# Patient Record
Sex: MALE | Race: WHITE | Employment: OTHER | URBAN - METROPOLITAN AREA
[De-identification: names, ages, dates, MRNs, and addresses within clinical notes are randomized per-mention and may not be internally consistent; named-entity substitution may affect disease eponyms.]

---

## 2021-01-06 ENCOUNTER — HOSPITAL ENCOUNTER (EMERGENCY)
Age: 76
Discharge: HOME | End: 2021-01-06
Attending: EMERGENCY MEDICINE
Payer: MEDICARE

## 2021-01-06 VITALS
WEIGHT: 214 LBS | HEART RATE: 73 BPM | DIASTOLIC BLOOD PRESSURE: 85 MMHG | SYSTOLIC BLOOD PRESSURE: 133 MMHG | RESPIRATION RATE: 18 BRPM | TEMPERATURE: 98.6 F | OXYGEN SATURATION: 97 %

## 2021-01-06 DIAGNOSIS — I48.91 ATRIAL FIBRILLATION, UNSPECIFIED TYPE (HCC): Primary | ICD-10-CM

## 2021-01-06 LAB
ANION GAP SERPL CALCULATED.3IONS-SCNC: 13 MMOL/L (ref 3–16)
BASOPHILS ABSOLUTE: 0.1 K/UL (ref 0–0.2)
BASOPHILS RELATIVE PERCENT: 0.8 %
BUN BLDV-MCNC: 26 MG/DL (ref 7–20)
CALCIUM SERPL-MCNC: 9.8 MG/DL (ref 8.3–10.6)
CHLORIDE BLD-SCNC: 97 MMOL/L (ref 99–110)
CO2: 26 MMOL/L (ref 21–32)
CREAT SERPL-MCNC: 1.1 MG/DL (ref 0.8–1.3)
EOSINOPHILS ABSOLUTE: 0.1 K/UL (ref 0–0.6)
EOSINOPHILS RELATIVE PERCENT: 1.8 %
GFR AFRICAN AMERICAN: >60
GFR NON-AFRICAN AMERICAN: >60
GLUCOSE BLD-MCNC: 237 MG/DL (ref 70–99)
HCT VFR BLD CALC: 45.1 % (ref 40.5–52.5)
HEMOGLOBIN: 15.6 G/DL (ref 13.5–17.5)
INR BLD: 1.08 (ref 0.86–1.14)
LYMPHOCYTES ABSOLUTE: 1.6 K/UL (ref 1–5.1)
LYMPHOCYTES RELATIVE PERCENT: 22.5 %
MCH RBC QN AUTO: 30.6 PG (ref 26–34)
MCHC RBC AUTO-ENTMCNC: 34.6 G/DL (ref 31–36)
MCV RBC AUTO: 88.6 FL (ref 80–100)
MONOCYTES ABSOLUTE: 0.6 K/UL (ref 0–1.3)
MONOCYTES RELATIVE PERCENT: 8.7 %
NEUTROPHILS ABSOLUTE: 4.6 K/UL (ref 1.7–7.7)
NEUTROPHILS RELATIVE PERCENT: 66.2 %
PDW BLD-RTO: 14 % (ref 12.4–15.4)
PLATELET # BLD: 223 K/UL (ref 135–450)
PMV BLD AUTO: 8.5 FL (ref 5–10.5)
POTASSIUM REFLEX MAGNESIUM: 3.7 MMOL/L (ref 3.5–5.1)
PROTHROMBIN TIME: 12.5 SEC (ref 10–13.2)
RBC # BLD: 5.1 M/UL (ref 4.2–5.9)
SODIUM BLD-SCNC: 136 MMOL/L (ref 136–145)
TROPONIN: <0.01 NG/ML
WBC # BLD: 6.9 K/UL (ref 4–11)

## 2021-01-06 PROCEDURE — 85025 COMPLETE CBC W/AUTO DIFF WBC: CPT

## 2021-01-06 PROCEDURE — 85610 PROTHROMBIN TIME: CPT

## 2021-01-06 PROCEDURE — 99284 EMERGENCY DEPT VISIT MOD MDM: CPT

## 2021-01-06 PROCEDURE — 84484 ASSAY OF TROPONIN QUANT: CPT

## 2021-01-06 PROCEDURE — 80048 BASIC METABOLIC PNL TOTAL CA: CPT

## 2021-01-06 PROCEDURE — 93005 ELECTROCARDIOGRAM TRACING: CPT | Performed by: STUDENT IN AN ORGANIZED HEALTH CARE EDUCATION/TRAINING PROGRAM

## 2021-01-06 PROCEDURE — 6370000000 HC RX 637 (ALT 250 FOR IP): Performed by: STUDENT IN AN ORGANIZED HEALTH CARE EDUCATION/TRAINING PROGRAM

## 2021-01-06 PROCEDURE — 84443 ASSAY THYROID STIM HORMONE: CPT

## 2021-01-06 RX ADMIN — APIXABAN 5 MG: 5 TABLET, FILM COATED ORAL at 21:42

## 2021-01-07 ENCOUNTER — TELEPHONE (OUTPATIENT)
Dept: CARDIOLOGY CLINIC | Age: 76
End: 2021-01-07

## 2021-01-07 DIAGNOSIS — I48.0 PAROXYSMAL ATRIAL FIBRILLATION (HCC): Primary | ICD-10-CM

## 2021-01-07 LAB
EKG ATRIAL RATE: 60 BPM
EKG DIAGNOSIS: NORMAL
EKG Q-T INTERVAL: 466 MS
EKG QRS DURATION: 88 MS
EKG QTC CALCULATION (BAZETT): 622 MS
EKG R AXIS: -31 DEGREES
EKG T AXIS: -61 DEGREES
EKG VENTRICULAR RATE: 107 BPM
TSH REFLEX: 1.85 UIU/ML (ref 0.27–4.2)

## 2021-01-07 NOTE — TELEPHONE ENCOUNTER
Patient called back wondering when his appt can be. I told him Lizbet Rose has been in clinic since 8 am and is still seeing pts. He hasn't worn a monitor or got an echo so I was going to schedule that and he doesn't want that. Please advise. He said he will call back at 10 am tomorrow if its not addressed.

## 2021-01-07 NOTE — TELEPHONE ENCOUNTER
LVM offering new pt OV with UL on 1/21 at 1600. Asked pt to call back to confirm. Also, advised pt that UL would like an echo prior to OV as this will help guide his treatment plan. He does not need a monitor as he had documented AF per ECG. When he calls back to confirm OV, please offer to schedule echo at [place] (close to his home), if he is agreeable.

## 2021-01-07 NOTE — ED TRIAGE NOTES
Patient presents with complaint of new onset atrial fibrillation. States he went for a routine colonoscopy and was found to be in atrial fibrillation. States his PCP advised him to been seen in the ED. He denies any respiratory difficulty or pain at this time.

## 2021-01-07 NOTE — TELEPHONE ENCOUNTER
The patient was referred to Dr. Gabrielle Heart by [facility] for new onset atrial fibrillation. The patient has not had any testing done and has not wore a monitor. The patient did not want to schedule an appointment for April, he states that is to far out. Please call the patient at [phone] to advise.

## 2021-01-07 NOTE — ED PROVIDER NOTES
[facility] RESIDENT [facility]       Date of evaluation: 1/6/2021    Chief Complaint     Heart Problem      of Present Illness     [place] is a 70 y.o. male with PMH significant for HTN, diabetes who presents for evaluation of new onset afib. Patient notes that he was at a regularly scheduled screening colonoscopy today when they noted that he was in atrial fibrillation. The colonoscopy was aborted, patient instructed to come to the emergency department for evaluation. He notes a history of PVCs approximately 20 years ago, followed up with stress test which was normal.  At the time he had multiple stressors in his life that he says was a cause of this. Here he is asymptomatic without chest pain, shortness of breath, palpitations, lightheadedness. Denies fevers, chills, congestion, sore throat, double vision, abdominal pain, constipation, diarrhea, dysuria, frequency, urgency, falls, rash, sensory deficits, weakness, headache. Other than stated above, no additional aggravating or alleviating factors are noted. Review of Systems     All other systems are negative except as mentioned in HPI. Past Medical, Surgical, Family, and Social History     He has no past medical history on file. He has no past surgical history on file. His family history is not on file. He reports that he has never smoked. He has never used smokeless tobacco.    Medications     There are no discharge medications for this patient. Allergies     He is allergic to sulfa antibiotics. Physical Exam     INITIAL VITALS: BP: 137/82, Temp: 98.6 °F (37 °C), Pulse: 67, Resp: 16, SpO2: 98 %   Physical Exam    General:  Well appearing. No acute distress  Eyes:  Pupils reactive. No discharge from eyes   ENT:  No discharge from nose. OP clear  Neck:  Supple, trachea midline  Pulmonary:   Non-labored breathing.  Breath sounds clear bilaterally  Cardiac:  Regular rate and rhythm. No murmurs  Abdomen:  Soft. Non-tender. Non-distended  Musculoskeletal:  No long bone deformity. No CVA tenderness  Vascular:  Extremities warm and perfused. Skin:  Dry, no rashes  Extremities:  No peripheral edema  Neuro:  Alert. Moves all four extremities to command. No focal deficit  Neuro:  Alert and oriented x 4. CN II-XII intact. 5/5 strength in all 4 extremities. Sensation grossly intact to light touch.  Speech and mentation normal.  Gait narrow and stable    DiagnosticResults     EKG   Interpreted in conjunction with emergencydepartment physician No att. providers found  Rhythm: atrial fibrillation  Rate: 110-120  Axis: normal  Ectopy: premature ventricular contractions (unifocal)  Conduction: normal  ST Segments: no acute change  T Waves:no acute change  Q Waves: none  Clinical Impression: no acute changes  Comparison:  No previous    RADIOLOGY:  No orders to display       LABS:   Results for orders placed or performed during the hospital encounter of 01/06/21   CBC Auto Differential   Result Value Ref Range    WBC 6.9 4.0 - 11.0 K/uL    RBC 5.10 4.20 - 5.90 M/uL    Hemoglobin 15.6 13.5 - 17.5 g/dL    Hematocrit 45.1 40.5 - 52.5 %    MCV 88.6 80.0 - 100.0 fL    MCH 30.6 26.0 - 34.0 pg    MCHC 34.6 31.0 - 36.0 g/dL    RDW 14.0 12.4 - 15.4 %    Platelets 747 770 - 913 K/uL    MPV 8.5 5.0 - 10.5 fL    Neutrophils % 66.2 %    Lymphocytes % 22.5 %    Monocytes % 8.7 %    Eosinophils % 1.8 %    Basophils % 0.8 %    Neutrophils Absolute 4.6 1.7 - 7.7 K/uL    Lymphocytes Absolute 1.6 1.0 - 5.1 K/uL    Monocytes Absolute 0.6 0.0 - 1.3 K/uL    Eosinophils Absolute 0.1 0.0 - 0.6 K/uL    Basophils Absolute 0.1 0.0 - 0.2 K/uL   Basic Metabolic Panel w/ Reflex to MG   Result Value Ref Range    Sodium 136 136 - 145 mmol/L    Potassium reflex Magnesium 3.7 3.5 - 5.1 mmol/L    Chloride 97 (L) 99 - 110 mmol/L    CO2 26 21 - 32 mmol/L    Anion Gap 13 3 - 16    Glucose 237 (H) 70 - 99 mg/dL    BUN 26 (H) 7 - 20 mg/dL CREATININE 1.1 0.8 - 1.3 mg/dL    GFR Non-African American >60 >60    GFR African American >60 >60    Calcium 9.8 8.3 - 10.6 mg/dL   Protime-INR   Result Value Ref Range    Protime 12.5 10.0 - 13.2 sec    INR 1.08 0.86 - 1.14   Troponin   Result Value Ref Range    Troponin <0.01 <0.01 ng/mL       ED BEDSIDE ULTRASOUND:      RECENT VITALS:  BP: 133/85, Temp: 98.6 °F (37 °C), Pulse: 73,Resp: 18, SpO2: 97 %     Procedures         ED Course     Nursing Notes, Past Medical Hx, Past Surgical Hx, Social Hx, Allergies, and Family Hx were reviewed. The patient was given the following medications:  Orders Placed This Encounter   Medications    apixaban (ELIQUIS) tablet 5 mg       CONSULTS:  [address] [place] [address],[address] / ASSESSMENT / Gino James is a 70 y.o. male with a history and presentation as described above in HPI. The patient was evaluated by myself and the ED Attending Physician, Dr. Ej Ly. All management and disposition plans were discussed and agreed upon. Upon presentation, the patient was well-appearing, afebrile and hemodynamically stable. Patient is asymptomatic currently in rate controlled atrial fibrillation, noted to be with rates in the 90s to 100s. CBC without leukocytosis or anemia present. BMP without sodium/potassium abnormalities or EDER present. Troponin negative. Coags wnl. TSH with reflex sent, pending at dispo. Patient without any history or physical exam findings consistent with hyperthyroidism. Cardiology was consulted, case discussed, recommended anticoagulation here, follow-up with Dr. Augustine Edmonds. Patient with [address] vasc score of 3, HAS BLED score of 1. Eliquis 5 mg administered. I discussed the diagnosis of atrial fibrillation with the patient noted that this may not be new onset, could be chronic and just discovered. I instructed the patient to follow-up with Dr. Augustine Edmonds also with PCP.     Medications received during this ED visit:  Medications   apixaban (ELIQUIS) tablet 5 mg (5 mg Oral Given 1/6/21 3872)       At this time, the patient was deemed appropriate for discharge. My customary discharge instructions, including strict return precautions for new or worsening symptoms concerning to the patient, were provided. All of patient's questions were answered satisfactorily, and was subsequently sent home in stable condition. This patient was also evaluated by the attending physician. All care plans were discussed and agreed upon. Clinical Impression     1. Atrial fibrillation, unspecified type [facility])        Disposition     PATIENT REFERRED TO:  Eyal Mcdowell MD  [address] [address]  [phone]    Schedule an appointment as soon as possible for a visit       Reinaldo Salamanca MD  [address] [place] [address]  [phone]    Schedule an appointment as soon as possible for a visit         DISCHARGE MEDICATIONS:  There are no discharge medications for this patient. DISPOSITION Decision To Discharge 01/06/2021 09:11:16 PM  1. The patient is to be discharged home in stable/improved condition. 2. Workup, treatment and diagnosis were discussed with the patient and/or family members; the patient agrees to the plan and all questions were addressed and answered. 3. The patient is instructed to return to the emergency department should their symptoms worsen or any concern the patient believes warrants acute physician evaluation.         Mian Thompson MD  Resident  01/06/21 9407

## 2021-01-12 ENCOUNTER — HOSPITAL ENCOUNTER (OUTPATIENT)
Dept: NON INVASIVE DIAGNOSTICS | Age: 76
Discharge: HOME | End: 2021-01-12
Payer: MEDICARE

## 2021-01-12 DIAGNOSIS — I48.0 PAROXYSMAL ATRIAL FIBRILLATION (HCC): ICD-10-CM

## 2021-01-12 LAB
LV EF: 58 %
LVEF MODALITY: NORMAL

## 2021-01-12 PROCEDURE — 93306 TTE W/DOPPLER COMPLETE: CPT

## 2021-01-19 NOTE — PROGRESS NOTES
[facility]   Cardiac Electrophysiology Consultation   Date: 1/21/2021  Reason for Consultation:  AF  Consult Requesting Physician: No att. providers found     Chief Complaint:   Chief Complaint   Patient presents with    Atrial Fibrillation    New Patient      HPI: Mario Dumont is a 70 y.o. male referred by ED physician for AF. PMH significant for HTN, DM type II, and PVC's about 20 years with normal stress test and thought to be related to stress. On 1/6/21, pt was having a routine colonoscopy and found to be in AF. Colonoscopy was aborted and he was sent to the ED. ECG in the ED confirmed AF, rate 107. The pt was asymptomatic without palpitations, SOB, or CP. He was sent home and started on Eliquis. Echo (1/12/21) showed preserved LVEF of 55-60%, intermediate diastolic dysfunction, and LA enlargement. Interval History: Today, he is here for management of new onset AF. He is in AF today with rate 101 bpm.  He complains of fatigue, MAIER, palpitations, chest discomfort, and decline in functional capacity. He also confirms occasional dizziness, especially with postural changes. He has noticed these symptoms since 7/2020 when he was having trouble walk up a hill while fishing with his grandson. He just thought he was out of shape. He is the  at a [employer] and struggles walking from one end of the building to the other due to fatigue. He has never been tested for sleep apnea, but he's been told he snores. In the past, he worked for [employer]. Atrial Fibrillation:    BMI    :   Body mass index is 31.96 kg/m².     Duration   :   1/2021    Symptoms   :   Shortness of breath, palpitations, easy fatigability, dyspnea on exertion, decline in his functional capacity    Previous DCCV :  none    Previous AAD           :   none    Beta blocker  :   Lopressor    ACE / ARB  :   losartan    OAC   :   Eliquis    LVEF    :   55-60%    Left atrial size :   5.0 cm SURAJ   :   Not tested    Past Medical History:   Diagnosis Date    Allergic reaction to sulfonamide 1960    Bee sting reaction 2000    Cephalexin adverse reaction 1993    Fracture of left elbow 1956    History of right inguinal hernia repair 1987    Hidalgo's neuroma of right foot 1989    Open fracture of left elbow 1991    Open fracture of left humerus 1991    Rupture of appendix 1970        Past Surgical History:   Procedure Laterality Date    TONSILLECTOMY  1953       Allergies: Allergies   Allergen Reactions    Bee Venom Swelling    Cephalexin Swelling    Cephalosporins     Propoxyphene     Sulfa Antibiotics        Medication:   Prior to Admission medications    Medication Sig Start Date End Date Taking? Authorizing Provider   amLODIPine (NORVASC) 10 MG tablet Take 1 tablet by mouth every morning 11/4/20  Yes Historical Provider, MD   atorvastatin (LIPITOR) 40 MG tablet Take 40 mg by mouth nightly 11/4/20  Yes Historical Provider, MD   ciclopirox (LOPROX) 0.77 % cream Apply topically every morning 11/4/20  Yes Historical Provider, MD   cloNIDine (CATAPRES) 0.2 MG tablet Take 0.2 mg by mouth 2 times daily 11/4/20  Yes Historical Provider, MD   enalapril (VASOTEC) 10 MG tablet Take 10 mg by mouth 2 times daily 11/4/20  Yes Historical Provider, MD   fenofibrate (TRIGLIDE) 160 MG tablet Take 160 mg by mouth every evening 11/4/20  Yes Historical Provider, MD   glimepiride (AMARYL) 2 MG tablet Take 2 mg by mouth 2 times daily 11/4/20  Yes Historical Provider, MD   losartan-hydroCHLOROthiazide (HYZAAR) 100-25 MG per tablet Take 1 tablet by mouth every evening 11/4/20  Yes Historical Provider, MD   LORazepam (ATIVAN) 1 MG tablet Take 1 mg by mouth 2 times daily.  11/4/20  Yes Historical Provider, MD   metFORMIN (GLUCOPHAGE) 1000 MG tablet Take 1,000 mg by mouth 2 times daily 11/4/20  Yes Historical Provider, MD   metoprolol tartrate (LOPRESSOR) 50 MG tablet Take 50 mg by mouth 2 times daily 11/4/20  Yes Historical Provider, MD   sAXagliptin (ONGLYZA) 5 MG TABS tablet Take 5 mg by mouth every evening 11/4/20  Yes Historical Provider, MD   PARoxetine (PAXIL) 20 MG tablet Take 20 mg by mouth every evening 11/4/20  Yes Historical Provider, MD   aspirin 81 MG EC tablet Take 81 mg by mouth daily   Yes Historical Provider, MD   apixaban (ELIQUIS) 2.5 MG TABS tablet Take 2.5 mg by mouth 2 times daily 1/11/21  Yes Historical Provider, MD   blood glucose test strips (TRUETEST TEST) strip 1 strip 2 times daily 4/2/20  Yes Historical Provider, MD   Lancets MISC Use to check Glucose BID. Dx: 250.00. Dispense Contour Lancets 4/2/20  Yes Historical Provider, MD   aspirin-acetaminophen-caffeine (MIGRAINE FORMULA) 768-056-44 MG per tablet Take 1 tablet by mouth 2 times daily as needed for Headaches   Yes Historical Provider, MD       Social History:   reports that he has never smoked. He has never used smokeless tobacco.        Family History:  family history is not on file. Reviewed. Denies family history of sudden cardiac death, arrhythmia, premature CAD    Review of System:    · General ROS: negative for - chills, fever   · Psychological ROS: negative for - anxiety or depression  · Ophthalmic ROS: negative for - eye pain or loss of vision  · ENT ROS: negative for - epistaxis, headaches, nasal discharge, sore throat   · Allergy and Immunology ROS: negative for - hives, nasal congestion   · Hematological and Lymphatic ROS: negative for - bleeding problems, blood clots, bruising or jaundice  · Endocrine ROS: negative for - skin changes, temperature intolerance or unexpected weight changes  · Respiratory ROS: negative for - cough, hemoptysis, pleuritic pain, SOB, sputum changes or wheezing  · Cardiovascular ROS: Per HPI.    · Gastrointestinal ROS: negative for - abdominal pain, blood in stools, diarrhea, hematemesis, melena, nausea/vomiting or swallowing difficulty/pain  · Genito-Urinary ROS: negative for - dysuria or incontinence · Musculoskeletal ROS: negative for - joint swelling or muscle pain  · Neurological ROS: negative for - confusion, dizziness, gait disturbance, headaches, numbness/tingling, seizures, speech problems, tremors, visual changes or weakness  · Dermatological ROS: negative for - rash    Physical Examination:  Vitals:    01/21/21 1552   BP: 124/86   Pulse: 101   Temp: 97.5 °F (36.4 °C)       · Constitutional: Oriented. No distress. · Head: Normocephalic and atraumatic. · Mouth/Throat: Oropharynx is clear and moist.   · Eyes: Conjunctivae normal. EOM are normal.   · Neck: Normal range of motion. Neck supple. No rigidity. No JVD present. · Cardiovascular: Normal rate, regular rhythm, S1&S2 and intact distal pulses. · Pulmonary/Chest: Bilateral respiratory sounds. No wheezes. No rhonchi. · Abdominal: Soft. Bowel sounds present. No distension, No tenderness. · Musculoskeletal: No tenderness. No edema    · Lymphadenopathy: Has no cervical adenopathy. · Neurological: Alert and oriented. Cranial nerve appears intact, No Gross deficit   · Skin: Skin is warm and dry. No rash noted. · Psychiatric: Has a normal mood, affect and behavior     Labs:  Reviewed. ECG: reviewed, AF, rate 101, with QRS duration 92 ms. No pathologic Q waves, ventricular pre-excitation, or QT prolongation. Studies:   1. Event monitor:     2. Echo 1/12/21:   Summary   Normal left ventricular size. Mild concentric left ventricular hypertrophy. Normal left ventricular systolic function with an estimated ejection   fraction of 55-60%. Indeterminate diastolic function due to afib. The left atrium is moderately dilated. 3. Stress Test 2/14/02: IMPRESSION:   NO OBVIOUS SCARRING NOR ISCHEMIA.  50 PERCENT LEFT VENTRICULAR   EJECTION FRACTION. 4. Cath: The MCOT, echocardiogram, stress test, and coronary angiography/PCI were reviewed by myself and used for my plan of care. Procedures:  1.   RFA/PVI of AF Assessment/Plan:  1. Newly diagnosed AF, on Eliquis   2. HTN, stable on losartan, metoprolol, and amlodipine  3. HLD, stable on statin  4.  DM, type II     New onset AF, found incidentally during routine colonoscopy  Preserved LV EF, but with intermediate diastolic dysfunction and LAE  In AF today, rate 101, symptomatic  Currently on Eliquis 2.5 mg BID. His creat was 1.1 (1/6/21). He is not on adequate dose of Eliquis for stroke prevention. Will increase Eliquis to 5 mg BID    Discussed in detail about the risk factors, pathophysiology, and treatment options including life stye modifications for atrial fibrillation. 1. Keep your blood pressure under control. 2. Keep your blood sugar under control. 3. Eat heart healthy diet  4. Minimize alcohol intake  5. Stop smoking  6. Be active, keep your body weight optimal  7. Exercise on a regular basis  8. Manage your stress   9. If you snore, get evaluated for sleep apnea  10. Be aware of the symptoms of stroke    Due to symptomatic AF and change in atrial structure, would recommend rhythm management. We educated the patient that atrial fibrillation and atrial flutter are both progressive diseases, with more frequent episodes that will ensue. Subsequent episodes usually become more sustained to the extent that many individuals would then develop persistent atrial fibrillation/atrial flutter. Once persistence is reached, permanent atrial dysrhythmia is inevitable. Treatment options including cardioversion, anti-arrhythmic medication therapy, rate control strategy, oral anticoagulation, and atrial fibrillation ablation were discussed with patient. Risks, benefits and alternative of each treatment options were explained. We discussed different management options for both atrial tachydysrhythmia including their risks and benefits.  These options include use of cardioversion (mainly for persisting atrial fibrillation or atrial flutter) which provides an effective after atrial fibrillation ablation. Lifelong oral anticoagulation secondary to high chads vasc score. Follow up in 1 week with EP NP after the ablation  Follow up in 3 months with me    Thank you for allowing me to participate in the care of Braulio Lofton. All questions and concerns were addressed to the patient/family. Alternatives to my treatment were discussed. Dyllan Golden RN, am scribing for and in the presence of Dr. Terral Nissen. 01/21/21 4:35 PM  Karen Vaughn RN    I, Gordo Estrada MD, personally performed the services prescribed in this documentation as scribed by Ms. Karen Vaughn RN in my presence and it is both accurate and complete.        Gordo Estrada MD  Cardiac Electrophysiology  [address]

## 2021-01-21 ENCOUNTER — OFFICE VISIT (OUTPATIENT)
Dept: CARDIOLOGY CLINIC | Age: 76
End: 2021-01-21
Payer: MEDICARE

## 2021-01-21 VITALS
SYSTOLIC BLOOD PRESSURE: 124 MMHG | TEMPERATURE: 97.5 F | HEART RATE: 101 BPM | DIASTOLIC BLOOD PRESSURE: 86 MMHG | BODY MASS INDEX: 32.05 KG/M2 | HEIGHT: 69 IN | WEIGHT: 216.4 LBS

## 2021-01-21 DIAGNOSIS — I48.0 PAROXYSMAL ATRIAL FIBRILLATION (HCC): Primary | ICD-10-CM

## 2021-01-21 DIAGNOSIS — I10 ESSENTIAL HYPERTENSION: ICD-10-CM

## 2021-01-21 DIAGNOSIS — E78.2 MIXED HYPERLIPIDEMIA: ICD-10-CM

## 2021-01-21 PROCEDURE — 93000 ELECTROCARDIOGRAM COMPLETE: CPT | Performed by: INTERNAL MEDICINE

## 2021-01-21 PROCEDURE — 1123F ACP DISCUSS/DSCN MKR DOCD: CPT | Performed by: INTERNAL MEDICINE

## 2021-01-21 PROCEDURE — G8484 FLU IMMUNIZE NO ADMIN: HCPCS | Performed by: INTERNAL MEDICINE

## 2021-01-21 PROCEDURE — 1036F TOBACCO NON-USER: CPT | Performed by: INTERNAL MEDICINE

## 2021-01-21 PROCEDURE — G8417 CALC BMI ABV UP PARAM F/U: HCPCS | Performed by: INTERNAL MEDICINE

## 2021-01-21 PROCEDURE — 3017F COLORECTAL CA SCREEN DOC REV: CPT | Performed by: INTERNAL MEDICINE

## 2021-01-21 PROCEDURE — G8427 DOCREV CUR MEDS BY ELIG CLIN: HCPCS | Performed by: INTERNAL MEDICINE

## 2021-01-21 PROCEDURE — 99205 OFFICE O/P NEW HI 60 MIN: CPT | Performed by: INTERNAL MEDICINE

## 2021-01-21 PROCEDURE — 4040F PNEUMOC VAC/ADMIN/RCVD: CPT | Performed by: INTERNAL MEDICINE

## 2021-01-21 RX ORDER — LOSARTAN POTASSIUM AND HYDROCHLOROTHIAZIDE 25; 100 MG/1; MG/1
1 TABLET ORAL EVERY EVENING
COMMUNITY
Start: 2020-11-04

## 2021-01-21 RX ORDER — BIOTIN 1 MG
1 TABLET ORAL 2 TIMES DAILY
COMMUNITY
Start: 2020-04-02

## 2021-01-21 RX ORDER — FENOFIBRATE 160 MG/1
160 TABLET ORAL EVERY EVENING
COMMUNITY
Start: 2020-11-04

## 2021-01-21 RX ORDER — GLIMEPIRIDE 2 MG/1
2 TABLET ORAL 2 TIMES DAILY
COMMUNITY
Start: 2020-11-04

## 2021-01-21 RX ORDER — LANCETS 30 GAUGE
EACH MISCELLANEOUS
COMMUNITY
Start: 2020-04-02

## 2021-01-21 RX ORDER — CLONIDINE HYDROCHLORIDE 0.2 MG/1
0.2 TABLET ORAL 2 TIMES DAILY
COMMUNITY
Start: 2020-11-04

## 2021-01-21 RX ORDER — METOPROLOL TARTRATE 50 MG/1
50 TABLET, FILM COATED ORAL 2 TIMES DAILY
COMMUNITY
Start: 2020-11-04

## 2021-01-21 RX ORDER — ATORVASTATIN CALCIUM 40 MG/1
40 TABLET, FILM COATED ORAL NIGHTLY
COMMUNITY
Start: 2020-11-04

## 2021-01-21 RX ORDER — PAROXETINE HYDROCHLORIDE 20 MG/1
20 TABLET, FILM COATED ORAL EVERY EVENING
COMMUNITY
Start: 2020-11-04

## 2021-01-21 RX ORDER — AMLODIPINE BESYLATE 10 MG/1
1 TABLET ORAL EVERY MORNING
COMMUNITY
Start: 2020-11-04

## 2021-01-21 RX ORDER — ENALAPRIL MALEATE 10 MG/1
10 TABLET ORAL 2 TIMES DAILY
COMMUNITY
Start: 2020-11-04

## 2021-01-21 RX ORDER — ASPIRIN 81 MG/1
81 TABLET ORAL DAILY
Status: ON HOLD | COMMUNITY
End: 2021-02-22 | Stop reason: HOSPADM

## 2021-01-21 RX ORDER — ACETAMINOPHEN, ASPIRIN AND CAFFEINE 250; 250; 65 MG/1; MG/1; MG/1
1 TABLET, FILM COATED ORAL 2 TIMES DAILY PRN
Status: ON HOLD | COMMUNITY
End: 2021-05-18 | Stop reason: HOSPADM

## 2021-01-21 RX ORDER — LORAZEPAM 1 MG/1
1 TABLET ORAL 2 TIMES DAILY
COMMUNITY
Start: 2020-11-04

## 2021-01-27 ENCOUNTER — PREP FOR PROCEDURE (OUTPATIENT)
Dept: CARDIOLOGY CLINIC | Age: 76
End: 2021-01-27

## 2021-01-27 RX ORDER — SODIUM CHLORIDE 0.9 % (FLUSH) 0.9 %
10 SYRINGE (ML) INJECTION PRN
Status: CANCELLED | OUTPATIENT
Start: 2021-01-27

## 2021-01-27 RX ORDER — SODIUM CHLORIDE 9 MG/ML
INJECTION, SOLUTION INTRAVENOUS CONTINUOUS
Status: CANCELLED | OUTPATIENT
Start: 2021-01-27

## 2021-01-27 RX ORDER — SODIUM CHLORIDE 0.9 % (FLUSH) 0.9 %
10 SYRINGE (ML) INJECTION EVERY 12 HOURS SCHEDULED
Status: CANCELLED | OUTPATIENT
Start: 2021-01-27

## 2021-02-01 ENCOUNTER — TELEPHONE (OUTPATIENT)
Dept: CARDIOLOGY CLINIC | Age: 76
End: 2021-02-01

## 2021-02-01 DIAGNOSIS — I48.19 PERSISTENT ATRIAL FIBRILLATION (HCC): Primary | ICD-10-CM

## 2021-02-01 NOTE — TELEPHONE ENCOUNTER
Spoke with pt. At [address] with UL on 1/21/21, his Eliquis was increased to 5 mg BID. On 1/22/21, he was on a ladder (works [employer]) and missed the last step, falling and hitting his back against a brick wall. He didn't notice any injuries, beside being sore. On the evening of 1/27, he noticed pink urine that progressed to red before going to bed. In the morning, his urine was clear. Then that evening (1/28), the hematuria came back and lasted through 1/29. At 1600 on 1/29, he passed a blood clot and the bleeding subsided. He has had clear urine since the afternoon of 1/29. He did not stop Eliquis during this time. He is scheduled for an AF ablation on 2/22. Does he need to see urology or can we continue to monitor and refer to urology if hematuria comes back?

## 2021-02-01 NOTE — TELEPHONE ENCOUNTER
Pt called back in requesting to speak to nurse [facility] regarding missed call. Pt can be reached at [phone] to address this matter.  Thanks

## 2021-02-01 NOTE — TELEPHONE ENCOUNTER
Patient called stating he was seen in office on 1.21.21 and was prescribed Eliquis. Patient had some blood in urine on Friday for two days stating it could be from a fall or possibly the medicine.  Patient does not have any problems today but would like a call

## 2021-02-04 ENCOUNTER — VIRTUAL VISIT (OUTPATIENT)
Dept: PULMONOLOGY | Age: 76
End: 2021-02-04
Payer: MEDICARE

## 2021-02-04 DIAGNOSIS — E11.43 TYPE 2 DIABETES MELLITUS WITH PERIPHERAL AUTONOMIC NEUROPATHY (HCC): Chronic | ICD-10-CM

## 2021-02-04 DIAGNOSIS — R06.83 SNORING: ICD-10-CM

## 2021-02-04 DIAGNOSIS — R31.9 HEMATURIA, UNSPECIFIED TYPE: Primary | ICD-10-CM

## 2021-02-04 DIAGNOSIS — I48.91 NEW ONSET A-FIB (HCC): ICD-10-CM

## 2021-02-04 DIAGNOSIS — F41.1 ANXIETY STATE: Chronic | ICD-10-CM

## 2021-02-04 DIAGNOSIS — E66.9 NON MORBID OBESITY, UNSPECIFIED OBESITY TYPE: Chronic | ICD-10-CM

## 2021-02-04 DIAGNOSIS — I10 ESSENTIAL HYPERTENSION: Chronic | ICD-10-CM

## 2021-02-04 DIAGNOSIS — G47.10 HYPERSOMNIA: Primary | ICD-10-CM

## 2021-02-04 PROBLEM — F32.9 MAJOR DEPRESSION, CHRONIC: Chronic | Status: ACTIVE | Noted: 2018-03-29

## 2021-02-04 PROBLEM — F32.9 MAJOR DEPRESSION, CHRONIC: Status: ACTIVE | Noted: 2018-03-29

## 2021-02-04 PROCEDURE — 4040F PNEUMOC VAC/ADMIN/RCVD: CPT | Performed by: INTERNAL MEDICINE

## 2021-02-04 PROCEDURE — 3017F COLORECTAL CA SCREEN DOC REV: CPT | Performed by: INTERNAL MEDICINE

## 2021-02-04 PROCEDURE — 2022F DILAT RTA XM EVC RTNOPTHY: CPT | Performed by: INTERNAL MEDICINE

## 2021-02-04 PROCEDURE — 1123F ACP DISCUSS/DSCN MKR DOCD: CPT | Performed by: INTERNAL MEDICINE

## 2021-02-04 PROCEDURE — 3046F HEMOGLOBIN A1C LEVEL >9.0%: CPT | Performed by: INTERNAL MEDICINE

## 2021-02-04 PROCEDURE — 99204 OFFICE O/P NEW MOD 45 MIN: CPT | Performed by: INTERNAL MEDICINE

## 2021-02-04 PROCEDURE — G8427 DOCREV CUR MEDS BY ELIG CLIN: HCPCS | Performed by: INTERNAL MEDICINE

## 2021-02-04 ASSESSMENT — SLEEP AND FATIGUE QUESTIONNAIRES
HOW LIKELY ARE YOU TO NOD OFF OR FALL ASLEEP WHILE SITTING AND TALKING TO SOMEONE: 1
HOW LIKELY ARE YOU TO NOD OFF OR FALL ASLEEP WHILE SITTING INACTIVE IN A PUBLIC PLACE: 1

## 2021-02-04 NOTE — TELEPHONE ENCOUNTER
Pt referred to Dr. Blayne Saul and given contact information. Cali Bustamante, pt missed one dose of AC yesterday and has an ablation scheduled for 2/22/21.  Will this missed dose be a problem?- Ania

## 2021-02-04 NOTE — PROGRESS NOTES
Differential diagnosis includes but not limited to: SURAJ, PLMD's, narcolepsy, parasomnias. Reviewed SURAJ (which is the highest likelihood diagnosis): pathophysiology, diagnosis, complications and treatment. Instructed him not to drive if drowsy. Continue medications per his PCP and other physicians. Limit caffeine use after 3pm. Will do PSG to rule-out SURAJ and other sleep disorders. 1 wk follow up after study to review his results. The chronic medical conditions listed are directly related to the primary diagnosis listed above. The management of the primary diagnosis affects the secondary diagnosis and vice versa. Continue meds for: a fib, HTN, DM, and TARIQ. Pt would medically benefit from wt loss for SURAJ (diet, exercise, surgical). Orders Placed This Encounter   Procedures    POLYSOMNOGRAPHY (PSG) - Diagnostic Testing          Subjective:     Patient ID: Lisset Ceron is a 70 y.o. male. Chief Complaint   Patient presents with    Atrial Fibrillation     ablation sched. 2/22    Daytime Sleepiness       HPI:      Lisset Ceron is a 70 y.o. male referred by Mic Sheridan for a sleep evaluation. He complains of: snoring, excessive daytime sleepiness , non-restorative sleep, nocturia, napping, drowsiness while driving (on long drives) and tossing and turning at night. He denies: cataplexy and hypnagogic hallucinations. Previous evaluation and treatment has included- none    DOT/CDL - No  FAA/'s license -No    Previous Report(s) Reviewed: historical medical records, office notes, andreferral letter(s). Pertinent data has been documented.     Rye - Total score: 13    Caffeine Intake - Pop/Soda: 1 can(s) per day    Social History     Socioeconomic History    Marital status: Single     Spouse name: Not on file    Number of children: Not on file    Years of education: Not on file    Highest education level: Not on file   Occupational History    Not on file   Social Needs  Financial resource strain: Not on file    Food insecurity     Worry: Not on file     Inability: Not on file   [place] [employer] needs     Medical: Not on file     Non-medical: Not on file   Tobacco Use    Smoking status: Never Smoker    Smokeless tobacco: Never Used   Substance and Sexual Activity    Alcohol use: Not on file    Drug use: Not on file    Sexual activity: Not on file   Lifestyle    Physical activity     Days per week: Not on file     Minutes per session: Not on file    Stress: Not on file   Relationships    Social connections     Talks on phone: Not on file     Gets together: Not on file     Attends Worship service: Not on file     Active member of club or organization: Not on file     Attends meetings of clubs or organizations: Not on file     Relationship status: Not on file    Intimate partner violence     Fear of current or ex partner: Not on file     Emotionally abused: Not on file     Physically abused: Not on file     Forced sexual activity: Not on file   Other Topics Concern    Not on file   Social History Narrative    Not on file        Current Outpatient Medications   Medication Sig Dispense Refill    amLODIPine (NORVASC) 10 MG tablet Take 1 tablet by mouth every morning      atorvastatin (LIPITOR) 40 MG tablet Take 40 mg by mouth nightly      ciclopirox (LOPROX) 0.77 % cream Apply topically every morning      cloNIDine (CATAPRES) 0.2 MG tablet Take 0.2 mg by mouth 2 times daily      enalapril (VASOTEC) 10 MG tablet Take 10 mg by mouth 2 times daily      fenofibrate (TRIGLIDE) 160 MG tablet Take 160 mg by mouth every evening      glimepiride (AMARYL) 2 MG tablet Take 2 mg by mouth 2 times daily      losartan-hydroCHLOROthiazide (HYZAAR) 100-25 MG per tablet Take 1 tablet by mouth every evening      LORazepam (ATIVAN) 1 MG tablet Take 1 mg by mouth 2 times daily.       metFORMIN (GLUCOPHAGE) 1000 MG tablet Take 1,000 mg by mouth 2 times daily  metoprolol tartrate (LOPRESSOR) 50 MG tablet Take 50 mg by mouth 2 times daily      sAXagliptin (ONGLYZA) 5 MG TABS tablet Take 5 mg by mouth every evening      PARoxetine (PAXIL) 20 MG tablet Take 20 mg by mouth every evening      aspirin 81 MG EC tablet Take 81 mg by mouth daily      blood glucose test strips (TRUETEST TEST) strip 1 strip 2 times daily      Lancets MISC Use to check Glucose BID. Dx: 250.00. Dispense Contour Lancets      aspirin-acetaminophen-caffeine (MIGRAINE FORMULA) 250-250-65 MG per tablet Take 1 tablet by mouth 2 times daily as needed for Headaches      apixaban (ELIQUIS) 5 MG TABS tablet Take 1 tablet by mouth 2 times daily 180 tablet 3     No current facility-administered medications for this visit.         Allergies as of 02/04/2021 - Review Complete 02/04/2021   Allergen Reaction Noted    Bee venom Swelling 08/26/2010    Cephalexin Swelling 02/19/2007    Cephalosporins  02/09/2016    Propoxyphene  02/09/2016    Sulfa antibiotics  01/06/2021       Patient Active Problem List   Diagnosis    Anxiety state    Type 2 diabetes mellitus with peripheral autonomic neuropathy (HCC)    Essential hypertension    Major depression, chronic    Migraine    Mixed hyperlipidemia    New onset a-fib ([address]    Reflux esophagitis    Obesity       Past Medical History:   Diagnosis Date    Allergic reaction to sulfonamide 1960    Bee sting reaction 2000    Cephalexin adverse reaction 1993    Fracture of left elbow 1956    History of right inguinal hernia repair 1987    Hidalgo's neuroma of right foot 1989    Open fracture of left elbow 1991    Open fracture of left humerus 1991    Rupture of appendix 1970       Past Surgical History:   Procedure Laterality Date    TONSILLECTOMY  1953       Family History   Problem Relation Age of Onset    Sleep Apnea Father        Objective:     Vitals:  Patient reported Height and Weight Calculated BMI   Patient-Reported Vitals 2/4/2021 Patient-Reported Weight 215#   Patient-Reported Height 5'9\"    31.7     Due to COVID-19 this was a virtual visit and physical exam was deferred.     Electronically signed by Morgan Rose MD on2/4/2021 at 3:02 PM

## 2021-02-04 NOTE — LETTER
[facility]  [place] [address] [place] [address]  Phone: [phone]  Fax: [phone]      February 4, 2021       Patient: Fidelia Chaparro   MR Number: <[id number]>   YOB: 1945   Date of Visit: 2/4/2021     Thank you for allowing me to participate in the care of Anthony Eagle. Here is my assessment and plan. Also attached is a copy of his consult note:    ASSESSMENT:  Visit Diagnoses and Associated Orders     Hypersomnia   (New Problem)  -  Primary    needs work-up    POLYSOMNOGRAPHY (PSG) - Diagnostic Testing [89709 Custom]   - Future Order         Snoring   (New Problem)      needs work-up    POLYSOMNOGRAPHY (PSG) - Diagnostic Testing [46932 Custom]   - Future Order         New onset a-fib (HCC)   (Stable)           Essential hypertension   (Stable)           Type 2 diabetes mellitus with peripheral autonomic neuropathy (HCC)   (Stable)           Anxiety state   (Stable)           Non morbid obesity, unspecified obesity type   (Stable)                 Plan:  Differential diagnosis includes but not limited to: SURAJ, PLMD's, narcolepsy, parasomnias. Reviewed SURAJ (which is the highest likelihood diagnosis): pathophysiology, diagnosis, complications and treatment. Instructed him not to drive if drowsy. Continue medications per his PCP and other physicians. Limit caffeine use after 3pm. Will do PSG to rule-out SURAJ and other sleep disorders. 1 wk follow up after study to review his results. The chronic medical conditions listed are directly related to the primary diagnosis listed above. The management of the primary diagnosis affects the secondary diagnosis and vice versa. Continue meds for: a fib, HTN, DM, and TARIQ. Pt would medically benefit from wt loss for SURAJ (diet, exercise, surgical).     Orders Placed This Encounter   Procedures    POLYSOMNOGRAPHY (PSG) - Diagnostic Testing If you have questions or concerns, please do not hesitate to call me. I look forward to following Ana Maria Ranjan along with you.     Sincerely,      Claudean Peon, MD    CC providers:  Kristen Hawley MD  [facility]  [address] [address]  [address] [facility] [address] [place] [address] MED  [address]  Via Outside Provider Messaging

## 2021-02-04 NOTE — TELEPHONE ENCOUNTER
Per UL, he will need a CHRISTOFER immediately prior to the AF ablation. Kirstie Low and Rafael from the cath lab aware.

## 2021-02-04 NOTE — TELEPHONE ENCOUNTER
Yes - it will be a problem. Please advise Carmen and Dr. Eliz Jeffrey to see if they want to move out appmt or schedule for a CHRISTOFER.

## 2021-02-11 ENCOUNTER — HOSPITAL ENCOUNTER (OUTPATIENT)
Dept: CT IMAGING | Age: 76
Discharge: HOME | End: 2021-02-11
Payer: MEDICARE

## 2021-02-11 DIAGNOSIS — R31.0 GROSS HEMATURIA: ICD-10-CM

## 2021-02-11 LAB
GFR AFRICAN AMERICAN: >60
GFR NON-AFRICAN AMERICAN: >60
PERFORMED ON: NORMAL
POC CREATININE: 1.1 MG/DL (ref 0.8–1.3)
POC SAMPLE TYPE: NORMAL

## 2021-02-11 PROCEDURE — 74178 CT ABD&PLV WO CNTR FLWD CNTR: CPT

## 2021-02-11 PROCEDURE — 6360000004 HC RX CONTRAST MEDICATION: Performed by: UROLOGY

## 2021-02-11 PROCEDURE — 82565 ASSAY OF CREATININE: CPT

## 2021-02-11 RX ADMIN — IOPAMIDOL 80 ML: 755 INJECTION, SOLUTION INTRAVENOUS at 08:53

## 2021-02-16 ENCOUNTER — TELEPHONE (OUTPATIENT)
Dept: CARDIOLOGY CLINIC | Age: 76
End: 2021-02-16

## 2021-02-16 ENCOUNTER — HOSPITAL ENCOUNTER (OUTPATIENT)
Dept: CT IMAGING | Age: 76
Discharge: HOME | End: 2021-02-16
Payer: MEDICARE

## 2021-02-16 DIAGNOSIS — I48.0 PAROXYSMAL ATRIAL FIBRILLATION (HCC): ICD-10-CM

## 2021-02-16 LAB
GFR AFRICAN AMERICAN: >60
GFR NON-AFRICAN AMERICAN: >60
PERFORMED ON: NORMAL
POC CREATININE: 1 MG/DL (ref 0.8–1.3)
POC SAMPLE TYPE: NORMAL

## 2021-02-16 PROCEDURE — 6360000004 HC RX CONTRAST MEDICATION: Performed by: INTERNAL MEDICINE

## 2021-02-16 PROCEDURE — 71275 CT ANGIOGRAPHY CHEST: CPT

## 2021-02-16 PROCEDURE — 82565 ASSAY OF CREATININE: CPT

## 2021-02-16 RX ADMIN — IOPAMIDOL 80 ML: 755 INJECTION, SOLUTION INTRAVENOUS at 12:48

## 2021-02-16 NOTE — TELEPHONE ENCOUNTER
Pt called to give update on hematuria. He saw urology and had a CT done. Pt reported that the urologist told him that one wall of the bladder is thicker than normal and, at some point, he'd like to do a scope and biopsy. The pt states that the urologist (Dr. Elba Dwyer) didn't request the pt to come off Eliqu, he just wanted the pt to call our office to make UL aware. Pt is scheduled for an AF ablation this Monday (2/22) and the pt would like to proceed. He states that his hematuria comes in \"waves\" (bleeding for 2-3 days and then clear for a while). He can correlate the bleeding with increase in activity. For the past 30 hrs, he's been less active and his urine is clear. He is aware that after the ablation, he needs to be on uninterrupted [address] [place] [address] Road for 3 months, but he wants to get the ablation and take the risk. Explained that  is out of town this week. Pt wishes to continue as planned, arriving at [facility] on Monday, and discuss this with UL immediately prior to the procedure.

## 2021-02-17 ENCOUNTER — OFFICE VISIT (OUTPATIENT)
Dept: PRIMARY CARE CLINIC | Age: 76
End: 2021-02-17
Payer: MEDICARE

## 2021-02-17 DIAGNOSIS — Z01.818 PREOP EXAMINATION: Primary | ICD-10-CM

## 2021-02-17 PROCEDURE — G8428 CUR MEDS NOT DOCUMENT: HCPCS | Performed by: NURSE PRACTITIONER

## 2021-02-17 PROCEDURE — G8417 CALC BMI ABV UP PARAM F/U: HCPCS | Performed by: NURSE PRACTITIONER

## 2021-02-17 PROCEDURE — 99211 OFF/OP EST MAY X REQ PHY/QHP: CPT | Performed by: NURSE PRACTITIONER

## 2021-02-18 ENCOUNTER — TELEPHONE (OUTPATIENT)
Dept: CARDIOLOGY CLINIC | Age: 76
End: 2021-02-18

## 2021-02-18 DIAGNOSIS — I48.0 PAROXYSMAL ATRIAL FIBRILLATION (HCC): ICD-10-CM

## 2021-02-18 LAB
ANION GAP SERPL CALCULATED.3IONS-SCNC: 16 MMOL/L (ref 3–16)
BASOPHILS ABSOLUTE: 0.1 K/UL (ref 0–0.2)
BASOPHILS RELATIVE PERCENT: 0.9 %
BUN BLDV-MCNC: 21 MG/DL (ref 7–20)
CALCIUM SERPL-MCNC: 9.5 MG/DL (ref 8.3–10.6)
CHLORIDE BLD-SCNC: 98 MMOL/L (ref 99–110)
CO2: 25 MMOL/L (ref 21–32)
CREAT SERPL-MCNC: 1.2 MG/DL (ref 0.8–1.3)
EOSINOPHILS ABSOLUTE: 0.1 K/UL (ref 0–0.6)
EOSINOPHILS RELATIVE PERCENT: 2.1 %
GFR AFRICAN AMERICAN: >60
GFR NON-AFRICAN AMERICAN: 59
GLUCOSE BLD-MCNC: 250 MG/DL (ref 70–99)
HCT VFR BLD CALC: 41.4 % (ref 40.5–52.5)
HEMOGLOBIN: 14.6 G/DL (ref 13.5–17.5)
INR BLD: 1.56 (ref 0.86–1.14)
LYMPHOCYTES ABSOLUTE: 1.6 K/UL (ref 1–5.1)
LYMPHOCYTES RELATIVE PERCENT: 22.2 %
MCH RBC QN AUTO: 31.2 PG (ref 26–34)
MCHC RBC AUTO-ENTMCNC: 35.3 G/DL (ref 31–36)
MCV RBC AUTO: 88.5 FL (ref 80–100)
MONOCYTES ABSOLUTE: 0.5 K/UL (ref 0–1.3)
MONOCYTES RELATIVE PERCENT: 6.4 %
NEUTROPHILS ABSOLUTE: 4.8 K/UL (ref 1.7–7.7)
NEUTROPHILS RELATIVE PERCENT: 68.4 %
PDW BLD-RTO: 13.6 % (ref 12.4–15.4)
PLATELET # BLD: 238 K/UL (ref 135–450)
PMV BLD AUTO: 9 FL (ref 5–10.5)
POTASSIUM SERPL-SCNC: 4.1 MMOL/L (ref 3.5–5.1)
PROTHROMBIN TIME: 18.2 SEC (ref 10–13.2)
RBC # BLD: 4.68 M/UL (ref 4.2–5.9)
SARS-COV-2, NAA: NOT DETECTED
SODIUM BLD-SCNC: 139 MMOL/L (ref 136–145)
WBC # BLD: 7.1 K/UL (ref 4–11)

## 2021-02-18 NOTE — TELEPHONE ENCOUNTER
Pt states that he has not had any bleeding since Monday longest he has went without Blood. Pt states that he should had his Blood Drawn 45 Minutes ago. Spoke with pt and confirmed upcoming Ablation  on 02/22/2021, pt arriving at [facility] at 09:30AM.  Reviewed all preop instructions previously given to pt. Covid Testing and CTA was coompleted on 02/17/2021 and labs were Drawn 02/18/2021. Pt denies missing any doses of  Eliquis and advised to hold [address] the morning of the procedure. Pt verbalized understanding.

## 2021-02-19 ENCOUNTER — HOSPITAL ENCOUNTER (OUTPATIENT)
Dept: SLEEP CENTER | Age: 76
Discharge: HOME | End: 2021-02-19
Payer: MEDICARE

## 2021-02-19 DIAGNOSIS — G47.10 HYPERSOMNIA: ICD-10-CM

## 2021-02-19 DIAGNOSIS — R06.83 SNORING: ICD-10-CM

## 2021-02-19 PROCEDURE — 95810 POLYSOM 6/> YRS 4/> PARAM: CPT | Performed by: INTERNAL MEDICINE

## 2021-02-19 PROCEDURE — 95810 POLYSOM 6/> YRS 4/> PARAM: CPT

## 2021-02-22 ENCOUNTER — ANESTHESIA EVENT (OUTPATIENT)
Dept: CARDIAC CATH/INVASIVE PROCEDURES | Age: 76
End: 2021-02-22
Payer: MEDICARE

## 2021-02-22 ENCOUNTER — ANESTHESIA (OUTPATIENT)
Dept: CARDIAC CATH/INVASIVE PROCEDURES | Age: 76
End: 2021-02-22
Payer: MEDICARE

## 2021-02-22 ENCOUNTER — HOSPITAL ENCOUNTER (OUTPATIENT)
Dept: CARDIAC CATH/INVASIVE PROCEDURES | Age: 76
Discharge: HOME | End: 2021-02-22
Attending: INTERNAL MEDICINE | Admitting: INTERNAL MEDICINE
Payer: MEDICARE

## 2021-02-22 VITALS — DIASTOLIC BLOOD PRESSURE: 78 MMHG | SYSTOLIC BLOOD PRESSURE: 126 MMHG | TEMPERATURE: 97.5 F | OXYGEN SATURATION: 97 %

## 2021-02-22 VITALS — HEIGHT: 69 IN | TEMPERATURE: 98 F | WEIGHT: 214 LBS | BODY MASS INDEX: 31.7 KG/M2

## 2021-02-22 DIAGNOSIS — I48.19 PERSISTENT ATRIAL FIBRILLATION (HCC): ICD-10-CM

## 2021-02-22 LAB
EKG ATRIAL RATE: 108 BPM
EKG DIAGNOSIS: NORMAL
EKG Q-T INTERVAL: 366 MS
EKG QRS DURATION: 92 MS
EKG QTC CALCULATION (BAZETT): 455 MS
EKG R AXIS: -21 DEGREES
EKG T AXIS: -28 DEGREES
EKG VENTRICULAR RATE: 93 BPM
INR BLD: 1.39 (ref 0.86–1.14)
POC ACT LR: 296 SEC
POC ACT LR: 344 SEC
POC ACT LR: 351 SEC
POC ACT LR: 365 SEC
PROTHROMBIN TIME: 16.2 SEC (ref 10–13.2)

## 2021-02-22 PROCEDURE — 6360000002 HC RX W HCPCS: Performed by: NURSE ANESTHETIST, CERTIFIED REGISTERED

## 2021-02-22 PROCEDURE — 93657 TX L/R ATRIAL FIB ADDL: CPT | Performed by: INTERNAL MEDICINE

## 2021-02-22 PROCEDURE — C1730 CATH, EP, 19 OR FEW ELECT: HCPCS

## 2021-02-22 PROCEDURE — 2500000003 HC RX 250 WO HCPCS: Performed by: NURSE ANESTHETIST, CERTIFIED REGISTERED

## 2021-02-22 PROCEDURE — 2500000003 HC RX 250 WO HCPCS

## 2021-02-22 PROCEDURE — 2720000010 HC SURG SUPPLY STERILE

## 2021-02-22 PROCEDURE — C1759 CATH, INTRA ECHOCARDIOGRAPHY: HCPCS

## 2021-02-22 PROCEDURE — 93623 PRGRMD STIMJ&PACG IV RX NFS: CPT | Performed by: INTERNAL MEDICINE

## 2021-02-22 PROCEDURE — 93622 COMP EP EVAL L VENTR PAC&REC: CPT

## 2021-02-22 PROCEDURE — 93623 PRGRMD STIMJ&PACG IV RX NFS: CPT

## 2021-02-22 PROCEDURE — 93320 DOPPLER ECHO COMPLETE: CPT

## 2021-02-22 PROCEDURE — 85347 COAGULATION TIME ACTIVATED: CPT

## 2021-02-22 PROCEDURE — C1732 CATH, EP, DIAG/ABL, 3D/VECT: HCPCS

## 2021-02-22 PROCEDURE — 2709999900 HC NON-CHARGEABLE SUPPLY

## 2021-02-22 PROCEDURE — 3700000001 HC ADD 15 MINUTES (ANESTHESIA)

## 2021-02-22 PROCEDURE — 2580000003 HC RX 258: Performed by: NURSE ANESTHETIST, CERTIFIED REGISTERED

## 2021-02-22 PROCEDURE — C1760 CLOSURE DEV, VASC: HCPCS

## 2021-02-22 PROCEDURE — 93655 ICAR CATH ABLTJ DSCRT ARRHYT: CPT

## 2021-02-22 PROCEDURE — 93005 ELECTROCARDIOGRAM TRACING: CPT | Performed by: INTERNAL MEDICINE

## 2021-02-22 PROCEDURE — 93613 INTRACARDIAC EPHYS 3D MAPG: CPT

## 2021-02-22 PROCEDURE — 93656 COMPRE EP EVAL ABLTJ ATR FIB: CPT

## 2021-02-22 PROCEDURE — 93662 INTRACARDIAC ECG (ICE): CPT | Performed by: INTERNAL MEDICINE

## 2021-02-22 PROCEDURE — 93655 ICAR CATH ABLTJ DSCRT ARRHYT: CPT | Performed by: INTERNAL MEDICINE

## 2021-02-22 PROCEDURE — 93613 INTRACARDIAC EPHYS 3D MAPG: CPT | Performed by: INTERNAL MEDICINE

## 2021-02-22 PROCEDURE — 3700000000 HC ANESTHESIA ATTENDED CARE

## 2021-02-22 PROCEDURE — G0269 OCCLUSIVE DEVICE IN VEIN ART: HCPCS

## 2021-02-22 PROCEDURE — 93662 INTRACARDIAC ECG (ICE): CPT

## 2021-02-22 PROCEDURE — 93312 ECHO TRANSESOPHAGEAL: CPT

## 2021-02-22 PROCEDURE — C1894 INTRO/SHEATH, NON-LASER: HCPCS

## 2021-02-22 PROCEDURE — 93010 ELECTROCARDIOGRAM REPORT: CPT | Performed by: INTERNAL MEDICINE

## 2021-02-22 PROCEDURE — 93325 DOPPLER ECHO COLOR FLOW MAPG: CPT

## 2021-02-22 PROCEDURE — 6360000002 HC RX W HCPCS

## 2021-02-22 PROCEDURE — 93657 TX L/R ATRIAL FIB ADDL: CPT

## 2021-02-22 PROCEDURE — 85610 PROTHROMBIN TIME: CPT

## 2021-02-22 PROCEDURE — 93656 COMPRE EP EVAL ABLTJ ATR FIB: CPT | Performed by: INTERNAL MEDICINE

## 2021-02-22 RX ORDER — SODIUM CHLORIDE 0.9 % (FLUSH) 0.9 %
10 SYRINGE (ML) INJECTION PRN
Status: DISCONTINUED | OUTPATIENT
Start: 2021-02-22 | End: 2021-02-22 | Stop reason: HOSPADM

## 2021-02-22 RX ORDER — FUROSEMIDE 20 MG/1
20 TABLET ORAL DAILY
Qty: 5 TABLET | Refills: 0 | Status: SHIPPED | OUTPATIENT
Start: 2021-02-22 | End: 2021-03-17 | Stop reason: ALTCHOICE

## 2021-02-22 RX ORDER — SODIUM CHLORIDE 0.9 % (FLUSH) 0.9 %
10 SYRINGE (ML) INJECTION EVERY 12 HOURS SCHEDULED
Status: DISCONTINUED | OUTPATIENT
Start: 2021-02-22 | End: 2021-02-22 | Stop reason: HOSPADM

## 2021-02-22 RX ORDER — SODIUM CHLORIDE 9 MG/ML
INJECTION, SOLUTION INTRAVENOUS CONTINUOUS
Status: DISCONTINUED | OUTPATIENT
Start: 2021-02-22 | End: 2021-02-22 | Stop reason: HOSPADM

## 2021-02-22 RX ORDER — LIDOCAINE HYDROCHLORIDE 20 MG/ML
INJECTION, SOLUTION INTRAVENOUS PRN
Status: DISCONTINUED | OUTPATIENT
Start: 2021-02-22 | End: 2021-02-22 | Stop reason: SDUPTHER

## 2021-02-22 RX ORDER — ENALAPRILAT 2.5 MG/2ML
1.25 INJECTION INTRAVENOUS
Status: DISCONTINUED | OUTPATIENT
Start: 2021-02-22 | End: 2021-02-22 | Stop reason: HOSPADM

## 2021-02-22 RX ORDER — GABAPENTIN 300 MG/1
300 CAPSULE ORAL 2 TIMES DAILY
COMMUNITY

## 2021-02-22 RX ORDER — LABETALOL HYDROCHLORIDE 5 MG/ML
5 INJECTION, SOLUTION INTRAVENOUS EVERY 10 MIN PRN
Status: DISCONTINUED | OUTPATIENT
Start: 2021-02-22 | End: 2021-02-22 | Stop reason: HOSPADM

## 2021-02-22 RX ORDER — ONDANSETRON 2 MG/ML
4 INJECTION INTRAMUSCULAR; INTRAVENOUS
Status: DISCONTINUED | OUTPATIENT
Start: 2021-02-22 | End: 2021-02-22 | Stop reason: HOSPADM

## 2021-02-22 RX ORDER — FLECAINIDE ACETATE 100 MG/1
100 TABLET ORAL 2 TIMES DAILY
Qty: 60 TABLET | Refills: 3 | Status: SHIPPED | OUTPATIENT
Start: 2021-02-22 | End: 2021-03-04 | Stop reason: ALTCHOICE

## 2021-02-22 RX ORDER — HEPARIN SODIUM 10000 [USP'U]/100ML
INJECTION, SOLUTION INTRAVENOUS CONTINUOUS PRN
Status: DISCONTINUED | OUTPATIENT
Start: 2021-02-22 | End: 2021-02-22 | Stop reason: SDUPTHER

## 2021-02-22 RX ORDER — SODIUM CHLORIDE, SODIUM LACTATE, POTASSIUM CHLORIDE, CALCIUM CHLORIDE 600; 310; 30; 20 MG/100ML; MG/100ML; MG/100ML; MG/100ML
INJECTION, SOLUTION INTRAVENOUS CONTINUOUS PRN
Status: DISCONTINUED | OUTPATIENT
Start: 2021-02-22 | End: 2021-02-22 | Stop reason: SDUPTHER

## 2021-02-22 RX ORDER — HEPARIN SODIUM 10000 [USP'U]/ML
INJECTION, SOLUTION INTRAVENOUS; SUBCUTANEOUS PRN
Status: DISCONTINUED | OUTPATIENT
Start: 2021-02-22 | End: 2021-02-22 | Stop reason: SDUPTHER

## 2021-02-22 RX ORDER — HEPARIN SODIUM 1000 [USP'U]/ML
INJECTION, SOLUTION INTRAVENOUS; SUBCUTANEOUS PRN
Status: DISCONTINUED | OUTPATIENT
Start: 2021-02-22 | End: 2021-02-22 | Stop reason: SDUPTHER

## 2021-02-22 RX ORDER — PROTAMINE SULFATE 10 MG/ML
INJECTION, SOLUTION INTRAVENOUS PRN
Status: DISCONTINUED | OUTPATIENT
Start: 2021-02-22 | End: 2021-02-22 | Stop reason: SDUPTHER

## 2021-02-22 RX ORDER — HYDRALAZINE HYDROCHLORIDE 20 MG/ML
5 INJECTION INTRAMUSCULAR; INTRAVENOUS EVERY 5 MIN PRN
Status: DISCONTINUED | OUTPATIENT
Start: 2021-02-22 | End: 2021-02-22 | Stop reason: HOSPADM

## 2021-02-22 RX ORDER — ONDANSETRON 2 MG/ML
INJECTION INTRAMUSCULAR; INTRAVENOUS PRN
Status: DISCONTINUED | OUTPATIENT
Start: 2021-02-22 | End: 2021-02-22 | Stop reason: SDUPTHER

## 2021-02-22 RX ORDER — FENTANYL CITRATE 50 UG/ML
50 INJECTION, SOLUTION INTRAMUSCULAR; INTRAVENOUS EVERY 5 MIN PRN
Status: DISCONTINUED | OUTPATIENT
Start: 2021-02-22 | End: 2021-02-22 | Stop reason: HOSPADM

## 2021-02-22 RX ORDER — FUROSEMIDE 10 MG/ML
INJECTION INTRAMUSCULAR; INTRAVENOUS PRN
Status: DISCONTINUED | OUTPATIENT
Start: 2021-02-22 | End: 2021-02-22 | Stop reason: SDUPTHER

## 2021-02-22 RX ORDER — ACETAMINOPHEN 325 MG/1
650 TABLET ORAL EVERY 4 HOURS PRN
Status: DISCONTINUED | OUTPATIENT
Start: 2021-02-22 | End: 2021-02-22 | Stop reason: HOSPADM

## 2021-02-22 RX ORDER — PANTOPRAZOLE SODIUM 40 MG/1
40 TABLET, DELAYED RELEASE ORAL DAILY
Qty: 30 TABLET | Refills: 0 | Status: SHIPPED | OUTPATIENT
Start: 2021-02-22 | End: 2021-07-16

## 2021-02-22 RX ORDER — FENTANYL CITRATE 50 UG/ML
INJECTION, SOLUTION INTRAMUSCULAR; INTRAVENOUS PRN
Status: DISCONTINUED | OUTPATIENT
Start: 2021-02-22 | End: 2021-02-22 | Stop reason: SDUPTHER

## 2021-02-22 RX ORDER — FENTANYL CITRATE 50 UG/ML
25 INJECTION, SOLUTION INTRAMUSCULAR; INTRAVENOUS EVERY 5 MIN PRN
Status: DISCONTINUED | OUTPATIENT
Start: 2021-02-22 | End: 2021-02-22 | Stop reason: HOSPADM

## 2021-02-22 RX ORDER — ROCURONIUM BROMIDE 10 MG/ML
INJECTION, SOLUTION INTRAVENOUS PRN
Status: DISCONTINUED | OUTPATIENT
Start: 2021-02-22 | End: 2021-02-22 | Stop reason: SDUPTHER

## 2021-02-22 RX ORDER — PROPOFOL 10 MG/ML
INJECTION, EMULSION INTRAVENOUS PRN
Status: DISCONTINUED | OUTPATIENT
Start: 2021-02-22 | End: 2021-02-22 | Stop reason: SDUPTHER

## 2021-02-22 RX ADMIN — HEPARIN SODIUM 3000 UNITS: 1000 INJECTION, SOLUTION INTRAVENOUS; SUBCUTANEOUS at 13:44

## 2021-02-22 RX ADMIN — ONDANSETRON 4 MG: 2 INJECTION INTRAMUSCULAR; INTRAVENOUS at 15:05

## 2021-02-22 RX ADMIN — ROCURONIUM BROMIDE 50 MG: 10 INJECTION INTRAVENOUS at 12:48

## 2021-02-22 RX ADMIN — PHENYLEPHRINE HYDROCHLORIDE 20 MCG/MIN: 10 INJECTION INTRAVENOUS at 12:16

## 2021-02-22 RX ADMIN — ISOPROTERENOL HYDROCHLORIDE 10 MCG/MIN: 0.2 INJECTION, SOLUTION INTRAMUSCULAR; INTRAVENOUS at 14:54

## 2021-02-22 RX ADMIN — SODIUM CHLORIDE, SODIUM LACTATE, POTASSIUM CHLORIDE, AND CALCIUM CHLORIDE: .6; .31; .03; .02 INJECTION, SOLUTION INTRAVENOUS at 12:16

## 2021-02-22 RX ADMIN — HEPARIN SODIUM 10000 UNITS: 1000 INJECTION, SOLUTION INTRAVENOUS; SUBCUTANEOUS at 13:27

## 2021-02-22 RX ADMIN — HEPARIN SODIUM 2000 UNITS/HR: 10000 INJECTION, SOLUTION INTRAVENOUS at 13:44

## 2021-02-22 RX ADMIN — PROTAMINE SULFATE 40 MG: 10 INJECTION, SOLUTION INTRAVENOUS at 15:05

## 2021-02-22 RX ADMIN — SUGAMMADEX 200 MG: 100 INJECTION, SOLUTION INTRAVENOUS at 15:10

## 2021-02-22 RX ADMIN — FUROSEMIDE 40 MG: 10 INJECTION, SOLUTION INTRAMUSCULAR; INTRAVENOUS at 15:05

## 2021-02-22 RX ADMIN — PROPOFOL 125 MG: 10 INJECTION, EMULSION INTRAVENOUS at 12:48

## 2021-02-22 RX ADMIN — HEPARIN SODIUM 21 UNITS: 10000 INJECTION, SOLUTION INTRAVENOUS; SUBCUTANEOUS at 12:16

## 2021-02-22 RX ADMIN — LIDOCAINE HYDROCHLORIDE 100 MG: 20 INJECTION, SOLUTION INTRAVENOUS at 12:44

## 2021-02-22 RX ADMIN — FENTANYL CITRATE 100 MCG: 50 INJECTION, SOLUTION INTRAMUSCULAR; INTRAVENOUS at 12:42

## 2021-02-22 ASSESSMENT — PULMONARY FUNCTION TESTS
PIF_VALUE: 23
PIF_VALUE: 20
PIF_VALUE: 19
PIF_VALUE: 1
PIF_VALUE: 27
PIF_VALUE: 20
PIF_VALUE: 18
PIF_VALUE: 20
PIF_VALUE: 21
PIF_VALUE: 22
PIF_VALUE: 19
PIF_VALUE: 0
PIF_VALUE: 1
PIF_VALUE: 20
PIF_VALUE: 20
PIF_VALUE: 19
PIF_VALUE: 19
PIF_VALUE: 22
PIF_VALUE: 19
PIF_VALUE: 18
PIF_VALUE: 20
PIF_VALUE: 20
PIF_VALUE: 19
PIF_VALUE: 0
PIF_VALUE: 19
PIF_VALUE: 2
PIF_VALUE: 21
PIF_VALUE: 21
PIF_VALUE: 19
PIF_VALUE: 1
PIF_VALUE: 21
PIF_VALUE: 18
PIF_VALUE: 20
PIF_VALUE: 0
PIF_VALUE: 1
PIF_VALUE: 18
PIF_VALUE: 20
PIF_VALUE: 21
PIF_VALUE: 19
PIF_VALUE: 26
PIF_VALUE: 19
PIF_VALUE: 20
PIF_VALUE: 18
PIF_VALUE: 6
PIF_VALUE: 18
PIF_VALUE: 20
PIF_VALUE: 18
PIF_VALUE: 1
PIF_VALUE: 20
PIF_VALUE: 19
PIF_VALUE: 24
PIF_VALUE: 21
PIF_VALUE: 0
PIF_VALUE: 1
PIF_VALUE: 20
PIF_VALUE: 21
PIF_VALUE: 21
PIF_VALUE: 19
PIF_VALUE: 21
PIF_VALUE: 19
PIF_VALUE: 20
PIF_VALUE: 19
PIF_VALUE: 19
PIF_VALUE: 20
PIF_VALUE: 21
PIF_VALUE: 21
PIF_VALUE: 18
PIF_VALUE: 21
PIF_VALUE: 19
PIF_VALUE: 21
PIF_VALUE: 0
PIF_VALUE: 19
PIF_VALUE: 0
PIF_VALUE: 19
PIF_VALUE: 21
PIF_VALUE: 20
PIF_VALUE: 21
PIF_VALUE: 18
PIF_VALUE: 19
PIF_VALUE: 20
PIF_VALUE: 1
PIF_VALUE: 19
PIF_VALUE: 0
PIF_VALUE: 19

## 2021-02-22 NOTE — PRE SEDATION
Sedation Pre-Procedure Note    Patient Name: Katelyn Espinosa   YOB: 1945  Room/Bed: Cath Pool/NONE  Medical Record Number: [id number]  Date: 2/22/2021   Time: 12:47 PM       Indication: Atrial fibrillation    Consent: I have discussed with the patient and/or the patient representative the indication, alternatives, and the possible risks and/or complications of the planned procedure and the anesthesia methods. The patient and/or patient representative appear to understand and agree to proceed. Vital Signs:   Vitals:    02/22/21 0945   Temp: 98 °F (36.7 °C)       Past Medical History:   has a past medical history of Allergic reaction to sulfonamide, Bee sting reaction, Cephalexin adverse reaction, Fracture of left elbow, History of right inguinal hernia repair, Hidalgo's neuroma of right foot, Open fracture of left elbow, Open fracture of left humerus, and Rupture of appendix. Past Surgical History:   has a past surgical history that includes Tonsillectomy (1953). Medications:   Scheduled Meds:    sodium chloride flush  10 mL Intravenous 2 times per day     Continuous Infusions:    sodium chloride       PRN Meds: sodium chloride flush  Home Meds:   Prior to Admission medications    Medication Sig Start Date End Date Taking? Authorizing Provider   gabapentin (NEURONTIN) 300 MG capsule Take 300 mg by mouth 2 times daily.    Yes Historical Provider, MD   amLODIPine (NORVASC) 10 MG tablet Take 1 tablet by mouth every morning 11/4/20  Yes Historical Provider, MD   atorvastatin (LIPITOR) 40 MG tablet Take 40 mg by mouth nightly 11/4/20  Yes Historical Provider, MD   ciclopirox (LOPROX) 0.77 % cream Apply topically every morning 11/4/20  Yes Historical Provider, MD   cloNIDine (CATAPRES) 0.2 MG tablet Take 0.2 mg by mouth 2 times daily 11/4/20  Yes Historical Provider, MD   enalapril (VASOTEC) 10 MG tablet Take 10 mg by mouth 2 times daily 11/4/20  Yes Historical Provider, MD fenofibrate (TRIGLIDE) 160 MG tablet Take 160 mg by mouth every evening 11/4/20  Yes Historical Provider, MD   glimepiride (AMARYL) 2 MG tablet Take 2 mg by mouth 2 times daily 11/4/20  Yes Historical Provider, MD   losartan-hydroCHLOROthiazide (HYZAAR) 100-25 MG per tablet Take 1 tablet by mouth every evening 11/4/20  Yes Historical Provider, MD   LORazepam (ATIVAN) 1 MG tablet Take 1 mg by mouth 2 times daily. 11/4/20  Yes Historical Provider, MD   metFORMIN (GLUCOPHAGE) 1000 MG tablet Take 1,000 mg by mouth 2 times daily 11/4/20  Yes Historical Provider, MD   metoprolol tartrate (LOPRESSOR) 50 MG tablet Take 50 mg by mouth 2 times daily 11/4/20  Yes Historical Provider, MD   sAXagliptin (ONGLYZA) 5 MG TABS tablet Take 5 mg by mouth every evening 11/4/20  Yes Historical Provider, MD   PARoxetine (PAXIL) 20 MG tablet Take 20 mg by mouth every evening 11/4/20  Yes Historical Provider, MD   aspirin-acetaminophen-caffeine (MIGRAINE FORMULA) 250-250-65 MG per tablet Take 1 tablet by mouth 2 times daily as needed for Headaches   Yes Historical Provider, MD   apixaban (ELIQUIS) 5 MG TABS tablet Take 1 tablet by mouth 2 times daily 1/21/21  Yes Betty Bean MD   aspirin 81 MG EC tablet Take 81 mg by mouth daily    Historical Provider, MD   blood glucose test strips (TRUETEST TEST) strip 1 strip 2 times daily 4/2/20   Historical Provider, MD   Lancets MISC Use to check Glucose BID. Dx: 250.00. Dispense Contour Lancets 4/2/20   Historical Provider, MD     Coumadin Use Last 7 Days:  no  Antiplatelet drug therapy use last 7 days: yes -aspirin  Other anticoagulant use last 7 days: yes -Eliquis  Additional Medication Information: None      Pre-Sedation Documentation and Exam:   I have personally completed a history, physical exam & review of systems for this patient (see notes).     Mallampati Airway Assessment:  Mallampati Class I - (soft palate, fauces, uvula & anterior/posterior tonsillar pillars are visible) Prior History of Anesthesia Complications:   none    ASA Classification:  Class 2 - A normal healthy patient with mild systemic disease    Sedation/ Anesthesia Plan:   general    Medications Planned:   Per anesthesia team    Patient is an appropriate candidate for plan of sedation: yes    Electronically signed by Romayne Rude, MD on 2/22/2021 at 12:47 PM

## 2021-02-22 NOTE — H&P
[address]   Cardiac Electrophysiology H&P  Date: 2/22/2021  Admit Date:  2/22/2021  Reason for admission: Atrial fibrillation  Consult Requesting Physician: Jose Juan Hernandez*     No chief complaint on file. HPI: Da Baker is a 70 y.o. PMH significant for HTN, DM type II, and PVC's about 20 years with normal stress test and thought to be related to stress. On 1/6/21, pt was having a routine colonoscopy and found to be in AF. Colonoscopy was aborted and he was sent to the ED. ECG in the ED confirmed AF, rate 107. The pt was asymptomatic without palpitations, SOB, or CP. He was sent home and started on Eliquis. Echo (1/12/21) showed preserved LVEF of 55-60%, intermediate diastolic dysfunction, and LA enlargement. Here for previously scheduled atrial fibrillation ablation    Past Medical History:   Diagnosis Date    Allergic reaction to sulfonamide 1960    Bee sting reaction 2000    Cephalexin adverse reaction 1993    Fracture of left elbow 1956    History of right inguinal hernia repair 1987    Hidalgo's neuroma of right foot 1989    Open fracture of left elbow 1991    Open fracture of left humerus 1991    Rupture of appendix 1970        Past Surgical History:   Procedure Laterality Date    TONSILLECTOMY  1953       Allergies   Allergen Reactions    Bee Venom Swelling    Cephalexin Swelling    Cephalosporins     Propoxyphene     Sulfa Antibiotics        Social History:  Reviewed. reports that he has never smoked. He has never used smokeless tobacco.     Family History:  Reviewed. family history includes Sleep Apnea in his father. No premature CAD. Review of System:  All other systems reviewed except for that noted above.  Pertinent negatives and positives are:     Objective      · General: negative for fever, chills   · Ophthalmic ROS: negative for - eye pain or loss of vision  · ENT ROS: negative for - headaches, sore throat · Respiratory: negative for - cough, sputum  · Cardiovascular: Reviewed in HPI  · Gastrointestinal: negative for - abdominal pain, diarrhea, N/V  · Hematology: negative for - bleeding, blood clots, bruising or jaundice  · Genito-Urinary:  negative for - Dysuria or incontinence  · Musculoskeletal: negative for - Joint swelling, muscle pain  · Neurological: negative for - confusion, dizziness, headaches   · Psychiatric: No anxiety, no depression. · Dermatological: negative for - rash    Physical Examination:  Vitals:    21 0945   Temp: 98 °F (36.7 °C)      No intake or output data in the 24 hours ending 21 1246  No intake/output data recorded. Wt Readings from Last 3 Encounters:   21 214 lb (97.1 kg)   21 216 lb 6.4 oz (98.2 kg)   21 214 lb (97.1 kg)     Temp  Av °F (36.7 °C)  Min: 98 °F (36.7 °C)  Max: 98 °F (36.7 °C)  BP  Min: 107/84  Max: 107/84  SpO2  Av %  Min: 99 %  Max: 99 %  FiO2   Av %  Min: 98 %  Max: 98 %    · Telemetry: A. fib    · Constitutional: Alert. Oriented to person, place, and time. No distress. · Head: Normocephalic and atraumatic. · Mouth/Throat: Lips appear moist. Oropharynx is clear and moist.  · Eyes: Conjunctivae normal. EOM are normal.   · Neck: Neck supple. No lymphadenopathy. No rigidity. No JVD present. · Cardiovascular: Normal rate, irregular rhythm. Normal S1&S2. Carotid pulse 2+ bilaterally. · Pulmonary/Chest: Bilateral respiratory sounds present. No respiratory accessory muscle use. No wheezes, No rhonchi. · Abdominal: Soft. Normal bowel sounds present. No distension, No tenderness. No splenomegaly. No hernia. · Musculoskeletal: No tenderness. No edema    · Lymphadenopathy: Has no cervical adenopathy. · Neurological: Alert and oriented. Cranial nerve II-XII grossly intact, No gross deficit to touch. · Skin: Skin is warm and dry. No rash, lesions, ulcerations noted. · Psychiatric: No anxiety nor agitation.     Labs: Reviewed. No results for input(s): NA, K, CL, CO2, PHOS, BUN, CREATININE in the last 72 hours. Invalid input(s): CA  No results for input(s): WBC, HGB, HCT, MCV, PLT in the last 72 hours. Lab Results   Component Value Date    TROPONINI <0.01 01/06/2021     No results found for: BNP  Lab Results   Component Value Date    PROTIME 16.2 02/22/2021    PROTIME 18.2 02/18/2021    PROTIME 12.5 01/06/2021    INR 1.39 02/22/2021    INR 1.56 02/18/2021    INR 1.08 01/06/2021     No results found for: CHOL, HDL, TRIG    Diagnostic and imaging results reviewed. I independently reviewed the ECG and telemetry. Scheduled Meds:   sodium chloride flush  10 mL Intravenous 2 times per day     Continuous Infusions:   sodium chloride       PRN Meds:.sodium chloride flush     Assessment:   Patient Active Problem List    Diagnosis Date Noted    New onset a-fib ([facility] 01/11/2021    Major depression, chronic 03/29/2018    Type 2 diabetes mellitus with peripheral autonomic neuropathy ([facility] 10/13/2016    Mixed hyperlipidemia 10/13/2016    Migraine 02/16/2010    Obesity 02/16/2010    Anxiety state 07/30/1997    Essential hypertension 07/30/1997    Reflux esophagitis 07/30/1997      There are no active hospital problems to display for this patient.         Recommendation (s): We educated the patient that atrial fibrillation and atrial flutter are both progressive diseases, with more frequent episodes that will ensue. Subsequent episodes usually become more sustained to the extent that many individuals would then develop persistent atrial fibrillation/atrial flutter. Once persistence is reached, permanent atrial dysrhythmia is inevitable. Treatment options including cardioversion, anti-arrhythmic medication therapy, rate control strategy, oral anticoagulation, and atrial fibrillation ablation were discussed with patient. Risks, benefits and alternative of each treatment options were explained. We discussed different management options for both atrial tachydysrhythmia including their risks and benefits. These options include use of cardioversion (mainly for persisting atrial fibrillation or atrial flutter) which provides an effective immediate therapy with success rates of 75% or higher, but it provides no short nor long term efficacy. Anti-arrhythmic medications provide a very effective short term therapy, but even with our most potent anti-arrhythmic medication there is limited long term efficacy (clinical studies have shown that 40% of patients remain atrial fibrillation-free after 4 years of follow-up after starting one of the more powerful anti-arrhythmic medication (amiodarone), and, if extrapolated, may have further diminishing success as time goes on). Against atrial flutter, any anti-arrhythmic medication would be nearly ineffective. Atrial fibrillation and atrial flutter ablation is a potentially curative therapy with very reasonable success rate after a first time procedure and with improving success rates with subsequent procedures. The risks, benefits and alternatives of the ablation procedure were discussed with the patient. The risks including, but not limited to, the risks of bleeding, infection, radiation exposure, injury to vascular, cardiac and surrounding structures (including pneumothorax), stroke, cardiac perforation, tamponade, need for emergent open heart surgery, need for pacemaker implantation, injury to the phrenic nerve, injury to the esophagus, myocardial infarction and death were discussed in detail. The patient wishes to proceed. Thank you for allowing me to participate in the care of Daphnie Grady . If you have any questions/comments, please do not hesitate to contact us. Dante Coelho MD   Cardiac Electrophysiology  [address] [facility]    For any EP related issues after 5 PM, contact [facility] on call cardiology through .

## 2021-02-22 NOTE — ANESTHESIA PRE PROCEDURE
Department of Anesthesiology  Preprocedure Note       Name:  Nixon Anthony   Age:  70 y.o.  :  1945                                          MRN:  [id number]         Date:  2021      Surgeon: * Surgery not found *    Procedure:     Medications prior to admission:   Prior to Admission medications    Medication Sig Start Date End Date Taking? Authorizing Provider   gabapentin (NEURONTIN) 300 MG capsule Take 300 mg by mouth 2 times daily. Yes Historical Provider, MD   amLODIPine (NORVASC) 10 MG tablet Take 1 tablet by mouth every morning 20  Yes Historical Provider, MD   atorvastatin (LIPITOR) 40 MG tablet Take 40 mg by mouth nightly 20  Yes Historical Provider, MD   ciclopirox (LOPROX) 0.77 % cream Apply topically every morning 20  Yes Historical Provider, MD   cloNIDine (CATAPRES) 0.2 MG tablet Take 0.2 mg by mouth 2 times daily 20  Yes Historical Provider, MD   enalapril (VASOTEC) 10 MG tablet Take 10 mg by mouth 2 times daily 20  Yes Historical Provider, MD   fenofibrate (TRIGLIDE) 160 MG tablet Take 160 mg by mouth every evening 20  Yes Historical Provider, MD   glimepiride (AMARYL) 2 MG tablet Take 2 mg by mouth 2 times daily 20  Yes Historical Provider, MD   losartan-hydroCHLOROthiazide (HYZAAR) 100-25 MG per tablet Take 1 tablet by mouth every evening 20  Yes Historical Provider, MD   LORazepam (ATIVAN) 1 MG tablet Take 1 mg by mouth 2 times daily.  20  Yes Historical Provider, MD   metFORMIN (GLUCOPHAGE) 1000 MG tablet Take 1,000 mg by mouth 2 times daily 20  Yes Historical Provider, MD   metoprolol tartrate (LOPRESSOR) 50 MG tablet Take 50 mg by mouth 2 times daily 20  Yes Historical Provider, MD   sAXagliptin (ONGLYZA) 5 MG TABS tablet Take 5 mg by mouth every evening 20  Yes Historical Provider, MD   PARoxetine (PAXIL) 20 MG tablet Take 20 mg by mouth every evening 20  Yes Historical Provider, MD aspirin-acetaminophen-caffeine (MIGRAINE FORMULA) 250-250-65 MG per tablet Take 1 tablet by mouth 2 times daily as needed for Headaches   Yes Historical Provider, MD   apixaban (ELIQUIS) 5 MG TABS tablet Take 1 tablet by mouth 2 times daily 1/21/21  Yes Jose Juan Hernandez MD   aspirin 81 MG EC tablet Take 81 mg by mouth daily    Historical Provider, MD   blood glucose test strips (TRUETEST TEST) strip 1 strip 2 times daily 4/2/20   Historical Provider, MD   Lancets MISC Use to check Glucose BID. Dx: 250.00. Dispense Contour Lancets 4/2/20   Historical Provider, MD       Current medications:    Current Facility-Administered Medications   Medication Dose Route Frequency Provider Last Rate Last Admin    0.9 % sodium chloride infusion   Intravenous Continuous Jose Juan Hernandez MD        sodium chloride flush 0.9 % injection 10 mL  10 mL Intravenous 2 times per day Jose Juan Hernandez MD        sodium chloride flush 0.9 % injection 10 mL  10 mL Intravenous PRN Jose Juan Hernandez MD           Allergies:     Allergies   Allergen Reactions    Bee Venom Swelling    Cephalexin Swelling    Cephalosporins     Propoxyphene     Sulfa Antibiotics        Problem List:    Patient Active Problem List   Diagnosis Code    Anxiety state F41.1    Type 2 diabetes mellitus with peripheral autonomic neuropathy (HCC) E11.43    Essential hypertension I10    Major depression, chronic F32.9    Migraine G43.909    Mixed hyperlipidemia E78.2    New onset a-fib (HCC) I48.91    Reflux esophagitis K21.00    Obesity E66.9       Past Medical History:        Diagnosis Date    Allergic reaction to sulfonamide 1960    Bee sting reaction 2000    Cephalexin adverse reaction 1993    Fracture of left elbow 1956    History of right inguinal hernia repair 1987    Hidalgo's neuroma of right foot 1989    Open fracture of left elbow 1991    Open fracture of left humerus 1991    Rupture of appendix 1970 Past Surgical History:        Procedure Laterality Date    TONSILLECTOMY  5       Social History:    Social History     Tobacco Use    Smoking status: Never Smoker    Smokeless tobacco: Never Used   Substance Use Topics    Alcohol use: Not on file                                Counseling given: Not Answered      Vital Signs (Current):   Vitals:    02/22/21 0945   Temp: 98 °F (36.7 °C)   Weight: 214 lb (97.1 kg)   Height: 5' 9\" (1.753 m)                                              BP Readings from Last 3 Encounters:   01/21/21 124/86   01/06/21 133/85       NPO Status:                                                                                 BMI:   Wt Readings from Last 3 Encounters:   02/22/21 214 lb (97.1 kg)   01/21/21 216 lb 6.4 oz (98.2 kg)   01/06/21 214 lb (97.1 kg)     Body mass index is 31.6 kg/m². CBC:   Lab Results   Component Value Date    WBC 7.1 02/18/2021    RBC 4.68 02/18/2021    HGB 14.6 02/18/2021    HCT 41.4 02/18/2021    MCV 88.5 02/18/2021    RDW 13.6 02/18/2021     02/18/2021       CMP:   Lab Results   Component Value Date     02/18/2021    K 4.1 02/18/2021    K 3.7 01/06/2021    CL 98 02/18/2021    CO2 25 02/18/2021    BUN 21 02/18/2021    CREATININE 1.2 02/18/2021    GFRAA >60 02/18/2021    LABGLOM 59 02/18/2021    GLUCOSE 250 02/18/2021    CALCIUM 9.5 02/18/2021       POC Tests: No results for input(s): POCGLU, POCNA, POCK, POCCL, POCBUN, POCHEMO, POCHCT in the last 72 hours.     Coags:   Lab Results   Component Value Date    PROTIME 16.2 02/22/2021    INR 1.39 02/22/2021       HCG (If Applicable): No results found for: PREGTESTUR, PREGSERUM, HCG, HCGQUANT     ABGs: No results found for: PHART, PO2ART, HRV8MIX, CCF6GZR, BEART, C3GKZVUW     Type & Screen (If Applicable):  No results found for: LABABO, LABRH    Drug/Infectious Status (If Applicable):  No results found for: HIV, HEPCAB    COVID-19 Screening (If Applicable):   Lab Results   Component Value Date

## 2021-02-22 NOTE — PROCEDURES
The risks, benefits and alternatives of the ablation procedure were discussed with the patient. The risks including, but not limited to, the risks of bleeding, infection, radiation exposure, injury to vascular, cardiac and surrounding structures (including pneumothorax), stroke, cardiac perforation, tamponade, need for emergent open heart surgery, need for pacemaker implantation, esophageal injury and fistula, myocardial infarction and death were discussed in detail. The patient opted to proceed with the ablation. Written informed consent was signed and placed in the chart. Patient was brought to the EP lab in a fasting non-sedate state. Patient underwent general anesthesia by anesthesia team. The patient was monitored continuously with ECG, pulse oximetry, blood pressure monitoring, and direct observation. The patient was in AF  upon arriving in the EP laboratory. In the past 3 weeks, patient missed a dose of Eliquis. Hence, we decided to perform a CHRISTOFER to rule out any left atrial appendage clot. CHRISTOFER did not show any evidence of left atrial or left atrial appendage thrombus or smoke. This report will be dictated separately. As there is no clot noted, we decided to proceed with atrial fibrillation ablation.     After subcutaneous infiltration with Lidocaine, sheaths were placed in RFV without difficulty as follows:    8.5Fr Buna medium curve sheath to the RFV for 3.5 mm tip Thermocool ST/SF D/F curve catheter and Pentaray catheter  7Fr sheath for the decapolar CS catheter  9Fr sheath for the ICE catheter An intracardiac echocardiography catheter was advanced to the right atrium. The ICE catheter was placed in the RV and lack of a pericardial effusion was documented. After confirming that, a heparin bolus was given and a heparin drip was started. The ACT was maintained with a goal of 350-400 seconds for the duration of the procedure. Additional boluses of heparin during the procedure to keep the ACT about 350 sec. Intracardiac echocardiography was performed and a Cartosound map of the interatrial septum, coronary sinus, root of the aorta and left atrium was created. Using Pentaray catheter, a three-dimensional electro-anatomic mapping of the right atrium, right atrial appendage, SVC, IVC, coronary sinus and interatrial septum was performed. The decapolar catheter was advanced to the coronary sinus. Also an esophageal temperature probe (Fontself Temperature Probe 12Fr) that was tied with sutures to an accompanying [employer] quadripolar 6 [place] 5-5-5 electrode spacing catheter was advanced into the esophagus for real-time mapping of the esophagus and careful monitoring of the esophageal temperature during ablation. Ablation was stopped if the temperature was rising for more than 0.5 °C. Esosure was used to deviate the esophagus away from the site of ablation. Pacing from left atrium, 1:1 conduction over AV node with (AV wenckebach) was 350 msec  Pacing from LV apex, 1:1 retrograde conduction over AV node (VA wenckebach) was more than 600 msec    All catheters and sheaths were removed to the right atrium. The heparin was stopped. The ICE catheter was placed in the RV. There was no pericardial effusion. Protamine 40 mg was given. When the ACT was appropriate, catheters and sheaths were removed and hemostasis was obtained via Vascade MVP venous closure device. No complications noted. Conclusion:     - Pre- and post-procedure diagnoses were persistent atrial fibrillation   - Pulmonary vein isolations using wide area circumferential radiofrequency ablation   - Additional focal ablation for atrial fibrillation, outside the pulmonary veins in endocardial part of the coronary sinus and left side of the intra-atrial septum  -Atypical flutter -roofline and posterior box to create a posterior wall isolation   No dormant conduction with Adenosine  Isuprel infusion, with an attempt to induce arrhythmia    Plan:  Resume [facility] with Eliquis  PPI for 30 days  Started on flecainide 100 g p.o. twice daily  Lasix 20mg PO today and then for 2 more days. Bedrest for 2 hours  Observation for 4 hours  D/C after 4 hours, if stable. Thank you for allowing us to participate in the care of your patient. If you have any questions or concerns, please do not hesitate to contact me.     Mami Oneill MD   Cardiac Electrophysiology  [address] [place] [address] [phone]  [facility]

## 2021-02-23 NOTE — ANESTHESIA POSTPROCEDURE EVALUATION
Department of Anesthesiology  Postprocedure Note    Patient: Da Baker  MRN: [id number]  YOB: 1945  Date of evaluation: 2/22/2021  Time:  7:46 PM     Procedure Summary     Date: 02/22/21 Room / Location: [facility] Cath Lab    Anesthesia Start: 0636 Anesthesia Stop: 6568    Procedure: ECHOCARDIOGRAM TRANSESOPHAGEAL Diagnosis:       Persistent atrial fibrillation ([address]      Other persistent atrial fibrillation      (missed dose of Eliquis)    Scheduled Providers: Doris Padron MD Responsible Provider: Judy Haney DO    Anesthesia Type: general ASA Status: 3          Anesthesia Type: general    Nataly Phase I:      Nataly Phase II:      Last vitals: Reviewed and per EMR flowsheets.        Anesthesia Post Evaluation    Patient location during evaluation: PACU  Patient participation: complete - patient participated  Level of consciousness: awake and alert  Airway patency: patent  Nausea & Vomiting: no nausea and no vomiting  Cardiovascular status: blood pressure returned to baseline  Respiratory status: acceptable  Hydration status: euvolemic

## 2021-02-25 ENCOUNTER — TELEPHONE (OUTPATIENT)
Dept: PULMONOLOGY | Age: 76
End: 2021-02-25

## 2021-02-26 ENCOUNTER — OFFICE VISIT (OUTPATIENT)
Dept: PRIMARY CARE CLINIC | Age: 76
End: 2021-02-26
Payer: MEDICARE

## 2021-02-26 DIAGNOSIS — Z01.818 PREOP EXAMINATION: Primary | ICD-10-CM

## 2021-02-26 PROCEDURE — 99211 OFF/OP EST MAY X REQ PHY/QHP: CPT | Performed by: NURSE PRACTITIONER

## 2021-02-26 PROCEDURE — G8428 CUR MEDS NOT DOCUMENT: HCPCS | Performed by: NURSE PRACTITIONER

## 2021-02-26 PROCEDURE — G8417 CALC BMI ABV UP PARAM F/U: HCPCS | Performed by: NURSE PRACTITIONER

## 2021-02-26 NOTE — PROGRESS NOTES
[address]   Electrophysiology  Office Visit  Date: 3/4/2021    Chief Complaint   Patient presents with    Atrial Fibrillation    Hypertension       Cardiac HX: Yvette House is a 70 y.o. man with a h/o HTN, DM, PVCs ongoing for about 20 years with normal MPI, incidental finding of AF on 1/6/2021 during a routine colonoscopy, sent to the ED, placed on Eliquis, echo (1/12/2021) showed preserved LVEF of 55 to 60%, intermediate diastolic dysfunction and LAE, + SURAJ on CPAP, s/p RFA/PVI of AF (2/22/2021, Dr. Lionel Aden). Interval History/HPI: Patient is here for follow-up for paroxysmal atrial fibrillation. He is status post catheter ablation and PVI of atrial fibrillation and atypical flutter with a roofline and posterior box to create a posterior wall isolation on 2/22/2021. Patient denies any difficulty or pain with swallowing. He had a couple twinges of CP post procedure however states that he is not having any now. Overall he feels well. He denies shortness of breath, PND, orthopnea or lower extremity edema. He has not felt any breakthrough of his atrial fibrillation. He was placed on flecainide post procedure and his EKG today shows that his QRS is widened to 128 ms. He is taking the Protonix twice a day, have asked him to cut back to once a day and finish out the prescription. His blood pressure is well controlled in the office today review of medication shows that he is on both an ACE and ARB and he states that this was done by his PCP. Home medications:   Current Outpatient Medications on File Prior to Visit   Medication Sig Dispense Refill    gabapentin (NEURONTIN) 300 MG capsule Take 300 mg by mouth 2 times daily.       pantoprazole (PROTONIX) 40 MG tablet Take 1 tablet by mouth daily 30 tablet 0    amLODIPine (NORVASC) 10 MG tablet Take 1 tablet by mouth every morning      atorvastatin (LIPITOR) 40 MG tablet Take 40 mg by mouth nightly      ciclopirox (LOPROX) 0.77 % cream Apply topically every morning      cloNIDine (CATAPRES) 0.2 MG tablet Take 0.2 mg by mouth 2 times daily      enalapril (VASOTEC) 10 MG tablet Take 10 mg by mouth 2 times daily      fenofibrate (TRIGLIDE) 160 MG tablet Take 160 mg by mouth every evening      glimepiride (AMARYL) 2 MG tablet Take 2 mg by mouth 2 times daily      losartan-hydroCHLOROthiazide (HYZAAR) 100-25 MG per tablet Take 1 tablet by mouth every evening      LORazepam (ATIVAN) 1 MG tablet Take 1 mg by mouth 2 times daily.  metFORMIN (GLUCOPHAGE) 1000 MG tablet Take 1,000 mg by mouth 2 times daily      metoprolol tartrate (LOPRESSOR) 50 MG tablet Take 50 mg by mouth 2 times daily      sAXagliptin (ONGLYZA) 5 MG TABS tablet Take 5 mg by mouth every evening      PARoxetine (PAXIL) 20 MG tablet Take 20 mg by mouth every evening      blood glucose test strips (TRUETEST TEST) strip 1 strip 2 times daily      Lancets MISC Use to check Glucose BID. Dx: 250.00. Dispense Contour Lancets      aspirin-acetaminophen-caffeine (MIGRAINE FORMULA) 250-250-65 MG per tablet Take 1 tablet by mouth 2 times daily as needed for Headaches      apixaban (ELIQUIS) 5 MG TABS tablet Take 1 tablet by mouth 2 times daily 180 tablet 3    furosemide (LASIX) 20 MG tablet Take 1 tablet by mouth daily for 5 days (Patient not taking: Reported on 3/4/2021) 5 tablet 0     No current facility-administered medications on file prior to visit.         Past Medical History:   Diagnosis Date    Allergic reaction to sulfonamide 1960    Bee sting reaction 2000    Cephalexin adverse reaction 1993    Fracture of left elbow 1956    History of right inguinal hernia repair 1987    Hidalgo's neuroma of right foot 1989    Open fracture of left elbow 1991    Open fracture of left humerus 1991    Rupture of appendix 1970        Past Surgical History:   Procedure Laterality Date    TONSILLECTOMY  1953       Allergies   Allergen Reactions    Bee Venom Swelling    Cephalexin Swelling    Cephalosporins     Propoxyphene     Sulfa Antibiotics        Social History:  Reviewed. reports that he has never smoked. He has never used smokeless tobacco.     Family History:  Reviewed. family history includes Sleep Apnea in his father. Review of System:    · Constitutional: No fevers, chills. · Eyes: No visual changes or diplopia. No scleral icterus. · ENT: No Headaches. No mouth sores or sore throat. · Cardiovascular: No for chest pain, No for dyspnea on exertion, No for palpitations or No for loss of consciousness. No cough, hemoptysis, No for pleuritic pain, or phlebitis. · Respiratory: No for cough or wheezing. No hematemesis. · Gastrointestinal: No abdominal pain, blood in stools. · Genitourinary: No dysuria, or hematuria. · Musculoskeletal: No gait disturbance,    · Integumentary: No rash or pruritis. · Neurological: No headache, change in muscle strength, numbness or tingling. · Psychiatric: No anxiety, or depression. · Endocrine: No temperature intolerance. No excessive thirst, fluid intake, or urination. · Hem/Lymph: No abnormal bruising or bleeding, blood clots or swollen lymph nodes. · Allergic/Immunologic: No nasal congestion or hives. Physical Examination:  Vitals:    03/04/21 1503   BP: 124/68   Pulse:    Temp:          Wt Readings from Last 3 Encounters:   03/04/21 214 lb 3.2 oz (97.2 kg)   02/22/21 214 lb (97.1 kg)   01/21/21 216 lb 6.4 oz (98.2 kg)       · Constitutional: Oriented. No distress. · Head: Normocephalic and atraumatic. · Mouth/Throat: Oropharynx is clear and moist.   · Eyes: Conjunctivae clear without jaunduice. PERRL. · Neck: Neck supple. No rigidity. No JVD present. · Cardiovascular: Normal rate, regular rhythm, S1&S2. · Pulmonary/Chest: Bilateral respiratory sounds. No wheezes, No rhonchi. · Abdominal: Soft. Bowel sounds present. No distension, No tenderness. · Musculoskeletal: No tenderness.  No edema    · Lymphadenopathy: Has no cervical adenopathy. · Neurological: Alert and oriented. Cranial nerve appears intact, No Gross deficit   · Skin: Skin is warm and dry. No rash noted. · Psychiatric: Has a normal mood, affect and behavior     Labs:  Reviewed. No results for input(s): NA, K, CL, CO2, PHOS, BUN, CREATININE in the last 72 hours. Invalid input(s): CA,  TSH  No results for input(s): WBC, HGB, HCT, MCV, PLT in the last 72 hours. Lab Results   Component Value Date    TROPONINI <0.01 01/06/2021     No results found for: BNP  Lab Results   Component Value Date    PROTIME 16.2 02/22/2021    PROTIME 18.2 02/18/2021    PROTIME 12.5 01/06/2021    INR 1.39 02/22/2021    INR 1.56 02/18/2021    INR 1.08 01/06/2021     No results found for: CHOL, HDL, TRIG    ECG: Personally reviewed: NSR, HR 68, , , QTc 476    ECHO:  2.22.2021(CHRISTOFER)  Summary   The left atrial size is dilated. There is no evidence of mass or thrombus in the left atrium or appendage. Doppler velocities in the JOON are reduced. ECHO: 1.12.2021  Summary   Normal left ventricular size. Mild concentric left ventricular hypertrophy. Normal left ventricular systolic function with an estimated ejection   fraction of 55-60%. Indeterminate diastolic function due to afib. The left atrium is moderately dilated. Stress Test: n/a    Cardiac Angiography: n/a    Problem List:   Patient Active Problem List    Diagnosis Date Noted    Persistent atrial fibrillation ([address]     New onset a-fib ([address] 01/11/2021    Major depression, chronic 03/29/2018    Type 2 diabetes mellitus with peripheral autonomic neuropathy ([address] 10/13/2016    Mixed hyperlipidemia 10/13/2016    Migraine 02/16/2010    Obesity 02/16/2010    Anxiety state 07/30/1997    Essential hypertension 07/30/1997    Reflux esophagitis 07/30/1997        Assessment:   1. Persistent atrial fibrillation ([address]    2.  Benign essential HTN        Cardiac HX: Yefri Vargas is a 70 y.o. man with a h/o HTN, DM, PVCs ongoing for about 20 years with normal MPI, incidental finding of AF on 1/6/2021 during a routine colonoscopy, sent to the ED, placed on Eliquis, echo (1/12/2021) showed preserved LVEF of 55 to 60%, intermediate diastolic dysfunction and LAE, + SURAJ on CPAP, s/p RFA/PVI of AF (2/22/2021, Dr. Lionel Aden). [id number] 3. TSH 1.85 (1/6/2021). AF  - In NSR - no breakthrough per patient  - S/p RFA/PVI of AF and atypical AFL on 2/22/2021  - Right groin healed well per patient  - On Eliquis 5 mg- no s/s bleeding - continue, reinforced not missing any doses in this 90-day post procedure timeframe  - On flecainide 100 mg twice a day -  - had been 88/92 now 128 - will stop  - On Protonix 40 mg daily - continue x 30 days  - F/u with Dr. Lionel Aden in 3 months  - ECG ordered and results personally reviewed     HTN  - BP well controlled  - On amlodipine 10 mg daily, clonidine 0.2 mg twice daily, Vasotec 10 mg twice daily, Hyzaar 100/25 mg nightly, metoprolol 50 mg twice a day- patient placed on ACE and ARB per PCP    EF of 04-56%  ACEi for systolic HF  ASA and Statin for CAD  Anticoagulation for AF   No Tobacco use. All questions and concerns were addressed to the patient/family. Alternatives to my treatment were discussed. The note was completed using EMR. Every effort was made to ensure accuracy; however, inadvertent computerized transcription errors may be present. Patient received education regarding their diagnosis, treatment and medications while in the office today.       Annabel Abraham [address]

## 2021-02-27 LAB — SARS-COV-2: NOT DETECTED

## 2021-03-02 ENCOUNTER — HOSPITAL ENCOUNTER (OUTPATIENT)
Dept: SLEEP CENTER | Age: 76
Discharge: HOME | End: 2021-03-02
Payer: MEDICARE

## 2021-03-02 DIAGNOSIS — G47.33 OBSTRUCTIVE SLEEP APNEA (ADULT) (PEDIATRIC): ICD-10-CM

## 2021-03-02 DIAGNOSIS — G47.33 OBSTRUCTIVE SLEEP APNEA (ADULT) (PEDIATRIC): Primary | ICD-10-CM

## 2021-03-02 PROCEDURE — 95811 POLYSOM 6/>YRS CPAP 4/> PARM: CPT

## 2021-03-02 PROCEDURE — 95811 POLYSOM 6/>YRS CPAP 4/> PARM: CPT | Performed by: INTERNAL MEDICINE

## 2021-03-04 ENCOUNTER — OFFICE VISIT (OUTPATIENT)
Dept: CARDIOLOGY CLINIC | Age: 76
End: 2021-03-04
Payer: MEDICARE

## 2021-03-04 VITALS
HEIGHT: 69 IN | WEIGHT: 214.2 LBS | TEMPERATURE: 97.8 F | SYSTOLIC BLOOD PRESSURE: 124 MMHG | DIASTOLIC BLOOD PRESSURE: 68 MMHG | HEART RATE: 68 BPM | BODY MASS INDEX: 31.73 KG/M2

## 2021-03-04 DIAGNOSIS — I48.19 PERSISTENT ATRIAL FIBRILLATION (HCC): Primary | ICD-10-CM

## 2021-03-04 DIAGNOSIS — I10 BENIGN ESSENTIAL HTN: ICD-10-CM

## 2021-03-04 PROCEDURE — 93000 ELECTROCARDIOGRAM COMPLETE: CPT | Performed by: NURSE PRACTITIONER

## 2021-03-04 PROCEDURE — G8427 DOCREV CUR MEDS BY ELIG CLIN: HCPCS | Performed by: NURSE PRACTITIONER

## 2021-03-04 PROCEDURE — 1036F TOBACCO NON-USER: CPT | Performed by: NURSE PRACTITIONER

## 2021-03-04 PROCEDURE — 3017F COLORECTAL CA SCREEN DOC REV: CPT | Performed by: NURSE PRACTITIONER

## 2021-03-04 PROCEDURE — 99214 OFFICE O/P EST MOD 30 MIN: CPT | Performed by: NURSE PRACTITIONER

## 2021-03-04 PROCEDURE — G8484 FLU IMMUNIZE NO ADMIN: HCPCS | Performed by: NURSE PRACTITIONER

## 2021-03-04 PROCEDURE — G8417 CALC BMI ABV UP PARAM F/U: HCPCS | Performed by: NURSE PRACTITIONER

## 2021-03-04 PROCEDURE — 1123F ACP DISCUSS/DSCN MKR DOCD: CPT | Performed by: NURSE PRACTITIONER

## 2021-03-04 PROCEDURE — 4040F PNEUMOC VAC/ADMIN/RCVD: CPT | Performed by: NURSE PRACTITIONER

## 2021-03-05 ENCOUNTER — TELEPHONE (OUTPATIENT)
Dept: PULMONOLOGY | Age: 76
End: 2021-03-05

## 2021-03-05 NOTE — PROGRESS NOTES
Yefri Vargas         : 1945  [address]    Diagnosis: [x] SURAJ (G47.33) [] CSA (G47.31) [] Apnea (G47.30)   Length of Need: [x] 12 Months [] 99 Months [] Other:    Machine (RENUKA!): [x] Respironics Dream Station      Auto [] ResMed AirSense     Auto [] Other:     []  CPAP () [x] Bilevel ()   Mode: [] Auto [] Spontaneous    Mode: [x] Auto [] Spontaneous               IPAP max 25  EPAP min 11 cmH2O  PS 3 cmH2o  PS max 7 cmH2O             Comfort Settings:   - Ramp Pressure: 5 cmH2O                                        - Ramp time: 15 min                                     -  Flex/EPR - 3 full time                                    - For ResMed Bilevel (TiMax-4 sec   TiMin- 0.2 sec)     Humidifier: [x] Heated ()        [x] Water chamber replacement ()/ 1 per 6 months        Mask:   [] Nasal () /1 per 3 months [] Full Face () /1 per 3 months   [] Patient choice -Size and fit mask [x] Patient Choice - Size and fit mask   [] Dispense:  [x] Dispense: Dreamwear   [] Headgear () / 1 per 3 months [x] Headgear () / 1 per 3 months   [] Replacement Nasal Cushion ()/2 per month [x] Interface Replacement ()/1 per month   [] Replacement Nasal Pillows ()/2 per month         Tubing: [x] Heated ()/1 per 3 months    [] Standard ()/1 per 3 months [] Other:           Filters: [x] Non-disposable ()/1 per 6 months     [x] Ultra-Fine, Disposable ()/2 per month        Miscellaneous: [] Chin Strap ()/ 1 per 6 months [] O2 bleed-in:       LPM   [] Oximetry on CPAP/Bilevel []  Other:    [x] Modem: ()         Start Order Date: 21    MEDICAL JUSTIFICATION:  I, the undersigned, certify that the above prescribed supplies are medically necessary for this patients wellbeing. In my opinion, the supplies are both reasonable and necessary in reference to accepted standards of medicalpractice in treatment of this patients condition.     Bryson Dominguez MD NPI: [id number]       Order Signed Date: 21    Electronically signed by Tamica Carroll MD on 3/5/2021 at 9:32 AM    Katelyn Espinosa  1945  [address]  [phone] (home)   [phone] (mobile)      Insurance Info (confirm with patient if correct):  Payor/Plan Subscr  Sex Relation Sub.  Ins. ID Effective Group Num

## 2021-03-16 ENCOUNTER — TELEPHONE (OUTPATIENT)
Dept: CARDIOLOGY CLINIC | Age: 76
End: 2021-03-16

## 2021-03-17 ENCOUNTER — PREP FOR PROCEDURE (OUTPATIENT)
Dept: CARDIOLOGY CLINIC | Age: 76
End: 2021-03-17

## 2021-03-17 ENCOUNTER — OFFICE VISIT (OUTPATIENT)
Dept: CARDIOLOGY CLINIC | Age: 76
End: 2021-03-17
Payer: MEDICARE

## 2021-03-17 VITALS
WEIGHT: 212.2 LBS | TEMPERATURE: 97.1 F | HEIGHT: 70 IN | DIASTOLIC BLOOD PRESSURE: 72 MMHG | HEART RATE: 116 BPM | SYSTOLIC BLOOD PRESSURE: 108 MMHG | BODY MASS INDEX: 30.38 KG/M2

## 2021-03-17 DIAGNOSIS — I10 BENIGN ESSENTIAL HTN: ICD-10-CM

## 2021-03-17 DIAGNOSIS — I48.0 PAROXYSMAL ATRIAL FIBRILLATION (HCC): Primary | ICD-10-CM

## 2021-03-17 PROCEDURE — 99214 OFFICE O/P EST MOD 30 MIN: CPT | Performed by: NURSE PRACTITIONER

## 2021-03-17 PROCEDURE — G8484 FLU IMMUNIZE NO ADMIN: HCPCS | Performed by: NURSE PRACTITIONER

## 2021-03-17 PROCEDURE — 1123F ACP DISCUSS/DSCN MKR DOCD: CPT | Performed by: NURSE PRACTITIONER

## 2021-03-17 PROCEDURE — 3017F COLORECTAL CA SCREEN DOC REV: CPT | Performed by: NURSE PRACTITIONER

## 2021-03-17 PROCEDURE — 93000 ELECTROCARDIOGRAM COMPLETE: CPT | Performed by: NURSE PRACTITIONER

## 2021-03-17 PROCEDURE — G8428 CUR MEDS NOT DOCUMENT: HCPCS | Performed by: NURSE PRACTITIONER

## 2021-03-17 PROCEDURE — 4040F PNEUMOC VAC/ADMIN/RCVD: CPT | Performed by: NURSE PRACTITIONER

## 2021-03-17 PROCEDURE — 1036F TOBACCO NON-USER: CPT | Performed by: NURSE PRACTITIONER

## 2021-03-17 PROCEDURE — G8417 CALC BMI ABV UP PARAM F/U: HCPCS | Performed by: NURSE PRACTITIONER

## 2021-03-17 RX ORDER — MIDAZOLAM HYDROCHLORIDE 1 MG/ML
1 INJECTION INTRAMUSCULAR; INTRAVENOUS ONCE
Status: CANCELLED | OUTPATIENT
Start: 2021-03-19

## 2021-03-17 RX ORDER — SODIUM CHLORIDE 0.9 % (FLUSH) 0.9 %
10 SYRINGE (ML) INJECTION PRN
Status: CANCELLED | OUTPATIENT
Start: 2021-03-17

## 2021-03-17 RX ORDER — SODIUM CHLORIDE 9 MG/ML
INJECTION, SOLUTION INTRAVENOUS CONTINUOUS
Status: CANCELLED | OUTPATIENT
Start: 2021-03-17

## 2021-03-17 RX ORDER — SODIUM CHLORIDE 0.9 % (FLUSH) 0.9 %
10 SYRINGE (ML) INJECTION EVERY 12 HOURS SCHEDULED
Status: CANCELLED | OUTPATIENT
Start: 2021-03-17

## 2021-03-17 NOTE — PROGRESS NOTES
[facility]   Electrophysiology  Office Visit  Date: 3/17/2021    Chief Complaint   Patient presents with    Atrial Fibrillation    Hypertension       Cardiac HX: Catalina Winslow is a 70 y.o. man with a h/o HTN, DM, PVCs ongoing for about 20 years with normal MPI, incidental finding of AF on 1/6/2021 during a routine colonoscopy, sent to the ED, placed on Eliquis, echo (1/12/2021) showed preserved LVEF of 55 to 60%, intermediate diastolic dysfunction and LAE, + SURAJ on CPAP, s/p RFA/PVI of AF (2/22/2021, Dr. Citlali Salazar). Interval History/HPI: Patient is here for f/u for pAF, he presents in AF today. He had his second Covid vaccination last Friday and then Monday had been sitting on a couch at a friends house when he stood up and almost passed out, then felt he was in AF. He has been in AF since. His flecainide had been stopped at last OV d/t a widening QRS. He does have an ECG kathi for his phone and checks his HR several times a day. His heart rate in A. fib runs between 100 to 120 bpm. When he is in NSR he is in the 60s. He is still waiting on his CPAP and was hoping he would have that on prior to doing another cardioversion. Denies chest pain, shortness of breath, PND, orthopnea or lower extremity edema. He is on the Eliquis and has not missed any doses. His blood pressure is a little lower today than what it normally is. Home medications:   Current Outpatient Medications on File Prior to Visit   Medication Sig Dispense Refill    gabapentin (NEURONTIN) 300 MG capsule Take 300 mg by mouth 2 times daily.       pantoprazole (PROTONIX) 40 MG tablet Take 1 tablet by mouth daily 30 tablet 0    amLODIPine (NORVASC) 10 MG tablet Take 1 tablet by mouth every morning      atorvastatin (LIPITOR) 40 MG tablet Take 40 mg by mouth nightly      ciclopirox (LOPROX) 0.77 % cream Apply topically every morning      cloNIDine (CATAPRES) 0.2 MG tablet Take 0.2 mg by mouth 2 times daily      enalapril (VASOTEC) 10 MG tablet Take 10 mg by mouth 2 times daily      fenofibrate (TRIGLIDE) 160 MG tablet Take 160 mg by mouth every evening      glimepiride (AMARYL) 2 MG tablet Take 2 mg by mouth 2 times daily      losartan-hydroCHLOROthiazide (HYZAAR) 100-25 MG per tablet Take 1 tablet by mouth every evening      LORazepam (ATIVAN) 1 MG tablet Take 1 mg by mouth 2 times daily.  metFORMIN (GLUCOPHAGE) 1000 MG tablet Take 1,000 mg by mouth 2 times daily      metoprolol tartrate (LOPRESSOR) 50 MG tablet Take 50 mg by mouth 2 times daily      sAXagliptin (ONGLYZA) 5 MG TABS tablet Take 5 mg by mouth every evening      PARoxetine (PAXIL) 20 MG tablet Take 20 mg by mouth every evening      blood glucose test strips (TRUETEST TEST) strip 1 strip 2 times daily      Lancets MISC Use to check Glucose BID. Dx: 250.00. Dispense Contour Lancets      aspirin-acetaminophen-caffeine (MIGRAINE FORMULA) 250-250-65 MG per tablet Take 1 tablet by mouth 2 times daily as needed for Headaches      apixaban (ELIQUIS) 5 MG TABS tablet Take 1 tablet by mouth 2 times daily 180 tablet 3     No current facility-administered medications on file prior to visit. Past Medical History:   Diagnosis Date    Allergic reaction to sulfonamide 1960    Bee sting reaction 2000    Cephalexin adverse reaction 1993    Fracture of left elbow 1956    History of right inguinal hernia repair 1987    Hidalgo's neuroma of right foot 1989    Open fracture of left elbow 1991    Open fracture of left humerus 1991    Rupture of appendix 1970        Past Surgical History:   Procedure Laterality Date    TONSILLECTOMY  1953       Allergies   Allergen Reactions    Bee Venom Swelling    Cephalexin Swelling    Cephalosporins     Propoxyphene Other (See Comments)     Hallucinations  (Darvon/Darvocet)    Sulfa Antibiotics        Social History:  Reviewed. reports that he has never smoked. He has never used smokeless tobacco.     Family History:  Reviewed. normal mood, affect and behavior     Labs:  Reviewed. No results for input(s): NA, K, CL, CO2, PHOS, BUN, CREATININE in the last 72 hours. Invalid input(s): CA,  TSH  No results for input(s): WBC, HGB, HCT, MCV, PLT in the last 72 hours. Lab Results   Component Value Date    TROPONINI <0.01 01/06/2021     No results found for: BNP  Lab Results   Component Value Date    PROTIME 16.2 02/22/2021    PROTIME 18.2 02/18/2021    PROTIME 12.5 01/06/2021    INR 1.39 02/22/2021    INR 1.56 02/18/2021    INR 1.08 01/06/2021     No results found for: CHOL, HDL, TRIG    ECG: Personally reviewed: AF, , QRS 90, QTc 450     ECHO:  2.22.2021([facility])  Summary   The left atrial size is dilated. There is no evidence of mass or thrombus in the left atrium or appendage. Doppler velocities in the JOON are reduced. ECHO: 1.12.2021  Summary   Normal left ventricular size. Mild concentric left ventricular hypertrophy. Normal left ventricular systolic function with an estimated ejection   fraction of 55-60%. Indeterminate diastolic function due to afib. The left atrium is moderately dilated. Stress Test: n/a    Cardiac Angiography: n/a    Problem List:   Patient Active Problem List    Diagnosis Date Noted    Persistent [facility] fibrillation ([facility] [address]     New onset a-fib ([facility] [address] 01/11/2021    Major depression, chronic 03/29/2018    Type 2 diabetes mellitus with peripheral autonomic neuropathy ([facility] [address] 10/13/2016    Mixed hyperlipidemia 10/13/2016    Migraine 02/16/2010    Obesity 02/16/2010    Anxiety state 07/30/1997    Essential hypertension 07/30/1997    Reflux esophagitis 07/30/1997        Assessment:   1. Paroxysmal atrial fibrillation (HCC)    2.  Benign essential HTN        Cardiac HX: Yefri Vargas is a 70 y.o. man with a h/o HTN, DM, PVCs ongoing for about 20 years with normal MPI, incidental finding of AF on 1/6/2021 during a routine colonoscopy, sent to the ED, placed on Eliquis, echo (1/12/2021) showed preserved LVEF of 55 to 60%, intermediate diastolic dysfunction and LAE, + SURAJ on CPAP, s/p RFA/PVI of AF (2/22/2021, Dr. Lei Wiggins). [id number] 3. TSH 1.85 (1/6/2021). AF  - In AF -   - S/p RFA/PVI of AF and atypical AFL on 2/22/2021  - On Eliquis 5 mg- no s/s bleeding - continue, has not missed any doses  - Off flecainide d/t QRS prolongation  - On Protonix 40 mg daily - continue x 30 days  - Will arrange DCCV  -Patient to take metoprolol 75 mg twice a day until his cardioversion and then cut back to 50 mg twice a day as his heart rate is in the 60s when he is in NSR  - ECG ordered and results personally reviewed     HTN  - BP well controlled  - On amlodipine 10 mg daily, clonidine 0.2 mg twice daily, Vasotec 10 mg twice daily, Hyzaar 100/25 mg nightly, metoprolol 50 mg twice a day- (patient placed on ACE and ARB per PCP)    EF of 10-44%  ACEi for systolic HF  ASA and Statin for CAD  Anticoagulation for AF   No Tobacco use. All questions and concerns were addressed to the patient/family. Alternatives to my treatment were discussed. The note was completed using EMR. Every effort was made to ensure accuracy; however, inadvertent computerized transcription errors may be present. Patient received education regarding their diagnosis, treatment and medications while in the office today.       Michael Leung [address]

## 2021-03-19 ENCOUNTER — HOSPITAL ENCOUNTER (OUTPATIENT)
Dept: CARDIAC CATH/INVASIVE PROCEDURES | Age: 76
Discharge: HOME | End: 2021-03-21
Payer: MEDICARE

## 2021-03-19 VITALS
RESPIRATION RATE: 16 BRPM | WEIGHT: 212 LBS | TEMPERATURE: 97.9 F | OXYGEN SATURATION: 99 % | BODY MASS INDEX: 30.35 KG/M2 | HEIGHT: 70 IN

## 2021-03-19 LAB
ANION GAP SERPL CALCULATED.3IONS-SCNC: 17 MMOL/L (ref 3–16)
BUN BLDV-MCNC: 30 MG/DL (ref 7–20)
CALCIUM SERPL-MCNC: 9 MG/DL (ref 8.3–10.6)
CHLORIDE BLD-SCNC: 96 MMOL/L (ref 99–110)
CO2: 25 MMOL/L (ref 21–32)
CREAT SERPL-MCNC: 1.3 MG/DL (ref 0.8–1.3)
EKG ATRIAL RATE: 87 BPM
EKG DIAGNOSIS: NORMAL
EKG Q-T INTERVAL: 372 MS
EKG QRS DURATION: 96 MS
EKG QTC CALCULATION (BAZETT): 494 MS
EKG R AXIS: -20 DEGREES
EKG T AXIS: -28 DEGREES
EKG VENTRICULAR RATE: 106 BPM
GFR AFRICAN AMERICAN: >60
GFR NON-AFRICAN AMERICAN: 54
GLUCOSE BLD-MCNC: 221 MG/DL (ref 70–99)
INR BLD: 1.99 (ref 0.86–1.14)
POTASSIUM SERPL-SCNC: 3.3 MMOL/L (ref 3.5–5.1)
PROTHROMBIN TIME: 23.3 SEC (ref 10–13.2)
SODIUM BLD-SCNC: 138 MMOL/L (ref 136–145)

## 2021-03-19 PROCEDURE — 85610 PROTHROMBIN TIME: CPT

## 2021-03-19 PROCEDURE — 93010 ELECTROCARDIOGRAM REPORT: CPT | Performed by: INTERNAL MEDICINE

## 2021-03-19 PROCEDURE — 93005 ELECTROCARDIOGRAM TRACING: CPT | Performed by: INTERNAL MEDICINE

## 2021-03-19 PROCEDURE — 99152 MOD SED SAME PHYS/QHP 5/>YRS: CPT | Performed by: INTERNAL MEDICINE

## 2021-03-19 PROCEDURE — 2700000000 HC OXYGEN THERAPY PER DAY

## 2021-03-19 PROCEDURE — 92960 CARDIOVERSION ELECTRIC EXT: CPT | Performed by: INTERNAL MEDICINE

## 2021-03-19 PROCEDURE — 92960 CARDIOVERSION ELECTRIC EXT: CPT

## 2021-03-19 PROCEDURE — 80048 BASIC METABOLIC PNL TOTAL CA: CPT

## 2021-03-19 PROCEDURE — 94761 N-INVAS EAR/PLS OXIMETRY MLT: CPT

## 2021-03-19 RX ORDER — SODIUM CHLORIDE 9 MG/ML
INJECTION, SOLUTION INTRAVENOUS CONTINUOUS
Status: DISCONTINUED | OUTPATIENT
Start: 2021-03-19 | End: 2021-03-22 | Stop reason: HOSPADM

## 2021-03-19 RX ORDER — SODIUM CHLORIDE 0.9 % (FLUSH) 0.9 %
10 SYRINGE (ML) INJECTION PRN
Status: DISCONTINUED | OUTPATIENT
Start: 2021-03-19 | End: 2021-03-22 | Stop reason: HOSPADM

## 2021-03-19 RX ORDER — SODIUM CHLORIDE 0.9 % (FLUSH) 0.9 %
10 SYRINGE (ML) INJECTION EVERY 12 HOURS SCHEDULED
Status: DISCONTINUED | OUTPATIENT
Start: 2021-03-19 | End: 2021-03-22 | Stop reason: HOSPADM

## 2021-03-19 RX ORDER — MIDAZOLAM HYDROCHLORIDE 1 MG/ML
1 INJECTION INTRAMUSCULAR; INTRAVENOUS ONCE
Status: DISCONTINUED | OUTPATIENT
Start: 2021-03-19 | End: 2021-03-22 | Stop reason: HOSPADM

## 2021-03-19 NOTE — PROCEDURES
[facility]     Electrophysiology Procedure Note       Date of Procedure: 3/19/2021  Patient's Name: Lisa Ramos  YOB: 1945   Medical Record Number: [id number]  Referring Physician: Sanaz att. providers found  Procedure Performed by: Aura Sicard, MD    Procedures performed:  · Anesthesia: Monitored Anesthesia Care  · Level of sedation plan: Moderate sedation (conscious sedation) with intravenous Propofol 70 mg  · Start time: 810  · Stop time: 820  · Mallampati airway assessment class: I  · ASA class: 1  · An independent trained observer pushed medications at my direction. We monitored the patient's level of consciousness and vital signs/physiologic status throughout the procedure duration  · External Electrical cardioversion     Indication of the procedure: Symptomatic, persistent atrial fibrillation      Details of procedure: The patient was brought to the cath lab area in a fasting and non-sedated state. The risks, benefits and alternatives of the procedure were discussed with the patient. The patient opted to proceed with the procedure. Written informed consent was signed and placed in the chart. A timeout protocol was completed to identify the patient and the procedure being performed. Patient is on chronic anticoagulation therapy. The patient was monitored continuously with ECG, pulse oximetry, blood pressure monitoring, and direct observation. We then administered intravenous propofol for sedation, and electrical DC cardioversion was performed using 200J, synchronized shock. Patient was converted to sinus rhythm. The patient tolerated the procedure well and there were no complications. Conclusion:   Successful external DC cardioversion of symptomatic, persistent atrial fibrillation     Plan:   The patient can be discharged if remains stable. Will continue with Eliquis and flecainide.  The patient will follow-up with Dr. Kiet Cole as an outpatient    Thank you for allowing me to participate in the care of this patient. If you have any questions, please feel free to contact me.     Jamari Puente MD   Cardiac Electrophysiology  [address] [place] [address] [phone]  [facility]

## 2021-03-19 NOTE — PROGRESS NOTES
ETCO2  Monitoring done for conscious sedation. Patient is on 2 liters/min via nasal cannula for procedure.     Baseline information:  HR: 77 BP: 137/74  RR: 16 LOC: sleepy  ETCO2: 36 SpO2: 99    2 minutes after sedation administered:  HR: 88 BP: 138/87  RR: 16 LOC: comatose  ETCO2: 36 SpO2: 98    4 min after sedation administered:  HR: 60 BP: 112/78  RR: 16 LOC: comatose  ETCO2: 31 SpO2: 100    Post-Procedure:  HR: 58 BP: 119/79  RR: 16 LOC: lethargic  ETCO2: 30 SpO2: 100  well    Patient/caregiver was educated on the proper method of use:  Yes      Level of patient/caregiver understanding able to:   [x] Verbalize understanding   [] Demonstrate understanding       [] Teach back        [] Needs reinforcement       []  No available caregiver               []  Other:     Response to education:  Excellent

## 2021-03-20 LAB
EKG ATRIAL RATE: 56 BPM
EKG DIAGNOSIS: NORMAL
EKG P AXIS: -19 DEGREES
EKG P-R INTERVAL: 200 MS
EKG Q-T INTERVAL: 458 MS
EKG QRS DURATION: 96 MS
EKG QTC CALCULATION (BAZETT): 441 MS
EKG R AXIS: -34 DEGREES
EKG T AXIS: -35 DEGREES
EKG VENTRICULAR RATE: 56 BPM

## 2021-04-11 ENCOUNTER — HOSPITAL ENCOUNTER (EMERGENCY)
Age: 76
Discharge: HOME | End: 2021-04-11
Attending: EMERGENCY MEDICINE
Payer: MEDICARE

## 2021-04-11 VITALS
RESPIRATION RATE: 15 BRPM | OXYGEN SATURATION: 94 % | SYSTOLIC BLOOD PRESSURE: 130 MMHG | TEMPERATURE: 97.6 F | HEIGHT: 70 IN | BODY MASS INDEX: 30.64 KG/M2 | WEIGHT: 214 LBS | DIASTOLIC BLOOD PRESSURE: 68 MMHG | HEART RATE: 68 BPM

## 2021-04-11 DIAGNOSIS — S90.424A BLISTER OF THIRD TOE OF RIGHT FOOT, INITIAL ENCOUNTER: ICD-10-CM

## 2021-04-11 DIAGNOSIS — S90.424A BLISTER OF SECOND TOE OF RIGHT FOOT, INITIAL ENCOUNTER: Primary | ICD-10-CM

## 2021-04-11 PROCEDURE — 99284 EMERGENCY DEPT VISIT MOD MDM: CPT

## 2021-04-11 PROCEDURE — 6370000000 HC RX 637 (ALT 250 FOR IP): Performed by: EMERGENCY MEDICINE

## 2021-04-11 RX ORDER — BACITRACIN ZINC AND POLYMYXIN B SULFATE 500; 1000 [USP'U]/G; [USP'U]/G
OINTMENT TOPICAL ONCE
Status: COMPLETED | OUTPATIENT
Start: 2021-04-11 | End: 2021-04-11

## 2021-04-11 RX ORDER — DOXYCYCLINE 100 MG/1
100 TABLET ORAL 2 TIMES DAILY
Qty: 10 TABLET | Refills: 0 | Status: SHIPPED | OUTPATIENT
Start: 2021-04-11 | End: 2021-04-16

## 2021-04-11 RX ADMIN — BACITRACIN ZINC AND POLYMYXIN B SULFATE: 500; 10000 OINTMENT TOPICAL at 04:24

## 2021-04-11 SDOH — HEALTH STABILITY: MENTAL HEALTH: HOW OFTEN DO YOU HAVE A DRINK CONTAINING ALCOHOL?: NEVER

## 2021-04-11 NOTE — ED PROVIDER NOTES
[id number]          ATTENDING PHYSICIAN NOTE       Date of evaluation: 4/11/2021    Chief Complaint     Toe Pain      History of Present Illness     Paul Le is a 70 y.o. male who presents who presents with complaint of some blisters on the end of his second third and fourth toe on his right foot. Patient reports he has diabetes, and some decreased sensation in his feet, as well as previous nerve resection at the third toe on his right foot. Reports he thought he had some water in his work boot today, noted to be mildly irritating, but this evening when he checked found a drill bit in his shoe, which apparently has been contacting his foot all day long, and was noted to have some blisters on the distal aspect of his second third and fourth toe. He says the area is mildly uncomfortable, but just a little bit. He is not sure how the drill bit got in there, noted today but thinks it may have been in there yesterday as well. He just noticed the wounds this evening. He says he was unable to really care for them at home and subsequently came in. Denies any fevers or chills. Denies any pain in his foot otherwise. No alleviating or aggravating factors otherwise. Review of Systems     Review of Systems  Pertinent positives and negatives are listed in the HPI; otherwise all systems are reviewed and were negative. Past Medical, Surgical, Family, and Social History     He has a past medical history of Allergic reaction to sulfonamide, Bee sting reaction, Cephalexin adverse reaction, Fracture of left elbow, History of right inguinal hernia repair, Hidalgo's neuroma of right foot, Open fracture of left elbow, Open fracture of left humerus, and Rupture of appendix. He has a past surgical history that includes Tonsillectomy (1953). His family history includes Sleep Apnea in his father. He reports that he has never smoked.  He has never used smokeless tobacco. He reports that he does not drink alcohol or use drugs. Medications     Discharge Medication List as of 4/11/2021  5:03 AM      CONTINUE these medications which have NOT CHANGED    Details   gabapentin (NEURONTIN) 300 MG capsule Take 300 mg by mouth 2 times daily. Historical Med      pantoprazole (PROTONIX) 40 MG tablet Take 1 tablet by mouth daily, Disp-30 tablet, R-0Normal      amLODIPine (NORVASC) 10 MG tablet Take 1 tablet by mouth every morningHistorical Med      atorvastatin (LIPITOR) 40 MG tablet Take 40 mg by mouth nightlyHistorical Med      ciclopirox (LOPROX) 0.77 % cream Apply topically every morning, Topical, EVERY MORNING Starting Wed 11/4/2020, Historical Med      cloNIDine (CATAPRES) 0.2 MG tablet Take 0.2 mg by mouth 2 times dailyHistorical Med      enalapril (VASOTEC) 10 MG tablet Take 10 mg by mouth 2 times dailyHistorical Med      fenofibrate (TRIGLIDE) 160 MG tablet Take 160 mg by mouth every eveningHistorical Med      glimepiride (AMARYL) 2 MG tablet Take 2 mg by mouth 2 times dailyHistorical Med      losartan-hydroCHLOROthiazide (HYZAAR) 100-25 MG per tablet Take 1 tablet by mouth every eveningHistorical Med      LORazepam (ATIVAN) 1 MG tablet Take 1 mg by mouth 2 times daily. Historical Med      metFORMIN (GLUCOPHAGE) 1000 MG tablet Take 1,000 mg by mouth 2 times dailyHistorical Med      metoprolol tartrate (LOPRESSOR) 50 MG tablet Take 50 mg by mouth 2 times dailyHistorical Med      sAXagliptin (ONGLYZA) 5 MG TABS tablet Take 5 mg by mouth every eveningHistorical Med      PARoxetine (PAXIL) 20 MG tablet Take 20 mg by mouth every eveningHistorical Med      blood glucose test strips (TRUETEST TEST) strip 2 TIMES DAILY Starting Thu 4/2/2020, Historical Med      Lancets MISC Historical Med      aspirin-acetaminophen-caffeine (MIGRAINE FORMULA) 250-250-65 MG per tablet Take 1 tablet by mouth 2 times daily as needed for HeadachesHistorical Med      apixaban (ELIQUIS) 5 MG TABS tablet Take 1 tablet by mouth 2 times daily, Disp-180 tablet, R-3Normal             Allergies     He is allergic to bee venom; cephalexin; cephalosporins; propoxyphene; and sulfa antibiotics. Physical Exam     INITIAL VITALS: BP: 137/73, Temp: 97.6 °F (36.4 °C), Pulse: 68, Resp: 15, SpO2: 97 %   Physical Exam  Constitutional:       Appearance: Normal appearance. Cardiovascular:      Rate and Rhythm: Normal rate. Pulmonary:      Effort: Pulmonary effort is normal. No respiratory distress. Musculoskeletal:      Comments: There are ruptured blisters on the tip and pad of the second and third toe on the right foot with some mild erythema of the toes. There is a unruptured blister on the medial aspect of the fourth toe, which appears small. Blister is not tense on this digit, there is no erythema. No tenderness of the foot. No erythema extending outside of the toes. Full range of motion all 5 toes. Skin:     General: Skin is warm and dry. Neurological:      General: No focal deficit present. Mental Status: He is alert and oriented to person, place, and time. Comments: decreased sensation right 2nd, 3, 4 toe         Diagnostic Results     EKG       RADIOLOGY:  No orders to display       LABS:   No results found for this visit on 04/11/21. ED BEDSIDE ULTRASOUND:      RECENT VITALS:  BP: 130/68,Temp: 97.6 °F (36.4 °C), Pulse: 68, Resp: 15, SpO2: 94 %     Procedures         ED Course     Nursing Notes, Past Medical Hx, Past Surgical Hx, Social Hx,Allergies, and Family Hx were reviewed.     patient was given the following medications:  Orders Placed This Encounter   Medications    bacitracin-polymyxin b (POLYSPORIN) ointment    doxycycline monohydrate (ADOXA) 100 MG tablet     Sig: Take 1 tablet by mouth 2 times daily for 5 days     Dispense:  10 tablet     Refill:  0       CONSULTS:  None    MEDICAL DECISIONMAKING / ASSESSMENT / Ever Rivera is a 70 y.o. male who presents with what appears to be contact area of blistering on the third and second toes of the right foot after contact with a drill bit in the patient's shoe all day. The ruptured blisters were debrided, exposing some beefy red dermis tissue underneath it. There is a small area of pale tissue on the distal aspect of the third toe, which may represent a deeper pressure sore, but no obvious ulcer in this area. The wounds were cleaned, irrigated, and the sloughing off dermal tissue incised, and dressed with bacitracin ointment, silicone dressing, and absorbent gauze overlying them. There is a little bit of erythema on the dorsal aspect of the second and third toe, which I am unable to say at this point could not be an early cellulitis, though I suspect this probably changes from the irritation that caused the blistering in the distal aspect to start with, but we will go ahead and treat for the cellulitis. Is reported allergies appear to indicate doxycycline to be the most likely to be helpful and provide adequate coverage for skin neelam. He was given return precautions for any spreading redness or any other worsening symptoms. We will refer him to podiatry, and asked that he call first thing in the morning to get follow-up as soon as he can. He is comfortable with this plan, and otherwise nontoxic in appearance. Clinical Impression     1. Blister of second toe of right foot, initial encounter    2.  Blister of third toe of right foot, initial encounter        Disposition     PATIENT REFERRED TO:  Mat Shay DPM  [address] [facility] [address]  [phone]    Schedule an appointment as soon as possible for a visit         DISCHARGE MEDICATIONS:  Discharge Medication List as of 4/11/2021  5:03 AM      START taking these medications    Details   doxycycline monohydrate (ADOXA) 100 MG tablet Take 1 tablet by mouth 2 times daily for 5 days, Disp-10 tablet, R-0Normal             DISPOSITION Decision To Discharge 04/11/2021

## 2021-05-10 ENCOUNTER — TELEPHONE (OUTPATIENT)
Dept: CARDIOLOGY CLINIC | Age: 76
End: 2021-05-10

## 2021-05-10 NOTE — TELEPHONE ENCOUNTER
Pt requesting Cardiac Clearance. Scheduled for a colonoscopy. Date not specified on the letter sent. Last EKG was conducted on 03/19/2021. Pt is currently on Eliquis 5mg. Colonoscopy scheduled to be conducted at the [facility].

## 2021-05-12 ENCOUNTER — OFFICE VISIT (OUTPATIENT)
Dept: PULMONOLOGY | Age: 76
End: 2021-05-12
Payer: MEDICARE

## 2021-05-12 VITALS
DIASTOLIC BLOOD PRESSURE: 70 MMHG | HEART RATE: 96 BPM | BODY MASS INDEX: 30.64 KG/M2 | OXYGEN SATURATION: 97 % | HEIGHT: 70 IN | SYSTOLIC BLOOD PRESSURE: 126 MMHG | WEIGHT: 214 LBS

## 2021-05-12 DIAGNOSIS — G47.33 OSA (OBSTRUCTIVE SLEEP APNEA): Primary | ICD-10-CM

## 2021-05-12 DIAGNOSIS — I10 ESSENTIAL HYPERTENSION: Chronic | ICD-10-CM

## 2021-05-12 DIAGNOSIS — I48.19 PERSISTENT ATRIAL FIBRILLATION (HCC): ICD-10-CM

## 2021-05-12 DIAGNOSIS — E11.43 TYPE 2 DIABETES MELLITUS WITH PERIPHERAL AUTONOMIC NEUROPATHY (HCC): Chronic | ICD-10-CM

## 2021-05-12 DIAGNOSIS — E66.9 CLASS 1 OBESITY WITH SERIOUS COMORBIDITY AND BODY MASS INDEX (BMI) OF 30.0 TO 30.9 IN ADULT, UNSPECIFIED OBESITY TYPE: ICD-10-CM

## 2021-05-12 PROCEDURE — 4040F PNEUMOC VAC/ADMIN/RCVD: CPT | Performed by: NURSE PRACTITIONER

## 2021-05-12 PROCEDURE — 3017F COLORECTAL CA SCREEN DOC REV: CPT | Performed by: NURSE PRACTITIONER

## 2021-05-12 PROCEDURE — 3046F HEMOGLOBIN A1C LEVEL >9.0%: CPT | Performed by: NURSE PRACTITIONER

## 2021-05-12 PROCEDURE — G8417 CALC BMI ABV UP PARAM F/U: HCPCS | Performed by: NURSE PRACTITIONER

## 2021-05-12 PROCEDURE — 2022F DILAT RTA XM EVC RTNOPTHY: CPT | Performed by: NURSE PRACTITIONER

## 2021-05-12 PROCEDURE — 1036F TOBACCO NON-USER: CPT | Performed by: NURSE PRACTITIONER

## 2021-05-12 PROCEDURE — G8427 DOCREV CUR MEDS BY ELIG CLIN: HCPCS | Performed by: NURSE PRACTITIONER

## 2021-05-12 PROCEDURE — 99214 OFFICE O/P EST MOD 30 MIN: CPT | Performed by: NURSE PRACTITIONER

## 2021-05-12 PROCEDURE — 1123F ACP DISCUSS/DSCN MKR DOCD: CPT | Performed by: NURSE PRACTITIONER

## 2021-05-12 ASSESSMENT — SLEEP AND FATIGUE QUESTIONNAIRES
ESS TOTAL SCORE: 5
HOW LIKELY ARE YOU TO NOD OFF OR FALL ASLEEP WHILE SITTING INACTIVE IN A PUBLIC PLACE: 1
HOW LIKELY ARE YOU TO NOD OFF OR FALL ASLEEP WHILE WATCHING TV: 1
HOW LIKELY ARE YOU TO NOD OFF OR FALL ASLEEP WHILE SITTING AND TALKING TO SOMEONE: 0
HOW LIKELY ARE YOU TO NOD OFF OR FALL ASLEEP WHILE LYING DOWN TO REST IN THE AFTERNOON WHEN CIRCUMSTANCES PERMIT: 1
HOW LIKELY ARE YOU TO NOD OFF OR FALL ASLEEP WHILE SITTING QUIETLY AFTER LUNCH WITHOUT ALCOHOL: 0
HOW LIKELY ARE YOU TO NOD OFF OR FALL ASLEEP WHEN YOU ARE A PASSENGER IN A CAR FOR AN HOUR WITHOUT A BREAK: 1

## 2021-05-12 NOTE — LETTER
[facility]  [place] [address] [place] [address]  Phone: [phone]  Fax: [phone]    May 12, 2021       Patient: Ciara Powell   MR Number: [id number]   YOB: 1945   Date of Visit: 5/12/2021       Emily West was seen for a follow up visit today. Here is my assessment and plan as well as an attached copy of his visit today:    Type 2 diabetes mellitus with peripheral autonomic neuropathy (HCC)  Chronic- Stable. Discussed the importance of treating obstructive sleep apnea as part of the management of this disorder. Cont any meds per PCP and other physicians. Essential hypertension  Chronic- Stable. Discussed the importance of treating obstructive sleep apnea as part of the management of this disorder. Cont any meds per PCP and other physicians. Persistent atrial fibrillation (HCC)  Chronic- Stable. Discussed the importance of treating obstructive sleep apnea as part of the management of this disorder. Cont any meds per PCP and other physicians. Obesity  Chronic-not stable:  Discussed importance of treating obstructive sleep apnea and getting sufficient sleep to assist with weight control. Encouraged him to work on weight loss through diet and exercise. Recommended DASH or Mediterranean diets. SURAJ (obstructive sleep apnea)  Chronic-Stable: Reviewed and analyzed results of physiologic download from patient's machine and reviewed with patient. Supplies and parts as needed for his machine. These are medically necessary. Limit caffeine use after 3pm. Based on the analyzed data will continue with current settings. Verbal and written instruction on PAP equipment cleaning and disinfection schedule provided. Patient to reach out to medical services company to schedule a mask fitting. Follow up in 3 months. If you have questions or concerns, please do not hesitate to call me. I look forward to following Gemma Krabbe along with you.     Sincerely,    More Sanders SUKUMAR Khoury    CC providers:  MD Beto Mathew [address] [place] [id number]  [employer] Mail

## 2021-05-12 NOTE — PROGRESS NOTES
diet and exercise. Recommended DASH or Mediterranean diets. Review of external prior note from Cardiology (different specialty) showed: 3/17/21. Patient underwent afib ablation on 2/22/21 that was unsuccessful. Dicontinued flecainide 100mg BID due to low bp. Continue Eliquis. Review of results of unique test/ procedure showed: Underwent a successful cardioversion for Atrial fibrillation on 3/19/21. Symptoms have since improved. Echo 1/12/21 showed LVEF of 55-60%, intermediate diastolic dysfunction. Reviewed, analyzed, and documented physiologic data from patient's PAP machine. This information was analyzed to assess complexity and medical decision making in regards to further testing and management. The primary encounter diagnosis was SURAJ (obstructive sleep apnea). Diagnoses of Persistent atrial fibrillation ([address] Essential hypertension, Type 2 diabetes mellitus with peripheral autonomic neuropathy ([facility] [address] and Class 1 obesity with serious comorbidity and body mass index (BMI) of 30.0 to 30.9 in adult, unspecified obesity type were also pertinent to this visit. The chronic medical conditions listed are directly related to the primary diagnosis listed above. The management of the primary diagnosis affects the secondary diagnosis and vice versa. Subjective:     Patient ID: Olaf Seay is a 70 y.o. male. Chief Complaint   Patient presents with    Sleep Apnea       HPI:  Machine Modem/Download Info:  Compliance (hours/night): 6.2 hrs/night  % of nights >= 4 hrs: 90 %  Download AHI (/hour): 2.7 /HR   Average IPAP Pressure: 16 cmH2O  Average EPAP Pressure: 11 cmH2O         AUTO BIPAP - Settings (Dax)  IPAP Max: 25 cmH2O  EPAP Min: 11 cmH2O  Pressure Support Min: 3  Pressure Support Max: 7             Comfort Settings  Humidity Level (0-8): 3  Heated Tubing (Yes/No): Yes  Flex/EPR (0-3): 3 PAP Mask  Clinically Relevant Leak: No     Olaf Seay is doing well with his machine.  He is waking more rested and is no longer sleepy on his drive home after work. Pressure feels good and he is waking rested. he denies headaches, congestion, nosebleeds, dryness, aerophagia, or drowsiness while driving. He is using a full face mask and reports that the large is too big but the medium feels ok but is somewhat tight on his nasal bridge. He has not reached out to his equipment company for a mask fitting. Had polysomnography on 2/19/21 which showed CMS AHI-25.1/hr with low sat-80% and time below 90% of 8.3 min.     [address]    [place] - Total score: 5    Social History     Socioeconomic History    Marital status: Single     Spouse name: Not on file    Number of children: Not on file    Years of education: Not on file    Highest education level: Not on file   Occupational History    Not on file   Social Needs    Financial resource strain: Not on file    Food insecurity     Worry: Not on file     Inability: Not on file    Transportation needs     Medical: Not on file     Non-medical: Not on file   Tobacco Use    Smoking status: Never Smoker    Smokeless tobacco: Never Used   Substance and Sexual Activity    Alcohol use: Never     Frequency: Never    Drug use: Never    Sexual activity: Not on file   Lifestyle    Physical activity     Days per week: Not on file     Minutes per session: Not on file    Stress: Not on file   Relationships    Social connections     Talks on phone: Not on file     Gets together: Not on file     Attends Buddhist service: Not on file     Active member of club or organization: Not on file     Attends meetings of clubs or organizations: Not on file     Relationship status: Not on file    Intimate partner violence     Fear of current or ex partner: Not on file     Emotionally abused: Not on file     Physically abused: Not on file     Forced sexual activity: Not on file   Other Topics Concern    Not on file   Social History Narrative    Not on file       Current Outpatient Medications   Medication Sig Dispense Refill    gabapentin (NEURONTIN) 300 MG capsule Take 300 mg by mouth 2 times daily.  pantoprazole (PROTONIX) 40 MG tablet Take 1 tablet by mouth daily 30 tablet 0    amLODIPine (NORVASC) 10 MG tablet Take 1 tablet by mouth every morning      atorvastatin (LIPITOR) 40 MG tablet Take 40 mg by mouth nightly      ciclopirox (LOPROX) 0.77 % cream Apply topically every morning      cloNIDine (CATAPRES) 0.2 MG tablet Take 0.2 mg by mouth 2 times daily      enalapril (VASOTEC) 10 MG tablet Take 10 mg by mouth 2 times daily      fenofibrate (TRIGLIDE) 160 MG tablet Take 160 mg by mouth every evening      glimepiride (AMARYL) 2 MG tablet Take 2 mg by mouth 2 times daily      losartan-hydroCHLOROthiazide (HYZAAR) 100-25 MG per tablet Take 1 tablet by mouth every evening      LORazepam (ATIVAN) 1 MG tablet Take 1 mg by mouth 2 times daily.  metFORMIN (GLUCOPHAGE) 1000 MG tablet Take 1,000 mg by mouth 2 times daily      metoprolol tartrate (LOPRESSOR) 50 MG tablet Take 50 mg by mouth 2 times daily      sAXagliptin (ONGLYZA) 5 MG TABS tablet Take 5 mg by mouth every evening      PARoxetine (PAXIL) 20 MG tablet Take 20 mg by mouth every evening      blood glucose test strips (TRUETEST TEST) strip 1 strip 2 times daily      Lancets MISC Use to check Glucose BID. Dx: 250.00. Dispense Contour Lancets      aspirin-acetaminophen-caffeine (MIGRAINE FORMULA) 250-250-65 MG per tablet Take 1 tablet by mouth 2 times daily as needed for Headaches      apixaban (ELIQUIS) 5 MG TABS tablet Take 1 tablet by mouth 2 times daily 180 tablet 3     No current facility-administered medications for this visit.         Allergies as of 05/12/2021 - Review Complete 05/12/2021   Allergen Reaction Noted    Bee venom Swelling 08/26/2010    Cephalexin Swelling 02/19/2007    Cephalosporins  02/09/2016    Propoxyphene Other (See Comments) 02/09/2016    Sulfa antibiotics  01/06/2021       Patient Active Problem List   Diagnosis    Anxiety state    Type 2 diabetes mellitus with peripheral autonomic neuropathy (HCC)    Essential hypertension    Major depression, chronic    Migraine    Mixed hyperlipidemia    New onset a-fib (HCC)    Reflux esophagitis    Obesity    Persistent atrial fibrillation (HCC)    SURAJ (obstructive sleep apnea)            Vitals:  Weight BMI   Wt Readings from Last 3 Encounters:   05/12/21 214 lb (97.1 kg)   04/11/21 214 lb (97.1 kg)   03/19/21 212 lb (96.2 kg)    Body mass index is 30.71 kg/m².      BP HR SaO2   BP Readings from Last 3 Encounters:   05/12/21 126/70   04/11/21 130/68   03/17/21 108/72    Pulse Readings from Last 3 Encounters:   05/12/21 96   04/11/21 68   03/17/21 116    SpO2 Readings from Last 3 Encounters:   05/12/21 97%   04/11/21 94%   03/19/21 99%            Electronically signed by SUKUMAR Holt on 5/12/2021 at 9:45 AM

## 2021-05-12 NOTE — ASSESSMENT & PLAN NOTE
Chronic-Stable: Reviewed and analyzed results of physiologic download from patient's machine and reviewed with patient. Supplies and parts as needed for his machine. These are medically necessary. Limit caffeine use after 3pm. Based on the analyzed data will continue with current settings. Verbal and written instruction on PAP equipment cleaning and disinfection schedule provided. Patient to reach out to medical services company to schedule a mask fitting. Follow up in 3 months.

## 2021-05-14 ENCOUNTER — APPOINTMENT (OUTPATIENT)
Dept: GENERAL RADIOLOGY | Age: 76
DRG: 617 | End: 2021-05-14
Payer: MEDICARE

## 2021-05-14 ENCOUNTER — HOSPITAL ENCOUNTER (INPATIENT)
Age: 76
LOS: 4 days | Discharge: HOME | DRG: 617 | End: 2021-05-18
Attending: EMERGENCY MEDICINE | Admitting: INTERNAL MEDICINE
Payer: MEDICARE

## 2021-05-14 DIAGNOSIS — M86.9 OSTEOMYELITIS OF RIGHT FOOT, UNSPECIFIED TYPE (HCC): ICD-10-CM

## 2021-05-14 DIAGNOSIS — G89.18 POST-OP PAIN: Primary | ICD-10-CM

## 2021-05-14 PROBLEM — E11.628 DIABETIC FOOT INFECTION (HCC): Status: ACTIVE | Noted: 2021-05-14

## 2021-05-14 PROBLEM — L08.9 DIABETIC FOOT INFECTION (HCC): Status: ACTIVE | Noted: 2021-05-14

## 2021-05-14 LAB
ANION GAP SERPL CALCULATED.3IONS-SCNC: 16 MMOL/L (ref 3–16)
BASE EXCESS VENOUS: 3.4 MMOL/L (ref -2–3)
BASOPHILS ABSOLUTE: 0.1 K/UL (ref 0–0.2)
BASOPHILS RELATIVE PERCENT: 0.6 %
BUN BLDV-MCNC: 30 MG/DL (ref 7–20)
C-REACTIVE PROTEIN: 36 MG/L (ref 0–5.1)
CALCIUM SERPL-MCNC: 9.5 MG/DL (ref 8.3–10.6)
CARBOXYHEMOGLOBIN: 0.3 % (ref 0–1.5)
CHLORIDE BLD-SCNC: 94 MMOL/L (ref 99–110)
CO2: 25 MMOL/L (ref 21–32)
CREAT SERPL-MCNC: 1.3 MG/DL (ref 0.8–1.3)
EOSINOPHILS ABSOLUTE: 0.1 K/UL (ref 0–0.6)
EOSINOPHILS RELATIVE PERCENT: 0.9 %
GFR AFRICAN AMERICAN: >60
GFR NON-AFRICAN AMERICAN: 54
GLUCOSE BLD-MCNC: 168 MG/DL (ref 70–99)
GLUCOSE BLD-MCNC: 85 MG/DL (ref 70–99)
HCO3 VENOUS: 28.1 MMOL/L (ref 24–28)
HCT VFR BLD CALC: 37.1 % (ref 40.5–52.5)
HEMOGLOBIN: 12.8 G/DL (ref 13.5–17.5)
LACTIC ACID: 3 MMOL/L (ref 0.4–2)
LYMPHOCYTES ABSOLUTE: 1.5 K/UL (ref 1–5.1)
LYMPHOCYTES RELATIVE PERCENT: 16.3 %
MAGNESIUM: 1.2 MG/DL (ref 1.8–2.4)
MCH RBC QN AUTO: 30.1 PG (ref 26–34)
MCHC RBC AUTO-ENTMCNC: 34.4 G/DL (ref 31–36)
MCV RBC AUTO: 87.5 FL (ref 80–100)
METHEMOGLOBIN VENOUS: 0.4 % (ref 0–1.5)
MONOCYTES ABSOLUTE: 0.7 K/UL (ref 0–1.3)
MONOCYTES RELATIVE PERCENT: 8 %
NEUTROPHILS ABSOLUTE: 6.8 K/UL (ref 1.7–7.7)
NEUTROPHILS RELATIVE PERCENT: 74.2 %
O2 SAT, VEN: 84 %
PCO2, VEN: 42.3 MMHG (ref 41–51)
PDW BLD-RTO: 13.3 % (ref 12.4–15.4)
PERFORMED ON: NORMAL
PH VENOUS: 7.43 (ref 7.35–7.45)
PLATELET # BLD: 306 K/UL (ref 135–450)
PMV BLD AUTO: 8.2 FL (ref 5–10.5)
PO2, VEN: 50.7 MMHG (ref 25–40)
POTASSIUM REFLEX MAGNESIUM: 3.4 MMOL/L (ref 3.5–5.1)
RBC # BLD: 4.25 M/UL (ref 4.2–5.9)
SEDIMENTATION RATE, ERYTHROCYTE: 68 MM/HR (ref 0–20)
SODIUM BLD-SCNC: 135 MMOL/L (ref 136–145)
TCO2 CALC VENOUS: 29 MMOL/L
WBC # BLD: 9.1 K/UL (ref 4–11)

## 2021-05-14 PROCEDURE — 84134 ASSAY OF PREALBUMIN: CPT

## 2021-05-14 PROCEDURE — 86140 C-REACTIVE PROTEIN: CPT

## 2021-05-14 PROCEDURE — 36415 COLL VENOUS BLD VENIPUNCTURE: CPT

## 2021-05-14 PROCEDURE — 85652 RBC SED RATE AUTOMATED: CPT

## 2021-05-14 PROCEDURE — 2580000003 HC RX 258: Performed by: EMERGENCY MEDICINE

## 2021-05-14 PROCEDURE — 83036 HEMOGLOBIN GLYCOSYLATED A1C: CPT

## 2021-05-14 PROCEDURE — 73630 X-RAY EXAM OF FOOT: CPT

## 2021-05-14 PROCEDURE — 96365 THER/PROPH/DIAG IV INF INIT: CPT

## 2021-05-14 PROCEDURE — 83605 ASSAY OF LACTIC ACID: CPT

## 2021-05-14 PROCEDURE — 0JBQ0ZZ EXCISION OF RIGHT FOOT SUBCUTANEOUS TISSUE AND FASCIA, OPEN APPROACH: ICD-10-PCS | Performed by: PODIATRIST

## 2021-05-14 PROCEDURE — 96375 TX/PRO/DX INJ NEW DRUG ADDON: CPT

## 2021-05-14 PROCEDURE — 1200000000 HC SEMI PRIVATE

## 2021-05-14 PROCEDURE — 87070 CULTURE OTHR SPECIMN AEROBIC: CPT

## 2021-05-14 PROCEDURE — 87077 CULTURE AEROBIC IDENTIFY: CPT

## 2021-05-14 PROCEDURE — 85025 COMPLETE CBC W/AUTO DIFF WBC: CPT

## 2021-05-14 PROCEDURE — 6360000002 HC RX W HCPCS: Performed by: EMERGENCY MEDICINE

## 2021-05-14 PROCEDURE — 87186 SC STD MICRODIL/AGAR DIL: CPT

## 2021-05-14 PROCEDURE — 87205 SMEAR GRAM STAIN: CPT

## 2021-05-14 PROCEDURE — 83735 ASSAY OF MAGNESIUM: CPT

## 2021-05-14 PROCEDURE — 2580000003 HC RX 258: Performed by: INTERNAL MEDICINE

## 2021-05-14 PROCEDURE — 6360000002 HC RX W HCPCS: Performed by: INTERNAL MEDICINE

## 2021-05-14 PROCEDURE — 80048 BASIC METABOLIC PNL TOTAL CA: CPT

## 2021-05-14 PROCEDURE — 87040 BLOOD CULTURE FOR BACTERIA: CPT

## 2021-05-14 PROCEDURE — 82803 BLOOD GASES ANY COMBINATION: CPT

## 2021-05-14 PROCEDURE — 99283 EMERGENCY DEPT VISIT LOW MDM: CPT

## 2021-05-14 RX ORDER — ENALAPRIL MALEATE 10 MG/1
10 TABLET ORAL 2 TIMES DAILY
Status: DISCONTINUED | OUTPATIENT
Start: 2021-05-15 | End: 2021-05-18 | Stop reason: HOSPADM

## 2021-05-14 RX ORDER — LORAZEPAM 1 MG/1
1 TABLET ORAL 2 TIMES DAILY PRN
Status: DISCONTINUED | OUTPATIENT
Start: 2021-05-15 | End: 2021-05-18 | Stop reason: HOSPADM

## 2021-05-14 RX ORDER — SODIUM CHLORIDE 0.9 % (FLUSH) 0.9 %
5-40 SYRINGE (ML) INJECTION PRN
Status: DISCONTINUED | OUTPATIENT
Start: 2021-05-14 | End: 2021-05-18 | Stop reason: HOSPADM

## 2021-05-14 RX ORDER — LOSARTAN POTASSIUM AND HYDROCHLOROTHIAZIDE 25; 100 MG/1; MG/1
1 TABLET ORAL EVERY EVENING
Status: DISCONTINUED | OUTPATIENT
Start: 2021-05-15 | End: 2021-05-18 | Stop reason: HOSPADM

## 2021-05-14 RX ORDER — ONDANSETRON 2 MG/ML
4 INJECTION INTRAMUSCULAR; INTRAVENOUS EVERY 6 HOURS PRN
Status: DISCONTINUED | OUTPATIENT
Start: 2021-05-14 | End: 2021-05-18 | Stop reason: HOSPADM

## 2021-05-14 RX ORDER — INSULIN LISPRO 100 [IU]/ML
0-6 INJECTION, SOLUTION INTRAVENOUS; SUBCUTANEOUS
Status: DISCONTINUED | OUTPATIENT
Start: 2021-05-15 | End: 2021-05-18 | Stop reason: HOSPADM

## 2021-05-14 RX ORDER — PAROXETINE HYDROCHLORIDE 20 MG/1
20 TABLET, FILM COATED ORAL EVERY EVENING
Status: DISCONTINUED | OUTPATIENT
Start: 2021-05-14 | End: 2021-05-18 | Stop reason: HOSPADM

## 2021-05-14 RX ORDER — SODIUM CHLORIDE, SODIUM LACTATE, POTASSIUM CHLORIDE, CALCIUM CHLORIDE 600; 310; 30; 20 MG/100ML; MG/100ML; MG/100ML; MG/100ML
INJECTION, SOLUTION INTRAVENOUS CONTINUOUS
Status: DISCONTINUED | OUTPATIENT
Start: 2021-05-14 | End: 2021-05-18 | Stop reason: HOSPADM

## 2021-05-14 RX ORDER — FENOFIBRATE 160 MG/1
160 TABLET ORAL DAILY
Status: DISCONTINUED | OUTPATIENT
Start: 2021-05-14 | End: 2021-05-18 | Stop reason: HOSPADM

## 2021-05-14 RX ORDER — METOPROLOL TARTRATE 50 MG/1
50 TABLET, FILM COATED ORAL 2 TIMES DAILY
Status: DISCONTINUED | OUTPATIENT
Start: 2021-05-15 | End: 2021-05-18 | Stop reason: HOSPADM

## 2021-05-14 RX ORDER — SODIUM CHLORIDE 9 MG/ML
25 INJECTION, SOLUTION INTRAVENOUS PRN
Status: DISCONTINUED | OUTPATIENT
Start: 2021-05-14 | End: 2021-05-18 | Stop reason: HOSPADM

## 2021-05-14 RX ORDER — AMLODIPINE BESYLATE 10 MG/1
10 TABLET ORAL EVERY MORNING
Status: DISCONTINUED | OUTPATIENT
Start: 2021-05-15 | End: 2021-05-18 | Stop reason: HOSPADM

## 2021-05-14 RX ORDER — DOXYCYCLINE HYCLATE 100 MG
100 TABLET ORAL 2 TIMES DAILY
Status: ON HOLD | COMMUNITY
End: 2021-05-18 | Stop reason: HOSPADM

## 2021-05-14 RX ORDER — SODIUM CHLORIDE 0.9 % (FLUSH) 0.9 %
5-40 SYRINGE (ML) INJECTION EVERY 12 HOURS SCHEDULED
Status: DISCONTINUED | OUTPATIENT
Start: 2021-05-14 | End: 2021-05-18 | Stop reason: HOSPADM

## 2021-05-14 RX ORDER — SODIUM CHLORIDE, SODIUM LACTATE, POTASSIUM CHLORIDE, AND CALCIUM CHLORIDE .6; .31; .03; .02 G/100ML; G/100ML; G/100ML; G/100ML
500 INJECTION, SOLUTION INTRAVENOUS ONCE
Status: COMPLETED | OUTPATIENT
Start: 2021-05-14 | End: 2021-05-15

## 2021-05-14 RX ORDER — ACETAMINOPHEN 650 MG/1
650 SUPPOSITORY RECTAL EVERY 6 HOURS PRN
Status: DISCONTINUED | OUTPATIENT
Start: 2021-05-14 | End: 2021-05-18 | Stop reason: HOSPADM

## 2021-05-14 RX ORDER — POLYETHYLENE GLYCOL 3350 17 G/17G
17 POWDER, FOR SOLUTION ORAL DAILY PRN
Status: DISCONTINUED | OUTPATIENT
Start: 2021-05-14 | End: 2021-05-18 | Stop reason: HOSPADM

## 2021-05-14 RX ORDER — CLONIDINE HYDROCHLORIDE 0.1 MG/1
0.2 TABLET ORAL 2 TIMES DAILY
Status: DISCONTINUED | OUTPATIENT
Start: 2021-05-15 | End: 2021-05-18 | Stop reason: HOSPADM

## 2021-05-14 RX ORDER — DEXTROSE MONOHYDRATE 50 MG/ML
100 INJECTION, SOLUTION INTRAVENOUS PRN
Status: DISCONTINUED | OUTPATIENT
Start: 2021-05-14 | End: 2021-05-18 | Stop reason: HOSPADM

## 2021-05-14 RX ORDER — NICOTINE POLACRILEX 4 MG
15 LOZENGE BUCCAL PRN
Status: DISCONTINUED | OUTPATIENT
Start: 2021-05-14 | End: 2021-05-18 | Stop reason: HOSPADM

## 2021-05-14 RX ORDER — ACETAMINOPHEN 325 MG/1
650 TABLET ORAL EVERY 6 HOURS PRN
Status: DISCONTINUED | OUTPATIENT
Start: 2021-05-14 | End: 2021-05-18 | Stop reason: HOSPADM

## 2021-05-14 RX ORDER — PANTOPRAZOLE SODIUM 40 MG/1
40 TABLET, DELAYED RELEASE ORAL DAILY
Status: DISCONTINUED | OUTPATIENT
Start: 2021-05-15 | End: 2021-05-18 | Stop reason: HOSPADM

## 2021-05-14 RX ORDER — PROMETHAZINE HYDROCHLORIDE 25 MG/1
12.5 TABLET ORAL EVERY 6 HOURS PRN
Status: DISCONTINUED | OUTPATIENT
Start: 2021-05-14 | End: 2021-05-18 | Stop reason: HOSPADM

## 2021-05-14 RX ORDER — ASPIRIN 81 MG/1
81 TABLET ORAL DAILY
COMMUNITY
End: 2021-07-16

## 2021-05-14 RX ORDER — DEXTROSE MONOHYDRATE 25 G/50ML
12.5 INJECTION, SOLUTION INTRAVENOUS PRN
Status: DISCONTINUED | OUTPATIENT
Start: 2021-05-14 | End: 2021-05-18 | Stop reason: HOSPADM

## 2021-05-14 RX ORDER — INSULIN LISPRO 100 [IU]/ML
0-3 INJECTION, SOLUTION INTRAVENOUS; SUBCUTANEOUS NIGHTLY
Status: DISCONTINUED | OUTPATIENT
Start: 2021-05-14 | End: 2021-05-18 | Stop reason: HOSPADM

## 2021-05-14 RX ORDER — MAGNESIUM SULFATE IN WATER 40 MG/ML
4000 INJECTION, SOLUTION INTRAVENOUS ONCE
Status: COMPLETED | OUTPATIENT
Start: 2021-05-14 | End: 2021-05-14

## 2021-05-14 RX ORDER — GABAPENTIN 300 MG/1
300 CAPSULE ORAL 2 TIMES DAILY
Status: DISCONTINUED | OUTPATIENT
Start: 2021-05-15 | End: 2021-05-18 | Stop reason: HOSPADM

## 2021-05-14 RX ADMIN — SODIUM CHLORIDE, POTASSIUM CHLORIDE, SODIUM LACTATE AND CALCIUM CHLORIDE: 600; 310; 30; 20 INJECTION, SOLUTION INTRAVENOUS at 23:55

## 2021-05-14 RX ADMIN — SODIUM CHLORIDE, POTASSIUM CHLORIDE, SODIUM LACTATE AND CALCIUM CHLORIDE 500 ML: 600; 310; 30; 20 INJECTION, SOLUTION INTRAVENOUS at 22:22

## 2021-05-14 RX ADMIN — MAGNESIUM SULFATE HEPTAHYDRATE 4000 MG: 4 INJECTION, SOLUTION INTRAVENOUS at 19:00

## 2021-05-14 RX ADMIN — Medication 10 ML: at 22:22

## 2021-05-14 RX ADMIN — VANCOMYCIN HYDROCHLORIDE 1500 MG: 10 INJECTION, POWDER, LYOPHILIZED, FOR SOLUTION INTRAVENOUS at 18:26

## 2021-05-14 RX ADMIN — MEROPENEM 1000 MG: 1 INJECTION, POWDER, FOR SOLUTION INTRAVENOUS at 17:40

## 2021-05-14 ASSESSMENT — ENCOUNTER SYMPTOMS
VOMITING: 0
RHINORRHEA: 0
FACIAL SWELLING: 0
CHEST TIGHTNESS: 0
EYE DISCHARGE: 0
NAUSEA: 0
DIARRHEA: 0
SHORTNESS OF BREATH: 0
CHOKING: 0
ABDOMINAL PAIN: 0
EYE PAIN: 0

## 2021-05-14 NOTE — H&P
Hospital Medicine History & Physical      PCP: Chey Newton MD    Date of Admission: 5/14/2021    Date of Service: Pt seen/examined on 05/14/21 and Admitted to Inpatient with expected LOS greater than two midnights due to medical therapy. Chief Complaint:  Toe drainage and erythema. History Of Present Illness:      Suzanne Hernandez is a 70 y.o. male with past medical history as below, including DMii, who presents with worsening erythema and drainage from his right toes, increased right foot swelling and fever to 101.7. He called his podiatrist last weekend and took a course of doxycycline when he first had symptoms however he has not had improvement, and believes drainage is increased. He denies chest ain, palpitations, sob, n/v, diarrhea or constipation. Denies much foot/toe pain. Past Medical History:          Diagnosis Date    Allergic reaction to sulfonamide 1960    Bee sting reaction 2000    Cephalexin adverse reaction 1993    Fracture of left elbow 1956    History of right inguinal hernia repair 1987    Hidalgo's neuroma of right foot 1989    Open fracture of left elbow 1991    Open fracture of left humerus 1991    Rupture of appendix 1970       Past Surgical History:          Procedure Laterality Date    TONSILLECTOMY  1953       Medications Prior to Admission:      Prior to Admission medications    Medication Sig Start Date End Date Taking? Authorizing Provider   doxycycline hyclate (VIBRA-TABS) 100 MG tablet Take 100 mg by mouth   Yes Historical Provider, MD   aspirin 81 MG EC tablet Take 81 mg by mouth daily   Yes Historical Provider, MD   gabapentin (NEURONTIN) 300 MG capsule Take 300 mg by mouth 2 times daily.    Yes Historical Provider, MD   amLODIPine (NORVASC) 10 MG tablet Take 1 tablet by mouth every morning 11/4/20  Yes Historical Provider, MD   atorvastatin (LIPITOR) 40 MG tablet Take 40 mg by mouth nightly 11/4/20  Yes Historical Provider, MD   ciclopirox (LOPROX) 0.77 % cream Apply topically every morning 11/4/20  Yes Historical Provider, MD   cloNIDine (CATAPRES) 0.2 MG tablet Take 0.2 mg by mouth 2 times daily 11/4/20  Yes Historical Provider, MD   enalapril (VASOTEC) 10 MG tablet Take 10 mg by mouth 2 times daily 11/4/20  Yes Historical Provider, MD   fenofibrate (TRIGLIDE) 160 MG tablet Take 160 mg by mouth every evening 11/4/20  Yes Historical Provider, MD   glimepiride (AMARYL) 2 MG tablet Take 2 mg by mouth 2 times daily 11/4/20  Yes Historical Provider, MD   losartan-hydroCHLOROthiazide (HYZAAR) 100-25 MG per tablet Take 1 tablet by mouth every evening 11/4/20  Yes Historical Provider, MD   LORazepam (ATIVAN) 1 MG tablet Take 1 mg by mouth 2 times daily. 11/4/20  Yes Historical Provider, MD   metFORMIN (GLUCOPHAGE) 1000 MG tablet Take 1,000 mg by mouth 2 times daily 11/4/20  Yes Historical Provider, MD   metoprolol tartrate (LOPRESSOR) 50 MG tablet Take 50 mg by mouth 2 times daily 11/4/20  Yes Historical Provider, MD   sAXagliptin (ONGLYZA) 5 MG TABS tablet Take 5 mg by mouth every evening 11/4/20  Yes Historical Provider, MD   PARoxetine (PAXIL) 20 MG tablet Take 20 mg by mouth every evening 11/4/20  Yes Historical Provider, MD   aspirin-acetaminophen-caffeine (MIGRAINE FORMULA) 250-250-65 MG per tablet Take 1 tablet by mouth 2 times daily as needed for Headaches   Yes Historical Provider, MD   apixaban (ELIQUIS) 5 MG TABS tablet Take 1 tablet by mouth 2 times daily 1/21/21  Yes Fish Serra MD   pantoprazole (PROTONIX) 40 MG tablet Take 1 tablet by mouth daily 2/22/21   Fish Serra MD   blood glucose test strips (TRUETEST TEST) strip 1 strip 2 times daily 4/2/20   Historical Provider, MD   Lancets MISC Use to check Glucose BID. Dx: 250.00.  Dispense Contour Lancets 4/2/20   Historical Provider, MD       Allergies:  Bee venom, Cephalexin, Cephalosporins, Propoxyphene, and Sulfa antibiotics    Social History:      The patient currently lives in private residence. TOBACCO:   reports that he has never smoked. He has never used smokeless tobacco.  ETOH:   reports no history of alcohol use. Family History:      Reviewed in detail and positive as follows:        Problem Relation Age of Onset    Sleep Apnea Father        REVIEW OF SYSTEMS:   Pertinent positives as noted in the HPI. All other systems reviewed and negative. PHYSICAL EXAM:    /72   Pulse 75   Temp 97.9 °F (36.6 °C) (Oral)   Resp 18   Ht 5' 10\" (1.778 m)   Wt 212 lb (96.2 kg)   SpO2 98%   BMI 30.42 kg/m²     General appearance: No apparent distress, appears stated age and cooperative. HEENT: Normal cephalic, atraumatic without obvious deformity. Pupils equal, round, and reactive to light. Extra ocular muscles intact. Conjunctivae/corneas clear. Neck: Supple, with full range of motion. No jugular venous distention. Trachea midline. Respiratory:  Normal respiratory effort. Clear to auscultation, bilaterally without Rales/Wheezes/Rhonchi. Cardiovascular: Regular rate and rhythm with normal S1/S2 without murmurs, rubs or gallops. Abdomen: Soft, non-tender, non-distended with normal bowel sounds. Musculoskelatal: R LE wrapped in dressings. Skin: Skin color, texture, turgor normal.  No rashes or lesions. Neurologic:  Neurovascularly intact without any focal sensory/motor deficits. Cranial nerves: II-XII intact, grossly non-focal.  Psychiatric: Alert and oriented, thought content appropriate, normal insight    Labs:     Recent Labs     05/14/21  1636   WBC 9.1   HGB 12.8*   HCT 37.1*        Recent Labs     05/14/21  1636   *   K 3.4*   CL 94*   CO2 25   BUN 30*   CREATININE 1.3   CALCIUM 9.5     No results for input(s): AST, ALT, BILIDIR, BILITOT, ALKPHOS in the last 72 hours. No results for input(s): INR in the last 72 hours. No results for input(s): Charley Loth in the last 72 hours.     Urinalysis:    No results found for: NITRU, [address], BACTERIA, RBCUA, BLOODU, SPECGRAV, GLUCOSEU    Studies:  XR FOOT RIGHT (MIN 3 VIEWS)   Final Result      3rd distal phalanx acute osteomyelitis. ASSESSMENT:    Diabetic foot infection  Osteomyelitis  Atrial fibrillation on Eliquis  HTN  DMII with peripheral neuropathy    PLAN:    Started on vanc and merrem due to history of antibiotic allergies  Podiatry consulted, involvement appreciated  Wound culture in process  ESR, CRP  Blood cultures ordered  CXR shows acute osteomyelitis  Agree with ID consult    DMII  Hold home oral meds  Sliding scale  Hgb A1c    Continue home anti-hypertensives    Afib s/p ablation  Patient has taken all his evening medications including his Eliquis already, plan to start heparin drip in am in case of possible surgery. DVT Prophylaxis: Pt on Eliquis (not ordered)  Diet: No diet orders on file  Code Status: Prior    PT/OT Eval Status: pending    Dispo - Inpatient      Nathalia Wise DO    Thank you Benny Snow MD for the opportunity to be involved in this patient's care. If you have any questions or concerns please feel free to contact me at perfectserve.

## 2021-05-14 NOTE — ED NOTES
Floor Rn for room 5308 unavailable to take report at present . Will call back.      Reinaldo Mckinnon RN  05/14/21 1922

## 2021-05-14 NOTE — ED NOTES
Bed: A08-08  Expected date:   Expected time:   Means of arrival:   Comments:  Jimmy Corcoran RN  05/14/21 [id number]

## 2021-05-14 NOTE — PROGRESS NOTES
Clinical Pharmacy Progress Note  Medication History     Admit Date: 5/14/21    List of of current medications patient is taking is complete. Home Medication list in EPIC updated to reflect changes noted below. Source of information: Patient, patient's home medication list, [employer]    Please note:   Doxycycline - patient reports starting 5/9/21 for a 7-day course  Patient reports taking all AM medications today prior to arrival at [facility]      Complete Home Medication List:  Current Outpatient Medications on File Prior to Encounter   Medication Sig    doxycycline hyclate (VIBRA-TABS) 100 MG tablet Take 100 mg by mouth 2 times daily     aspirin 81 MG EC tablet Take 81 mg by mouth daily    gabapentin (NEURONTIN) 300 MG capsule Take 300 mg by mouth 2 times daily. pantoprazole (PROTONIX) 40 MG tablet Take 1 tablet by mouth daily    amLODIPine (NORVASC) 10 MG tablet Take 1 tablet by mouth every morning    atorvastatin (LIPITOR) 40 MG tablet Take 40 mg by mouth nightly    ciclopirox (LOPROX) 0.77 % cream Apply topically every morning    cloNIDine (CATAPRES) 0.2 MG tablet Take 0.2 mg by mouth 2 times daily    enalapril (VASOTEC) 10 MG tablet Take 10 mg by mouth 2 times daily    fenofibrate (TRIGLIDE) 160 MG tablet Take 160 mg by mouth every evening    glimepiride (AMARYL) 2 MG tablet Take 2 mg by mouth 2 times daily    losartan-hydroCHLOROthiazide (HYZAAR) 100-25 MG per tablet Take 1 tablet by mouth every evening    LORazepam (ATIVAN) 1 MG tablet Take 1 mg by mouth 2 times daily.     metFORMIN (GLUCOPHAGE) 1000 MG tablet Take 1,000 mg by mouth 2 times daily    metoprolol tartrate (LOPRESSOR) 50 MG tablet Take 50 mg by mouth 2 times daily    sAXagliptin (ONGLYZA) 5 MG TABS tablet Take 5 mg by mouth every evening    PARoxetine (PAXIL) 20 MG tablet Take 20 mg by mouth every evening    aspirin-acetaminophen-caffeine (MIGRAINE FORMULA) 250-250-65 MG per tablet Take 1 tablet by mouth 2 times daily as needed for Headaches apixaban (ELIQUIS) 5 MG TABS tablet Take 1 tablet by mouth 2 times daily       Please call with questions!     Stas Suggs PharmD, [facility]  Mobile: [phone]  Main Pharmacy: [phone]

## 2021-05-14 NOTE — ED NOTES
Blood culture drawn. Specimen labeled using patient name, date of birth, medical number as identifiers, and staff initials. Patient tolerated well and left on stretcher locked in lowest position, with side rails up and call light within reach.       Molly Bruno RN  05/14/21 1981

## 2021-05-14 NOTE — CONSULTS
Department of Podiatry Consult Note  Resident       Reason for Consult: Foot infection    Requesting Physician: Dr. Fred Foster: Right foot pain    HISTORY OF PRESENT ILLNESS:                The patient is a 70 y.o. male with significant past medical history of please see list below. Podiatry was consulted for foot infection. Patient is established with a podiatrist Dr. Torie Acosta. Patient last saw his podiatrist on 5/10/2021. Patient was given a prescription of doxycycline. Patient states that he has had this ulcer to his right third toe for well over 4 weeks that started as a blister and was seen at the [facility] ED on 4/11/2021. Patient states something fell in his shoes and due to his neuropathy did not feel it until he created an ulcer. Pain rate today is 2/10 on the pain scale. Patient describes the pain as achy in nature. Patient states couple days ago that he did have increased temperature over 101.7 °F, but has not had a increase in temperature since. Patient thinks that his wound is getting worse on his third digit with increased drainage and came to the emergency department. Patient denies nausea, vomiting, fever, chills, shortness of breath, or other constitutional symptoms. Patient has no other complaints on today's evaluation. Past Medical History:        Diagnosis Date    Allergic reaction to sulfonamide 1960    Bee sting reaction 2000    Cephalexin adverse reaction 1993    Fracture of left elbow 1956    History of right inguinal hernia repair 1987    Hidalgo's neuroma of right foot 1989    Open fracture of left elbow 1991    Open fracture of left humerus 1991    Rupture of appendix 1970     Past Surgical History:        Procedure Laterality Date    TONSILLECTOMY  1953     Current Medications:    No current facility-administered medications for this encounter.    Allergies:   Bee venom, Cephalexin, Cephalosporins, Propoxyphene, and Sulfa antibiotics  Social History: TOBACCO:   reports that he has never smoked. He has never used smokeless tobacco.  Family History:       Problem Relation Age of Onset    Sleep Apnea Father      REVIEW OF SYSTEMS:      ROS: A 10 point review of systems was conducted, significant findings as noted in HPI. All other systems negative. PHYSICAL EXAM:      Vitals:    /72   Pulse 75   Temp 97.9 °F (36.6 °C) (Oral)   Resp 18   Ht 5' 10\" (1.778 m)   Wt 212 lb (96.2 kg)   SpO2 98%   BMI 30.42 kg/m²     LABS:   No results for input(s): WBC, HGB, HCT, PLT in the last 72 hours. No results for input(s): NA, K, CL, CO2, PHOS, BUN, CREATININE in the last 72 hours. Invalid input(s): CA  No results for input(s): PROT, INR, APTT in the last 72 hours. VASCULAR: DP and PT pulses are nonpalpable right lower extremity 2/2 edema. DP and PT pulses are palpable left lower extremity. Upon hand-held Doppler triphasic signals noted right lower extremity. CFT is brisk to the digits of the foot b/l. Skin temperature is warm to lukewarm from proximal to distal with focal calor noted to the second and third digits right foot. +1 pitting edema noted right lower extremity. No pain with calf compression b/l. NEUROLOGIC: Gross and epicritic sensation is diminished b/l. Protective sensation is diminished at all pedal sites b/l. DERMATOLOGIC: Dermatological changes noted B/L LE. Right lower extremity full-thickness linear wound noted to the distal tuft of the third digit. Wound measures approximately 1.0 x 0.1 x 0.4 cm. Wound base is a mixture of granular and hyperkeratotic rim and border. Wound does probe to bone. Wound does not tunnel, or tracks. Less than 1 cc of purulent drainage encountered during compression of the third digit. No fluctuance, crepitus, or malodor noted. Periwound erythema acute signs of infection noted at the second and third digits.     Picture taken on 5/14/2021        Patient gave verbal consent of all pictures taken today.        MUSCULOSKELETAL: Muscle strength is 5/5 for all pedal groups tested. No pain with palpation of the foot or ankle b/l. Ankle joint ROM is decreased in dorsiflexion with the knee extended. IMAGING:   X-ray right foot, 5/14/2021  Impression       3rd distal phalanx acute osteomyelitis. IMPRESSION/RECOMMENDATIONS:    Diabetic foot infection, right foot  Full-thickness ulceration, Mott 3, third digit  Cellulitis, right foot  Osteomyelitis, third distal phalanx right foot  DM 2 with peripheral neuropathy            -Patient is seen and examined this evening. -VSS, afebrile, no leukocytosis noted  -X-ray right foot, third distal phalanx, see impression above. -CRP 36/ESR 68  -Hemoglobin A1c and prealbumin, follow-up results  -Wound culture obtained digit right foot. Follow-up results.  Jennifer Crawford excisional debridement with a #10 blade to the third digit down to dermal tissue only. Less than 20 cm² of tissue debrided. Estimated blood loss less than 2 cc. Hemostasis achieved with direct pressure. Patient tolerated procedure well. -Right lower extremity dressed with Betadine, DSD, Ace bandage  -Patient brought Cam boot from home. Instructed patient to heel weight-bear to the right lower extremity while wearing the cam boot.  -Consulted infectious disease, IV antibiotic recommendation.  -Continue broad-spectrum IV antibiotics. -Diabetic foot infection with cellulitis at the second and third digit. DISPO: Diabetic foot infection, full-thickness ulceration third digit with localized cellulitis to the second and third digits on the right foot. Patient failed outpatient antibiotics and would recommend inpatient hospital stay with IV broad-spectrum antibiotics. Will follow-up on all labs and wound culture. Will discuss with attending about further treatment modalities.   We will continue to closely monitor patient while in house.    -Discussed assessment and plan with Dr. Mary Grimaldo [address], DPM   Podiatric Resident, PGY-2  Pager: [phone] or Perfect serve     Dr. Ana Paula Alfonso, [facility]  Office: [phone]  Cell: [phone]

## 2021-05-14 NOTE — ED PROVIDER NOTES
[address] [facility] ER          ATTENDING PHYSICIAN NOTE       Date of evaluation: 5/14/2021    Chief Complaint     Foot Swelling      History of Present Illness     Sia Pierson is a 70 y.o. male who presents with a chief complaint of foot swelling. Patient states that 4 weeks ago (4/11 on chart review) he came here for evaluation of blister on his feet. Per ED note he was started on doxycycline. He felt like his symptoms were improving including the erythema to his toes and he was taking good care of it and doing daily dressing changes. However over the weekend when he finishes antibiotics he noted a fever of 101.7 and felt generally unwell and noticed that his toes were more red and swollen. He called his podiatrist who he had been following with outpatient who prescribed him additional antibiotics. Patient has been taking those all week and subjectively feels better but did note today that his toes again appear more swollen and erythematous with significant purulent drainage. Patient does have diabetes and diabetic neuropathy, takes oral medications but does not take any insulin at this time. Denies any constitutional symptoms at this time including fevers, muscle aches. Denies significant pain in his foot but states he does have neuropathy so might not feel significant pain anyway. Podiatrist is Dr Jose Martin Jaquez [phone]      Review of Systems     Review of Systems   Constitutional: Negative for activity change, appetite change, fatigue and fever. HENT: Negative for ear pain, facial swelling, nosebleeds and rhinorrhea. Eyes: Negative for pain, discharge and visual disturbance. Respiratory: Negative for choking, chest tightness and shortness of breath. Cardiovascular: Negative for chest pain and palpitations. Gastrointestinal: Negative for abdominal pain, diarrhea, nausea and vomiting.    Endocrine: Negative for cold intolerance, heat intolerance and polyuria. Genitourinary: Negative for dysuria, flank pain and hematuria. Musculoskeletal: Negative for arthralgias, joint swelling and myalgias. Toe swelling R foot as per hpi above   Skin: Negative for rash and wound. Allergic/Immunologic: Negative for environmental allergies. Neurological: Negative for dizziness, seizures, syncope, weakness and light-headedness. Hematological: Does not bruise/bleed easily. Psychiatric/Behavioral: Negative for confusion and hallucinations. Past Medical, Surgical, Family, and Social History     He has a past medical history of Allergic reaction to sulfonamide, Bee sting reaction, Cephalexin adverse reaction, Fracture of left elbow, History of right inguinal hernia repair, Hidalgo's neuroma of right foot, Open fracture of left elbow, Open fracture of left humerus, and Rupture of appendix. He has a past surgical history that includes Tonsillectomy (1953). His family history includes Sleep Apnea in his father. He reports that he has never smoked. He has never used smokeless tobacco. He reports that he does not drink alcohol or use drugs.     Medications     Previous Medications    AMLODIPINE (NORVASC) 10 MG TABLET    Take 1 tablet by mouth every morning    APIXABAN (ELIQUIS) 5 MG TABS TABLET    Take 1 tablet by mouth 2 times daily    ASPIRIN 81 MG EC TABLET    Take 81 mg by mouth daily    ASPIRIN-ACETAMINOPHEN-CAFFEINE (MIGRAINE FORMULA) 250-250-65 MG PER TABLET    Take 1 tablet by mouth 2 times daily as needed for Headaches    ATORVASTATIN (LIPITOR) 40 MG TABLET    Take 40 mg by mouth nightly    BLOOD GLUCOSE TEST STRIPS (TRUETEST TEST) STRIP    1 strip 2 times daily    CICLOPIROX (LOPROX) 0.77 % CREAM    Apply topically every morning    CLONIDINE (CATAPRES) 0.2 MG TABLET    Take 0.2 mg by mouth 2 times daily    DOXYCYCLINE HYCLATE (VIBRA-TABS) 100 MG TABLET    Take 100 mg by mouth    ENALAPRIL (VASOTEC) 10 MG TABLET    Take 10 mg by mouth 2 times daily FENOFIBRATE (TRIGLIDE) 160 MG TABLET    Take 160 mg by mouth every evening    GABAPENTIN (NEURONTIN) 300 MG CAPSULE    Take 300 mg by mouth 2 times daily. GLIMEPIRIDE (AMARYL) 2 MG TABLET    Take 2 mg by mouth 2 times daily    LANCETS MISC    Use to check Glucose BID. Dx: 250.00. Dispense Contour Lancets    LORAZEPAM (ATIVAN) 1 MG TABLET    Take 1 mg by mouth 2 times daily. LOSARTAN-HYDROCHLOROTHIAZIDE (HYZAAR) 100-25 MG PER TABLET    Take 1 tablet by mouth every evening    METFORMIN (GLUCOPHAGE) 1000 MG TABLET    Take 1,000 mg by mouth 2 times daily    METOPROLOL TARTRATE (LOPRESSOR) 50 MG TABLET    Take 50 mg by mouth 2 times daily    PANTOPRAZOLE (PROTONIX) 40 MG TABLET    Take 1 tablet by mouth daily    PAROXETINE (PAXIL) 20 MG TABLET    Take 20 mg by mouth every evening    SAXAGLIPTIN (ONGLYZA) 5 MG TABS TABLET    Take 5 mg by mouth every evening       Allergies     He is allergic to bee venom; cephalexin; cephalosporins; propoxyphene; and sulfa antibiotics. Physical Exam     INITIAL VITALS: BP: 123/72, Temp: 97.9 °F (36.6 °C), Pulse: 75, Resp: 18, SpO2: 98 %   Physical Exam  Constitutional:       Appearance: Normal appearance. HENT:      Head: Normocephalic and atraumatic. Nose: Nose normal.      Mouth/Throat:      Mouth: Mucous membranes are moist.   Eyes:      Extraocular Movements: Extraocular movements intact. Pupils: Pupils are equal, round, and reactive to light. Neck:      Musculoskeletal: Normal range of motion and neck supple. Cardiovascular:      Rate and Rhythm: Normal rate and regular rhythm. Pulses: Normal pulses. Pulmonary:      Effort: Pulmonary effort is normal. No respiratory distress. Abdominal:      General: There is no distension. Tenderness: There is no abdominal tenderness. Musculoskeletal: Normal range of motion.       Comments: Erythema to right foot 2nd and 3rd digit, swelling as well, purulent ulcer on bottom of 3rd digit, foot otherwise without significant swelling / erythema   Skin:     General: Skin is warm and dry. Capillary Refill: Capillary refill takes less than 2 seconds. Neurological:      General: No focal deficit present. Mental Status: He is alert and oriented to person, place, and time. Diagnostic Results     EKG   none    RADIOLOGY:  XR FOOT RIGHT (MIN 3 VIEWS)   Final Result      3rd distal phalanx acute osteomyelitis.           LABS:   Results for orders placed or performed during the hospital encounter of 05/14/21   CBC Auto Differential   Result Value Ref Range    WBC 9.1 4.0 - 11.0 K/uL    RBC 4.25 4.20 - 5.90 M/uL    Hemoglobin 12.8 (L) 13.5 - 17.5 g/dL    Hematocrit 37.1 (L) 40.5 - 52.5 %    MCV 87.5 80.0 - 100.0 fL    MCH 30.1 26.0 - 34.0 pg    MCHC 34.4 31.0 - 36.0 g/dL    RDW 13.3 12.4 - 15.4 %    Platelets 300 311 - 535 K/uL    MPV 8.2 5.0 - 10.5 fL    Neutrophils % 74.2 %    Lymphocytes % 16.3 %    Monocytes % 8.0 %    Eosinophils % 0.9 %    Basophils % 0.6 %    Neutrophils Absolute 6.8 1.7 - 7.7 K/uL    Lymphocytes Absolute 1.5 1.0 - 5.1 K/uL    Monocytes Absolute 0.7 0.0 - 1.3 K/uL    Eosinophils Absolute 0.1 0.0 - 0.6 K/uL    Basophils Absolute 0.1 0.0 - 0.2 K/uL   Basic Metabolic Panel w/ Reflex to MG   Result Value Ref Range    Sodium 135 (L) 136 - 145 mmol/L    Potassium reflex Magnesium 3.4 (L) 3.5 - 5.1 mmol/L    Chloride 94 (L) 99 - 110 mmol/L    CO2 25 21 - 32 mmol/L    Anion Gap 16 3 - 16    Glucose 168 (H) 70 - 99 mg/dL    BUN 30 (H) 7 - 20 mg/dL    CREATININE 1.3 0.8 - 1.3 mg/dL    GFR Non-African American 54 (A) >60    GFR African American >60 >60    Calcium 9.5 8.3 - 10.6 mg/dL   Sedimentation Rate   Result Value Ref Range    Sed Rate 68 (H) 0 - 20 mm/Hr   CRP   Result Value Ref Range    CRP 36.0 (H) 0.0 - 5.1 mg/L   Blood Gas, Venous   Result Value Ref Range    pH, Claudio 7.431 7.350 - 7.450    pCO2, Claudio 42.3 41.0 - 51.0 mmHg    pO2, Claudio 50.7 (H) 25 - 40 mmHg    HCO3, Venous 28.1 (H) 24.0 - 28.0 antibiotics. Patient will be admitted to hospitalist.  Patient given vancomycin and meropenem given questionable anaphylaxis allergy to Keflex. .      Clinical Impression     1.  Osteomyelitis of right foot, unspecified type [facility])        Disposition     DISPOSITION Admitted 05/14/2021 06:34:42 PM       Aundria Goodpasture, MD  05/14/21 6004

## 2021-05-15 ENCOUNTER — APPOINTMENT (OUTPATIENT)
Dept: GENERAL RADIOLOGY | Age: 76
DRG: 617 | End: 2021-05-15
Payer: MEDICARE

## 2021-05-15 LAB
ALBUMIN SERPL-MCNC: 3.9 G/DL (ref 3.4–5)
ANION GAP SERPL CALCULATED.3IONS-SCNC: 14 MMOL/L (ref 3–16)
BASOPHILS ABSOLUTE: 0 K/UL (ref 0–0.2)
BASOPHILS RELATIVE PERCENT: 0 %
BUN BLDV-MCNC: 22 MG/DL (ref 7–20)
CALCIUM SERPL-MCNC: 8.8 MG/DL (ref 8.3–10.6)
CHLORIDE BLD-SCNC: 94 MMOL/L (ref 99–110)
CO2: 25 MMOL/L (ref 21–32)
CREAT SERPL-MCNC: 1 MG/DL (ref 0.8–1.3)
DOHLE BODIES: PRESENT
EOSINOPHILS ABSOLUTE: 0 K/UL (ref 0–0.6)
EOSINOPHILS RELATIVE PERCENT: 0 %
ESTIMATED AVERAGE GLUCOSE: 188.6 MG/DL
GFR AFRICAN AMERICAN: >60
GFR NON-AFRICAN AMERICAN: >60
GLUCOSE BLD-MCNC: 163 MG/DL (ref 70–99)
GLUCOSE BLD-MCNC: 177 MG/DL (ref 70–99)
GLUCOSE BLD-MCNC: 198 MG/DL (ref 70–99)
GLUCOSE BLD-MCNC: 227 MG/DL (ref 70–99)
GLUCOSE BLD-MCNC: 228 MG/DL (ref 70–99)
GLUCOSE BLD-MCNC: 231 MG/DL (ref 70–99)
HBA1C MFR BLD: 8.2 %
HCT VFR BLD CALC: 35.3 % (ref 40.5–52.5)
HEMOGLOBIN: 12.2 G/DL (ref 13.5–17.5)
INR BLD: 1.59 (ref 0.86–1.14)
LACTIC ACID: 2.1 MMOL/L (ref 0.4–2)
LYMPHOCYTES ABSOLUTE: 0.3 K/UL (ref 1–5.1)
LYMPHOCYTES RELATIVE PERCENT: 4 %
MAGNESIUM: 1.5 MG/DL (ref 1.8–2.4)
MAGNESIUM: 2 MG/DL (ref 1.8–2.4)
MAGNESIUM: 2.3 MG/DL (ref 1.8–2.4)
MCH RBC QN AUTO: 30.2 PG (ref 26–34)
MCHC RBC AUTO-ENTMCNC: 34.5 G/DL (ref 31–36)
MCV RBC AUTO: 87.3 FL (ref 80–100)
MONOCYTES ABSOLUTE: 0.5 K/UL (ref 0–1.3)
MONOCYTES RELATIVE PERCENT: 6 %
MYELOCYTE PERCENT: 1 %
NEUTROPHILS ABSOLUTE: 7.8 K/UL (ref 1.7–7.7)
NEUTROPHILS RELATIVE PERCENT: 89 %
PDW BLD-RTO: 13.4 % (ref 12.4–15.4)
PERFORMED ON: ABNORMAL
PHOSPHORUS: 2.3 MG/DL (ref 2.5–4.9)
PLATELET # BLD: 248 K/UL (ref 135–450)
PMV BLD AUTO: 8.5 FL (ref 5–10.5)
POTASSIUM SERPL-SCNC: 3.6 MMOL/L (ref 3.5–5.1)
PREALBUMIN: 22.2 MG/DL (ref 20–40)
PROTHROMBIN TIME: 18.5 SEC (ref 10–13.2)
RBC # BLD: 4.05 M/UL (ref 4.2–5.9)
RBC # BLD: NORMAL 10*6/UL
SARS-COV-2, NAAT: NOT DETECTED
SODIUM BLD-SCNC: 133 MMOL/L (ref 136–145)
TOXIC GRANULATION: PRESENT
VACUOLATED NEUTROPHILS: PRESENT
WBC # BLD: 8.7 K/UL (ref 4–11)

## 2021-05-15 PROCEDURE — 6370000000 HC RX 637 (ALT 250 FOR IP): Performed by: INTERNAL MEDICINE

## 2021-05-15 PROCEDURE — 71046 X-RAY EXAM CHEST 2 VIEWS: CPT

## 2021-05-15 PROCEDURE — 2580000003 HC RX 258: Performed by: INTERNAL MEDICINE

## 2021-05-15 PROCEDURE — 90715 TDAP VACCINE 7 YRS/> IM: CPT | Performed by: INTERNAL MEDICINE

## 2021-05-15 PROCEDURE — 99223 1ST HOSP IP/OBS HIGH 75: CPT | Performed by: INTERNAL MEDICINE

## 2021-05-15 PROCEDURE — 85025 COMPLETE CBC W/AUTO DIFF WBC: CPT

## 2021-05-15 PROCEDURE — 6360000002 HC RX W HCPCS: Performed by: INTERNAL MEDICINE

## 2021-05-15 PROCEDURE — 90471 IMMUNIZATION ADMIN: CPT | Performed by: INTERNAL MEDICINE

## 2021-05-15 PROCEDURE — 85610 PROTHROMBIN TIME: CPT

## 2021-05-15 PROCEDURE — 80069 RENAL FUNCTION PANEL: CPT

## 2021-05-15 PROCEDURE — 36415 COLL VENOUS BLD VENIPUNCTURE: CPT

## 2021-05-15 PROCEDURE — 83735 ASSAY OF MAGNESIUM: CPT

## 2021-05-15 PROCEDURE — 87635 SARS-COV-2 COVID-19 AMP PRB: CPT

## 2021-05-15 PROCEDURE — 1200000000 HC SEMI PRIVATE

## 2021-05-15 PROCEDURE — 83605 ASSAY OF LACTIC ACID: CPT

## 2021-05-15 RX ORDER — CIPROFLOXACIN 500 MG/1
500 TABLET, FILM COATED ORAL EVERY 12 HOURS SCHEDULED
Status: DISCONTINUED | OUTPATIENT
Start: 2021-05-15 | End: 2021-05-16

## 2021-05-15 RX ORDER — MAGNESIUM SULFATE IN WATER 40 MG/ML
4000 INJECTION, SOLUTION INTRAVENOUS ONCE
Status: COMPLETED | OUTPATIENT
Start: 2021-05-15 | End: 2021-05-15

## 2021-05-15 RX ADMIN — MEROPENEM 1000 MG: 1 INJECTION, POWDER, FOR SOLUTION INTRAVENOUS at 02:04

## 2021-05-15 RX ADMIN — INSULIN LISPRO 2 UNITS: 100 INJECTION, SOLUTION INTRAVENOUS; SUBCUTANEOUS at 11:53

## 2021-05-15 RX ADMIN — AMLODIPINE BESYLATE 10 MG: 10 TABLET ORAL at 09:28

## 2021-05-15 RX ADMIN — PANTOPRAZOLE SODIUM 40 MG: 40 TABLET, DELAYED RELEASE ORAL at 09:28

## 2021-05-15 RX ADMIN — ENOXAPARIN SODIUM 100 MG: 100 INJECTION SUBCUTANEOUS at 11:53

## 2021-05-15 RX ADMIN — FENOFIBRATE 160 MG: 160 TABLET ORAL at 09:28

## 2021-05-15 RX ADMIN — VANCOMYCIN HYDROCHLORIDE 1250 MG: 10 INJECTION, POWDER, LYOPHILIZED, FOR SOLUTION INTRAVENOUS at 11:52

## 2021-05-15 RX ADMIN — CLONIDINE HYDROCHLORIDE 0.2 MG: 0.1 TABLET ORAL at 09:28

## 2021-05-15 RX ADMIN — ENALAPRIL MALEATE 10 MG: 10 TABLET ORAL at 09:32

## 2021-05-15 RX ADMIN — ACETAMINOPHEN 650 MG: 325 TABLET ORAL at 21:44

## 2021-05-15 RX ADMIN — ACETAMINOPHEN 650 MG: 325 TABLET ORAL at 15:47

## 2021-05-15 RX ADMIN — INSULIN LISPRO 1 UNITS: 100 INJECTION, SOLUTION INTRAVENOUS; SUBCUTANEOUS at 09:29

## 2021-05-15 RX ADMIN — CLONIDINE HYDROCHLORIDE 0.2 MG: 0.1 TABLET ORAL at 21:44

## 2021-05-15 RX ADMIN — INSULIN LISPRO 1 UNITS: 100 INJECTION, SOLUTION INTRAVENOUS; SUBCUTANEOUS at 21:45

## 2021-05-15 RX ADMIN — GABAPENTIN 300 MG: 300 CAPSULE ORAL at 15:45

## 2021-05-15 RX ADMIN — ACETAMINOPHEN 650 MG: 325 TABLET ORAL at 05:01

## 2021-05-15 RX ADMIN — TETANUS TOXOID, REDUCED DIPHTHERIA TOXOID AND ACELLULAR PERTUSSIS VACCINE, ADSORBED 0.5 ML: 5; 2.5; 8; 8; 2.5 SUSPENSION INTRAMUSCULAR at 17:03

## 2021-05-15 RX ADMIN — Medication 10 ML: at 09:29

## 2021-05-15 RX ADMIN — ENALAPRIL MALEATE 10 MG: 10 TABLET ORAL at 21:44

## 2021-05-15 RX ADMIN — MAGNESIUM SULFATE HEPTAHYDRATE 4000 MG: 4 INJECTION, SOLUTION INTRAVENOUS at 11:52

## 2021-05-15 RX ADMIN — INSULIN GLARGINE 5 UNITS: 100 INJECTION, SOLUTION SUBCUTANEOUS at 23:49

## 2021-05-15 RX ADMIN — METOPROLOL TARTRATE 50 MG: 50 TABLET, FILM COATED ORAL at 09:28

## 2021-05-15 RX ADMIN — METOPROLOL TARTRATE 50 MG: 50 TABLET, FILM COATED ORAL at 21:44

## 2021-05-15 RX ADMIN — GABAPENTIN 300 MG: 300 CAPSULE ORAL at 09:28

## 2021-05-15 RX ADMIN — CIPROFLOXACIN HYDROCHLORIDE 500 MG: 500 TABLET, FILM COATED ORAL at 21:44

## 2021-05-15 RX ADMIN — MEROPENEM 1000 MG: 1 INJECTION, POWDER, FOR SOLUTION INTRAVENOUS at 09:29

## 2021-05-15 RX ADMIN — VANCOMYCIN HYDROCHLORIDE 1250 MG: 10 INJECTION, POWDER, LYOPHILIZED, FOR SOLUTION INTRAVENOUS at 21:43

## 2021-05-15 RX ADMIN — PAROXETINE HYDROCHLORIDE 20 MG: 20 TABLET, FILM COATED ORAL at 17:04

## 2021-05-15 RX ADMIN — INSULIN LISPRO 2 UNITS: 100 INJECTION, SOLUTION INTRAVENOUS; SUBCUTANEOUS at 17:06

## 2021-05-15 RX ADMIN — LOSARTAN POTASSIUM AND HYDROCHLOROTHIAZIDE 1 TABLET: 25; 100 TABLET ORAL at 17:04

## 2021-05-15 RX ADMIN — ENOXAPARIN SODIUM 100 MG: 100 INJECTION SUBCUTANEOUS at 21:43

## 2021-05-15 ASSESSMENT — PAIN SCALES - GENERAL
PAINLEVEL_OUTOF10: 0
PAINLEVEL_OUTOF10: 0
PAINLEVEL_OUTOF10: 2

## 2021-05-15 ASSESSMENT — PAIN DESCRIPTION - LOCATION: LOCATION: HEAD

## 2021-05-15 ASSESSMENT — PAIN DESCRIPTION - DESCRIPTORS: DESCRIPTORS: HEADACHE

## 2021-05-15 ASSESSMENT — PAIN DESCRIPTION - FREQUENCY: FREQUENCY: INTERMITTENT

## 2021-05-15 ASSESSMENT — PAIN DESCRIPTION - PROGRESSION: CLINICAL_PROGRESSION: GRADUALLY WORSENING

## 2021-05-15 NOTE — CONSULTS
Pharmacy Note  Vancomycin Consult    Rekha Nassar is a 70 y.o. male started on Vancomycin for Diabetic foot infection/Osteomyelitis; consult received from Dr. Jose Hutchinson to manage therapy. Also receiving the following antibiotics:  · Meropenem 1000 mg every 8 hours - day #1    Patient Active Problem List   Diagnosis    Anxiety state    Type 2 diabetes mellitus with peripheral autonomic neuropathy (HCC)    Essential hypertension    Major depression, chronic    Migraine    Mixed hyperlipidemia    New onset a-fib (HCC)    Reflux esophagitis    Obesity    Persistent atrial fibrillation (HCC)    SURAJ (obstructive sleep apnea)    Diabetic foot infection (HCC)     Allergies:  Bee venom, Cephalexin, Cephalosporins, Propoxyphene, and Sulfa antibiotics       Recent Labs     05/14/21  1636   BUN 30*   CREATININE 1.3   WBC 9.1     No intake or output data in the 24 hours ending 05/14/21 2336    Micro: Wound and Blood cultures are pending. Ht Readings from Last 1 Encounters:   05/14/21 5' 10\" (1.778 m)        Wt Readings from Last 1 Encounters:   05/14/21 212 lb (96.2 kg)       Body mass index is 30.42 kg/m². Estimated Creatinine Clearance: 59 mL/min (based on SCr of 1.3 mg/dL). Goal Trough Level: ~15 mcg/mL    Assessment/Plan:  · Patient received vancomycin 1500 mg IVx1 prior to this consult. · Will continue therapy with vancomycin 1250 mg IV every 18 hours (~15mg/kg per AdjBW of 82.3kg). · Timing of trough level will be determined based on culture results, renal function, and clinical response. · Pharmacy will continue to follow and adjust dosing as appropriate. Thank you for the consult.      Juan Carlos Coronado [facility]

## 2021-05-15 NOTE — PROGRESS NOTES
NEUROLOGIC: Gross and epicritic sensation is diminished b/l. Protective sensation is diminished at all pedal sites b/l. DERMATOLOGIC: Dermatological changes noted B/L LE. Right lower extremity full-thickness linear wound noted to the distal tuft of the third digit. Wound measures approximately 1.0 x 0.1 x 0.4 cm. Wound base is a mixture of granular and slightly fibrotic tissue with dermal borders. Wound does probe to bone. Wound does not tunnel, or tracks. No fluctuance, crepitus, purulent drainage or malodor noted. Periwound erythema and acute signs of infection noted at the second and third digits with slight improvement. Picture taken 5/15/2021        Patient gave verbal consent for the picture taken at today's visit. MUSCULOSKELETAL: Muscle strength is 5/5 for all pedal groups tested. Slight pain with palpation of the third digit right foot. Ankle joint ROM is decreased in dorsiflexion with the knee extended. IMAGING:   X-ray right foot, 5/14/2021  Impression       3rd distal phalanx acute osteomyelitis. ASSESSMENT/PLAN  Diabetic foot infection, right foot  Full-thickness ulceration, Mott 3, third digit  Cellulitis, right foot  Osteomyelitis, third distal phalanx right foot  DM 2 with peripheral neuropathy       -Patient seen and examined this a.m.  -Hypertensive, slightly afebrile 99.3 °F, no new AM labs  -CRP 36/ESR 68  -Wound culture 2+ gram-positive cocci  -Hemoglobin A1c & PT/INR, follow-up results.  -Ordered COVID-19 rapid test, follow-up results  -Right lower extremity dressed with Betadine, DSD, Ace bandage  -Heel weightbearing to right foot and full weightbearing to left lower extremity as tolerated. -Continue broad-spectrum IV antibiotics vancomycin meropenem and can change once sensitivities of wound culture comes back.   -Infectious disease consulted, follow-up recommendations.  -Had lengthy discussion with patient about conservative versus surgical intervention for his new onset of bone infection to the third digit. Patient wants to have this third toe removed and be done with this. Patient agrees to surgical intervention.  -Patient was consented for right foot third toe distal phalanx amputation versus third toe amputation procedure. All risk, benefits and potiental complications of the procedure were disccussed and all questions answered. We discussed the relative risks, benefits, alternatives, complications involved with the planned procedure. We also explained to the patient that residents will be present for the case and that photos and/or video may be taken. The patient understands, consents to the above and would like to proceed with the planned procedure. The patient's written consent has been signed and is in the chart. No guarantees were made. All questions were answered to the patient's satisfaction.   -Admitting team please medically clear for surgical intervention this Monday. DISPO: Diabetic foot infection, full-thickness ulceration third digit with localized cellulitis with new onset of osteomyelitis to the right foot third digit distal phalanx. Will follow-up on all labs and wound cultures. Patient will undergo surgical intervention this Monday OR time TBD. Will continue to closely monitor patient while in house and do local wound care. Discussed assessment and plan with Dr. Brad Florentino.     Moe Avendano DPM   Podiatric Resident, PGY-2  Pager: [phone] or Perfect serve   5/15/2021  7:29 AM    Dr. Brad Flroentino, [address] [place] [address]  Office: [phone]  Cell: [phone]

## 2021-05-15 NOTE — CONSULTS
Infectious Diseases   Consult Note        Admission Date: 5/14/2021  Hospital Day: Hospital Day: 2   Attending: Domingo Charlton DO  Date of service: 5/15/21     Reason for admission: Diabetic foot infection ([facility] [address] [E11.628, L08.9]    Chief complaint/ Reason for consult: Complicated right diabetic foot infection    Microbiology:        I have reviewed allavailable micro lab data and cultures    · Blood culture (2/2) - collected on 5/14/2021: *in process  · Right foot wound culture  - collected on 5/14/2021: in process    5/14/2021  8:12 PM - [facility] Incoming Lab Results From Soft (Epic Adt)    Specimen Information: Foot        Component Collected Lab   Gram Stain Result 05/14/2021  5:23  [facility]   1+ WBC's (Polymorphonuclear) 2+ Gram positive cocci    Testing Performed By    Lab - Abbreviation Name Director Address Valid Date Range   11-YI- [address] DEIDRA Chan [address] [facility] [address] 08/30/17 0811-Present   Narrative  Performed by: [facility]  ORDER#: [id number]                          ORDERED BY: Anurag Cochran   SOURCE: Foot Right                         COLLECTED:  05/14/21 17:23   ANTIBIOTICS AT [place]. :                      RECEIVED :  05/14/21 17:59   Performed at:   [facility]   [address] [address]   Phone [phone]       Antibiotics and immunizations:       Current antibiotics: All antibiotics and their doses were reviewed by me    Recent Abx Admin                   meropenem (MERREM) 1,000 mg in sodium chloride 0.9 % 100 mL IVPB (mini-bag) (mg) 1,000 mg New Bag 05/15/21 0929     1,000 mg New Bag  0204    vancomycin (VANCOCIN) 1,500 mg in dextrose 5 % 250 mL IVPB (mg) 1,500 mg New Bag 05/14/21 1826    meropenem (MERREM) 1,000 mg in sodium chloride 0.9 % 100 mL IVPB (mini-bag) (mg) 1,000 mg New Bag 05/14/21 1740                  Immunization History: All immunization history was reviewed by me today. There is no immunization history on file for this patient. Known drug allergies: All allergies were reviewed and updated    Allergies   Allergen Reactions    Bee Venom Swelling    Cephalexin Swelling    Cephalosporins     Propoxyphene Other (See Comments)     Hallucinations  (Darvon/Darvocet)    Sulfa Antibiotics        Social history:     Social History:  All social andepidemiologic history was reviewed and updated by me today as needed. · Tobacco use:   reports that he has never smoked. He has never used smokeless tobacco.  · Alcohol use:   reports no history of alcohol use. · Currently lives in: [address] [place] [id number]  ·  reports no history of drug use. COVID VACCINATION AND LAB RESULT RECORDS:     Internal Administration   First Dose      Second Dose           Last COVID Lab SARS-CoV-2 (no units)   Date Value   02/26/2021 Not Detected     SARS-CoV-2, JOSE GUADALUPE (no units)   Date Value   02/17/2021 NOT DETECTED            Assessment:     The patient is a 70 y.o. old male who  has a past medical history of Allergic reaction to sulfonamide (1960), Bee sting reaction (2000), Cephalexin adverse reaction (1993), Fracture of left elbow (1956), History of right inguinal hernia repair (1987), Hidalgo's neuroma of right foot (1989), Open fracture of left elbow (1991), Open fracture of left humerus (1991), and Rupture of appendix (1970). with following problems:    · Complicated right diabetic foot infection with osteomyelitis  · Poorly controlled type 2 diabetes mellitus  · Elevated sed rate and CRP  · Hyponatremia  · Diabetic polyneuropathy  · History of cephalexin allergy  · Obesity Class 1 due to excess calorie intake : Body mass index is 30.42 kg/m². Discussion:      The patient has a complicated right diabetic foot infection. X-ray of the right foot reviewed.   Is concerning for osteomyelitis involving the right third distal phalanx area.    White cell count is 8700. CRP was elevated at 36 and sed rate was 68      Plan:     Diagnostic Workup:    · Agree with blood cultures  · Follow-up on right foot wound culture  · Continue to follow fever curve, WBC count and blood cultures  · Follow up on liverand renal functions closely    Antimicrobials:    · Will continue empiric IV vancomycin  Check Vancomycin trough before the 5th dose. Target vancomycin trough level of 15-20  mg/L or vancomycin area under curve (AUC) of 400-600 mg*h/L by Bayesian modeling method. If dosing vancomycin based on trough levels, keep vancomycin trough below 20 at all times. Avoid increasing the dose of vancomycin above a total of 4 grams in a 24-hour period in patients younger than 45 years and above 3 grams in a 24-hour period in a patient of age 39 years or older. Continue to monitor serum creatinine and Vanco levels closely, while the patient is on I/v Vancomycin. Patient tells me he is not up-to-date on his tetanus vaccination. Will order 1 today. · No history of ESBL. We will stop meropenem today  · Will order p.o. Cipro 500 mg every 12 hours  · Podiatry plans for surgical I&D and right third toe amputation on Monday noted  · We will follow up on the culture results and clinical progress and will make further recommendations accordingly. · Continue close vitals monitoring. · Maintain good glycemic control. · Fall precautions. Aspiration precautions. · Continue to watch for new fever or diarrhea. · DVT prophylaxis. · Discussed all above with patient and RN. Drug Monitoring:    · Continue serial monitoring for antibiotic toxicity as follows: Vancomycin trough, CBC, CMP, QTc interval  · Continue to watch for following: new or worsening fever, hypotension, hives, lip swelling and redness or purulence at vascular access sites. I/v access Management:    · Continue to monitor i.v access sites for erythema, induration, discharge or tenderness. longstanding type 2 diabetes mellitus with diabetic polyneuropathy and had developed a blister on the bottom of the third digit of the right foot few weeks ago. He was started on doxycycline as an outpatient on 4/11/2021 at local ER. He finished his doxycycline course. However, patient started having fever up to 101.7 and was not feeling well. He is his right foot third toe was become increasingly red and painful and swollen. He got concerned and came to the ER. In the ER, he was afebrile. White cell count was 8700. He was started on empiric IV vancomycin and meropenem    I have been asked for my opinion for management for this patient. Past Medical History: All past medical history reviewed today. Past Medical History:   Diagnosis Date    Allergic reaction to sulfonamide 1960    Bee sting reaction 2000    Cephalexin adverse reaction 1993    Fracture of left elbow 1956    History of right inguinal hernia repair 1987    Hidalgo's neuroma of right foot 1989    Open fracture of left elbow 1991    Open fracture of left humerus 1991    Rupture of appendix 1970         Past Surgical History: All pastsurgical history was reviewed today. Past Surgical History:   Procedure Laterality Date    TONSILLECTOMY  1953         Family History: All family history was reviewed today. Problem Relation Age of Onset    Sleep Apnea Father          Medications: All current and past medications were reviewed. Medications Prior to Admission: doxycycline hyclate (VIBRA-TABS) 100 MG tablet, Take 100 mg by mouth 2 times daily   aspirin 81 MG EC tablet, Take 81 mg by mouth daily  gabapentin (NEURONTIN) 300 MG capsule, Take 300 mg by mouth 2 times daily.   pantoprazole (PROTONIX) 40 MG tablet, Take 1 tablet by mouth daily  amLODIPine (NORVASC) 10 MG tablet, Take 1 tablet by mouth every morning  atorvastatin (LIPITOR) 40 MG tablet, Take 40 mg by mouth nightly  ciclopirox (LOPROX) 0.77 % cream, Apply topically every morning  cloNIDine (CATAPRES) 0.2 MG tablet, Take 0.2 mg by mouth 2 times daily  enalapril (VASOTEC) 10 MG tablet, Take 10 mg by mouth 2 times daily  fenofibrate (TRIGLIDE) 160 MG tablet, Take 160 mg by mouth every evening  glimepiride (AMARYL) 2 MG tablet, Take 2 mg by mouth 2 times daily  losartan-hydroCHLOROthiazide (HYZAAR) 100-25 MG per tablet, Take 1 tablet by mouth every evening  LORazepam (ATIVAN) 1 MG tablet, Take 1 mg by mouth 2 times daily. metFORMIN (GLUCOPHAGE) 1000 MG tablet, Take 1,000 mg by mouth 2 times daily  metoprolol tartrate (LOPRESSOR) 50 MG tablet, Take 50 mg by mouth 2 times daily  sAXagliptin (ONGLYZA) 5 MG TABS tablet, Take 5 mg by mouth every evening  PARoxetine (PAXIL) 20 MG tablet, Take 20 mg by mouth every evening  aspirin-acetaminophen-caffeine (MIGRAINE FORMULA) 250-250-65 MG per tablet, Take 1 tablet by mouth 2 times daily as needed for Headaches  apixaban (ELIQUIS) 5 MG TABS tablet, Take 1 tablet by mouth 2 times daily  blood glucose test strips (TRUETEST TEST) strip, 1 strip 2 times daily  Lancets MISC, Use to check Glucose BID. Dx: 250.00. Dispense Contour Lancets     vancomycin  15 mg/kg (Adjusted) Intravenous Q12H    enoxaparin  1 mg/kg Subcutaneous BID    magnesium sulfate  4,000 mg Intravenous Once    ciprofloxacin  500 mg Oral 2 times per day    amLODIPine  10 mg Oral QAM    cloNIDine  0.2 mg Oral BID    enalapril  10 mg Oral BID    fenofibrate  160 mg Oral Daily    gabapentin  300 mg Oral BID    losartan-hydroCHLOROthiazide  1 tablet Oral QPM    metoprolol tartrate  50 mg Oral BID    pantoprazole  40 mg Oral Daily    PARoxetine  20 mg Oral QPM    insulin lispro  0-6 Units Subcutaneous TID WC    insulin lispro  0-3 Units Subcutaneous Nightly    sodium chloride flush  5-40 mL Intravenous 2 times per day          REVIEW OF SYSTEMS:       Review of Systems   Constitutional: Positive for fatigue. Negative for chills, diaphoresis and fever. HENT: Negative for ear discharge, ear pain, rhinorrhea, sore throat and trouble swallowing. Eyes: Negative for discharge and redness. Respiratory: Negative for cough, shortness of breath and wheezing. Cardiovascular: Negative for chest pain and leg swelling. Gastrointestinal: Negative for abdominal pain, constipation, diarrhea and nausea. Endocrine: Negative for polyuria. Genitourinary: Negative for dysuria, flank pain, frequency, hematuria and urgency. Musculoskeletal: Negative for back pain and myalgias. Skin: Negative for rash. Swelling and discharge of the right third toe   Neurological: Negative for dizziness, seizures and headaches. Hematological: Does not bruise/bleed easily. Psychiatric/Behavioral: Negative for hallucinations and suicidal ideas. All other systems reviewed and are negative. Objective:       PHYSICAL EXAM:      Vitals:   Vitals:    05/14/21 2357 05/15/21 0317 05/15/21 0624 05/15/21 0926   BP: (!) 178/81 (!) 162/88 (!) 141/77 (!) 147/77   Pulse:  67 83 80   Resp:  16 18 18   Temp:  98.6 °F (37 °C) 99.3 °F (37.4 °C) 97.9 °F (36.6 °C)   TempSrc:  Oral Oral Oral   SpO2:   95% 97%   Weight:       Height:           Physical Exam  Vitals and nursing note reviewed. Constitutional:       Appearance: Normal appearance. He is well-developed. HENT:      Head: Normocephalic and atraumatic. Right Ear: External ear normal.      Left Ear: External ear normal.      Nose: Nose normal. No congestion or rhinorrhea. Mouth/Throat:      Mouth: Mucous membranes are moist.      Pharynx: No oropharyngeal exudate or posterior oropharyngeal erythema. Eyes:      General: No scleral icterus. Right eye: No discharge. Left eye: No discharge. Conjunctiva/sclera: Conjunctivae normal.      Pupils: Pupils are equal, round, and reactive to light. Cardiovascular:      Rate and Rhythm: Normal rate and regular rhythm. Pulses: Normal pulses.       Heart sounds: No murmur heard. No friction rub. Pulmonary:      Effort: Pulmonary effort is normal. No respiratory distress. Breath sounds: Normal breath sounds. No stridor. No wheezing, rhonchi or rales. Abdominal:      General: Bowel sounds are normal.      Palpations: Abdomen is soft. Tenderness: There is no abdominal tenderness. There is no right CVA tenderness, left CVA tenderness, guarding or rebound. Musculoskeletal:         General: Swelling present. No tenderness. Normal range of motion. Cervical back: Normal range of motion and neck supple. No rigidity. No muscular tenderness. Lymphadenopathy:      Cervical: No cervical adenopathy. Skin:     General: Skin is warm and dry. Coloration: Skin is not jaundiced. Findings: No erythema or rash. Comments: Right second hand third toe wound with swelling and discharge noted   Neurological:      General: No focal deficit present. Mental Status: He is alert and oriented to person, place, and time. Mental status is at baseline. Motor: No abnormal muscle tone. Psychiatric:         Mood and Affect: Mood normal.         Behavior: Behavior normal.         Thought Content: Thought content normal.                       Lines: All vascular access sites are healthy with no local erythema, discharge or tenderness. Intake and output:     I/O last 3 completed shifts:  In: -   Out: 850 [Urine:850]    Lab Data:   All available labs were reviewed by me today.      CBC:   Recent Labs     05/14/21  1636 05/15/21  0527   WBC 9.1 8.7   RBC 4.25 4.05*   HGB 12.8* 12.2*   HCT 37.1* 35.3*    248   MCV 87.5 87.3   MCH 30.1 30.2   MCHC 34.4 34.5   RDW 13.3 13.4        BMP:  Recent Labs     05/14/21  1636 05/15/21  0527   * 133*   K 3.4* 3.6   CL 94* 94*   CO2 25 25   BUN 30* 22*   CREATININE 1.3 1.0   CALCIUM 9.5 8.8   GLUCOSE 168* 228*        Hepatic FunctionPanel:   Lab Results   Component Value Date    LABALBU 3.9 05/15/2021 CPK: No results found for: CKTOTAL  ESR:   Lab Results   Component Value Date    SEDRATE 68 (H) 05/14/2021     CRP:   Lab Results   Component Value Date    CRP 36.0 (H) 05/14/2021         Imaging: All pertinent images and reports for the current visit were reviewed by meduring this visit. XR FOOT RIGHT (MIN 3 VIEWS)   Final Result      3rd distal phalanx acute osteomyelitis. Outside records:    Labs, Microbiology, Radiology and pertinent results from Care everywhere, if available, were reviewed as a part ofthe consultation. Problem list:       Patient Active Problem List   Diagnosis Code    Anxiety state F41.1    Type 2 diabetes mellitus with peripheral autonomic neuropathy (HCC) E11.43    Essential hypertension I10    Major depression, chronic F32.9    Migraine G43.909    Mixed hyperlipidemia E78.2    New onset a-fib (HCC) I48.91    Reflux esophagitis K21.00    Obesity E66.9    Persistent atrial fibrillation (HCC) I48.19    SURAJ (obstructive sleep apnea) G47.33    Diabetic foot infection (HCC) E11.628, L08.9    Osteomyelitis of right foot (HCC) M86.9    Elevated C-reactive protein (CRP) R79.82    Diabetic polyneuropathy associated with type 2 diabetes mellitus (HCC) E11.42    Elevated sed rate R70.0    Hyponatremia E87.1    Diabetes education, encounter for Z71.89         Please note that this chart was generated using Dragon dictation software. Although every effort was made to ensure the accuracy of this automated transcription, some errors in transcription may have occurred inadvertently. If you may need any clarification, please do not hesitate to contact me through EPIC or at the phone number provided below with my electronic signature. Any pictures or media included in this note were obtained after taking informed verbal consent from the patient and with their approval to include those in the patient's medical record.       Karishma Bernal MD, MPH  5/15/21, 11:31 AM ANABELLA   [facility]   [address] [place] [address], [address] [facility], [address] [place] [address]  Office: [phone]  Fax: [phone]  Clinic days:  Tuesday & Thursday

## 2021-05-15 NOTE — PROGRESS NOTES
Patient's temperature up to 101.2 at 5 AM this morning, Tylenol administered, Temp is now down to 99.1

## 2021-05-15 NOTE — PROGRESS NOTES
Hospitalist Progress Note      PCP: Brionna Brooks MD    Date of Admission: 5/14/2021    Chief Complaint: Toe/foot swelling and drainage    Hospital Course: Admitted for DFI    Subjective: Had chills overnight and just overall doesn't fel good. No new complaints from yesterday. Medications:  Reviewed    Infusion Medications    dextrose      lactated ringers 125 mL/hr at 05/14/21 2355    sodium chloride       Scheduled Medications    vancomycin  15 mg/kg (Adjusted) Intravenous Q12H    enoxaparin  1 mg/kg Subcutaneous BID    ciprofloxacin  500 mg Oral 2 times per day    Tdap-Dtap  0.5 mL Intramuscular Once    amLODIPine  10 mg Oral QAM    cloNIDine  0.2 mg Oral BID    enalapril  10 mg Oral BID    fenofibrate  160 mg Oral Daily    gabapentin  300 mg Oral BID    losartan-hydroCHLOROthiazide  1 tablet Oral QPM    metoprolol tartrate  50 mg Oral BID    pantoprazole  40 mg Oral Daily    PARoxetine  20 mg Oral QPM    insulin lispro  0-6 Units Subcutaneous TID WC    insulin lispro  0-3 Units Subcutaneous Nightly    sodium chloride flush  5-40 mL Intravenous 2 times per day     PRN Meds: LORazepam, glucose, dextrose, glucagon (rDNA), dextrose, sodium chloride flush, sodium chloride, promethazine **OR** ondansetron, polyethylene glycol, acetaminophen **OR** acetaminophen      Intake/Output Summary (Last 24 hours) at 5/15/2021 1644  Last data filed at 5/15/2021 1221  Gross per 24 hour   Intake 240 ml   Output 850 ml   Net -610 ml       Exam:    BP (!) 141/71   Pulse 75   Temp 98.2 °F (36.8 °C) (Oral)   Resp 18   Ht 5' 10\" (1.778 m)   Wt 212 lb (96.2 kg)   SpO2 95%   BMI 30.42 kg/m²     General appearance: No apparent distress, appears stated age and cooperative. HEENT: Pupils equal, round, and reactive to light. Conjunctivae/corneas clear. Neck: Supple, with full range of motion. No jugular venous distention. Trachea midline. Respiratory:  Normal respiratory effort.  Clear to auscultation, bilaterally without Rales/Wheezes/Rhonchi. Cardiovascular: Regular rate and rhythm with normal S1/S2 without murmurs, rubs or gallops. Abdomen: Soft, non-tender, non-distended with normal bowel sounds. Musculoskelatal: No clubbing, cyanosis or edema bilaterally. Skin: Skin color, texture, turgor normal.  No rashes or lesions. Neurologic:  Cranial nerves: II-XII intact, grossly non-focal.  Psychiatric: Alert and oriented, thought content appropriate, normal insight    Labs:   Recent Labs     05/14/21  1636 05/15/21  0527   WBC 9.1 8.7   HGB 12.8* 12.2*   HCT 37.1* 35.3*    248     Recent Labs     05/14/21  1636 05/15/21  0527   * 133*   K 3.4* 3.6   CL 94* 94*   CO2 25 25   BUN 30* 22*   CREATININE 1.3 1.0   CALCIUM 9.5 8.8   PHOS  --  2.3*     No results for input(s): AST, ALT, BILIDIR, BILITOT, ALKPHOS in the last 72 hours. Recent Labs     05/15/21  0841   INR 1.59*     No results for input(s): Rejeana Kemps in the last 72 hours. Studies:  XR CHEST (2 VW)   Final Result   Minimal atelectatic changes right lower lung      XR FOOT RIGHT (MIN 3 VIEWS)   Final Result      3rd distal phalanx acute osteomyelitis.           Assessment/Plan:    Active Hospital Problems    Diagnosis Date Noted    [facility] [M86.9]     Elevated C-reactive protein (CRP) [R79.82]     Diabetic polyneuropathy associated with type 2 diabetes mellitus ([facility] [address] [E11.42]     Elevated sed rate [R70.0]     Hyponatremia [E87.1]     Diabetes education, encounter for [Z71.89]     Diabetic foot infection ([facility] [Q76.436, L08.9] 05/14/2021     Diabetic foot infection  Osteomyelitis  Atrial fibrillation on Eliquis  HTN  DMII with peripheral neuropathy     PLAN:     Started on vanc and merrem due to history of antibiotic allergies  Podiatry consulted, involvement appreciated  Wound culture in process  ESR, CRP  Blood cultures in process  XR foot shows acute osteomyelitis  ID consulted     DMII  Hold home oral meds  Sliding scale  Hgb A1c 8.2  Add Lantus 5 units qhs     Continue home anti-hypertensives     Afib s/p ablation  Eliquis at home, switched to therapeutic Lovenox pending timing of surgery     DVT Prophylaxis: Pt on Eliquis at home, switched to therapeutic Lovenox  Diet: DIET CARB CONTROL; Carb Control: 4 carb choices (60 gms)/meal  Code Status: Full Code    PT/OT Eval Status: pending    Dispo - Inpatient    Nathalia Wise DO

## 2021-05-15 NOTE — PROGRESS NOTES
Clinical Pharmacy Progress Note  Pharmacy to dose Vancomycin    Admit date: 5/14/2021     Subjective:  Patient is a 76yr old male from home with a PMHx significant for HTN, DM type II, pAfib on apixaban, and SURAJ on CPAP at home. Blister on R 3rd toe has worsened with erythema and drainage, also with R foot swelling, despite Doxycycline outpatient course. Admitted for IV antibiotics for DFI with cellulitis, new osteomyelitis. Per Podiatry, will take for surgical intervention on Monday. Pharmacy has been consulted by Dr. Daniella Camara to dose vancomycin. Current antibiotic regimen:   Meropenem 1g IV q8h -- day #2  Vancomycin -- pharmacy to dose -- day #2   1.5g IV x1 (5/14)   1.25g IV q18h (5/15-current)    Home anticoagulation regimen:   · Apixaban 5mg PO BID for pAfib      Objective:  Recent Labs     05/14/21  1636   WBC 9.1   HGB 12.8*   HCT 37.1*   MCV 87.5          Recent Labs     05/15/21  0527   *   K 3.6   CL 94*   CO2 25   PHOS 2.3*   BUN 22*   CREATININE 1.0       Estimated Creatinine Clearance: 77 mL/min (based on SCr of 1 mg/dL). Height = 70 in  Weight = 96.2 kg    Micro:  5/14 R foot wound = 2+ GPC  5/14 Blood x2 = in process    Prophylaxis:    VTE: SCDs (last dose apixaban 5/14 PM)   SUP: Not indicated    Assessment/Plan:    1.  R Diabetic foot infection, new OM + cellulitis: Planning OR intervention on Monday 5/17. Vancomycin + meropenem day #2   Vancomycin -- pharmacy to dose:  · Currently on vancomycin 1.25g IV q18h, after a 1.5g IV load. · Scr today slightly improved from 1.3 to 1. From [facility] data, baseline appears to be 1-1.1. · Given new OM, will adjust dosing to 1.25g IV q12h. · Plan to obtain a trough level once at steady state on new regimen. · Will follow clinical progress and renal function, adjusting dose as needed. Meropenem:   · Current dose appropriate for renal function and indication. Thank you, please call with questions. Aniya GarrettD., BCPS   5/15/2021 8:33 AM  Wireless: [phone]

## 2021-05-15 NOTE — CARE COORDINATION
Cm following, pt from home ins Pta. Pt plans OR on Monday with POD service for 3rd toe amp. ID consulted for ABX recs. PT OT evals needed post-op for DC recs.   Electronically signed by Trey Hill RN on 5/15/2021 at 9:40 AM  [phone]

## 2021-05-15 NOTE — PROGRESS NOTES
Patient alert and oriented times 4 with stable VS.  Patient tolerating diet well. No complaints of pain so far this shift. Podietry dressed wound and dressing clean dry and intact with no strike through noted. Patient in bed resting with IV fluids and Abx running.  Electronically signed by Mindy Carter RN on 5/15/2021 at 3:32 PM

## 2021-05-16 LAB
ALBUMIN SERPL-MCNC: 3.7 G/DL (ref 3.4–5)
ANION GAP SERPL CALCULATED.3IONS-SCNC: 11 MMOL/L (ref 3–16)
BASOPHILS ABSOLUTE: 0 K/UL (ref 0–0.2)
BASOPHILS RELATIVE PERCENT: 1 %
BUN BLDV-MCNC: 15 MG/DL (ref 7–20)
CALCIUM SERPL-MCNC: 9 MG/DL (ref 8.3–10.6)
CHLORIDE BLD-SCNC: 97 MMOL/L (ref 99–110)
CO2: 27 MMOL/L (ref 21–32)
CREAT SERPL-MCNC: 0.9 MG/DL (ref 0.8–1.3)
EKG ATRIAL RATE: 63 BPM
EKG DIAGNOSIS: NORMAL
EKG P AXIS: -12 DEGREES
EKG P-R INTERVAL: 200 MS
EKG Q-T INTERVAL: 468 MS
EKG QRS DURATION: 100 MS
EKG QTC CALCULATION (BAZETT): 478 MS
EKG R AXIS: -30 DEGREES
EKG T AXIS: -31 DEGREES
EKG VENTRICULAR RATE: 63 BPM
EOSINOPHILS ABSOLUTE: 0 K/UL (ref 0–0.6)
EOSINOPHILS RELATIVE PERCENT: 1.1 %
GFR AFRICAN AMERICAN: >60
GFR NON-AFRICAN AMERICAN: >60
GLUCOSE BLD-MCNC: 193 MG/DL (ref 70–99)
GLUCOSE BLD-MCNC: 197 MG/DL (ref 70–99)
GLUCOSE BLD-MCNC: 216 MG/DL (ref 70–99)
GLUCOSE BLD-MCNC: 242 MG/DL (ref 70–99)
GLUCOSE BLD-MCNC: 280 MG/DL (ref 70–99)
GLUCOSE BLD-MCNC: 288 MG/DL (ref 70–99)
HCT VFR BLD CALC: 34.4 % (ref 40.5–52.5)
HEMOGLOBIN: 12.1 G/DL (ref 13.5–17.5)
LYMPHOCYTES ABSOLUTE: 0.9 K/UL (ref 1–5.1)
LYMPHOCYTES RELATIVE PERCENT: 18.9 %
MAGNESIUM: 1.7 MG/DL (ref 1.8–2.4)
MCH RBC QN AUTO: 30.5 PG (ref 26–34)
MCHC RBC AUTO-ENTMCNC: 35.3 G/DL (ref 31–36)
MCV RBC AUTO: 86.4 FL (ref 80–100)
MONOCYTES ABSOLUTE: 0.4 K/UL (ref 0–1.3)
MONOCYTES RELATIVE PERCENT: 8.9 %
NEUTROPHILS ABSOLUTE: 3.2 K/UL (ref 1.7–7.7)
NEUTROPHILS RELATIVE PERCENT: 70.1 %
PDW BLD-RTO: 13 % (ref 12.4–15.4)
PERFORMED ON: ABNORMAL
PHOSPHORUS: 2.3 MG/DL (ref 2.5–4.9)
PLATELET # BLD: 229 K/UL (ref 135–450)
PMV BLD AUTO: 8.3 FL (ref 5–10.5)
POTASSIUM SERPL-SCNC: 3.7 MMOL/L (ref 3.5–5.1)
RBC # BLD: 3.99 M/UL (ref 4.2–5.9)
SODIUM BLD-SCNC: 135 MMOL/L (ref 136–145)
WBC # BLD: 4.6 K/UL (ref 4–11)

## 2021-05-16 PROCEDURE — 6370000000 HC RX 637 (ALT 250 FOR IP): Performed by: INTERNAL MEDICINE

## 2021-05-16 PROCEDURE — 99233 SBSQ HOSP IP/OBS HIGH 50: CPT | Performed by: INTERNAL MEDICINE

## 2021-05-16 PROCEDURE — 93010 ELECTROCARDIOGRAM REPORT: CPT | Performed by: INTERNAL MEDICINE

## 2021-05-16 PROCEDURE — 2580000003 HC RX 258: Performed by: INTERNAL MEDICINE

## 2021-05-16 PROCEDURE — 83735 ASSAY OF MAGNESIUM: CPT

## 2021-05-16 PROCEDURE — 80069 RENAL FUNCTION PANEL: CPT

## 2021-05-16 PROCEDURE — 6360000002 HC RX W HCPCS: Performed by: PODIATRIST

## 2021-05-16 PROCEDURE — 93005 ELECTROCARDIOGRAM TRACING: CPT | Performed by: PODIATRIST

## 2021-05-16 PROCEDURE — 1200000000 HC SEMI PRIVATE

## 2021-05-16 PROCEDURE — 6360000002 HC RX W HCPCS: Performed by: INTERNAL MEDICINE

## 2021-05-16 PROCEDURE — 85025 COMPLETE CBC W/AUTO DIFF WBC: CPT

## 2021-05-16 PROCEDURE — 36415 COLL VENOUS BLD VENIPUNCTURE: CPT

## 2021-05-16 RX ORDER — POTASSIUM CHLORIDE 20 MEQ/1
20 TABLET, EXTENDED RELEASE ORAL
Status: COMPLETED | OUTPATIENT
Start: 2021-05-16 | End: 2021-05-16

## 2021-05-16 RX ORDER — MAGNESIUM SULFATE IN WATER 40 MG/ML
2000 INJECTION, SOLUTION INTRAVENOUS ONCE
Status: COMPLETED | OUTPATIENT
Start: 2021-05-16 | End: 2021-05-16

## 2021-05-16 RX ADMIN — SODIUM CHLORIDE, POTASSIUM CHLORIDE, SODIUM LACTATE AND CALCIUM CHLORIDE: 600; 310; 30; 20 INJECTION, SOLUTION INTRAVENOUS at 04:00

## 2021-05-16 RX ADMIN — METOPROLOL TARTRATE 50 MG: 50 TABLET, FILM COATED ORAL at 20:56

## 2021-05-16 RX ADMIN — INSULIN LISPRO 1 UNITS: 100 INJECTION, SOLUTION INTRAVENOUS; SUBCUTANEOUS at 08:06

## 2021-05-16 RX ADMIN — AMLODIPINE BESYLATE 10 MG: 10 TABLET ORAL at 08:05

## 2021-05-16 RX ADMIN — INSULIN LISPRO 1 UNITS: 100 INJECTION, SOLUTION INTRAVENOUS; SUBCUTANEOUS at 17:23

## 2021-05-16 RX ADMIN — FENOFIBRATE 160 MG: 160 TABLET ORAL at 08:04

## 2021-05-16 RX ADMIN — INSULIN GLARGINE 5 UNITS: 100 INJECTION, SOLUTION SUBCUTANEOUS at 20:57

## 2021-05-16 RX ADMIN — CLONIDINE HYDROCHLORIDE 0.2 MG: 0.1 TABLET ORAL at 20:56

## 2021-05-16 RX ADMIN — ENOXAPARIN SODIUM 100 MG: 100 INJECTION SUBCUTANEOUS at 08:05

## 2021-05-16 RX ADMIN — INSULIN LISPRO 3 UNITS: 100 INJECTION, SOLUTION INTRAVENOUS; SUBCUTANEOUS at 11:34

## 2021-05-16 RX ADMIN — ENALAPRIL MALEATE 10 MG: 10 TABLET ORAL at 08:05

## 2021-05-16 RX ADMIN — PAROXETINE HYDROCHLORIDE 20 MG: 20 TABLET, FILM COATED ORAL at 17:21

## 2021-05-16 RX ADMIN — GABAPENTIN 300 MG: 300 CAPSULE ORAL at 08:04

## 2021-05-16 RX ADMIN — MEROPENEM 1000 MG: 1 INJECTION, POWDER, FOR SOLUTION INTRAVENOUS at 16:33

## 2021-05-16 RX ADMIN — SODIUM CHLORIDE, POTASSIUM CHLORIDE, SODIUM LACTATE AND CALCIUM CHLORIDE: 600; 310; 30; 20 INJECTION, SOLUTION INTRAVENOUS at 14:04

## 2021-05-16 RX ADMIN — METOPROLOL TARTRATE 50 MG: 50 TABLET, FILM COATED ORAL at 08:05

## 2021-05-16 RX ADMIN — CIPROFLOXACIN HYDROCHLORIDE 500 MG: 500 TABLET, FILM COATED ORAL at 08:04

## 2021-05-16 RX ADMIN — VANCOMYCIN HYDROCHLORIDE 1250 MG: 10 INJECTION, POWDER, LYOPHILIZED, FOR SOLUTION INTRAVENOUS at 09:48

## 2021-05-16 RX ADMIN — INSULIN LISPRO 1 UNITS: 100 INJECTION, SOLUTION INTRAVENOUS; SUBCUTANEOUS at 20:56

## 2021-05-16 RX ADMIN — ENALAPRIL MALEATE 10 MG: 10 TABLET ORAL at 20:56

## 2021-05-16 RX ADMIN — MAGNESIUM SULFATE HEPTAHYDRATE 2000 MG: 2 INJECTION, SOLUTION INTRAVENOUS at 10:17

## 2021-05-16 RX ADMIN — GABAPENTIN 300 MG: 300 CAPSULE ORAL at 16:33

## 2021-05-16 RX ADMIN — ACETAMINOPHEN 650 MG: 325 TABLET ORAL at 20:56

## 2021-05-16 RX ADMIN — Medication 10 ML: at 08:06

## 2021-05-16 RX ADMIN — ENOXAPARIN SODIUM 100 MG: 100 INJECTION SUBCUTANEOUS at 20:56

## 2021-05-16 RX ADMIN — POTASSIUM CHLORIDE 20 MEQ: 1500 TABLET, EXTENDED RELEASE ORAL at 11:33

## 2021-05-16 RX ADMIN — LOSARTAN POTASSIUM AND HYDROCHLOROTHIAZIDE 1 TABLET: 25; 100 TABLET ORAL at 17:21

## 2021-05-16 RX ADMIN — CLONIDINE HYDROCHLORIDE 0.2 MG: 0.1 TABLET ORAL at 08:05

## 2021-05-16 RX ADMIN — PANTOPRAZOLE SODIUM 40 MG: 40 TABLET, DELAYED RELEASE ORAL at 08:05

## 2021-05-16 ASSESSMENT — ENCOUNTER SYMPTOMS
WHEEZING: 0
NAUSEA: 0
TROUBLE SWALLOWING: 0
CONSTIPATION: 0
EYE REDNESS: 0
COUGH: 0
DIARRHEA: 0
SORE THROAT: 0
RHINORRHEA: 0
EYE DISCHARGE: 0
BACK PAIN: 0
ABDOMINAL PAIN: 0
SHORTNESS OF BREATH: 0

## 2021-05-16 ASSESSMENT — PAIN DESCRIPTION - DESCRIPTORS: DESCRIPTORS: ACHING

## 2021-05-16 ASSESSMENT — PAIN SCALES - GENERAL
PAINLEVEL_OUTOF10: 3
PAINLEVEL_OUTOF10: 3

## 2021-05-16 NOTE — PROGRESS NOTES
Clinical Pharmacy Progress Note  Pharmacy to dose Vancomycin    Admit date: 5/14/2021     Subjective:  Patient is a 76yr old male from home with a PMHx significant for HTN, DM type II, pAfib on apixaban, and SURAJ on CPAP at home. Blister on R 3rd toe has worsened with erythema and drainage, also with R foot swelling, despite Doxycycline outpatient course. Admitted for IV antibiotics for DFI with cellulitis, new osteomyelitis. Per Podiatry, will take for surgical intervention on Monday. Interval Update:  ID changed meropenem to oral ciprofloxacin. Podiatry will take for surgical intervention tomorrow. Enoxaparin as bridge to procedure. Pharmacy has been consulted by Dr. Robin to dose vancomycin. Current antibiotic regimen:    (5/14-5/15)  Cipro 500mg PO BID (5/15-current) -- day #2  Vancomycin -- pharmacy to dose -- day #3   1.5g IV x1 (5/14)   1.25g IV q18h (5/15)   1.25g IV q12h (5/15-current)    Home anticoagulation regimen:   · Apixaban 5mg PO BID for pAfib  · Last dose taken 5/14 PM      Objective:  Recent Labs     05/16/21  0557   WBC 4.6   HGB 12.1*   HCT 34.4*   MCV 86.4          Recent Labs     05/16/21  0557   *   K 3.7   CL 97*   CO2 27   PHOS 2.3*   BUN 15   CREATININE 0.9       Estimated Creatinine Clearance: 85 mL/min (based on SCr of 0.9 mg/dL). Height = 70 in  Weight = 96.2 kg    Vancomycin Levels:   5/16 Trough @ 21:30 = ordered     Micro:  5/14 R foot wound = Heavy growth Grp B Strep  5/14 Blood x2 = NGTD  5/15 SARS CoV-2 = Negative    Prophylaxis:    VTE: enoxaparin 100mg SQ BID   SUP: Not indicated    Assessment/Plan:    1.  R Diabetic foot infection, new OM + cellulitis: Planning OR intervention on Monday 5/17. Vancomycin (day #3) + ciprofloxacin (day #2). Vancomycin -- pharmacy to dose:  · Currently on vancomycin 1.25g IV q12h. Dose adjusted yesterday given renal improvement. · Scr continues to improve, 0.9 today.   Will continue with same dose for now.    · Plan to obtain a trough level once at steady state on new regimen, ordered for 21:30 today. Pharmacy will interpret in the AM.    · Will follow clinical progress and renal function, adjusting dose as needed. Cipro:   · Current dose appropriate for renal function and indication. Thank you, please call with questions.    Ann Cyr PharmD., BCPS   5/16/2021 9:12 AM  Wireless: [phone]

## 2021-05-16 NOTE — PROGRESS NOTES
Hospitalist Progress Note      PCP: Brionna Brooks MD    Date of Admission: 5/14/2021    Chief Complaint: Toe/foot swelling and drainage    Hospital Course: Admitted for DFI    Subjective: Feels much better today. No chest pain, sob, palpitations, cough, light-headedness or dizziness today or any time recently. Has been feeling very well since undergoing ablation. Medications:  Reviewed    Infusion Medications    dextrose      lactated ringers 125 mL/hr at 05/16/21 0400    sodium chloride       Scheduled Medications    vancomycin  15 mg/kg (Adjusted) Intravenous Q12H    enoxaparin  1 mg/kg Subcutaneous BID    ciprofloxacin  500 mg Oral 2 times per day    insulin glargine  5 Units Subcutaneous Nightly    amLODIPine  10 mg Oral QAM    cloNIDine  0.2 mg Oral BID    enalapril  10 mg Oral BID    fenofibrate  160 mg Oral Daily    gabapentin  300 mg Oral BID    losartan-hydroCHLOROthiazide  1 tablet Oral QPM    metoprolol tartrate  50 mg Oral BID    pantoprazole  40 mg Oral Daily    PARoxetine  20 mg Oral QPM    insulin lispro  0-6 Units Subcutaneous TID WC    insulin lispro  0-3 Units Subcutaneous Nightly    sodium chloride flush  5-40 mL Intravenous 2 times per day     PRN Meds: LORazepam, glucose, dextrose, glucagon (rDNA), dextrose, sodium chloride flush, sodium chloride, promethazine **OR** ondansetron, polyethylene glycol, acetaminophen **OR** acetaminophen      Intake/Output Summary (Last 24 hours) at 5/16/2021 0945  Last data filed at 5/16/2021 0802  Gross per 24 hour   Intake 1604 ml   Output --   Net 1604 ml       Exam:    /71   Pulse 64   Temp 98 °F (36.7 °C) (Oral)   Resp 16   Ht 5' 10\" (1.778 m)   Wt 212 lb (96.2 kg)   SpO2 97%   BMI 30.42 kg/m²     General appearance: No apparent distress, appears stated age and cooperative. HEENT: Pupils equal, round, and reactive to light. Conjunctivae/corneas clear. Neck: Supple, with full range of motion.  No jugular venous distention. Trachea midline. Respiratory:  Normal respiratory effort. Clear to auscultation, bilaterally without Rales/Wheezes/Rhonchi. Cardiovascular: Regular rate and rhythm with normal S1/S2 without murmurs, rubs or gallops. Abdomen: Soft, non-tender, non-distended with normal bowel sounds. Musculoskelatal: R LE wrapped in dressings   Skin: Skin color, texture, turgor normal.  No rashes or lesions. Neurologic:  Cranial nerves: II-XII intact, grossly non-focal.  Psychiatric: Alert and oriented, thought content appropriate, normal insight    Labs:   Recent Labs     05/14/21  1636 05/15/21  0527 05/16/21  0557   WBC 9.1 8.7 4.6   HGB 12.8* 12.2* 12.1*   HCT 37.1* 35.3* 34.4*    248 229     Recent Labs     05/14/21  1636 05/15/21  0527 05/16/21  0557   * 133* 135*   K 3.4* 3.6 3.7   CL 94* 94* 97*   CO2 25 25 27   BUN 30* 22* 15   CREATININE 1.3 1.0 0.9   CALCIUM 9.5 8.8 9.0   PHOS  --  2.3* 2.3*     No results for input(s): AST, ALT, BILIDIR, BILITOT, ALKPHOS in the last 72 hours. Recent Labs     05/15/21  0841   INR 1.59*     No results for input(s): [place] in the last 72 hours. Studies:  XR CHEST (2 VW)   Final Result   Minimal atelectatic changes right lower lung      XR FOOT RIGHT (MIN 3 VIEWS)   Final Result      3rd distal phalanx acute osteomyelitis.           Assessment/Plan:    Active Hospital Problems    Diagnosis Date Noted    Need for diphtheria-tetanus-pertussis (Tdap) vaccine [Z23]     Osteomyelitis of right foot ([facility] [M86.9]     Elevated C-reactive protein (CRP) [R79.82]     Diabetic polyneuropathy associated with type 2 diabetes mellitus ([facility] [E11.42]     Elevated sed rate [R70.0]     Hyponatremia [E87.1]     Diabetes education, encounter for [Z71.89]     Diabetic foot infection ([facility] [J21.585, L08.9] 05/14/2021     Diabetic foot infection  Osteomyelitis  Atrial fibrillation on Eliquis  HTN  DMII with peripheral neuropathy     PLAN:     Started on vanc and merrem due to history of antibiotic allergies  Podiatry consulted, involvement appreciated  Wound culture in process  ESR, CRP  Blood cultures in process  XR foot shows acute osteomyelitis  ID consulted, appreciate recs    Plan for OR tomorrow Mon 17th at 730 am  Will have last dose of therapeutic Lovenox tonight at 9 pm, will need to re-order anticoagulation post-op, either Eliquis or heparin drip if further surgery is expected. No acute decompensated cardiac conditions, not a high risk surgery.  RCRI score is 0-1, no objections to surgery.      DMII  Hold home oral meds  Sliding scale  Hgb A1c 8.2  Add Lantus 5 units qhs     Continue home anti-hypertensives     Afib s/p ablation  Normal sinus on telemetry  Eliquis at home, switched to therapeutic Lovenox pending timing of surgery.     DVT Prophylaxis: Pt on Eliquis at home, switched to therapeutic Lovenox  Diet: DIET CARB CONTROL; Carb Control: 4 carb choices (60 gms)/meal  Diet NPO, After Midnight Exceptions are: Sips of Water with Meds, Ice Chips  Code Status: Full Code    PT/OT Eval Status: pending    Dispo - Inpatient    Nathalia Wise DO

## 2021-05-16 NOTE — PROGRESS NOTES
Patient is A&O.VSS. Patient continues with partial weight bearing restrictions to RLE. Ace wrap in place per podiatry. Patient given IV abx and tolerated. Patient to be NPO at midnight for OR tomorrow.      Electronically signed by Ankit Campbell RN on 5/16/2021 at 1:55 PM

## 2021-05-16 NOTE — PROGRESS NOTES
units)   Date Value   02/26/2021 Not Detected     SARS-CoV-2, JOSE GUADALUPE (no units)   Date Value   02/17/2021 NOT DETECTED     SARS-CoV-2, NAAT (no units)   Date Value   05/15/2021 Not Detected            Assessment:     The patient is a 70 y.o. old male who  has a past medical history of Allergic reaction to sulfonamide (1960), Bee sting reaction (2000), Cephalexin adverse reaction (1993), Fracture of left elbow (1956), History of right inguinal hernia repair (1987), Hidalgo's neuroma of right foot (1989), Open fracture of left elbow (1991), Open fracture of left humerus (1991), and Rupture of appendix (1970). with following problems:    · Complicated right diabetic foot infection with osteomyelitis-wound culture growing group B streptococcus, Morganella, Pseudomonas  · Poorly controlled type 2 diabetes mellitus-maintain good glycemic control  · Elevated sed rate and CRP  · Hyponatremia-being corrected  · Diabetic polyneuropathy  · History of cephalexin allergy-tolerated meropenem okay  · Obesity Class 1 due to excess calorie intake : Body mass index is 30.42 kg/m².-Counseling done      Discussion:      The patient is afebrile. He is tolerating antibiotics okay    Right foot wound culture is growing group B streptococcus, Morganella and Pseudomonas. Blood cultures from admission are negative so far. COVID-19 NAAT test was negative. Serum creatinine 0.9    Chest x-ray from yesterday reviewed. Showed some atelectasis in the right lower lobe    Plan:     Diagnostic Workup:    · Follow-up on susceptibility Morganella and Pseudomonas isolated from the right foot wound culture  · Continue to follow  fever curve, WBC count and blood cultures. · Continue to monitor blood counts, liver and renal function. Antimicrobials:    · No evidence of MRSA so far.   We will stop IV vancomycin today  · We will stop oral Cipro today  · Will order IV meropenem 1 g every 8 hours for group B streptococcus, Pseudomonas and Morganella coverage  · Podiatry plans for right third toe amputation for tomorrow noted  · Start oral probiotics twice daily  · We will follow up on the culture results and clinical progress and will make further recommendations accordingly. · Continue close vitals monitoring. · Maintain good glycemic control. · Fall precautions. Aspiration precautions. · Continue to watch for new fever or diarrhea. · DVT prophylaxis. · Discussed all above with patient and RN. Drug Monitoring:    · Continue monitoring for antibiotic toxicity as follows: CBC, CMP   · Continue to watch for following: new or worsening fever, new hypotension, hives, lip swelling and redness or purulence at vascular access sites. I/v access Management:    · Continue to monitor i.v access sites for erythema, induration, discharge or tenderness. · As always, continue efforts to minimize tubes/lines/drains as clinically appropriate to reduce chances of line associated infections. Patient education and counseling:        · The patient was educated in detail about the side-effects of various antibiotics and things to watch for like new rashes, lip swelling, severe reaction, worsening diarrhea, break through fever etc.  · Discussed patient's condition and what to expect. All of the patient's questions were addressed in a satisfactory manner and patient verbalized understanding all instructions. Level of complexity of visit: High     Risk of Complications/Morbidity: High     · Illness(es)/ Infection present that pose threat to bodily function. · There is potential for severe exacerbation of infection/side effects of treatment. · Therapy requires intensive monitoring for antimicrobial agent toxicity. Weight loss counseling:    Extensive weight loss counseling was done. It is important to set a realistic weight loss goal. First goal should be to avoid gaining more weight and staying at current weight (or within 5 percent).  People at high risk of developing diabetes who are able to lose 5 percent of their body weight and maintain this weight will reduce their risk of developing diabetes by about 50 percent and reduce their blood pressure. Losing more than 15 percent of  body weight and staying at this weight is an extremely good result, even if you never reach your \"dream\" or \"ideal\" weight. Lifestyle changes including changing eating habits, substituting excess carbohydrates with proteins, stress reduction, using self-help programs like Weight Watchers®, Overeaters Anonymous®, and Take Off Pounds Sensibly (TOPS)© , following DASH diet and increasing exercise or walking briskly daily for half hour to and hour 5-7 days a week was suggested among other measures. Information was given about various weight loss education programs and their websites like www.cdc.gov/healthyweight, www.choosemyplate.gov and www.health.gov/dietaryguidelines/    TIME SPENT TODAY:     - Spent over  36 minutes on visit (including interval history, physical exam, review of data including labs, cultures, imaging, development and implementation of treatment plan and coordination of complex care). More than 50 percent of this includes face-to-face time spent with the patient for counseling and coordination of care. Thank you for involving me in the care of your patient. I will continue to follow. If you have anyadditional questions, please do not hesitate to contact me. Subjective: Interval history: Interval history was obtained from chart review and patient/ RN. The patient is afebrile. He is tolerating antibiotics okay. Has ongoing right second and third toe wounds and swelling     REVIEW OF SYSTEMS:     Review of Systems   Constitutional: Positive for fatigue. Negative for chills, diaphoresis and fever. HENT: Negative for ear discharge, ear pain, rhinorrhea, sore throat and trouble swallowing. Eyes: Negative for discharge and redness.    Respiratory: Negative for cough, shortness of breath and wheezing. Cardiovascular: Negative for chest pain and leg swelling. Gastrointestinal: Negative for abdominal pain, constipation, diarrhea and nausea. Endocrine: Negative for polyuria. Genitourinary: Negative for dysuria, flank pain, frequency, hematuria and urgency. Musculoskeletal: Negative for back pain and myalgias. Skin: Negative for rash. Neurological: Negative for dizziness, seizures and headaches. Hematological: Does not bruise/bleed easily. Psychiatric/Behavioral: Negative for hallucinations and suicidal ideas. All other systems reviewed and are negative. Past Medical History: All past medical history reviewed today. Past Medical History:   Diagnosis Date    Allergic reaction to sulfonamide 1960    Bee sting reaction 2000    Cephalexin adverse reaction 1993    Fracture of left elbow 1956    History of right inguinal hernia repair 1987    Hidalgo's neuroma of right foot 1989    Open fracture of left elbow 1991    Open fracture of left humerus 1991    Rupture of appendix 1970       Past Surgical History: All past surgical history was reviewed today. Past Surgical History:   Procedure Laterality Date    TONSILLECTOMY  1953       Family History: All family history was reviewed today. Problem Relation Age of Onset    Sleep Apnea Father        Objective:       PHYSICAL EXAM:      Vitals:   Vitals:    05/16/21 0341 05/16/21 0708 05/16/21 1057 05/16/21 1503   BP: 130/74 134/71 119/72 (!) 149/70   Pulse: 61 64 58 61   Resp: 16 16 16 16   Temp: 97.6 °F (36.4 °C) 98 °F (36.7 °C) 98.1 °F (36.7 °C) 98 °F (36.7 °C)   TempSrc: Oral Oral Oral Oral   SpO2: 97% 97% 94% 97%   Weight:       Height:           Physical Exam  Vitals and nursing note reviewed. Constitutional:       Appearance: Normal appearance. He is well-developed. HENT:      Head: Normocephalic and atraumatic.       Right Ear: External ear normal.      Left Ear: External ear normal.      Nose: Nose normal. No congestion or rhinorrhea. Mouth/Throat:      Mouth: Mucous membranes are moist.      Pharynx: No oropharyngeal exudate or posterior oropharyngeal erythema. Eyes:      General: No scleral icterus. Right eye: No discharge. Left eye: No discharge. Conjunctiva/sclera: Conjunctivae normal.      Pupils: Pupils are equal, round, and reactive to light. Cardiovascular:      Rate and Rhythm: Normal rate and regular rhythm. Pulses: Normal pulses. Heart sounds: No murmur heard. No friction rub. Pulmonary:      Effort: Pulmonary effort is normal. No respiratory distress. Breath sounds: Normal breath sounds. No stridor. No wheezing, rhonchi or rales. Abdominal:      General: Bowel sounds are normal.      Palpations: Abdomen is soft. Tenderness: There is no abdominal tenderness. There is no right CVA tenderness, left CVA tenderness, guarding or rebound. Musculoskeletal:         General: Swelling present. No tenderness. Normal range of motion. Cervical back: Normal range of motion and neck supple. No rigidity. No muscular tenderness. Lymphadenopathy:      Cervical: No cervical adenopathy. Skin:     General: Skin is warm and dry. Coloration: Skin is not jaundiced. Findings: No erythema or rash. Comments: Right second and third toe wounds and swelling noted again   Neurological:      General: No focal deficit present. Mental Status: He is alert and oriented to person, place, and time. Mental status is at baseline. Motor: No abnormal muscle tone. Psychiatric:         Mood and Affect: Mood normal.         Behavior: Behavior normal.         Thought Content: Thought content normal.            Lines: All vascular access sites are healthy with no local erythema, discharge or tenderness. Intake and output:    I/O last 3 completed shifts: In: 2094 [P.O.:300;  I.V.:1544; IV Piggyback:250]  Out: -     Lab Data: All available labs and old records have been reviewed by me. CBC:  Recent Labs     05/14/21  1636 05/15/21  0527 05/16/21  0557   WBC 9.1 8.7 4.6   RBC 4.25 4.05* 3.99*   HGB 12.8* 12.2* 12.1*   HCT 37.1* 35.3* 34.4*    248 229   MCV 87.5 87.3 86.4   MCH 30.1 30.2 30.5   MCHC 34.4 34.5 35.3   RDW 13.3 13.4 13.0        BMP:  Recent Labs     05/14/21  1636 05/15/21  0527 05/16/21  0557   * 133* 135*   K 3.4* 3.6 3.7   CL 94* 94* 97*   CO2 25 25 27   BUN 30* 22* 15   CREATININE 1.3 1.0 0.9   CALCIUM 9.5 8.8 9.0   GLUCOSE 168* 228* 216*        Hepatic Function Panel:   Lab Results   Component Value Date    LABALBU 3.7 05/16/2021       CPK: No results found for: CKTOTAL  ESR:   Lab Results   Component Value Date    SEDRATE 68 (H) 05/14/2021     CRP:   Lab Results   Component Value Date    CRP 36.0 (H) 05/14/2021           Imaging: All pertinent images and reports for the current visit were reviewed by me during this visit. XR CHEST (2 VW)   Final Result   Minimal atelectatic changes right lower lung      XR FOOT RIGHT (MIN 3 VIEWS)   Final Result      3rd distal phalanx acute osteomyelitis. Medications: All current and past medications were reviewed.      meropenem  1,000 mg Intravenous Q8H    enoxaparin  1 mg/kg Subcutaneous BID    insulin glargine  5 Units Subcutaneous Nightly    amLODIPine  10 mg Oral QAM    cloNIDine  0.2 mg Oral BID    enalapril  10 mg Oral BID    fenofibrate  160 mg Oral Daily    gabapentin  300 mg Oral BID    losartan-hydroCHLOROthiazide  1 tablet Oral QPM    metoprolol tartrate  50 mg Oral BID    pantoprazole  40 mg Oral Daily    PARoxetine  20 mg Oral QPM    insulin lispro  0-6 Units Subcutaneous TID WC    insulin lispro  0-3 Units Subcutaneous Nightly    sodium chloride flush  5-40 mL Intravenous 2 times per day        dextrose      lactated ringers 125 mL/hr at 05/16/21 1404    sodium chloride         LORazepam, glucose, dextrose, glucagon (rDNA), dextrose, sodium chloride flush, sodium chloride, promethazine **OR** ondansetron, polyethylene glycol, acetaminophen **OR** acetaminophen      Problem list:       Patient Active Problem List   Diagnosis Code    Anxiety state F41.1    Type 2 diabetes mellitus with peripheral autonomic neuropathy (HCC) E11.43    Essential hypertension I10    Major depression, chronic F32.9    Migraine G43.909    Mixed hyperlipidemia E78.2    New onset a-fib (HCC) I48.91    Reflux esophagitis K21.00    Obesity E66.9    Persistent atrial fibrillation (HCC) I48.19    SURAJ (obstructive sleep apnea) G47.33    Diabetic foot infection (HCC) E11.628, L08.9    Osteomyelitis of right foot (HCC) M86.9    Elevated C-reactive protein (CRP) R79.82    Diabetic polyneuropathy associated with type 2 diabetes mellitus (HCC) E11.42    Elevated sed rate R70.0    Hyponatremia E87.1    Weight loss counseling, encounter for Z71.3    Need for diphtheria-tetanus-pertussis (Tdap) vaccine Z23    Group B streptococcal infection A49.1    Bacterial infection due to Morganella morganii A49.8    Pseudomonas infection A49.8       Please note that this chart was generated using Dragon dictation software. Although every effort was made to ensure the accuracy of this automated transcription, some errors in transcription may have occurred inadvertently. If you may need any clarification, please do not hesitate to contact me through EPIC or at the phone number provided below with my electronic signature. Any pictures or media included in this note were obtained after taking informed verbal consent from the patient and with their approval to include those in the patient's medical record.     Mosetta Sever, MD, MPH  5/16/2021 , 3:45 PM   [facility]   [address] [place] [address], [address] [place] [address]  Office: [phone]  Fax: [phone]  Clinic days:  Tuesday & Thursday

## 2021-05-16 NOTE — PROGRESS NOTES
Podiatric Surgery Daily Progress Note      Admit Date: 5/14/2021                           Code:Full Code    Patient seen and examined, labs and records reviewed    Subjective:     Patient seen and examined at bedside this AM.  Patient denies any overnight acute event. Patient denies f/c/n/v/sob/cp. Patient is ready for the surgery tomorrow morning. Objective     /71   Pulse 64   Temp 98 °F (36.7 °C) (Oral)   Resp 16   Ht 5' 10\" (1.778 m)   Wt 212 lb (96.2 kg)   SpO2 97%   BMI 30.42 kg/m²      I/O:    Intake/Output Summary (Last 24 hours) at 5/16/2021 0736  Last data filed at 5/16/2021 0708  Gross per 24 hour   Intake 1490 ml   Output --   Net 1490 ml              Wt Readings from Last 3 Encounters:   05/14/21 212 lb (96.2 kg)   05/12/21 214 lb (97.1 kg)   04/11/21 214 lb (97.1 kg)       LABS:    Recent Labs     05/15/21  0527 05/16/21  0557   WBC 8.7 4.6   HGB 12.2* 12.1*   HCT 35.3* 34.4*    229        Recent Labs     05/16/21  0557   *   K 3.7   CL 97*   CO2 27   PHOS 2.3*   BUN 15   CREATININE 0.9        Recent Labs     05/15/21  0841   INR 1.59*           LOWER EXTREMITY EXAMINATION    Dressing to right LE intact. No strikethrough drainage noted to the external dressing. Scant serosanguineous drainage noted to the internal dressing layer. VASCULAR: DP and PT pulses are  palpable B/L LE.  CFT less than 3 seconds to the distal digits of the foot b/l. Skin temperature is warm to cool from proximal to distal with focal calor noted to the second and third digits right foot improving clinically daily. Relaxed contention lines noted right lower extremity 2/2 Ace compressive therapy. No pain with calf compression b/l. NEUROLOGIC: Gross and epicritic sensation is diminished b/l. Protective sensation is diminished at all pedal sites b/l. DERMATOLOGIC: Dermatological changes noted B/L LE.   Right lower extremity full-thickness linear wound noted to the distal tuft of the third digit. Wound measures approximately 1.0 x 0.1 x 0.4 cm. Wound base is a mixture of granular, necrotic and slightly fibrotic tissue with dermal borders. Wound does probe to bone. Wound does not tunnel, or tracks. Less than 1 cc purulent drainage expressed third digit. No fluctuance, crepitus, or malodor noted. Periwound erythema and acute signs of infection noted at the second and third digits clinically improving daily. Picture taken 5/16/2021        Patient gave verbal consent for the picture taken at today's visit. MUSCULOSKELETAL: Muscle strength is 5/5 for all pedal groups tested. Slight pain with palpation of the third digit right foot. Ankle joint ROM is decreased in dorsiflexion with the knee extended. IMAGING:   X-ray right foot, 5/14/2021  Impression       3rd distal phalanx acute osteomyelitis. ASSESSMENT/PLAN  Diabetic foot infection, right foot  Full-thickness ulceration, Mott 3, third digit  Cellulitis, right foot -improving  Osteomyelitis, third distal phalanx right foot  DM 2 with peripheral neuropathy       -Patient seen and examined this a.m.  -VSS, afebrile, no leukocytosis noted  -CRP 36/ESR 68  -Wound culture; Strep agalactiae group B  -Hemoglobin A1c 8.2%  -SARS-CoV-2; not detected  -Blood cultures x2; NGTD  -Right lower extremity dressed with Betadine, DSD, Ace bandage  -Heel weightbearing to right foot and full weightbearing to left lower extremity as tolerated. -Infectious disease following, tenuous IV vancomycin, D/C meropenem, starting Cipro 500 mg every 12 hours. -PT/OT consulted; follow-up recommendations. -NPO effective midnight tonight  -Hold anticoagulation the morning of 5/17/2021  -SARS-CoV-2; not detected  -Consent placed in chart  -Admitting team please medically clear for surgical intervention this Monday.     DISPO: Diabetic foot infection, full-thickness ulceration third digit with localized cellulitis with new onset of osteomyelitis to the right foot third digit distal phalanx. Patient will undergo surgical intervention this Monday OR time 7:30 AM.  Will follow-up on ID recommendation for outpatient antibiotics. Will continue to closely monitor patient while in house and do local wound care. Discussed assessment and plan with Dr. Arnulfo Early.     Michael Starr, DPM   Podiatric Resident, PGY-2  Pager: [phone] or Perfect serve   5/16/2021  7:36 AM    Dr. Arnulfo Early, [address] [place] [address]  Office: [phone]  Cell: [phone]

## 2021-05-16 NOTE — PLAN OF CARE
Problem: Pain:  Goal: Control of acute pain  Description: Control of acute pain  Outcome: Ongoing   Pt c/o headache during shift, PRN tylenol administered with relief. Pt denies pain in right foot. Problem: Skin Integrity:  Goal: Will show no infection signs and symptoms  Description: Will show no infection signs and symptoms  Outcome: Ongoing   Pt's right foot remains wrapped in ace wrap. Dressing remains CDI. Problem: Mobility - Impaired:  Goal: Mobility will improve to maximum level  Description: Mobility will improve to maximum level  Outcome: Ongoing   Pt up ad aftab with steady gait. Pt partial weight bearing to right foot.

## 2021-05-16 NOTE — PROGRESS NOTES
VSS overnight. Pt c/o headache, PRN tylenol administered with relief. Pt's right foot ace wrap remains CDI, +1 pitting edema to RLE. IVF infusing. Pt denies any other needs at this time.

## 2021-05-17 ENCOUNTER — APPOINTMENT (OUTPATIENT)
Dept: GENERAL RADIOLOGY | Age: 76
DRG: 617 | End: 2021-05-17
Payer: MEDICARE

## 2021-05-17 ENCOUNTER — ANESTHESIA (OUTPATIENT)
Dept: OPERATING ROOM | Age: 76
DRG: 617 | End: 2021-05-17
Payer: MEDICARE

## 2021-05-17 ENCOUNTER — ANESTHESIA EVENT (OUTPATIENT)
Dept: OPERATING ROOM | Age: 76
DRG: 617 | End: 2021-05-17
Payer: MEDICARE

## 2021-05-17 VITALS
OXYGEN SATURATION: 95 % | SYSTOLIC BLOOD PRESSURE: 122 MMHG | DIASTOLIC BLOOD PRESSURE: 72 MMHG | RESPIRATION RATE: 5 BRPM | TEMPERATURE: 98.6 F

## 2021-05-17 LAB
ALBUMIN SERPL-MCNC: 3.9 G/DL (ref 3.4–5)
ANION GAP SERPL CALCULATED.3IONS-SCNC: 12 MMOL/L (ref 3–16)
BASOPHILS ABSOLUTE: 0 K/UL (ref 0–0.2)
BASOPHILS RELATIVE PERCENT: 0.8 %
BUN BLDV-MCNC: 15 MG/DL (ref 7–20)
CALCIUM SERPL-MCNC: 9.2 MG/DL (ref 8.3–10.6)
CHLORIDE BLD-SCNC: 96 MMOL/L (ref 99–110)
CO2: 26 MMOL/L (ref 21–32)
CREAT SERPL-MCNC: 0.8 MG/DL (ref 0.8–1.3)
EOSINOPHILS ABSOLUTE: 0.1 K/UL (ref 0–0.6)
EOSINOPHILS RELATIVE PERCENT: 3 %
GFR AFRICAN AMERICAN: >60
GFR NON-AFRICAN AMERICAN: >60
GLUCOSE BLD-MCNC: 198 MG/DL (ref 70–99)
GLUCOSE BLD-MCNC: 202 MG/DL (ref 70–99)
GLUCOSE BLD-MCNC: 231 MG/DL (ref 70–99)
GLUCOSE BLD-MCNC: 237 MG/DL (ref 70–99)
GLUCOSE BLD-MCNC: 238 MG/DL (ref 70–99)
GLUCOSE BLD-MCNC: 265 MG/DL (ref 70–99)
GRAM STAIN RESULT: ABNORMAL
HCT VFR BLD CALC: 34.2 % (ref 40.5–52.5)
HEMOGLOBIN: 12.1 G/DL (ref 13.5–17.5)
LYMPHOCYTES ABSOLUTE: 1 K/UL (ref 1–5.1)
LYMPHOCYTES RELATIVE PERCENT: 25.6 %
MAGNESIUM: 1.6 MG/DL (ref 1.8–2.4)
MCH RBC QN AUTO: 30.3 PG (ref 26–34)
MCHC RBC AUTO-ENTMCNC: 35.3 G/DL (ref 31–36)
MCV RBC AUTO: 85.8 FL (ref 80–100)
MONOCYTES ABSOLUTE: 0.4 K/UL (ref 0–1.3)
MONOCYTES RELATIVE PERCENT: 9.8 %
NEUTROPHILS ABSOLUTE: 2.5 K/UL (ref 1.7–7.7)
NEUTROPHILS RELATIVE PERCENT: 60.8 %
ORGANISM: ABNORMAL
PDW BLD-RTO: 12.8 % (ref 12.4–15.4)
PERFORMED ON: ABNORMAL
PHOSPHORUS: 2.6 MG/DL (ref 2.5–4.9)
PLATELET # BLD: 243 K/UL (ref 135–450)
PMV BLD AUTO: 7.9 FL (ref 5–10.5)
POTASSIUM SERPL-SCNC: 3.5 MMOL/L (ref 3.5–5.1)
RBC # BLD: 3.99 M/UL (ref 4.2–5.9)
SODIUM BLD-SCNC: 134 MMOL/L (ref 136–145)
WBC # BLD: 4 K/UL (ref 4–11)
WOUND/ABSCESS: ABNORMAL

## 2021-05-17 PROCEDURE — 6360000002 HC RX W HCPCS: Performed by: PODIATRIST

## 2021-05-17 PROCEDURE — 83735 ASSAY OF MAGNESIUM: CPT

## 2021-05-17 PROCEDURE — 87116 MYCOBACTERIA CULTURE: CPT

## 2021-05-17 PROCEDURE — 6370000000 HC RX 637 (ALT 250 FOR IP): Performed by: PODIATRIST

## 2021-05-17 PROCEDURE — 73630 X-RAY EXAM OF FOOT: CPT

## 2021-05-17 PROCEDURE — 3600000014 HC SURGERY LEVEL 4 ADDTL 15MIN: Performed by: PODIATRIST

## 2021-05-17 PROCEDURE — 87015 SPECIMEN INFECT AGNT CONCNTJ: CPT

## 2021-05-17 PROCEDURE — 87102 FUNGUS ISOLATION CULTURE: CPT

## 2021-05-17 PROCEDURE — 97161 PT EVAL LOW COMPLEX 20 MIN: CPT

## 2021-05-17 PROCEDURE — 87205 SMEAR GRAM STAIN: CPT

## 2021-05-17 PROCEDURE — 2709999900 HC NON-CHARGEABLE SUPPLY: Performed by: PODIATRIST

## 2021-05-17 PROCEDURE — 88311 DECALCIFY TISSUE: CPT

## 2021-05-17 PROCEDURE — 97165 OT EVAL LOW COMPLEX 30 MIN: CPT

## 2021-05-17 PROCEDURE — 99232 SBSQ HOSP IP/OBS MODERATE 35: CPT | Performed by: INTERNAL MEDICINE

## 2021-05-17 PROCEDURE — 1200000000 HC SEMI PRIVATE

## 2021-05-17 PROCEDURE — 0QBQ0ZX EXCISION OF RIGHT TOE PHALANX, OPEN APPROACH, DIAGNOSTIC: ICD-10-PCS | Performed by: PODIATRIST

## 2021-05-17 PROCEDURE — 0Y6T0Z0 DETACHMENT AT RIGHT 3RD TOE, COMPLETE, OPEN APPROACH: ICD-10-PCS | Performed by: PODIATRIST

## 2021-05-17 PROCEDURE — 7100000000 HC PACU RECOVERY - FIRST 15 MIN: Performed by: PODIATRIST

## 2021-05-17 PROCEDURE — 2500000003 HC RX 250 WO HCPCS

## 2021-05-17 PROCEDURE — 6360000002 HC RX W HCPCS: Performed by: INTERNAL MEDICINE

## 2021-05-17 PROCEDURE — 2580000003 HC RX 258: Performed by: PODIATRIST

## 2021-05-17 PROCEDURE — 87075 CULTR BACTERIA EXCEPT BLOOD: CPT

## 2021-05-17 PROCEDURE — 6360000002 HC RX W HCPCS

## 2021-05-17 PROCEDURE — 97116 GAIT TRAINING THERAPY: CPT

## 2021-05-17 PROCEDURE — 7100000001 HC PACU RECOVERY - ADDTL 15 MIN: Performed by: PODIATRIST

## 2021-05-17 PROCEDURE — 80069 RENAL FUNCTION PANEL: CPT

## 2021-05-17 PROCEDURE — 97530 THERAPEUTIC ACTIVITIES: CPT

## 2021-05-17 PROCEDURE — 87077 CULTURE AEROBIC IDENTIFY: CPT

## 2021-05-17 PROCEDURE — 36415 COLL VENOUS BLD VENIPUNCTURE: CPT

## 2021-05-17 PROCEDURE — 2580000003 HC RX 258: Performed by: INTERNAL MEDICINE

## 2021-05-17 PROCEDURE — 6370000000 HC RX 637 (ALT 250 FOR IP): Performed by: INTERNAL MEDICINE

## 2021-05-17 PROCEDURE — 88305 TISSUE EXAM BY PATHOLOGIST: CPT

## 2021-05-17 PROCEDURE — 3700000001 HC ADD 15 MINUTES (ANESTHESIA): Performed by: PODIATRIST

## 2021-05-17 PROCEDURE — 3600000004 HC SURGERY LEVEL 4 BASE: Performed by: PODIATRIST

## 2021-05-17 PROCEDURE — 3700000000 HC ANESTHESIA ATTENDED CARE: Performed by: PODIATRIST

## 2021-05-17 PROCEDURE — 97535 SELF CARE MNGMENT TRAINING: CPT

## 2021-05-17 PROCEDURE — 85025 COMPLETE CBC W/AUTO DIFF WBC: CPT

## 2021-05-17 PROCEDURE — 87070 CULTURE OTHR SPECIMN AEROBIC: CPT

## 2021-05-17 PROCEDURE — 87206 SMEAR FLUORESCENT/ACID STAI: CPT

## 2021-05-17 PROCEDURE — 2500000003 HC RX 250 WO HCPCS: Performed by: PODIATRIST

## 2021-05-17 RX ORDER — HYDRALAZINE HYDROCHLORIDE 20 MG/ML
5 INJECTION INTRAMUSCULAR; INTRAVENOUS EVERY 5 MIN PRN
Status: DISCONTINUED | OUTPATIENT
Start: 2021-05-17 | End: 2021-05-17 | Stop reason: HOSPADM

## 2021-05-17 RX ORDER — ENALAPRILAT 2.5 MG/2ML
1.25 INJECTION INTRAVENOUS
Status: DISCONTINUED | OUTPATIENT
Start: 2021-05-17 | End: 2021-05-17 | Stop reason: HOSPADM

## 2021-05-17 RX ORDER — PROPOFOL 10 MG/ML
INJECTION, EMULSION INTRAVENOUS PRN
Status: DISCONTINUED | OUTPATIENT
Start: 2021-05-17 | End: 2021-05-17 | Stop reason: SDUPTHER

## 2021-05-17 RX ORDER — LIDOCAINE HYDROCHLORIDE 20 MG/ML
INJECTION, SOLUTION EPIDURAL; INFILTRATION; INTRACAUDAL; PERINEURAL PRN
Status: DISCONTINUED | OUTPATIENT
Start: 2021-05-17 | End: 2021-05-17 | Stop reason: SDUPTHER

## 2021-05-17 RX ORDER — ONDANSETRON 2 MG/ML
INJECTION INTRAMUSCULAR; INTRAVENOUS PRN
Status: DISCONTINUED | OUTPATIENT
Start: 2021-05-17 | End: 2021-05-17 | Stop reason: SDUPTHER

## 2021-05-17 RX ORDER — OXYCODONE HYDROCHLORIDE 5 MG/1
5 TABLET ORAL EVERY 6 HOURS PRN
Status: DISCONTINUED | OUTPATIENT
Start: 2021-05-17 | End: 2021-05-18 | Stop reason: HOSPADM

## 2021-05-17 RX ORDER — FENTANYL CITRATE 50 UG/ML
50 INJECTION, SOLUTION INTRAMUSCULAR; INTRAVENOUS EVERY 5 MIN PRN
Status: DISCONTINUED | OUTPATIENT
Start: 2021-05-17 | End: 2021-05-17 | Stop reason: HOSPADM

## 2021-05-17 RX ORDER — FENTANYL CITRATE 50 UG/ML
25 INJECTION, SOLUTION INTRAMUSCULAR; INTRAVENOUS EVERY 5 MIN PRN
Status: DISCONTINUED | OUTPATIENT
Start: 2021-05-17 | End: 2021-05-17 | Stop reason: HOSPADM

## 2021-05-17 RX ORDER — BUPIVACAINE HYDROCHLORIDE 5 MG/ML
INJECTION, SOLUTION EPIDURAL; INTRACAUDAL PRN
Status: DISCONTINUED | OUTPATIENT
Start: 2021-05-17 | End: 2021-05-17 | Stop reason: ALTCHOICE

## 2021-05-17 RX ORDER — OXYCODONE HYDROCHLORIDE 5 MG/1
10 TABLET ORAL EVERY 6 HOURS PRN
Status: DISCONTINUED | OUTPATIENT
Start: 2021-05-17 | End: 2021-05-18 | Stop reason: HOSPADM

## 2021-05-17 RX ORDER — ONDANSETRON 2 MG/ML
4 INJECTION INTRAMUSCULAR; INTRAVENOUS
Status: DISCONTINUED | OUTPATIENT
Start: 2021-05-17 | End: 2021-05-17 | Stop reason: HOSPADM

## 2021-05-17 RX ORDER — LABETALOL HYDROCHLORIDE 5 MG/ML
5 INJECTION, SOLUTION INTRAVENOUS EVERY 10 MIN PRN
Status: DISCONTINUED | OUTPATIENT
Start: 2021-05-17 | End: 2021-05-17 | Stop reason: HOSPADM

## 2021-05-17 RX ADMIN — MEROPENEM 1000 MG: 1 INJECTION, POWDER, FOR SOLUTION INTRAVENOUS at 16:28

## 2021-05-17 RX ADMIN — SODIUM CHLORIDE, POTASSIUM CHLORIDE, SODIUM LACTATE AND CALCIUM CHLORIDE: 600; 310; 30; 20 INJECTION, SOLUTION INTRAVENOUS at 20:14

## 2021-05-17 RX ADMIN — Medication 10 ML: at 09:38

## 2021-05-17 RX ADMIN — ONDANSETRON 4 MG: 2 INJECTION INTRAMUSCULAR; INTRAVENOUS at 07:41

## 2021-05-17 RX ADMIN — PAROXETINE HYDROCHLORIDE 20 MG: 20 TABLET, FILM COATED ORAL at 17:50

## 2021-05-17 RX ADMIN — APIXABAN 5 MG: 5 TABLET, FILM COATED ORAL at 20:27

## 2021-05-17 RX ADMIN — MEROPENEM 1000 MG: 1 INJECTION, POWDER, FOR SOLUTION INTRAVENOUS at 00:02

## 2021-05-17 RX ADMIN — METOPROLOL TARTRATE 50 MG: 50 TABLET, FILM COATED ORAL at 09:38

## 2021-05-17 RX ADMIN — INSULIN LISPRO 1 UNITS: 100 INJECTION, SOLUTION INTRAVENOUS; SUBCUTANEOUS at 09:37

## 2021-05-17 RX ADMIN — FENOFIBRATE 160 MG: 160 TABLET ORAL at 09:38

## 2021-05-17 RX ADMIN — AMLODIPINE BESYLATE 10 MG: 10 TABLET ORAL at 09:37

## 2021-05-17 RX ADMIN — INSULIN GLARGINE 5 UNITS: 100 INJECTION, SOLUTION SUBCUTANEOUS at 20:28

## 2021-05-17 RX ADMIN — GABAPENTIN 300 MG: 300 CAPSULE ORAL at 16:28

## 2021-05-17 RX ADMIN — ENALAPRIL MALEATE 10 MG: 10 TABLET ORAL at 09:47

## 2021-05-17 RX ADMIN — CLONIDINE HYDROCHLORIDE 0.2 MG: 0.1 TABLET ORAL at 20:27

## 2021-05-17 RX ADMIN — ENALAPRIL MALEATE 10 MG: 10 TABLET ORAL at 20:27

## 2021-05-17 RX ADMIN — INSULIN LISPRO 1 UNITS: 100 INJECTION, SOLUTION INTRAVENOUS; SUBCUTANEOUS at 20:28

## 2021-05-17 RX ADMIN — OXYCODONE 5 MG: 5 TABLET ORAL at 23:29

## 2021-05-17 RX ADMIN — INSULIN LISPRO 2 UNITS: 100 INJECTION, SOLUTION INTRAVENOUS; SUBCUTANEOUS at 17:50

## 2021-05-17 RX ADMIN — SODIUM CHLORIDE, POTASSIUM CHLORIDE, SODIUM LACTATE AND CALCIUM CHLORIDE: 600; 310; 30; 20 INJECTION, SOLUTION INTRAVENOUS at 00:02

## 2021-05-17 RX ADMIN — PANTOPRAZOLE SODIUM 40 MG: 40 TABLET, DELAYED RELEASE ORAL at 09:37

## 2021-05-17 RX ADMIN — POLYETHYLENE GLYCOL 3350 17 G: 17 POWDER, FOR SOLUTION ORAL at 14:59

## 2021-05-17 RX ADMIN — METOPROLOL TARTRATE 50 MG: 50 TABLET, FILM COATED ORAL at 20:27

## 2021-05-17 RX ADMIN — LOSARTAN POTASSIUM AND HYDROCHLOROTHIAZIDE 1 TABLET: 25; 100 TABLET ORAL at 17:50

## 2021-05-17 RX ADMIN — INSULIN LISPRO 3 UNITS: 100 INJECTION, SOLUTION INTRAVENOUS; SUBCUTANEOUS at 11:47

## 2021-05-17 RX ADMIN — MEROPENEM 1000 MG: 1 INJECTION, POWDER, FOR SOLUTION INTRAVENOUS at 09:47

## 2021-05-17 RX ADMIN — LIDOCAINE HYDROCHLORIDE 100 MG: 20 INJECTION, SOLUTION EPIDURAL; INFILTRATION; INTRACAUDAL; PERINEURAL at 07:41

## 2021-05-17 RX ADMIN — GABAPENTIN 300 MG: 300 CAPSULE ORAL at 09:37

## 2021-05-17 RX ADMIN — APIXABAN 5 MG: 5 TABLET, FILM COATED ORAL at 11:47

## 2021-05-17 RX ADMIN — PROPOFOL 100 MG: 10 INJECTION, EMULSION INTRAVENOUS at 07:41

## 2021-05-17 RX ADMIN — ACETAMINOPHEN 650 MG: 325 TABLET ORAL at 20:32

## 2021-05-17 RX ADMIN — PROPOFOL 50 MCG/KG/MIN: 10 INJECTION, EMULSION INTRAVENOUS at 07:44

## 2021-05-17 RX ADMIN — CLONIDINE HYDROCHLORIDE 0.2 MG: 0.1 TABLET ORAL at 09:37

## 2021-05-17 RX ADMIN — MEROPENEM 1000 MG: 1 INJECTION, POWDER, FOR SOLUTION INTRAVENOUS at 23:29

## 2021-05-17 ASSESSMENT — PULMONARY FUNCTION TESTS
PIF_VALUE: 0
PIF_VALUE: 0
PIF_VALUE: 1
PIF_VALUE: 0

## 2021-05-17 ASSESSMENT — PAIN DESCRIPTION - FREQUENCY: FREQUENCY: CONTINUOUS

## 2021-05-17 ASSESSMENT — PAIN DESCRIPTION - LOCATION: LOCATION: FOOT

## 2021-05-17 ASSESSMENT — PAIN SCALES - GENERAL
PAINLEVEL_OUTOF10: 4
PAINLEVEL_OUTOF10: 0
PAINLEVEL_OUTOF10: 5
PAINLEVEL_OUTOF10: 0
PAINLEVEL_OUTOF10: 0

## 2021-05-17 ASSESSMENT — PAIN DESCRIPTION - ORIENTATION: ORIENTATION: RIGHT

## 2021-05-17 ASSESSMENT — PAIN DESCRIPTION - DESCRIPTORS: DESCRIPTORS: ACHING;THROBBING

## 2021-05-17 ASSESSMENT — PAIN - FUNCTIONAL ASSESSMENT: PAIN_FUNCTIONAL_ASSESSMENT: ACTIVITIES ARE NOT PREVENTED

## 2021-05-17 ASSESSMENT — PAIN DESCRIPTION - ONSET: ONSET: GRADUAL

## 2021-05-17 NOTE — PROGRESS NOTES
Physical Therapy    Facility/Department: [facility] [address] SURGERY  Initial Assessment/discharge note      NAME: Miguel Benavides  : 1945  MRN: [id number]    Date of Service: 2021    Discharge Recommendations:Leobardo Fleming scored a 23/24 on the AM-PAC short mobility form. Current research shows that an AM-PAC score of 18 or greater is typically associated with a discharge to the patient's home setting. k. Please see assessment section for further patient specific details. PT Equipment Recommendations  Equipment Needed:  (rolling walker)    Assessment   Assessment: 69 yo admitted 23 for diabetic foot infection. Pt is POD 0 for R foot 3rd digit amputation. Pt demo mobility below his reported baseline of independent at home without AD. Pt plans to return home alone at discharge and reports he can stay on main level of his condo initially. If home, recommend increased supervision initiallly and use of rolling walker (needs). No further acute PT needs so will sign off. Recommend nursing continue to encourage ambulation PRN and often heel WB R in CAM boot. Treatment Diagnosis: mobility impairment due to diabetic foot infection  Decision Making: Low Complexity  Patient Education: Pt educated on PT role, importance of OOB mobility,need to call for assist to get up , heel WB R in CAM boot and he verbalized understanding. REQUIRES PT FOLLOW UP: No  Activity Tolerance  Activity Tolerance: Patient Tolerated treatment well (limited by heel WB R in CAM boot)       Patient Diagnosis(es): The encounter diagnosis was Osteomyelitis of right foot, unspecified type ([address] has a past medical history of Allergic reaction to sulfonamide, Bee sting reaction, Cephalexin adverse reaction, Fracture of left elbow, History of right inguinal hernia repair, Hidalgo's neuroma of right foot, Open fracture of left elbow, Open fracture of left humerus, and Rupture of appendix.    has a past surgical history that includes Tonsillectomy (1953) and Toe amputation (Right, 5/17/2021). Restrictions  Position Activity Restriction  Other position/activity restrictions: up with assist; heel WB R in CAM boot     Vision/Hearing  Vision: Impaired  Vision Exceptions: Wears glasses at all times  Hearing: Within functional limits       Subjective  General  Additional Pertinent Hx: 70 yr old male admitted 5/14/21 for R toe blister. Podiatry consult: OR 5/17 for 3rd digit amputation due to osteomyelitis; ID consult. PMHx : HTN, DM type II, pAfib on apixaban, and SURAJ on CPAP at home. Family / Caregiver Present: No  Diagnosis: diabetic foot infection  Follows Commands: Within Functional Limits  Subjective  Subjective: Pt found supine in bed and agreeable to PT. \" I may be going home today. \"  Pain Screening  Patient Currently in Pain: Denies          Orientation  Orientation  Overall Orientation Status: Within Functional Limits     Social/Functional History  Social/Functional History  Lives With: Alone  Type of Home:  (condo)  Home Layout: Multi-level, Bed/Bath upstairs, 1/2 bath on main level  Home Access:  (one to enter without rail)  Bathroom Shower/Tub: Tub/Shower unit  Bathroom Toilet: Standard (sink nearby)  Home Equipment: 4 wheeled walker, Alert Button (CAM boot for 3 weeks)  ADL Assistance: Independent  Homemaking Assistance: Independent  Ambulation Assistance: Independent  Transfer Assistance: Independent  Active : Yes  Occupation: Full time employment  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 Plans to stay on the first floor most of the time - go upstairs a few days per week for showers.        Objective          AROM RLE (degrees)  RLE AROM: WFL  AROM LLE (degrees)  LLE AROM : WFL     Strength RLE  Strength RLE: WFL (ankle NT due to surgical site)  Strength LLE  Strength LLE: WFL        Bed mobility  Supine to Sit: Modified independent   Scooting: Modified independent     Transfers  Sit to Stand: Modified independent (x3 trials-heel WB R in CAM boot)  Stand to sit: Modified independent (x3 trials-heel WB R in CAM boot)     Ambulation  Device: Rolling Walker  Assistance: Modified Independent  Quality of Gait: slow, steady gait with pt maintaining heel WB R in CAM boot without issue  Distance: 75 ft  Comments: Pt amb 10 ft x2 with CGA, heel WB R in CAM boot. Pt demo decreased balance without rolling walker. Discussed steps with pt and he plans to stay on main level initially then have family assist him up/down with crutches he bought. We discussed him going up sideways to hold onto railing with both hands for safety and he verbalized understanding.            Plan   Safety Devices  Type of devices: Nurse notified, Call light within reach, Chair alarm in place, Left in chair (pt reclined)            AM-PAC Score  AM-PAC Inpatient Mobility Raw Score : 23 (05/17/21 1149)  AM-PAC Inpatient T-Scale Score : 56.93 (05/17/21 1149)  Mobility Inpatient CMS 0-100% Score: 11.2 (05/17/21 1149)  Mobility Inpatient CMS G-Code Modifier : CI (05/17/21 1149)       Therapy Time   Individual Concurrent Group Co-treatment   Time In 1100         Time Out 1140         Minutes 40           Timed Code Treatment Minutes:30      Total Treatment Minutes:  [facility],

## 2021-05-17 NOTE — PLAN OF CARE
Problem: Mobility - Impaired:  Goal: Mobility will improve to maximum level  Description: Mobility will improve to maximum level  Outcome: Ongoing  Note: Patient went to OR this AM. PT/OT to eval mobility later in shift. Problem: Skin Integrity - Impaired:  Goal: Will show no infection signs and symptoms  Description: Will show no infection signs and symptoms  Outcome: Ongoing  Note: Patient with no new signs of infection. Patient continues on IV abx at this time.

## 2021-05-17 NOTE — PROGRESS NOTES
Hospital Medicine Care  Progress Note      Chief complain; Foot Swelling  74-year-old male admitted to the hospital with diabetic foot infection. Admitted for surgical intervention antibiotic therapy         Subjective:     Patient is s/p third digit amputation  Eating lunch  Denies any chest pain, shortness of breath  Denies any fever or chills. He appears to be in good spirits  Review of Systems:     Review of Systems as mentioned above, other ros is negative. Objective:       Medications        Scheduled Meds:   apixaban  5 mg Oral BID    meropenem  1,000 mg Intravenous Q8H    insulin glargine  5 Units Subcutaneous Nightly    amLODIPine  10 mg Oral QAM    cloNIDine  0.2 mg Oral BID    enalapril  10 mg Oral BID    fenofibrate  160 mg Oral Daily    gabapentin  300 mg Oral BID    losartan-hydroCHLOROthiazide  1 tablet Oral QPM    metoprolol tartrate  50 mg Oral BID    pantoprazole  40 mg Oral Daily    PARoxetine  20 mg Oral QPM    insulin lispro  0-6 Units Subcutaneous TID WC    insulin lispro  0-3 Units Subcutaneous Nightly    sodium chloride flush  5-40 mL Intravenous 2 times per day     Continuous Infusions:   dextrose      lactated ringers 125 mL/hr at 05/17/21 0002    sodium chloride       PRN Meds:. LORazepam, glucose, dextrose, glucagon (rDNA), dextrose, sodium chloride flush, sodium chloride, promethazine **OR** ondansetron, polyethylene glycol, acetaminophen **OR** acetaminophen      Allergies  Allergies   Allergen Reactions    Bee Venom Swelling    Cephalexin Swelling    Cephalosporins     Propoxyphene Other (See Comments)     Hallucinations  (Darvon/Darvocet)    Sulfa Antibiotics          Vitals:    05/17/21 0830 05/17/21 0845 05/17/21 0931 05/17/21 1146   BP: 139/73 (!) 153/75 (!) 142/81 121/72   Pulse: 59 59 62 63   Resp: 14 13 16 16   Temp:  98 °F (36.7 °C) 97.6 °F (36.4 °C) 97.7 °F (36.5 °C)   TempSrc:   Oral Oral   SpO2: 95% 98% 98% 95%   Weight:       Height: Physical exam:         Physical Exam  Constitutional:       Appearance: Normal appearance. HENT:      Head: Normocephalic and atraumatic. Cardiovascular:      Rate and Rhythm: Normal rate and regular rhythm. Pulmonary:      Effort: Pulmonary effort is normal.      Breath sounds: Normal breath sounds. Abdominal:      General: Abdomen is flat. Palpations: Abdomen is soft. Musculoskeletal:         General: No swelling or tenderness. Normal range of motion. Cervical back: Normal range of motion and neck supple. Skin:     General: Skin is warm and dry. Capillary Refill: Capillary refill takes less than 2 seconds. Neurological:      General: No focal deficit present. Mental Status: He is alert and oriented to person, place, and time. Psychiatric:         Mood and Affect: Mood normal.         Behavior: Behavior normal.               Labs      Recent Labs     05/15/21  0527 05/16/21  0557 05/17/21  0537   WBC 8.7 4.6 4.0   HGB 12.2* 12.1* 12.1*   HCT 35.3* 34.4* 34.2*    229 243   MCV 87.3 86.4 85.8        Recent Labs     05/15/21  0527 05/16/21  0557 05/17/21  0537   * 135* 134*   K 3.6 3.7 3.5   CL 94* 97* 96*   CO2 25 27 26   PHOS 2.3* 2.3* 2.6   BUN 22* 15 15   CREATININE 1.0 0.9 0.8       No results for input(s): AST, ALT, ALB, BILIDIR, BILITOT, ALKPHOS in the last 72 hours. Recent Labs     05/15/21  1742 05/16/21  0557 05/17/21  0537   MG 2.30 1.70* 1.60*       Radiology  XR FOOT RIGHT (MIN 3 VIEWS)   Final Result   Postoperative changes without immediate complication. Polyarticular osteoarthritis. XR CHEST (2 VW)   Final Result   Minimal atelectatic changes right lower lung      XR FOOT RIGHT (MIN 3 VIEWS)   Final Result      3rd distal phalanx acute osteomyelitis. Assessment and Plan:    Active Problems:    Diabetic foot infection ([address]    Osteomyelitis of right foot (HCC)    Elevated C-reactive protein (CRP)    Diabetic polyneuropathy associated with type 2 diabetes mellitus (HCC)    Elevated sed rate    Hyponatremia    Weight loss counseling, encounter for    Need for diphtheria-tetanus-pertussis (Tdap) vaccine    Group B streptococcal infection    Bacterial infection due to Morganella morganii    Pseudomonas infection  Resolved Problems:    * No resolved hospital problems. *       Diabetic foot infection  S/p third phalanx amputation  Definitive surgery  Continue with the meropenem  Resume apixaban  If no bleeding issues, consider discharge on p.o. antibiotics by tomorrow    A. fib  S/p ablation and cardioversion in the recent past  Anticoagulated with apixaban  Per podiatry okay to resume apixaban. Diabetes mellitus type 2  Uncontrolled at times  At home on metformin and glimepiride  Continue with low-dose Lantus, sliding scale insulin. Essential hypertension  Continue with amlodipine, enalapril, clonidine  BP appears to be well controlled this morning.     PT OT  Possible discharge on p.o. levofloxacin tomorrow    Mercedes Sims MD  5/17/2021

## 2021-05-17 NOTE — ANESTHESIA POSTPROCEDURE EVALUATION
Department of Anesthesiology  Postprocedure Note    Patient: Enoch Cushing  MRN: [id number]  YOB: 1945  Date of evaluation: 5/17/2021  Time:  1:19 PM     Procedure Summary     Date: 05/17/21 Room / Location: [address] [place] [address]    Anesthesia Start: 7267 Anesthesia Stop: 0805    Procedure: THIRD DISTAL PHALANYX AMPUTATION VS THIRD GREAT TOE AMPUTATION RIGHT FOOT (Right ) Diagnosis: (osteomylytis right foot)    Surgeons: Ana Paula Alfonso DPM Responsible Provider: Adam Goldstein MD    Anesthesia Type: general, MAC ASA Status: 3          Anesthesia Type: general, MAC    Nataly Phase I: Nataly Score: 9    Nataly Phase II:      Last vitals: Reviewed and per EMR flowsheets.        Anesthesia Post Evaluation    Patient location during evaluation: PACU  Patient participation: complete - patient participated  Level of consciousness: awake  Airway patency: patent  Nausea & Vomiting: no nausea and no vomiting  Complications: no  Cardiovascular status: hemodynamically stable  Respiratory status: acceptable  Hydration status: stable

## 2021-05-17 NOTE — PROGRESS NOTES
Patient is A&O. VSS. Patient returned from surgery. RLE dressing is currently clean, dry and intact. Patient denies pain in RLE at this time. PT/OT to eval. No needs at this time.     Electronically signed by Chey Bush RN on 5/17/2021 at 10:05 AM

## 2021-05-17 NOTE — PROGRESS NOTES
Pre-Operative Note  Resident Note     Patient: Yanni Hylton     Procedure: Right foot third digit distal phalanx amputation versus third toe amputation    Consent: In chart     Labs:        Recent Labs     05/15/21  0527 05/16/21  0557   WBC 8.7 4.6   HGB 12.2* 12.1*   HCT 35.3* 34.4*   MCV 87.3 86.4    229        Recent Labs     05/15/21  0527 05/16/21  0557   * 135*   K 3.6 3.7   CL 94* 97*   CO2 25 27   PHOS 2.3* 2.3*   BUN 22* 15   CREATININE 1.0 0.9      No results for input(s): AST, ALT, ALB, BILIDIR, BILITOT, ALKPHOS in the last 72 hours. No results for input(s): LIPASE, AMYLASE in the last 72 hours. Recent Labs     05/15/21  0841   INR 1.59*      No results for input(s): CKTOTAL, CKMB, CKMBINDEX, TROPONINI in the last 72 hours. Pre-op medications:     Diet: NPO Past Midnight     CXR: Minimal atelectatic changes right lower lobe      EKG: Normal sinus rhythm left axis deviation incomplete right bundle branch block     Echocardiogram: not done    IVF: As needed    PT/INR: 18.5/1.59    Type and Screen: Not done    Bowel prep: Not applicable    ABX: IV meropenem    SARS-CoV-2; not detected    Known risk factors for perioperative complications: Diabetes mellitus  Hypertension, and atrial fibrillation      Difficulty with intubation will not be anticipated. Anesthesia to see patient.     Nimco SETHM, PGY-2  [phone] or Perfect serve

## 2021-05-17 NOTE — PROGRESS NOTES
Physical Therapy/Occupational Therapy    Discharge note    Referral received. Pt currently off floor to OR. Will sign off and await re-referral post op.        Damion Chavarria, PT  Leticia Crockett OTR/L #[id number]

## 2021-05-17 NOTE — ANESTHESIA PRE PROCEDURE
Department of Anesthesiology  Preprocedure Note       Name:  Chon Wilson   Age:  70 y.o.  :  1945                                          MRN:  [id number]         Date:  2021      Surgeon: Colt Best):  Az Nice DPM    Procedure: Procedure(s):  THIRD DISTAL PHALANYX AMPUTATION VS THIRD GREAT TOE AMPUTATION RIGHT FOOT    Medications prior to admission:   Prior to Admission medications    Medication Sig Start Date End Date Taking? Authorizing Provider   doxycycline hyclate (VIBRA-TABS) 100 MG tablet Take 100 mg by mouth 2 times daily    Yes Historical Provider, MD   aspirin 81 MG EC tablet Take 81 mg by mouth daily   Yes Historical Provider, MD   gabapentin (NEURONTIN) 300 MG capsule Take 300 mg by mouth 2 times daily. Yes Historical Provider, MD   pantoprazole (PROTONIX) 40 MG tablet Take 1 tablet by mouth daily 21  Yes Jeannette Mazariegos MD   amLODIPine (NORVASC) 10 MG tablet Take 1 tablet by mouth every morning 20  Yes Historical Provider, MD   atorvastatin (LIPITOR) 40 MG tablet Take 40 mg by mouth nightly 20  Yes Historical Provider, MD   ciclopirox (LOPROX) 0.77 % cream Apply topically every morning 20  Yes Historical Provider, MD   cloNIDine (CATAPRES) 0.2 MG tablet Take 0.2 mg by mouth 2 times daily 20  Yes Historical Provider, MD   enalapril (VASOTEC) 10 MG tablet Take 10 mg by mouth 2 times daily 20  Yes Historical Provider, MD   fenofibrate (TRIGLIDE) 160 MG tablet Take 160 mg by mouth every evening 20  Yes Historical Provider, MD   glimepiride (AMARYL) 2 MG tablet Take 2 mg by mouth 2 times daily 20  Yes Historical Provider, MD   losartan-hydroCHLOROthiazide (HYZAAR) 100-25 MG per tablet Take 1 tablet by mouth every evening 20  Yes Historical Provider, MD   LORazepam (ATIVAN) 1 MG tablet Take 1 mg by mouth 2 times daily.  20  Yes Historical Provider, MD   metFORMIN (GLUCOPHAGE) 1000 MG tablet Take 1,000 mg by mouth 2 times daily 11/4/20  Yes Historical Provider, MD   metoprolol tartrate (LOPRESSOR) 50 MG tablet Take 50 mg by mouth 2 times daily 11/4/20  Yes Historical Provider, MD   sAXagliptin (ONGLYZA) 5 MG TABS tablet Take 5 mg by mouth every evening 11/4/20  Yes Historical Provider, MD   PARoxetine (PAXIL) 20 MG tablet Take 20 mg by mouth every evening 11/4/20  Yes Historical Provider, MD   aspirin-acetaminophen-caffeine (MIGRAINE FORMULA) 250-250-65 MG per tablet Take 1 tablet by mouth 2 times daily as needed for Headaches   Yes Historical Provider, MD   apixaban (ELIQUIS) 5 MG TABS tablet Take 1 tablet by mouth 2 times daily 1/21/21  Yes Lise Ruiz MD   blood glucose test strips (TRUETEST TEST) strip 1 strip 2 times daily 4/2/20   Historical Provider, MD   Lancets MISC Use to check Glucose BID. Dx: 250.00.  Dispense Contour Lancets 4/2/20   Historical Provider, MD       Current medications:    Current Facility-Administered Medications   Medication Dose Route Frequency Provider Last Rate Last Admin    dextrose 5 % infusion             meropenem (MERREM) 1,000 mg in sodium chloride 0.9 % 100 mL IVPB (mini-bag)  1,000 mg Intravenous Q8H Pj Vargas MD   Stopped at 05/17/21 0035    insulin glargine (LANTUS;BASAGLAR) injection pen 5 Units  5 Units Subcutaneous Nightly Nathalia Black, DO   5 Units at 05/16/21 2057    amLODIPine (NORVASC) tablet 10 mg  10 mg Oral QAM Nathalia Black, DO   10 mg at 05/16/21 0805    cloNIDine (CATAPRES) tablet 0.2 mg  0.2 mg Oral BID Nathalia Black, DO   0.2 mg at 05/16/21 2056    enalapril (VASOTEC) tablet 10 mg  10 mg Oral BID Nathalia Black, DO   10 mg at 05/16/21 2056    fenofibrate (TRIGLIDE) tablet 160 mg  160 mg Oral Daily Nathalia Black, DO   160 mg at 05/16/21 0804    gabapentin (NEURONTIN) capsule 300 mg  300 mg Oral BID Nathalia Black, DO   300 mg at 05/16/21 1633    LORazepam (ATIVAN) tablet 1 mg  1 mg Oral BID PRN Nathalia Black, DO        losartan-hydroCHLOROthiazide (HYZAAR) 100-25 MG per tablet 1 tablet  1 tablet Oral QPM Nathalia Black, DO   1 tablet at 05/16/21 1721    metoprolol tartrate (LOPRESSOR) tablet 50 mg  50 mg Oral BID Nathalia Black, DO   50 mg at 05/16/21 2056    pantoprazole (PROTONIX) tablet 40 mg  40 mg Oral Daily Nathalia Black, DO   40 mg at 05/16/21 0805    PARoxetine (PAXIL) tablet 20 mg  20 mg Oral QPM Nathalia Black, DO   20 mg at 05/16/21 1721    insulin lispro (1 Unit Dial) 0-6 Units  0-6 Units Subcutaneous TID WC Nathalia Black, DO   1 Units at 05/16/21 1723    insulin lispro (1 Unit Dial) 0-3 Units  0-3 Units Subcutaneous Nightly Nathalia Black, DO   1 Units at 05/16/21 2056    glucose (GLUTOSE) 40 % oral gel 15 g  15 g Oral PRN Nathalia Black, DO        dextrose 50 % IV solution  12.5 g Intravenous PRN Nathalia Black, DO        glucagon (rDNA) injection 1 mg  1 mg Intramuscular PRN Nathalia Black, DO        dextrose 5 % solution  100 mL/hr Intravenous PRN Jens Janak, DO        lactated ringers infusion   Intravenous Continuous Nathalia Black,  mL/hr at 05/17/21 0002 New Bag at 05/17/21 0002    sodium chloride flush 0.9 % injection 5-40 mL  5-40 mL Intravenous 2 times per day Jens Janak, DO   10 mL at 05/16/21 0806    sodium chloride flush 0.9 % injection 5-40 mL  5-40 mL Intravenous PRN Nathalia Black, DO        0.9 % sodium chloride infusion  25 mL Intravenous PRN Natahlia Black, DO        promethazine (PHENERGAN) tablet 12.5 mg  12.5 mg Oral Q6H PRN Nathalia Black, DO        Or    ondansetron (ZOFRAN) injection 4 mg  4 mg Intravenous Q6H PRN Nathalia Black, DO        polyethylene glycol (GLYCOLAX) packet 17 g  17 g Oral Daily PRN Nathalia Black, DO        acetaminophen (TYLENOL) tablet 650 mg  650 mg Oral Q6H PRN Nathalia Black, DO   650 mg at 05/16/21 2056    Or    acetaminophen (TYLENOL) suppository 650 mg  650 mg Rectal Q6H PRN Jens Janak, DO           Allergies:     Allergies   Allergen Reactions    Bee Venom Swelling    Cephalexin Swelling    Cephalosporins     Propoxyphene Other (See Comments) Cardiovascular:    (+) hypertension:, dysrhythmias (hx of a-fib has had an abltion and crdioversion   now NSR): atrial fibrillation,                ROS comment:  Summary   Normal left ventricular size. Mild concentric left ventricular hypertrophy. Normal left ventricular systolic function with an estimated ejection   fraction of 55-60%. Indeterminate diastolic function due to afib. The left atrium is moderately dilated. Signature      ------------------------------------------------------------------   Electronically signed by Jyoti Melendez MD (Interpreting   physician) on 01/12/2021 at 01:10 PM   ------------------------------------------------------------------     Neuro/Psych:   (+) headaches: migraine headaches, depression/anxiety             GI/Hepatic/Renal:             Endo/Other:    (+) Diabetes, . ROS comment: Diabetic foot infection ([address]  Osteomyelitis of right foot      Abdominal:           Vascular:                                        Anesthesia Plan      general and MAC     ASA 3       Induction: intravenous. Anesthetic plan and risks discussed with patient.                       Marylene Socks, MD   5/17/2021

## 2021-05-17 NOTE — PROGRESS NOTES
BP elevated at beginning of shift, improved after receiving nightly BP meds. , covered with sliding scale humalog and lantus 5 units. Dressing to right foot remains CDI. IVF infusing. Pt has been NPO since midnight. This RN verified signed consent is in the chart for surgery in the morning.

## 2021-05-17 NOTE — PROGRESS NOTES
ID Follow-up NOTE    CC:   R DM foot infection  Antibiotics: Meropenem    Admit Date: 5/14/2021  Hospital Day: 4    Subjective:     S/p R 3rd dose resection    Patient feels good. No complaint       Objective:     Patient Vitals for the past 8 hrs:   BP Temp Temp src Pulse Resp SpO2   05/17/21 0931 (!) 142/81 97.6 °F (36.4 °C) Oral 62 16 98 %   05/17/21 0845 (!) 153/75 98 °F (36.7 °C) -- 59 13 98 %   05/17/21 0830 139/73 -- -- 59 14 95 %   05/17/21 0820 134/77 -- -- 59 12 97 %   05/17/21 0815 130/73 -- -- 59 10 97 %   05/17/21 0810 117/71 -- -- 59 12 93 %   05/17/21 0807 107/66 97.2 °F (36.2 °C) Temporal 60 12 93 %   05/17/21 0706 (!) 160/84 97.1 °F (36.2 °C) Oral 62 16 95 %   05/17/21 0343 (!) 147/78 97.4 °F (36.3 °C) Oral 58 16 98 %     I/O last 3 completed shifts: In: 2603 [P.O.:890; I.V.:1613; IV Piggyback:100]  Out: -   I/O this shift:  In: 200 [I.V.:200]  Out: -     EXAM:  GENERAL: No apparent distress.   RA  HEENT: Membranes moist, no oral lesion  NECK:  Supple, no lymphadenopathy  LUNGS: Clear b/l, no rales, no dullness  CARDIAC: RRR, no murmur appreciated  ABD:  + BS, soft / NT  EXT:  No rash, no edema, no lesions - R foot with dressing   NEURO: No focal neurologic findings  PSYCH: Orientation, sensorium, mood normal  LINES:  Peripheral iv       Data Review:  Lab Results   Component Value Date    WBC 4.0 05/17/2021    HGB 12.1 (L) 05/17/2021    HCT 34.2 (L) 05/17/2021    MCV 85.8 05/17/2021     05/17/2021     Lab Results   Component Value Date    CREATININE 0.8 05/17/2021    BUN 15 05/17/2021     (L) 05/17/2021    K 3.5 05/17/2021    CL 96 (L) 05/17/2021    CO2 26 05/17/2021       Hepatic Function Panel:   Lab Results   Component Value Date    LABALBU 3.9 05/17/2021       MICRO:  5/14 Wound cult: heavy BGS, light P mirabilis, light Ps aeruginosa  Morganella morganii   Antibiotic Interpretation BAKARI   amoxicillin-clavulanate Resistant >16/8 mcg/mL   ampicillin Resistant >16 mcg/mL   ceFAZolin Resistant >16 mcg/mL   cefepime Sensitive <=2 mcg/mL   cefTRIAXone Intermediate 2 mcg/mL   cefuroxime Resistant >16 mcg/mL   ciprofloxacin Sensitive <=1 mcg/mL   ertapenem Sensitive <=0.5 mcg/mL   gentamicin Sensitive <=4 mcg/mL   meropenem Sensitive <=1 mcg/mL   piperacillin-tazobactam Sensitive <=16 mcg/mL   trimethoprim-sulfamethoxazole Sensitive <=2/38 mcg/mL     Pseudomonas aeruginosa   Antibiotic Interpretation BAKARI   cefepime Sensitive 8 mcg/mL   ciprofloxacin Sensitive <=1 mcg/mL   gentamicin Sensitive <=4 mcg/mL   meropenem Sensitive <=1 mcg/mL   piperacillin-tazobactam Sensitive <=16 mcg/mL   tobramycin Sensitive <=4 mcg/mL        BC no growth to date  5/15 SARS CoV-2 NAAT neg    IMAGIN/17 R foot x-ray:  Postoperative changes without immediate complication. Polyarticular osteoarthritis.      R foot x-ray: '3rd distal phalanx acute osteomyelitis'      Scheduled Meds:   dextrose        apixaban  5 mg Oral BID    meropenem  1,000 mg Intravenous Q8H    insulin glargine  5 Units Subcutaneous Nightly    amLODIPine  10 mg Oral QAM    cloNIDine  0.2 mg Oral BID    enalapril  10 mg Oral BID    fenofibrate  160 mg Oral Daily    gabapentin  300 mg Oral BID    losartan-hydroCHLOROthiazide  1 tablet Oral QPM    metoprolol tartrate  50 mg Oral BID    pantoprazole  40 mg Oral Daily    PARoxetine  20 mg Oral QPM    insulin lispro  0-6 Units Subcutaneous TID WC    insulin lispro  0-3 Units Subcutaneous Nightly    sodium chloride flush  5-40 mL Intravenous 2 times per day       Continuous Infusions:   dextrose      lactated ringers 125 mL/hr at 21 0002    sodium chloride         PRN Meds:  LORazepam, glucose, dextrose, glucagon (rDNA), dextrose, sodium chloride flush, sodium chloride, promethazine **OR** ondansetron, polyethylene glycol, acetaminophen **OR** acetaminophen      Assessment:     DM, DM neuropathy  Hx cephalosporin allergy    R DM foot infection with 3rd digit osteomyelitis   - cult + GBS, Morganella, Pseudomonas    POD#0 resection 3 digit, closure (reviewed OR report, MT head appeared to be not infected - definitive)      Plan:     Cont meropenem  Postop care and f/u per Podiatry    Home on po levofloxacin 750 mg po daily x 10 days     Medical Decision Making:   The following items were considered in medical decision making:  Discussion of patient care with other providers  Reviewed clinical lab tests  Reviewed radiology tests  Reviewed other diagnostic tests/interventions  Independent review of radiologic images  Microbiology cultures reviewed      Discussed with pt, tay Cummings MD

## 2021-05-17 NOTE — PROGRESS NOTES
PACU Transfer Note    Pt meets criteria as per Cassius Score and ASPAN Standards to transfer to next phase of care. Recent Labs     05/16/21 2055 05/17/21 0819   POCGLU 242* 237*         Vitals:    05/17/21 0845   BP: (!) 153/75   Pulse: 59   Resp: 13   Temp: 98 °F (36.7 °C)   SpO2: 98%     BP within   20% of pt's admitting BP as per CASSIUS SCORE    SpO2: 98 %    O2 Flow Rate (L/min): 2 L/min      Intake/Output Summary (Last 24 hours) at 5/17/2021 0856  Last data filed at 5/17/2021 0741  Gross per 24 hour   Intake 2689 ml   Output --   Net 2689 ml         Handoff report given at bedside.    Family updated and directed to pt room      5/17/2021 8:56 AM

## 2021-05-17 NOTE — OP NOTE
Operative Note      Patient: Ruslan Starr  YOB: 1945  MRN: [id number]     Date of Procedure: 5/17/2021    Pre-Op Diagnosis: osteomyelytis third digit, right foot    Post-Op Diagnosis: Same    Procedure(s):  Third digit amputation, right foot, bone biopsy 3rd digit    Surgeon(s):  Deb Moran DPM    Assistant:  Carlo Davison PGY2    Anesthesia: MAC    Injectables: intra-op 10 cc 0.5% Marcaine plain     Hemostasis: anatomic dissection and electrocautery    Materials: 3-0 vicryl, 4-0 nylon      Estimated Blood Loss: less than 15 cc    Complications: None    Specimens:   ID Type Source Tests Collected by Time Destination   1 : DEEP WOUND SWAB RIGHT THIRD TOE Body Fluid Fluid CULTURE, ANAEROBIC, CULTURE, FUNGUS, CULTURE, BODY FLUID, CULTURE WITH SMEAR, ACID FAST BACILLIUS Deb Moran DPM 5/17/2021 0749     A : RIGHT THIRD TOE Tissue Tissue SURGICAL PATHOLOGY Deb Moran DPM 5/17/2021 0748       Implants:  * No implants in log *    Drains: * No LDAs found *     Findings: no purulence, right 3rd digit removed in total      INDICATIONS FOR PROCEDURE: This patient has signs and symptoms clinically and radiographically consistent with the above mentioned preoperative diagnosis. Having failed conservative treatment, it was determined that the patient would benefit from surgical intervention. All potential risks, benefits, and complications were discussed with the patient prior to the scheduling of surgery. All the patient's questions were answered and no guarantees were given. The patient wished to proceed with surgery, and informed written consent was obtained. DETAILS OF PROCEDURE: The patient was brought from the pre-operative area and placed on the operating table in the supine position. Following IV sedation, the right lower extremity was then scrubbed, prepped, and draped in the usual sterile fashion. A time-out was performed.  The patient, procedure, and operative site were confirmed and the following procedure was performed. Procedure #1 Third digit amputation, right foot:  Attention was then directed to the third digit on the right foot. A local anesthetic block was then injected proximal to the incision site consisting of 10cc of 0.5% Marcaine plain. A towel clamp was then clamped around the distal aspect of the right digit and used to stabilize the digit. A #15 surgical blade was used to make a full thickness incision through the skin in a modified racquet technique. The incision was then deepened through the subcutaneous tissue to the level of bone. Next, the third metatarsal phalangeal joint was identified and attention was then directed to this location. A #15 blade was then used to release the extensor tendon as well as the medial and lateral collateral ligaments present at the metatarsal phalangeal joint. The flexor tendon was then severed planarly at the level of the metatarsal phalangeal joint and digit removed distally and passed from the operative field and placed on the back table. The tow was then sent to pathology for further review. Attention was then directed to the head of the third metatarsal. The cartilage was noted to white, shiny, pearly and hard, all which are consistent with healthy cartilage. It was determined that no further resection was necessary. At this time, the incision site was flushed using copious amounts of normal saline. The incision site was closed using 3-0 vicryl for the subcutaneous closure and 4-0 nylon was utilized in a simple interrupted suture fashion. A soft sterile dressing was applied consisting of bacitracin, adaptic, gauze, cast padding, and ace bandage. A prompt hyperemic response was noted on all aspects of the patient's right lower extremity.     END OF PROCEDURE: The patient tolerated the procedure and anesthesia well and was transported from the operating room to the PACU with vital signs stable and vascular status intact

## 2021-05-17 NOTE — PROGRESS NOTES
independent    Tub Transfers  Tub Transfers Comments: Note: discussed recommendation for shower chair (to maintain WB status during shower) - pt has been keeping his wound dry PTA (wraps his foot w/ bags/tape)    ADL  LE Dressing: Modified independent  (don CAM boot and adjust clothing in standing; demo good awareness of WB status)  Toileting: Modified independent  (using urinal; demo good awareness of WB restriction)     Coordination  Movements Are Fluid And Coordinated: Yes           Transfers  Sit to stand: Modified independent  Stand to sit: Modified independent  Transfer Comments: Note: demo good awareness of WB restriction, wearing CAM boot        Cognition  Overall Cognitive Status: WNL  Cognition Comment: good awareness and good insight into abilities; verbalizing and demonstrating awareness of WB restrictions                       LUE Strength  Gross LUE Strength: WFL  RUE Strength  Gross RUE Strength: WFL               Pt seen by OT for eval and treat. Treatment included: bed mobility, functional transfer/mobility, ADL, pt education         Plan   Discharge acute OT - no needs indicated.                                                     AM-PAC Score        AM-[facility] Inpatient Daily Activity Raw Score: 24 (05/17/21 1155)  AM-PAC Inpatient ADL T-Scale Score : 57.54 (05/17/21 1155)  ADL Inpatient CMS 0-100% Score: 0 (05/17/21 1155)  ADL Inpatient CMS G-Code Modifier : CH (05/17/21 1155)              Therapy Time   Individual Concurrent Group Co-treatment   Time In 1100         Time Out 1140         Minutes 40              Timed Code Treatment Minutes:   25    Total Treatment Minutes:  86 Alfonso Corrigan OTR/L #[id number]

## 2021-05-17 NOTE — PROGRESS NOTES
NUTRITION ASSESSMENT  Admission Date: 5/14/2021     Type and Reason for Visit: Initial, Positive Nutrition Screen, Wound    NUTRITION RECOMMENDATIONS:   1. PO Diet: Continue current diet, CC4 restriction   2. ONS: Add Ensure High Protein QD  3. Nutrition Education: Will provide diet edu as needed when appropriate    NUTRITION ASSESSMENT:  Nutritional summary & status: Possitive screen for wound. Patient s/p R 3rd toe amputation this am, unable to interview pt at visit. Patient now with [address] ordered. Will order ONS and monitor nutritional status. Patient admitted d/t toe amputation    PMH significant for: DM  MALNUTRITION ASSESSMENT  Context of Malnutrition: Acute Illness   Malnutrition Status: Insufficient data  Findings of the 6 clinical characteristics of malnutrition (Minimum of 2 out of 6 clinical characteristics is required to make the diagnosis of moderate or severe Protein Calorie Malnutrition based on AND/ASPEN Guidelines):  Energy Intake: Unable to Assess    Energy Intake Time: Unable to assess   Weight Loss %: Unable to assess    Weight loss Time: Unable to assess   Body Fat Loss: Unable to Assess   Body Fat Location: Unable to assess    Body Muscle Loss: Unable to Assess   Body Muscle Loss Location: Unable to assess    Fluid Accumulation: No significant    Fluid Accumulation Location: No significant     Strength: Not Performed;  Not Measured     NUTRITION DIAGNOSIS   Problem: Problem #1: Increased nutrient needs   Etiology: Increased demand for nutrient  Signs & Symptoms: Presence of wounds     NUTRITION INTERVENTION  Food and/or Nutrient Delivery: Continue Current Diet, Start Oral Nutrition Supplement       Coordination of Nutrition Care: Continue to monitor while inpatient     NUTRITION MONITORING & EVALUATION:  Evaluation:Goals set   Goals:Goals: Patient will consu,e 50% or greater of all meals and supplements offered  Monitoring: Meal Intake  or Supplement Intake      OBJECTIVE DATA: Significant to nutrition assessment  · Nutrition-Focused Physical Findings: No BM recorded  · Labs: Reviewed; Na+ 134  · Meds: Reviewed;   · Wounds Surgical Wound      ANTHROPOMETRICS  Current Height: 5' 10\" (177.8 cm)  Current Weight: 212 lb (96.2 kg)    Admission weight: 212 lb (96.2 kg)  Ideal Bodyweight 166 lb   Usual Bodyweight 215 lb   Weight Changes FRANKLIN, most wt hx stated       BMI BMI (Calculated): 30.5    Wt Readings from Last 50 Encounters:   05/14/21 212 lb (96.2 kg)   05/12/21 214 lb (97.1 kg)   04/11/21 214 lb (97.1 kg)   03/19/21 212 lb (96.2 kg)   03/17/21 212 lb 3.2 oz (96.3 kg)   03/04/21 214 lb 3.2 oz (97.2 kg)   02/22/21 214 lb (97.1 kg)   01/21/21 216 lb 6.4 oz (98.2 kg)   01/06/21 214 lb (97.1 kg)       COMPARATIVE STANDARDS  Estimated Total Kcals/Day : 15-18 Current Bodyweight (96 kg) 8291-7132 kcal/day    Estimated Total Protein (g/day) : 1.0-1.2 Current Bodyweight (96 kg)  g/day  Estimated Daily Total Fluid (ml/day): 0937-1011 mL per day     Food / Nutrition-Related History  Pre-Admission / Home Diet:  Pre-Admission/Home Diet: Carb Control   Home Supplements / Herbals:    none noted  Food Restrictions / Cultural Requests:    none noted    Current Nutrition Therapies   DIET CARB CONTROL; Carb Control: 4 carb choices (60 gms)/meal     PO Intake: %x1 meal  PO Supplement: None   PO Supplement Intake: None   IVF: N/A ml/hr     NUTRITION RISK LEVEL: Risk Level:  Moderate     Isael Hernandez RD, LD  Artemio:  [phone]  Office:  [phone]

## 2021-05-17 NOTE — PLAN OF CARE
Problem: Nutrition  Goal: Optimal nutrition therapy  Outcome: Ongoing  Note: Nutrition Problem #1: Increased nutrient needs  Intervention: Food and/or Nutrient Delivery: Continue Current Diet, Start Oral Nutrition Supplement  Nutritional Goals: Patient will consu,e 50% or greater of all meals and supplements offered

## 2021-05-18 VITALS
OXYGEN SATURATION: 95 % | HEART RATE: 60 BPM | DIASTOLIC BLOOD PRESSURE: 82 MMHG | HEIGHT: 70 IN | TEMPERATURE: 97.5 F | BODY MASS INDEX: 30.35 KG/M2 | RESPIRATION RATE: 15 BRPM | WEIGHT: 212 LBS | SYSTOLIC BLOOD PRESSURE: 144 MMHG

## 2021-05-18 PROBLEM — G89.18 POST-OP PAIN: Status: ACTIVE | Noted: 2021-05-18

## 2021-05-18 LAB
ALBUMIN SERPL-MCNC: 3.9 G/DL (ref 3.4–5)
ANION GAP SERPL CALCULATED.3IONS-SCNC: 11 MMOL/L (ref 3–16)
BASOPHILS ABSOLUTE: 0 K/UL (ref 0–0.2)
BASOPHILS RELATIVE PERCENT: 0.7 %
BLOOD CULTURE, ROUTINE: NORMAL
BUN BLDV-MCNC: 17 MG/DL (ref 7–20)
CALCIUM SERPL-MCNC: 9.6 MG/DL (ref 8.3–10.6)
CHLORIDE BLD-SCNC: 95 MMOL/L (ref 99–110)
CO2: 25 MMOL/L (ref 21–32)
CREAT SERPL-MCNC: 0.9 MG/DL (ref 0.8–1.3)
CULTURE, BLOOD 2: NORMAL
EOSINOPHILS ABSOLUTE: 0.2 K/UL (ref 0–0.6)
EOSINOPHILS RELATIVE PERCENT: 2.7 %
GFR AFRICAN AMERICAN: >60
GFR NON-AFRICAN AMERICAN: >60
GLUCOSE BLD-MCNC: 214 MG/DL (ref 70–99)
GLUCOSE BLD-MCNC: 220 MG/DL (ref 70–99)
GLUCOSE BLD-MCNC: 234 MG/DL (ref 70–99)
GLUCOSE BLD-MCNC: 248 MG/DL (ref 70–99)
HCT VFR BLD CALC: 36.1 % (ref 40.5–52.5)
HEMOGLOBIN: 12.8 G/DL (ref 13.5–17.5)
LYMPHOCYTES ABSOLUTE: 1.2 K/UL (ref 1–5.1)
LYMPHOCYTES RELATIVE PERCENT: 20.3 %
MAGNESIUM: 1.5 MG/DL (ref 1.8–2.4)
MCH RBC QN AUTO: 30.1 PG (ref 26–34)
MCHC RBC AUTO-ENTMCNC: 35.5 G/DL (ref 31–36)
MCV RBC AUTO: 84.8 FL (ref 80–100)
MONOCYTES ABSOLUTE: 0.6 K/UL (ref 0–1.3)
MONOCYTES RELATIVE PERCENT: 9.7 %
NEUTROPHILS ABSOLUTE: 4 K/UL (ref 1.7–7.7)
NEUTROPHILS RELATIVE PERCENT: 66.6 %
PDW BLD-RTO: 13.1 % (ref 12.4–15.4)
PERFORMED ON: ABNORMAL
PHOSPHORUS: 2.8 MG/DL (ref 2.5–4.9)
PLATELET # BLD: 270 K/UL (ref 135–450)
PMV BLD AUTO: 7.9 FL (ref 5–10.5)
POTASSIUM SERPL-SCNC: 3.9 MMOL/L (ref 3.5–5.1)
RBC # BLD: 4.26 M/UL (ref 4.2–5.9)
SODIUM BLD-SCNC: 131 MMOL/L (ref 136–145)
WBC # BLD: 6 K/UL (ref 4–11)

## 2021-05-18 PROCEDURE — 80069 RENAL FUNCTION PANEL: CPT

## 2021-05-18 PROCEDURE — 85025 COMPLETE CBC W/AUTO DIFF WBC: CPT

## 2021-05-18 PROCEDURE — 6370000000 HC RX 637 (ALT 250 FOR IP): Performed by: INTERNAL MEDICINE

## 2021-05-18 PROCEDURE — 6360000002 HC RX W HCPCS: Performed by: PODIATRIST

## 2021-05-18 PROCEDURE — 2580000003 HC RX 258: Performed by: PODIATRIST

## 2021-05-18 PROCEDURE — 6370000000 HC RX 637 (ALT 250 FOR IP): Performed by: PODIATRIST

## 2021-05-18 PROCEDURE — 36415 COLL VENOUS BLD VENIPUNCTURE: CPT

## 2021-05-18 PROCEDURE — 99232 SBSQ HOSP IP/OBS MODERATE 35: CPT | Performed by: INTERNAL MEDICINE

## 2021-05-18 PROCEDURE — 83735 ASSAY OF MAGNESIUM: CPT

## 2021-05-18 RX ORDER — OXYCODONE HYDROCHLORIDE 5 MG/1
5 TABLET ORAL EVERY 6 HOURS PRN
Qty: 20 TABLET | Refills: 0 | Status: SHIPPED | OUTPATIENT
Start: 2021-05-18 | End: 2021-05-23

## 2021-05-18 RX ORDER — LEVOFLOXACIN 750 MG/1
750 TABLET ORAL DAILY
Qty: 10 TABLET | Refills: 0 | Status: SHIPPED | OUTPATIENT
Start: 2021-05-18 | End: 2021-05-28

## 2021-05-18 RX ADMIN — SODIUM CHLORIDE, POTASSIUM CHLORIDE, SODIUM LACTATE AND CALCIUM CHLORIDE: 600; 310; 30; 20 INJECTION, SOLUTION INTRAVENOUS at 14:31

## 2021-05-18 RX ADMIN — METOPROLOL TARTRATE 50 MG: 50 TABLET, FILM COATED ORAL at 09:53

## 2021-05-18 RX ADMIN — CLONIDINE HYDROCHLORIDE 0.2 MG: 0.1 TABLET ORAL at 09:54

## 2021-05-18 RX ADMIN — INSULIN LISPRO 2 UNITS: 100 INJECTION, SOLUTION INTRAVENOUS; SUBCUTANEOUS at 09:57

## 2021-05-18 RX ADMIN — MEROPENEM 1000 MG: 1 INJECTION, POWDER, FOR SOLUTION INTRAVENOUS at 09:54

## 2021-05-18 RX ADMIN — ENALAPRIL MALEATE 10 MG: 10 TABLET ORAL at 09:54

## 2021-05-18 RX ADMIN — APIXABAN 5 MG: 5 TABLET, FILM COATED ORAL at 09:54

## 2021-05-18 RX ADMIN — INSULIN LISPRO 2 UNITS: 100 INJECTION, SOLUTION INTRAVENOUS; SUBCUTANEOUS at 13:20

## 2021-05-18 RX ADMIN — AMLODIPINE BESYLATE 10 MG: 10 TABLET ORAL at 09:54

## 2021-05-18 RX ADMIN — OXYCODONE 5 MG: 5 TABLET ORAL at 17:39

## 2021-05-18 RX ADMIN — GABAPENTIN 300 MG: 300 CAPSULE ORAL at 16:39

## 2021-05-18 RX ADMIN — MEROPENEM 1000 MG: 1 INJECTION, POWDER, FOR SOLUTION INTRAVENOUS at 16:39

## 2021-05-18 RX ADMIN — OXYCODONE 5 MG: 5 TABLET ORAL at 09:56

## 2021-05-18 RX ADMIN — PANTOPRAZOLE SODIUM 40 MG: 40 TABLET, DELAYED RELEASE ORAL at 09:53

## 2021-05-18 RX ADMIN — GABAPENTIN 300 MG: 300 CAPSULE ORAL at 09:54

## 2021-05-18 RX ADMIN — FENOFIBRATE 160 MG: 160 TABLET ORAL at 09:54

## 2021-05-18 ASSESSMENT — PAIN SCALES - GENERAL
PAINLEVEL_OUTOF10: 4
PAINLEVEL_OUTOF10: 2
PAINLEVEL_OUTOF10: 2
PAINLEVEL_OUTOF10: 0

## 2021-05-18 ASSESSMENT — PAIN DESCRIPTION - PROGRESSION
CLINICAL_PROGRESSION: NOT CHANGED
CLINICAL_PROGRESSION: NOT CHANGED

## 2021-05-18 ASSESSMENT — PAIN DESCRIPTION - ONSET: ONSET: ON-GOING

## 2021-05-18 ASSESSMENT — PAIN DESCRIPTION - FREQUENCY: FREQUENCY: CONTINUOUS

## 2021-05-18 ASSESSMENT — PAIN DESCRIPTION - DESCRIPTORS
DESCRIPTORS: ACHING;THROBBING
DESCRIPTORS: ACHING;THROBBING

## 2021-05-18 ASSESSMENT — PAIN DESCRIPTION - LOCATION: LOCATION: FOOT

## 2021-05-18 ASSESSMENT — PAIN DESCRIPTION - PAIN TYPE
TYPE: SURGICAL PAIN
TYPE: SURGICAL PAIN

## 2021-05-18 ASSESSMENT — PAIN - FUNCTIONAL ASSESSMENT: PAIN_FUNCTIONAL_ASSESSMENT: ACTIVITIES ARE NOT PREVENTED

## 2021-05-18 NOTE — PROGRESS NOTES
ARISTEOS, SR on telemetry. Pt sat up in chair most of the evening. Pt wearing surgical boot when ambulating and tolerating activity well. Pt c/o R foot pain and tylenol given initially. Podiatry resident contacted for unrelieved pain and order for oxycodone obtained. Pt now sleeping after pain reassessment. Call light in reach. Will continue to monitor.   Electronically signed by Darin Rosario RN on 5/18/21 at 2:57 AM EDT

## 2021-05-18 NOTE — PROGRESS NOTES
ID Follow-up NOTE    CC:   R DM foot infection  Antibiotics: Meropenem    Admit Date: 5/14/2021  Hospital Day: 5    Subjective:     POD#1 R 3rd toe resection    R foot / surg site 'throbbs'  No other complaint       Objective:     Patient Vitals for the past 8 hrs:   BP Temp Temp src Pulse Resp SpO2   05/18/21 1053 (!) 177/83 97.7 °F (36.5 °C) Oral 69 -- 96 %   05/18/21 0836 (!) 171/86 97.9 °F (36.6 °C) Oral 60 15 95 %   05/18/21 0415 (!) 154/80 98 °F (36.7 °C) Oral 60 14 96 %     I/O last 3 completed shifts: In: 2833 [P.O.:660; I.V.:850]  Out: 1450 [Urine:1450]  No intake/output data recorded. EXAM:  GENERAL: No apparent distress.   RA  HEENT: Membranes moist, no oral lesion  NECK:  Supple, no lymphadenopathy  LUNGS: Clear b/l, no rales, no dullness  CARDIAC: RRR, no murmur appreciated  ABD:  + BS, soft / NT  EXT:  No rash, no edema, no lesions - R foot with dressing   NEURO: No focal neurologic findings  PSYCH: Orientation, sensorium, mood normal  LINES:  Peripheral iv       Data Review:  Lab Results   Component Value Date    WBC 6.0 05/18/2021    HGB 12.8 (L) 05/18/2021    HCT 36.1 (L) 05/18/2021    MCV 84.8 05/18/2021     05/18/2021     Lab Results   Component Value Date    CREATININE 0.9 05/18/2021    BUN 17 05/18/2021     (L) 05/18/2021    K 3.9 05/18/2021    CL 95 (L) 05/18/2021    CO2 25 05/18/2021       Hepatic Function Panel:   Lab Results   Component Value Date    LABALBU 3.9 05/18/2021       MICRO:  5/14 Wound cult: heavy BGS, light P mirabilis, light Ps aeruginosa  Morganella morganii   Antibiotic Interpretation BAKARI   amoxicillin-clavulanate Resistant >16/8 mcg/mL   ampicillin Resistant >16 mcg/mL   ceFAZolin Resistant >16 mcg/mL   cefepime Sensitive <=2 mcg/mL   cefTRIAXone Intermediate 2 mcg/mL   cefuroxime Resistant >16 mcg/mL   ciprofloxacin Sensitive <=1 mcg/mL   ertapenem Sensitive <=0.5 mcg/mL   gentamicin Sensitive <=4 mcg/mL   meropenem Sensitive <=1 mcg/mL piperacillin-tazobactam Sensitive <=16 mcg/mL   trimethoprim-sulfamethoxazole Sensitive <=2/38 mcg/mL     Pseudomonas aeruginosa   Antibiotic Interpretation BAKARI   cefepime Sensitive 8 mcg/mL   ciprofloxacin Sensitive <=1 mcg/mL   gentamicin Sensitive <=4 mcg/mL   meropenem Sensitive <=1 mcg/mL   piperacillin-tazobactam Sensitive <=16 mcg/mL   tobramycin Sensitive <=4 mcg/mL        BC no growth to date  5/15 SARS CoV-2 NAAT neg    IMAGIN/17 R foot x-ray:  Postoperative changes without immediate complication. Polyarticular osteoarthritis.      R foot x-ray: '3rd distal phalanx acute osteomyelitis'      Scheduled Meds:   apixaban  5 mg Oral BID    meropenem  1,000 mg Intravenous Q8H    insulin glargine  5 Units Subcutaneous Nightly    amLODIPine  10 mg Oral QAM    cloNIDine  0.2 mg Oral BID    enalapril  10 mg Oral BID    fenofibrate  160 mg Oral Daily    gabapentin  300 mg Oral BID    losartan-hydroCHLOROthiazide  1 tablet Oral QPM    metoprolol tartrate  50 mg Oral BID    pantoprazole  40 mg Oral Daily    PARoxetine  20 mg Oral QPM    insulin lispro  0-6 Units Subcutaneous TID WC    insulin lispro  0-3 Units Subcutaneous Nightly    sodium chloride flush  5-40 mL Intravenous 2 times per day       Continuous Infusions:   dextrose      lactated ringers 125 mL/hr at 21    sodium chloride         PRN Meds:  oxyCODONE **OR** oxyCODONE, LORazepam, glucose, dextrose, glucagon (rDNA), dextrose, sodium chloride flush, sodium chloride, promethazine **OR** ondansetron, polyethylene glycol, acetaminophen **OR** acetaminophen      Assessment:     DM, DM neuropathy  Hx cephalosporin allergy    R DM foot infection with 3rd digit osteomyelitis   - cult + GBS, Morganella, Pseudomonas    POD#1 resection 3 digit, closure (reviewed OR report, MT head appeared to be not infected - definitive)      Plan:     Cont meropenem  Postop care and f/u per Podiatry    Home on po levofloxacin 750 mg po

## 2021-05-18 NOTE — DISCHARGE SUMMARY
Discharge Summary    Date:5/18/2021        Patient Name:Leobardo Mclaughlin     YOB: 1945     Age:75 y.o. Admit Date:5/14/2021   Admission Condition:good   Discharged Condition:good  Discharge Date: 05/18/21    Discharge Diagnoses   Active Problems:    Diabetic foot infection ([address]    Osteomyelitis of right foot (HCC)    Elevated C-reactive protein (CRP)    Diabetic polyneuropathy associated with type 2 diabetes mellitus (HCC)    Elevated sed rate    Hyponatremia    Weight loss counseling, encounter for    Need for diphtheria-tetanus-pertussis (Tdap) vaccine    Group B streptococcal infection    Bacterial infection due to Morganella morganii    Pseudomonas infection    Post-op pain  Resolved Problems:    * No resolved hospital problems. [facility] Stay   Narrative of Hospital Course:       Diabetic foot infection  S/p third phalanx amputation  Definitive surgery  Received meropenem during hospitalization  Apixaban resumed, no signs or symptoms of bleeding  DC on p.o. levofloxacin per ID instructions     A. fib  S/p ablation and cardioversion in the recent past  Anticoagulated with apixaban  Per podiatry okay to resume apixaban.     Diabetes mellitus type 2  Uncontrolled at times  At home on metformin and glimepiride  Resume home medications on discharge     Essential hypertension  Continue with amlodipine, enalapril, clonidine  BP appears to be well controlled this morning.       Physical exam   Vitals:    05/17/21 2301 05/18/21 0415 05/18/21 0836 05/18/21 1053   BP: (!) 166/78 (!) 154/80 (!) 171/86 (!) 177/83   Pulse: 60 60 60 69   Resp: 16 14 15    Temp: 97.8 °F (36.6 °C) 98 °F (36.7 °C) 97.9 °F (36.6 °C) 97.7 °F (36.5 °C)   TempSrc: Oral Oral Oral Oral   SpO2: 97% 96% 95% 96%   Weight:       Height:             Physical Exam     Physical Exam  Constitutional:       Appearance: Normal appearance. HENT:      Head: Normocephalic and atraumatic.    Cardiovascular:      Rate and Rhythm: Normal rate and regular rhythm. Pulmonary:      Effort: Pulmonary effort is normal.      Breath sounds: Normal breath sounds. Abdominal:      General: Abdomen is flat. Palpations: Abdomen is soft. Musculoskeletal:         General: No swelling or tenderness. Normal range of motion. Cervical back: Normal range of motion and neck supple. Right foot wrapped   Skin:     General: Skin is warm and dry. Capillary Refill: Capillary refill takes less than 2 seconds. Neurological:      General: No focal deficit present. Mental Status: He is alert and oriented to person, place, and time. Psychiatric:         Mood and Affect: Mood normal.         Behavior: Behavior normal.        Consultants:   IP CONSULT TO PODIATRY  IP CONSULT TO HOSPITALIST  IP CONSULT TO INFECTIOUS DISEASES  PHARMACY TO DOSE VANCOMYCIN  IP CONSULT TO DIABETES EDUCATOR    Time Spent on Discharge:  45 minutes were spent in patient examination, evaluation, counseling as well as medication reconciliation, prescriptions for required medications, discharge plan and follow up. Surgeries/Procedures Performed:  Procedure(s):  THIRD DISTAL PHALANYX AMPUTATION VS THIRD GREAT TOE AMPUTATION RIGHT FOOT        Significant Diagnostic Studies:   Recent Labs:  CBC:   Recent Labs     05/16/21  0557 05/17/21  0537 05/18/21  0657   WBC 4.6 4.0 6.0   HGB 12.1* 12.1* 12.8*   HCT 34.4* 34.2* 36.1*   MCV 86.4 85.8 84.8    243 270     BMP:   Recent Labs     05/16/21  0557 05/17/21  0537 05/18/21  0657   * 134* 131*   K 3.7 3.5 3.9   CL 97* 96* 95*   CO2 27 26 25   PHOS 2.3* 2.6 2.8   BUN 15 15 17   CREATININE 0.9 0.8 0.9     Mag:   Lab Results   Component Value Date    MG 1.50 05/18/2021     LIVER PROFILE: No results for input(s): AST, ALT, LIPASE, BILIDIR, BILITOT, ALKPHOS in the last 72 hours. Invalid input(s): AMYLASE,  ALB  PT/INR: No results for input(s): PROTIME, INR in the last 72 hours. APTT: No results for input(s):  APTT in the last 72 hours. BNP:  No results for input(s): BNP in the last 72 hours. CARDIAC ENZYMES: No results for input(s): CKTOTAL, CKMB, TROPONINI in the last 72 hours. Radiology Last 7 Days:  XR FOOT RIGHT (MIN 3 VIEWS)   Final Result   Postoperative changes without immediate complication. Polyarticular osteoarthritis. XR CHEST (2 VW)   Final Result   Minimal atelectatic changes right lower lung      XR FOOT RIGHT (MIN 3 VIEWS)   Final Result      3rd distal phalanx acute osteomyelitis. Discharge Plan   Disposition: Home    Provider Follow-Up:   Brianda Ma DPM  [id number]    Schedule an appointment as soon as possible for a visit in 3 days  For wound re-check    Neelima Bhakta, [address]  [address]  [phone]    Schedule an appointment as soon as possible for a visit in 1 week  first post-operative appointment    Felecia Peterson MD  [address] [place] [address]  [phone]    Call  If symptoms worsen    Pastor Porter MD  [address] [facility]  [address]  [phone]    In 1 week  follow up        Hospital/Incidental Findings Requiring Follow-Up:  XR FOOT RIGHT (MIN 3 VIEWS)   Final Result   Postoperative changes without immediate complication. Polyarticular osteoarthritis. XR CHEST (2 VW)   Final Result   Minimal atelectatic changes right lower lung      XR FOOT RIGHT (MIN 3 VIEWS)   Final Result      3rd distal phalanx acute osteomyelitis.                 Discharge Medications         Medication List      START taking these medications    levoFLOXacin 750 MG tablet  Commonly known as: LEVAQUIN  Take 1 tablet by mouth daily for 10 days  Notes to patient: Levofloxacin  (Levaquin)  USE--  Treat bacterial infection  SIDE EFFECTS--  Upset stomach, diarrhea     oxyCODONE 5 MG immediate release tablet  Commonly known as: ROXICODONE  Take 1 tablet by mouth every 6 hours as needed for Pain for up to 5 days.   Notes to patient: Oxycodone   USE--  Treat moderate to severe pain (contains opiate and tylenol)  SIDE EFFECTS--  Upset stomach, feeling sleepy, constipation  CAUTION driving or operating heavy machinery        CONTINUE taking these medications    amLODIPine 10 MG tablet  Commonly known as: NORVASC     apixaban 5 MG Tabs tablet  Commonly known as: ELIQUIS  Take 1 tablet by mouth 2 times daily     aspirin 81 MG EC tablet     atorvastatin 40 MG tablet  Commonly known as: LIPITOR     ciclopirox 0.77 % cream  Commonly known as: LOPROX     cloNIDine 0.2 MG tablet  Commonly known as: CATAPRES     enalapril 10 MG tablet  Commonly known as: VASOTEC     fenofibrate 160 MG tablet  Commonly known as: TRIGLIDE     gabapentin 300 MG capsule  Commonly known as: NEURONTIN     glimepiride 2 MG tablet  Commonly known as: AMARYL     Lancets Misc     LORazepam 1 MG tablet  Commonly known as: ATIVAN     losartan-hydroCHLOROthiazide 100-25 MG per tablet  Commonly known as: HYZAAR     metFORMIN 1000 MG tablet  Commonly known as: GLUCOPHAGE     metoprolol tartrate 50 MG tablet  Commonly known as: LOPRESSOR     pantoprazole 40 MG tablet  Commonly known as: Protonix  Take 1 tablet by mouth daily     PARoxetine 20 MG tablet  Commonly known as: PAXIL     sAXagliptin 5 MG Tabs tablet  Commonly known as: ONGLYZA     TRUEtest Test strip  Generic drug: blood glucose test strips        STOP taking these medications    doxycycline hyclate 100 MG tablet  Commonly known as: VIBRA-TABS     Migraine Formula 589-116-40 MG per tablet  Generic drug: aspirin-acetaminophen-caffeine           Where to Get Your Medications      You can get these medications from any pharmacy    Bring a paper prescription for each of these medications  · levoFLOXacin 750 MG tablet  · oxyCODONE 5 MG immediate release tablet         Electronically signed by Farida Hernandez MD on 5/18/21 at

## 2021-05-18 NOTE — PROGRESS NOTES
Podiatric Surgery Daily Progress Note      Admit Date: 5/14/2021      Code:Full Code    Patient seen and examined, labs and records reviewed    Subjective:     Patient seen at bedside this AM.  Patient denies any overnight acute events. Patient denies f/c/n/v/sob/cp. Review of Systems: A 12 point review of symptoms is unremarkable with the exception of the chief complaint. Patient specifically denies nausea, fever, vomiting, chills, shortness of breath, chest pain, abdominal pain, constipation or difficulty urinating.            Objective     BP (!) 154/80   Pulse 60   Temp 98 °F (36.7 °C) (Oral)   Resp 14   Ht 5' 10\" (1.778 m)   Wt 212 lb (96.2 kg)   SpO2 96%   BMI 30.42 kg/m²      I/O:    Intake/Output Summary (Last 24 hours) at 5/18/2021 0832  Last data filed at 5/18/2021 0442  Gross per 24 hour   Intake 1310 ml   Output 1450 ml   Net -140 ml              Wt Readings from Last 3 Encounters:   05/14/21 212 lb (96.2 kg)   05/12/21 214 lb (97.1 kg)   04/11/21 214 lb (97.1 kg)       LABS:    Recent Labs     05/17/21  0537 05/18/21  0657   WBC 4.0 6.0   HGB 12.1* 12.8*   HCT 34.2* 36.1*    270        Recent Labs     05/18/21  0657   *   K 3.9   CL 95*   CO2 25   PHOS 2.8   BUN 17   CREATININE 0.9        Recent Labs     05/15/21  0841   INR 1.59*       CBC with Differential:    Lab Results   Component Value Date    WBC 6.0 05/18/2021    RBC 4.26 05/18/2021    HGB 12.8 05/18/2021    HCT 36.1 05/18/2021     05/18/2021    MCV 84.8 05/18/2021    MCH 30.1 05/18/2021    MCHC 35.5 05/18/2021    RDW 13.1 05/18/2021    LYMPHOPCT 20.3 05/18/2021    MONOPCT 9.7 05/18/2021    MYELOPCT 1 05/15/2021    BASOPCT 0.7 05/18/2021    MONOSABS 0.6 05/18/2021    LYMPHSABS 1.2 05/18/2021    EOSABS 0.2 05/18/2021    BASOSABS 0.0 05/18/2021     CMP:    Lab Results   Component Value Date     05/18/2021    K 3.9 05/18/2021    K 3.4 05/14/2021    CL 95 05/18/2021    CO2 25 05/18/2021    BUN 17 05/18/2021 CREATININE 0.9 05/18/2021    GFRAA >60 05/18/2021    LABGLOM >60 05/18/2021    GLUCOSE 220 05/18/2021    LABALBU 3.9 05/18/2021    CALCIUM 9.6 05/18/2021     LOWER EXTREMITY EXAMINATION    Dressing to right LE left clean, dry, and intact. No strikethrough noted to the external dressing. No pain with calf squeeze b/l LE. IMAGING:  XR right foot 5/17/21  Impression   Postoperative changes without immediate complication. Polyarticular osteoarthritis. XR right foot 5/14/21     Impression       3rd distal phalanx acute osteomyelitis. ASSESSMENT/PLAN  1. S/p 3rd digit amputation, right foot (DOS 5/17/21)  2. Diabetic foot infection, right foot - resolved  3. Cellulitis, right foot - resolved  4. Osteomyelitis, third digit right foot - resolved  5. DM type 2 with peripheral neuropathy    -VSS, no updated CBC this AM  -Blood cultures 1 & 2 NGTD  -XR images reviewed, impression noted above  -Wound culture reviewed; BHS Group B (strep agalacticae), Morganella morganii, and Pseudomonas  -Right foot surgical dressing left clean, dry, and intact - to be changed at first post-operative appointment  -ID following, recommends IV Merrem while inpatient and Levofloxacin 750 mg daily x 10 days as outpatient - Rx printed and placed on patient chart  -Continue with Oxycodone PRN, Rx printed and placed on patient chart  -HEEL weightbearing to the right in CAM boot, WBAT to the left    Discussed assessment and plan with Dr. Humera Devi.     DISPO: s/p 3rd digit amputation, right foot (DOS 5/17/21); no further surgical intervention planned, patient is OK to discharge from a podiatric standpoint pending medical clearance, is to follow up with Dr. Humera Devi within 1 week of discharge    Vish Quiñonez DPM  Podiatric Resident PGY2  Pager [phone] or Maxim  5/18/2021, 8:32 AM    Dr. Valentín Lozano, [address] [facility]   Office: [phone]  Cell: [phone]

## 2021-05-18 NOTE — PLAN OF CARE
Problem: Body Temperature - Imbalanced:  Goal: Ability to maintain a body temperature in the normal range will improve  Description: Ability to maintain a body temperature in the normal range will improve  Outcome: Ongoing  Note: Patient has remained Afebrile throughout the shift. Will continue to monitor. Problem: Discharge Planning:  Goal: Discharged to appropriate level of care  Description: Discharged to appropriate level of care  Outcome: Ongoing  Note: Patient has been up at aftab with a steady gait. Patient is being discharged home and able to take care of ADL's independently. Will continue to monitor.

## 2021-05-18 NOTE — PROGRESS NOTES
Patient alert and oriented times 4 with stable VS.  Nurse delivered meds to beds to patient and educated patient on medications and side effects. Nurse went over discharge instructions with patient and patient acknowledged understanding of instructions. Patient was taken via wheelchair to private vehicle for discharge home.

## 2021-05-18 NOTE — CARE COORDINATION
Case Management Assessment            Discharge Note                    Date / Time of Note: 5/18/2021 9:14 AM                  Discharge Note Completed by: Altagracia Flowers RN    Patient Name: Rj Lee   YOB: 1945  Diagnosis: Diabetic foot infection ([facility] [E11.628, L08.9]   Date / Time: 5/14/2021  3:57 PM    Current PCP: Shila Rosas MD  Clinic patient: No    Hospitalization in the last 30 days: No    Advance Directives:  Code Status: Full Code  [place] DNR form completed and on chart: Not Indicated    Financial:  Payor: MEDICARE / Plan: MEDICARE PART A AND B / Product Type: *No Product type* /      Pharmacy:    [facility]ina Willard [address] [place] [address]  Phone: [phone] Fax: [phone]      Assistance purchasing medications?: Potential Assistance Purchasing Medications: No  Assistance provided by Case Management: None at this time    Does patient want to participate in local refill/ meds to beds program?: Yes    Meds To Beds General Rules:  1. Can ONLY be done Monday- Friday between 8:30am-5pm  2. Prescription(s) must be in pharmacy by 3pm to be filled same day  3. Copy of patient's insurance/ prescription drug card and patient face sheet must be sent along with the prescription(s)  4. Cost of Rx cannot be added to hospital bill. If financial assistance is needed, please contact unit  or ;  or  CANNOT provide pharmacy voucher for patients co-pays  5.  Patients can then  the prescription on their way out of the hospital at discharge, or pharmacy can deliver to the bedside if staff is available. (payment due at time of pick-up or delivery - cash, check, or card accepted)     Able to afford home medications/ co-pay costs: Yes    ADLS:  Current PT AM-PAC Score: 23 /24  Current OT AM-PAC Score: 24 /24      DISCHARGE Disposition: Home- No Services Needed    LOC at discharge: Not Applicable  DARLENE Completed: Not Indicated    Notification completed in HENS/PAS?:  Not Applicable    IMM Completed:       Transportation:  Transportation PLAN for discharge: family   Mode of Transport: Private Car    Transport form completed: Not Indicated    Home Care:  [address] ordered at discharge: Not [address]: Not Applicable  Orders faxed: No    Durable Medical Equipment:  DME Provider: none  Equipment obtained during hospitalization:     Home Oxygen and Respiratory Equipment:  Oxygen needed at discharge?: Not [address]: Not Applicable  Portable tank available for discharge?: Not Indicated    Dialysis:  Dialysis patient: No    Dialysis Center:  Not Applicable      Additional CM Notes: Pt from home will return home no needs at DC, will follow up with Pod team out pt. Family to drive home    The Plan for Transition of Care is related to the following treatment goals of Diabetic foot [facility] [U29.820, L08.9]    The Patient and/or patient representative Ginny Luz and his family were provided with a choice of provider and agrees with the discharge plan Yes    Freedom of choice list was provided with basic dialogue that supports the patient's individualized plan of care/goals and shares the quality data associated with the providers.  Not Indicated    Care Transitions patient: No    Jasmeet Escalera RN  [facility]  Case Management Department  Ph: [phone]  Fax: [phone]

## 2021-05-19 ENCOUNTER — CARE COORDINATION (OUTPATIENT)
Dept: CASE MANAGEMENT | Age: 76
End: 2021-05-19

## 2021-05-19 LAB
BODY FLUID CULTURE, STERILE: ABNORMAL
BODY FLUID CULTURE, STERILE: ABNORMAL
GRAM STAIN RESULT: ABNORMAL
ORGANISM: ABNORMAL
ORGANISM: ABNORMAL

## 2021-05-19 NOTE — CARE COORDINATION
Rajinder [facility] Transitions Initial Follow Up Call    Call within 2 business days of discharge: Yes    Patient:  Jenny Loyd  Patient :  1945  MRN:  [id number]   Reason for Admission:  covid 19   Discharge Date:  21  RARS: 20      CTC attempt to reach Pt regarding recent hospital discharge. CTC left voice recording with call back number requesting a call back. Follow up appointments:    Future Appointments   Date Time Provider Suzan Beltrán   2021 10:45 AM Bernard Jeans, MD [place] Card [facility]   2021  3:20 PM SUKUMAR Naidu SLEEP [facility]       JEREMY Baldwin, RN  Care Transition Coordinator  Contact Number:  [phone]

## 2021-05-19 NOTE — PROGRESS NOTES
[facility]   Cardiac Electrophysiology Consultation   Date: 5/25/2021  Reason for Consultation:  AF  Consult Requesting Physician: No att. providers found     Chief Complaint:   Chief Complaint   Patient presents with    3 Month Follow-Up      HPI: Chun Loja is a 70 y.o. male referred by ED physician for AF. PMH significant for HTN, DM type II, SURAJ on CPAP, and PVC's about 20 years with normal stress test and thought to be related to stress. AF first found in 1/2021 during routine colonoscopy. AF symptomatic with fatigue, MAIER, palpitations, chest discomfort, and decline in functional capacity. Echo (1/12/21) showed preserved LVEF of 55-60%, intermediate diastolic dysfunction, and LA enlargement. S/p RFA/PVI of AF, additional focal ablation for AF, outside the pulmonary veins in endocardial part of the coronary sinus and left side of the intra-atrial septum, and atypical flutter -roofline and posterior box to create a posterior wall isolation (2/22/21). Started on flecainide post ablation, but this was stopped as QRS duration increased. S/p DCCV (3/19/21) following recurrence of AF. Interval History: Today, he is here for 3 mo f/u post ablation, presenting in ectopic atrial rhythm. He is feeling well since the ablation. Denies symptomatic recurrence since ablation and DCCV. Denies SOB, palpitations, chest discomfort, eating difficulties, odynophagia, fever or chills since the ablation. He has recently had his R great toes amputated on 5/17/21 and is wearing a surgical boot today. He had issues with hematuria prior to his ablation, but Eliquis was never stopped before or after the ablation. He reports that he has not had any hematuria for the past 4 weeks and plans to have a urology procedure in the near future. 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 He enjoys riding roller coasters and is questioning if he is able to do so with his AF.   He is also asking if he is permitted to exercise. Atrial Fibrillation:    BMI    :   Body mass index is 29.59 kg/m². Duration   :   1/2021    Symptoms   :   Shortness of breath, palpitations, easy fatigability, dyspnea on exertion, decline in his functional capacity    Previous DCCV :  3/19/21    Previous AAD           :   Flecainide 2/22/21-3/4/21    Beta blocker  :   Lopressor    ACE / ARB  :   losartan    OAC   :   Eliquis    LVEF    :   55-60%    Left atrial size :   5.0 cm    SURAJ   :   Yes, on CPAP    Past Medical History:   Diagnosis Date    Allergic reaction to sulfonamide 1960    Bee sting reaction 2000    Cephalexin adverse reaction 1993    Fracture of left elbow 1956    History of right inguinal hernia repair 1987    Hidalgo's neuroma of right foot 1989    Open fracture of left elbow 1991    Open fracture of left humerus 1991    Rupture of appendix 1970        Past Surgical History:   Procedure Laterality Date    TOE AMPUTATION Right 5/17/2021    THIRD DISTAL PHALANYX AMPUTATION VS THIRD GREAT TOE AMPUTATION RIGHT FOOT performed by Az Nice DPM at [facility]       Allergies: Allergies   Allergen Reactions    Bee Venom Swelling    Cephalexin Swelling    Cephalosporins     Propoxyphene Other (See Comments)     Hallucinations  (Darvon/Darvocet)    Sulfa Antibiotics        Medication:   Prior to Admission medications    Medication Sig Start Date End Date Taking? Authorizing Provider   levoFLOXacin (LEVAQUIN) 750 MG tablet Take 1 tablet by mouth daily for 10 days 5/18/21 5/28/21 Yes Loulou Tovar DPM   aspirin 81 MG EC tablet Take 81 mg by mouth daily   Yes Historical Provider, MD   gabapentin (NEURONTIN) 300 MG capsule Take 300 mg by mouth 2 times daily.    Yes Historical Provider, MD   pantoprazole (PROTONIX) 40 MG tablet Take 1 tablet by mouth daily 2/22/21  Yes Jeannette Mazariegos MD   amLODIPine (NORVASC) 10 MG tablet Take 1 tablet by mouth every morning 11/4/20  Yes Historical Provider, MD   atorvastatin (LIPITOR) 40 MG tablet Take 40 mg by mouth nightly 11/4/20  Yes Historical Provider, MD   ciclopirox (LOPROX) 0.77 % cream Apply topically every morning 11/4/20  Yes Historical Provider, MD   cloNIDine (CATAPRES) 0.2 MG tablet Take 0.2 mg by mouth 2 times daily 11/4/20  Yes Historical Provider, MD   enalapril (VASOTEC) 10 MG tablet Take 10 mg by mouth 2 times daily 11/4/20  Yes Historical Provider, MD   fenofibrate (TRIGLIDE) 160 MG tablet Take 160 mg by mouth every evening 11/4/20  Yes Historical Provider, MD   glimepiride (AMARYL) 2 MG tablet Take 2 mg by mouth 2 times daily 11/4/20  Yes Historical Provider, MD   losartan-hydroCHLOROthiazide (HYZAAR) 100-25 MG per tablet Take 1 tablet by mouth every evening 11/4/20  Yes Historical Provider, MD   LORazepam (ATIVAN) 1 MG tablet Take 1 mg by mouth 2 times daily. 11/4/20  Yes Historical Provider, MD   metFORMIN (GLUCOPHAGE) 1000 MG tablet Take 1,000 mg by mouth 2 times daily 11/4/20  Yes Historical Provider, MD   metoprolol tartrate (LOPRESSOR) 50 MG tablet Take 50 mg by mouth 2 times daily 11/4/20  Yes Historical Provider, MD   sAXagliptin (ONGLYZA) 5 MG TABS tablet Take 5 mg by mouth every evening 11/4/20  Yes Historical Provider, MD   PARoxetine (PAXIL) 20 MG tablet Take 20 mg by mouth every evening 11/4/20  Yes Historical Provider, MD   blood glucose test strips (TRUETEST TEST) strip 1 strip 2 times daily 4/2/20  Yes Historical Provider, MD   Lancets MISC Use to check Glucose BID. Dx: 250.00. Dispense Contour Lancets 4/2/20  Yes Historical Provider, MD   apixaban (ELIQUIS) 5 MG TABS tablet Take 1 tablet by mouth 2 times daily 1/21/21  Yes Nadia Bonilla MD       Social History:   reports that he has never smoked. He has never used smokeless tobacco. He reports that he does not drink alcohol and does not use drugs. Family History:  family history includes Sleep Apnea in his father. Reviewed. Denies family history of sudden cardiac death, arrhythmia, premature CAD    Review of System:    · General ROS: negative for - chills, fever   · Psychological ROS: negative for - anxiety or depression  · Ophthalmic ROS: negative for - eye pain or loss of vision  · ENT ROS: negative for - epistaxis, headaches, nasal discharge, sore throat   · Allergy and Immunology ROS: negative for - hives, nasal congestion   · Hematological and Lymphatic ROS: negative for - bleeding problems, blood clots, bruising or jaundice  · Endocrine ROS: negative for - skin changes, temperature intolerance or unexpected weight changes  · Respiratory ROS: negative for - cough, hemoptysis, pleuritic pain, SOB, sputum changes or wheezing  · Cardiovascular ROS: Per HPI. · Gastrointestinal ROS: negative for - abdominal pain, blood in stools, diarrhea, hematemesis, melena, nausea/vomiting or swallowing difficulty/pain  · Genito-Urinary ROS: negative for - dysuria or incontinence  · Musculoskeletal ROS: negative for - joint swelling or muscle pain  · Neurological ROS: negative for - confusion, dizziness, gait disturbance, headaches, numbness/tingling, seizures, speech problems, tremors, visual changes or weakness  · Dermatological ROS: negative for - rash    Physical Examination:  Vitals:    05/25/21 1027   BP: 122/62   Pulse: 73       · Constitutional: Oriented. No distress. · Head: Normocephalic and atraumatic. · Mouth/Throat: Oropharynx is clear and moist.   · Eyes: Conjunctivae normal. EOM are normal.   · Neck: Normal range of motion. Neck supple. No rigidity. No JVD present. · Cardiovascular: Normal rate, regular rhythm, S1&S2 and intact distal pulses. · Pulmonary/Chest: Bilateral respiratory sounds. No wheezes. No rhonchi. · Abdominal: Soft. Bowel sounds present. No distension, No tenderness. · Musculoskeletal: No tenderness. No edema    · Lymphadenopathy: Has no cervical adenopathy. · Neurological: Alert and oriented. Cranial nerve appears intact, No Gross deficit   · Skin: Skin is warm and dry. No rash noted. · Psychiatric: Has a normal mood, affect and behavior     Labs:  Reviewed. ECG: reviewed, ectopic atrial rhythm. No pathologic Q waves, ventricular pre-excitation, or QT prolongation. Studies:   1. Event monitor:  na    2. Echo 1/12/21:   Summary   Normal left ventricular size. Mild concentric left ventricular hypertrophy. Normal left ventricular systolic function with an estimated ejection   fraction of 55-60%. Indeterminate diastolic function due to afib. The left atrium is moderately dilated. 3. Stress Test 2/14/02: IMPRESSION:   NO OBVIOUS SCARRING NOR ISCHEMIA.  50 PERCENT LEFT VENTRICULAR   EJECTION FRACTION. 4. Cath: The [facility]OT, echocardiogram, stress test, and coronary angiography/PCI were reviewed by myself and used for my plan of care. Procedures:  1.  none    Assessment/Plan:  1. Pers AF, on Eliquis   2. HTN, stable on losartan, metoprolol, and amlodipine  3. HLD, stable on statin  4.  DM, type II  5. SURAJ, on CPAP     Preserved LV EF, but with intermediate diastolic dysfunction and LAE  S/p RFA/PVI of AF 2/22/21  S/p DCCV 3/19/21  No longer on flecainide due to widening QRS    In sinus rhythm today with significant improvement in his symptoms since the ablation and DCCV    Continue Eliquis 5 mg BID, Lopressor 50 mg BID, amlodipine, enalapril, and Hyzaar    Lifelong OAC secondary to high chads vasc score. He may hold Eliquis for 2 days prior to colonoscopy and urological procedure. Advised that he is permitted to ride roller coasters  Discussed lifestyle modifications to minimize risk factors for AF. Encouraged him to exercise by briskly walking    If there is any recurrence of atrial fibrillation, then other options include propafenone versus sotalol as an inpatient.     Follow up in 6 months with EP NP    Thank you for allowing me to participate in the care of Tahira Giles Arianna Ding. All questions and concerns were addressed to the patient/family. Alternatives to my treatment were discussed. Kika Jones RN, am scribing for and in the presence of Dr. Jeannette Mazariegos. 05/25/21 10:53 AM  VIKRAM Bardales MD, personally performed the services prescribed in this documentation as scribed by Ms. Lo Saavedra RN in my presence and it is both accurate and complete.        Dominic Marina MD  Cardiac Electrophysiology  [facility]

## 2021-05-20 ENCOUNTER — CARE COORDINATION (OUTPATIENT)
Dept: CASE MANAGEMENT | Age: 76
End: 2021-05-20

## 2021-05-20 ENCOUNTER — TELEPHONE (OUTPATIENT)
Dept: INFECTIOUS DISEASES | Age: 76
End: 2021-05-20

## 2021-05-20 NOTE — CARE COORDINATION
[facility] Transitions Initial Follow Up Call    Call within 2 business days of discharge: Yes    Patient:  Adina Puente  Patient :  1945  MRN:  [id number]   Reason for Admission:  covid 19   Discharge Date:  21  RARS: 20      CTC attempt to reach Pt regarding recent hospital discharge. CTC left voice recording with call back number requesting a call back. Follow up appointments:    Future Appointments   Date Time Provider Suzan Beltrán   2021 10:45 AM MD Todd Mcl[facility]wood Card [facility]   2021  3:20 PM SUKUMAR Linder SLEEP [facility]       GERMAIN HaroN, RN  Care Transition Coordinator  Contact Number:  [phone]

## 2021-05-22 LAB — ANAEROBIC CULTURE: NORMAL

## 2021-05-25 ENCOUNTER — OFFICE VISIT (OUTPATIENT)
Dept: CARDIOLOGY CLINIC | Age: 76
End: 2021-05-25
Payer: MEDICARE

## 2021-05-25 VITALS
WEIGHT: 206.2 LBS | HEIGHT: 70 IN | SYSTOLIC BLOOD PRESSURE: 122 MMHG | BODY MASS INDEX: 29.52 KG/M2 | DIASTOLIC BLOOD PRESSURE: 62 MMHG | HEART RATE: 73 BPM

## 2021-05-25 DIAGNOSIS — I10 ESSENTIAL HYPERTENSION: ICD-10-CM

## 2021-05-25 DIAGNOSIS — E78.2 MIXED HYPERLIPIDEMIA: ICD-10-CM

## 2021-05-25 DIAGNOSIS — I48.19 PERSISTENT ATRIAL FIBRILLATION (HCC): Primary | ICD-10-CM

## 2021-05-25 PROCEDURE — 4040F PNEUMOC VAC/ADMIN/RCVD: CPT | Performed by: INTERNAL MEDICINE

## 2021-05-25 PROCEDURE — 1111F DSCHRG MED/CURRENT MED MERGE: CPT | Performed by: INTERNAL MEDICINE

## 2021-05-25 PROCEDURE — 3017F COLORECTAL CA SCREEN DOC REV: CPT | Performed by: INTERNAL MEDICINE

## 2021-05-25 PROCEDURE — 1036F TOBACCO NON-USER: CPT | Performed by: INTERNAL MEDICINE

## 2021-05-25 PROCEDURE — 1123F ACP DISCUSS/DSCN MKR DOCD: CPT | Performed by: INTERNAL MEDICINE

## 2021-05-25 PROCEDURE — G8427 DOCREV CUR MEDS BY ELIG CLIN: HCPCS | Performed by: INTERNAL MEDICINE

## 2021-05-25 PROCEDURE — G8417 CALC BMI ABV UP PARAM F/U: HCPCS | Performed by: INTERNAL MEDICINE

## 2021-05-25 PROCEDURE — 93000 ELECTROCARDIOGRAM COMPLETE: CPT | Performed by: INTERNAL MEDICINE

## 2021-05-25 PROCEDURE — 99214 OFFICE O/P EST MOD 30 MIN: CPT | Performed by: INTERNAL MEDICINE

## 2021-06-17 ENCOUNTER — TELEPHONE (OUTPATIENT)
Dept: CARDIOLOGY CLINIC | Age: 76
End: 2021-06-17

## 2021-06-17 NOTE — TELEPHONE ENCOUNTER
Mr. Mary Burton is Scheduled for a cystoscopy possible Bladder Biopsy on 07/01/2021. [facility] with Dr. Rekha Shelby as the Surgeon. They are requesting that he holds his Eliquis ( Did not specify The number of days Prior).

## 2021-06-21 LAB
FUNGUS (MYCOLOGY) CULTURE: NORMAL
FUNGUS STAIN: NORMAL

## 2021-07-06 LAB
AFB CULTURE (MYCOBACTERIA): NORMAL
AFB SMEAR: NORMAL

## 2021-07-16 ENCOUNTER — HOSPITAL ENCOUNTER (EMERGENCY)
Age: 76
Discharge: HOME | End: 2021-07-16
Payer: MEDICARE

## 2021-07-16 VITALS
RESPIRATION RATE: 14 BRPM | HEIGHT: 70 IN | WEIGHT: 206 LBS | BODY MASS INDEX: 29.49 KG/M2 | TEMPERATURE: 98.4 F | OXYGEN SATURATION: 97 % | HEART RATE: 68 BPM | DIASTOLIC BLOOD PRESSURE: 73 MMHG | SYSTOLIC BLOOD PRESSURE: 138 MMHG

## 2021-07-16 DIAGNOSIS — T14.8XXA BLEEDING FROM WOUND: ICD-10-CM

## 2021-07-16 DIAGNOSIS — S41.112S: Primary | ICD-10-CM

## 2021-07-16 PROCEDURE — 6370000000 HC RX 637 (ALT 250 FOR IP): Performed by: PHYSICIAN ASSISTANT

## 2021-07-16 PROCEDURE — 99284 EMERGENCY DEPT VISIT MOD MDM: CPT

## 2021-07-16 RX ORDER — DOXYCYCLINE HYCLATE 100 MG/1
100 CAPSULE ORAL 2 TIMES DAILY
COMMUNITY
End: 2021-12-08

## 2021-07-16 RX ADMIN — GELATIN ABSORBABLE SPONGE 12-7 MM 1 EACH: 12-7 MISC at 00:54

## 2021-07-16 ASSESSMENT — PAIN DESCRIPTION - LOCATION: LOCATION: ARM

## 2021-07-16 ASSESSMENT — PAIN SCALES - GENERAL: PAINLEVEL_OUTOF10: 4

## 2021-07-16 ASSESSMENT — PAIN DESCRIPTION - ONSET: ONSET: ON-GOING

## 2021-07-16 ASSESSMENT — PAIN DESCRIPTION - DESCRIPTORS: DESCRIPTORS: SORE;DISCOMFORT

## 2021-07-16 ASSESSMENT — PAIN DESCRIPTION - PAIN TYPE: TYPE: ACUTE PAIN

## 2021-07-16 ASSESSMENT — PAIN DESCRIPTION - FREQUENCY: FREQUENCY: CONTINUOUS

## 2021-07-16 ASSESSMENT — PAIN DESCRIPTION - ORIENTATION: ORIENTATION: LEFT

## 2021-08-02 ASSESSMENT — ENCOUNTER SYMPTOMS
TROUBLE SWALLOWING: 0
NAUSEA: 0
COUGH: 0
COLOR CHANGE: 0
WHEEZING: 0
CHEST TIGHTNESS: 0
SHORTNESS OF BREATH: 0
VOMITING: 0
ABDOMINAL PAIN: 0
RHINORRHEA: 0
ABDOMINAL DISTENTION: 0
EYE PAIN: 0
DIARRHEA: 0
SORE THROAT: 0

## 2021-08-02 NOTE — ED PROVIDER NOTES
[address] ENCOUNTER          PHYSICIAN ASSISTANT NOTE       Date of evaluation: 7/16/2021    Chief Complaint     Laceration (left arm from cutting a piece of wood, and stick jumped back and cut arm, went to Urgent Care and got 10 stiches but still bleeding)      History of Present Illness     Esther Gallo is a 70 y.o. male with a past medical history as noted below who presents to the Emergency Department with a complaint of continued bleeding from a laceration. The patient reports that earlier today he was cutting a piece of wood with a band saw, when a portion of the wood kicked back causing a laceration to the patient's left arm. The patient went to an urgent care, where the wound was evaluated and he received 10 sutures for closure of the wound. The patient is on Eliquis for atrial fibrillation. He reports that at the time of departure from the urgent care, it appeared that the bleeding had been controlled with bandaging, but he has noted that the wound has continued to bleed throughout the afternoon and evening. The patient now comes to the emergency department with continued bleeding from the sutured wound seeking assistance for hemostasis. He denies any significant pain in the area of the laceration. No lightheadedness or dizziness. He reports no near syncopal sensation or syncope. He reports that he has been through a few changes in bandaging which have soaked through with blood, but does not feel that the bleeding has been a large amount. Review of Systems     Review of Systems   Constitutional: Negative for chills, diaphoresis, fatigue and fever. HENT: Negative for congestion, postnasal drip, rhinorrhea, sore throat and trouble swallowing. Eyes: Negative for pain and visual disturbance. Respiratory: Negative for cough, chest tightness, shortness of breath and wheezing. Cardiovascular: Negative for chest pain, palpitations and leg swelling. Gastrointestinal: Negative for abdominal distention, abdominal pain, diarrhea, nausea and vomiting. Endocrine: Negative for polydipsia and polyuria. Genitourinary: Negative for dysuria. Musculoskeletal: Negative for myalgias, neck pain and neck stiffness. Skin: Positive for wound. Negative for color change, pallor and rash. Neurological: Negative for dizziness, weakness, light-headedness and headaches. Hematological: Does not bruise/bleed easily. Psychiatric/Behavioral: Negative for confusion, hallucinations, self-injury and suicidal ideas. All other systems reviewed and are negative. Past Medical, Surgical, Family, and Social History     He has a past medical history of Allergic reaction to sulfonamide, Atrial fibrillation ([address] Bee sting reaction, Bulging of cervical intervertebral disc, Cephalexin adverse reaction, Fracture of left elbow, History of cardioversion, History of right inguinal hernia repair, Hidalgo's neuroma of right foot, Open fracture of left elbow, Open fracture of left humerus, and Rupture of appendix. He has a past surgical history that includes Tonsillectomy (1953) and Toe amputation (Right, 5/17/2021). His family history includes Sleep Apnea in his father. He reports that he has never smoked. He has never used smokeless tobacco. He reports that he does not drink alcohol and does not use drugs. Medications     Discharge Medication List as of 7/16/2021  1:21 AM      CONTINUE these medications which have NOT CHANGED    Details   doxycycline hyclate (VIBRAMYCIN) 100 MG capsule Take 100 mg by mouth 2 times daily X 5 daysHistorical Med      gabapentin (NEURONTIN) 300 MG capsule Take 300 mg by mouth 2 times daily. Historical Med      amLODIPine (NORVASC) 10 MG tablet Take 1 tablet by mouth every morningHistorical Med      atorvastatin (LIPITOR) 40 MG tablet Take 40 mg by mouth nightlyHistorical Med      ciclopirox (LOPROX) 0.77 % cream Apply topically every morning, Topical, EVERY MORNING Starting Wed 11/4/2020, Historical Med      cloNIDine (CATAPRES) 0.2 MG tablet Take 0.2 mg by mouth 2 times dailyHistorical Med      enalapril (VASOTEC) 10 MG tablet Take 10 mg by mouth 2 times dailyHistorical Med      fenofibrate (TRIGLIDE) 160 MG tablet Take 160 mg by mouth every eveningHistorical Med      glimepiride (AMARYL) 2 MG tablet Take 2 mg by mouth 2 times dailyHistorical Med      losartan-hydroCHLOROthiazide (HYZAAR) 100-25 MG per tablet Take 1 tablet by mouth every eveningHistorical Med      LORazepam (ATIVAN) 1 MG tablet Take 1 mg by mouth 2 times daily. Historical Med      metFORMIN (GLUCOPHAGE) 1000 MG tablet Take 1,000 mg by mouth 2 times dailyHistorical Med      metoprolol tartrate (LOPRESSOR) 50 MG tablet Take 50 mg by mouth 2 times dailyHistorical Med      sAXagliptin (ONGLYZA) 5 MG TABS tablet Take 5 mg by mouth every eveningHistorical Med      PARoxetine (PAXIL) 20 MG tablet Take 20 mg by mouth every eveningHistorical Med      blood glucose test strips (TRUETEST TEST) strip 2 TIMES DAILY Starting Thu 4/2/2020, Historical Med      Lancets MISC Historical Med      apixaban (ELIQUIS) 5 MG TABS tablet Take 1 tablet by mouth 2 times daily, Disp-180 tablet, R-3Normal             Allergies     He is allergic to bee venom, cephalexin, cephalosporins, propoxyphene, and sulfa antibiotics. Physical Exam     INITIAL VITALS: BP: 138/73, Temp: 98.4 °F (36.9 °C), Pulse: 68, Resp: 14, SpO2: 97 %  Physical Exam  Vitals and nursing note reviewed. Constitutional:       General: He is not in acute distress. Appearance: He is well-developed. He is not diaphoretic. HENT:      Head: Normocephalic and atraumatic. Eyes:      Pupils: Pupils are equal, round, and reactive to light. Neck:      Vascular: No JVD. Cardiovascular:      Rate and Rhythm: Normal rate and regular rhythm. Pulmonary:      Effort: Pulmonary effort is normal. No respiratory distress.       Breath sounds: Normal breath sounds. No stridor. No wheezing or rales. Chest:      Chest wall: No tenderness. Abdominal:      General: There is no distension. Palpations: Abdomen is soft. Tenderness: There is no abdominal tenderness. Musculoskeletal:         General: No tenderness. Normal range of motion. Cervical back: Normal range of motion and neck supple. Skin:     General: Skin is warm and dry. Coloration: Skin is not pale. Findings: Laceration present. No erythema or rash. Comments: The patient has an approximate 4 cm wound of the left forearm, repaired with 10 interrupted sutures demonstrating continued mild, venous leakage of bleeding. Neurological:      Mental Status: He is alert and oriented to person, place, and time. Coordination: Coordination normal.         Diagnostic Results     RADIOLOGY:  No orders to display       LABS:   No results found for this visit on 07/16/21. ED BEDSIDE ULTRASOUND:  N/A    RECENT VITALS:  BP: 138/73, Temp: 98.4 °F (36.9 °C), Pulse: 68, Resp: 14, SpO2: 97 %     Procedures     N/A    ED Course     Nursing Notes, Past Medical Hx,Past Surgical Hx, Social Hx, Allergies, and Family Hx were reviewed. The patient was given the following medications:  Orders Placed This Encounter   Medications    gelatin adsorbable (GELFOAM) sponge 1 each       CONSULTS:  None    MEDICAL DECISION MAKING / ASSESSMENT / Frank Dale is admitted to the Emergency Department for evaluation of his chief complaint as described in the history of present illness. Complete history and physical was performed by me and my attending. Nursing notes, past medical history, surgical history, family history and social history were reviewed and addressed in the HPI. Cole Couch is a 70 y.o. male who presents to the emergency department with a complaint of a continued bleeding laceration.   The patient sustained a laceration earlier today which was repaired at an outside urgent care with 10 sutures. The patient is on Eliquis as a result of having atrial fibrillation and has demonstrated continued bleeding from the wound despite the wound repair. He presents to the emergency department with continued, mild venous weepage from the wound. The patient is hemodynamically stable and within normal limits on arrival to the emergency department. He demonstrates no signs or symptoms concerning for significant anemia. The wound was unwrapped and evaluated. There are 10, interrupted sutures in place with mild venous weepage from the wound. On evaluation of the wound, an ineffective clot that had loosely adhered to the outside of the wound was removed, Gelfoam was applied to the surface of the wound, covered in Adaptic and gauze and then a pressure dressing applied utilizing Coban. After approximately 30 minutes, the pressure dressing was removed and the wound reevaluated. It appears at this point that all bleeding is stopped. The Gelfoam and Adaptic was left in place. The gauze and Coban was replaced and the patient will be discharged home for continued self-care. Strict return precautions for the development of worsening free bleeding or signs and symptoms consistent with blood loss anemia. I discussed this plan at length the patient who verbalizes understanding and is in agreement. The patient is currently stable and will be discharged home for continued self-care. Please see patient's AVS for additional discharge instructions. Clinical Impression     1. Laceration of arm, left, complicated, sequela    2.  Bleeding from wound        Disposition     PATIENT REFERRED TO:  The [facility]    On 7/27/2021  For suture removal    [facility] Department  [address] [place] [address]  Go to   If symptoms worsen      DISCHARGE MEDICATIONS:  Discharge Medication List as of 7/16/2021  1:21 AM          DISPOSITION Decision To Discharge 07/16/2021 01:18:06 AM     [address], [place]  08/02/21 1137

## 2021-08-26 ENCOUNTER — OFFICE VISIT (OUTPATIENT)
Dept: PULMONOLOGY | Age: 76
End: 2021-08-26
Payer: MEDICARE

## 2021-08-26 VITALS
HEIGHT: 70 IN | BODY MASS INDEX: 29.2 KG/M2 | HEART RATE: 71 BPM | WEIGHT: 204 LBS | DIASTOLIC BLOOD PRESSURE: 72 MMHG | SYSTOLIC BLOOD PRESSURE: 130 MMHG | OXYGEN SATURATION: 97 %

## 2021-08-26 DIAGNOSIS — E11.43 TYPE 2 DIABETES MELLITUS WITH PERIPHERAL AUTONOMIC NEUROPATHY (HCC): Chronic | ICD-10-CM

## 2021-08-26 DIAGNOSIS — I48.19 PERSISTENT ATRIAL FIBRILLATION (HCC): ICD-10-CM

## 2021-08-26 DIAGNOSIS — I10 ESSENTIAL HYPERTENSION: Chronic | ICD-10-CM

## 2021-08-26 DIAGNOSIS — F32.9 MAJOR DEPRESSION, CHRONIC: Chronic | ICD-10-CM

## 2021-08-26 DIAGNOSIS — G47.33 OSA (OBSTRUCTIVE SLEEP APNEA): Primary | ICD-10-CM

## 2021-08-26 PROCEDURE — 1036F TOBACCO NON-USER: CPT | Performed by: NURSE PRACTITIONER

## 2021-08-26 PROCEDURE — 4040F PNEUMOC VAC/ADMIN/RCVD: CPT | Performed by: NURSE PRACTITIONER

## 2021-08-26 PROCEDURE — G8427 DOCREV CUR MEDS BY ELIG CLIN: HCPCS | Performed by: NURSE PRACTITIONER

## 2021-08-26 PROCEDURE — G8417 CALC BMI ABV UP PARAM F/U: HCPCS | Performed by: NURSE PRACTITIONER

## 2021-08-26 PROCEDURE — 3017F COLORECTAL CA SCREEN DOC REV: CPT | Performed by: NURSE PRACTITIONER

## 2021-08-26 PROCEDURE — 99214 OFFICE O/P EST MOD 30 MIN: CPT | Performed by: NURSE PRACTITIONER

## 2021-08-26 PROCEDURE — 3052F HG A1C>EQUAL 8.0%<EQUAL 9.0%: CPT | Performed by: NURSE PRACTITIONER

## 2021-08-26 PROCEDURE — 1123F ACP DISCUSS/DSCN MKR DOCD: CPT | Performed by: NURSE PRACTITIONER

## 2021-08-26 PROCEDURE — 2022F DILAT RTA XM EVC RTNOPTHY: CPT | Performed by: NURSE PRACTITIONER

## 2021-08-26 RX ORDER — FINASTERIDE 5 MG/1
5 TABLET, FILM COATED ORAL DAILY
COMMUNITY
Start: 2021-08-19

## 2021-08-26 ASSESSMENT — SLEEP AND FATIGUE QUESTIONNAIRES
HOW LIKELY ARE YOU TO NOD OFF OR FALL ASLEEP WHILE WATCHING TV: 1
HOW LIKELY ARE YOU TO NOD OFF OR FALL ASLEEP WHILE SITTING AND READING: 1
HOW LIKELY ARE YOU TO NOD OFF OR FALL ASLEEP WHILE LYING DOWN TO REST IN THE AFTERNOON WHEN CIRCUMSTANCES PERMIT: 1
HOW LIKELY ARE YOU TO NOD OFF OR FALL ASLEEP WHEN YOU ARE A PASSENGER IN A CAR FOR AN HOUR WITHOUT A BREAK: 2
HOW LIKELY ARE YOU TO NOD OFF OR FALL ASLEEP WHILE SITTING INACTIVE IN A PUBLIC PLACE: 1
ESS TOTAL SCORE: 8
HOW LIKELY ARE YOU TO NOD OFF OR FALL ASLEEP WHILE SITTING AND TALKING TO SOMEONE: 0
HOW LIKELY ARE YOU TO NOD OFF OR FALL ASLEEP WHILE SITTING QUIETLY AFTER LUNCH WITHOUT ALCOHOL: 1
HOW LIKELY ARE YOU TO NOD OFF OR FALL ASLEEP IN A CAR, WHILE STOPPED FOR A FEW MINUTES IN TRAFFIC: 1

## 2021-08-26 NOTE — LETTER
[facility]  [place] [id number] [place] [address]  Phone: [phone]  Fax: [phone]    August 26, 2021       Patient: Poli Irwin   MR Number: [id number]   YOB: 1945   Date of Visit: 8/26/2021       Danny Graham was seen for a follow up visit today. Here is my assessment and plan as well as an attached copy of his visit today:    Major depression, chronic  Chronic- Stable. Discussed the importance of treating obstructive sleep apnea as part of the management of this disorder. Cont any meds per PCP and other physicians. Type 2 diabetes mellitus with peripheral autonomic neuropathy (HCC)  Chronic- Stable. Discussed the importance of treating obstructive sleep apnea as part of the management of this disorder. Cont any meds per PCP and other physicians. Essential hypertension   Chronic- Stable. Discussed the importance of treating obstructive sleep apnea as part of the management of this disorder. Cont any meds per PCP and other physicians. Persistent atrial fibrillation (HCC)  Chronic- Stable. Discussed the importance of treating obstructive sleep apnea as part of the management of this disorder. Cont any meds per PCP and other physicians. SURAJ (obstructive sleep apnea)  Chronic-Stable: Reviewed and analyzed results of physiologic download from patient's machine and reviewed with patient. Supplies and parts as needed for his machine. These are medically necessary. Limit caffeine use after 3pm. Based on the analyzed data will continue with current settings . Stable on his machine at current settings, getting benefit from the use, and having minimal side effects. Patient to work with his equipment company on mask fit and comfort. Will provide information on mask liners. Verbal and written instruction on PAP equipment cleaning and disinfection schedule provided. Follow up in 3 months.          If you have questions or concerns, please do not hesitate to call me. I look forward to following Tenisha Lambert along with you.     Sincerely,    SUKUMAR Johansen    CC providers:  MD Beto Edwards [facility] [address]  Via Fax: [phone]

## 2021-08-26 NOTE — ASSESSMENT & PLAN NOTE
Chronic-Stable: Reviewed and analyzed results of physiologic download from patient's machine and reviewed with patient. Supplies and parts as needed for his machine. These are medically necessary. Limit caffeine use after 3pm. Based on the analyzed data will continue with current settings . Stable on his machine at current settings, getting benefit from the use, and having minimal side effects. Patient to work with his equipment company on mask fit and comfort. Will provide information on mask liners. Verbal and written instruction on PAP equipment cleaning and disinfection schedule provided. Follow up in 3 months.

## 2021-08-26 NOTE — ASSESSMENT & PLAN NOTE
"ED Provider Note    Scribed for Jamshid Everett D.O. by Tara Stern. 3/5/2019  5:09 PM    Primary care provider: Pcp Pt States None  Means of arrival: Walk-in  History obtained from: Patient  History limited by: None    CHIEF COMPLAINT  Chief Complaint   Patient presents with   • Abdominal Pain       HPI  Dav Claire is a 48 y.o. male who presents to the Emergency Department with intermittent upper quadrant abdominal pain onset a couple months ago. The patient states his abdominal pain would occur \"every two months\", however he notes they now occur once a month and the pain is progressively worsening in severity. Patient reports the pain radiates to his back, and wakes him up in the middle of night causing him to vomit. He endorses associated sweating, pyrosis, and loss of appetite. Patient denies any dysuria, melena, or hematochezia.     The patient reports he often consumes spicy foods, as well as caffeine, specifically Redbull.   Patient was seen for his abdominal pain in the past and was prescribed medication with mild alleviation. He denies taking any ibuprofen. Patient reports a family history of cholelithiasis. He denies history of smoking tobacco or marijuana, or recent alcohol use.      REVIEW OF SYSTEMS  Pertinent positives include abdominal pain, sweating, pyrosis, vomiting, loss of appetite. Pertinent negatives include no dysuria, melena, or hematachezia.  All other systems reviewed and negative.    PAST MEDICAL HISTORY  Chronic abdominal pain.    SURGICAL HISTORY  No past surgical history noted.     SOCIAL HISTORY  Social History   Substance Use Topics   • Smoking status: Never Smoker   • Alcohol use No      Comment: was an alcoholic. quit 3 years ago      History   Drug Use No       FAMILY HISTORY  Cholelithiasis.     CURRENT MEDICATIONS  No current facility-administered medications on file prior to encounter.      Current Outpatient Prescriptions on File Prior to Encounter " Chronic- Stable. Discussed the importance of treating obstructive sleep apnea as part of the management of this disorder. Cont any meds per PCP and other physicians. "  Medication Sig Dispense Refill   • metoclopramide (REGLAN) 10 MG TABS Take 1 Tab by mouth 4 times a day. 1 tablet before meals and at bedtime 20 Each zero       ALLERGIES  No Known Allergies    PHYSICAL EXAM  VITAL SIGNS: /84   Pulse 95   Temp 37 °C (98.6 °F) (Temporal)   Resp 18   Ht 1.676 m (5' 6\")   Wt 76.4 kg (168 lb 6.9 oz)   SpO2 95%   BMI 27.19 kg/m²     Nursing notes and vitals reviewed.  Constitutional: Well developed, Well nourished, No acute distress, Non-toxic appearance.   Eyes: Pterygium. PERRLA, EOMI, No discharge.   Cardiovascular: Normal heart rate, Normal rhythm, No murmurs, No rubs, No gallops.   Thorax & Lungs: No respiratory distress, No rales, No rhonchi, No wheezing, No chest tenderness.   Abdomen: Epigastric and right upper quadrant tenderness. Bowel sounds normal, Soft, No guarding, No rebound, No masses, No pulsatile masses.   Skin: Warm, Dry, No erythema, No rash.   Musculoskeletal: Intact distal pulses, No edema, No cyanosis, No clubbing. Good range of motion in all major joints. No tenderness to palpation or major deformities noted, no CVA tenderness, no midline back tenderness.   Neurologic: Alert & oriented x 3, Normal motor function, Normal sensory function, No focal deficits noted.  Psychiatric: Affect normal for clinical presentation.      DIAGNOSTIC STUDIES/PROCEDURES    LABS  Results for orders placed or performed during the hospital encounter of 03/05/19   CBC WITH DIFFERENTIAL   Result Value Ref Range    WBC 7.8 4.8 - 10.8 K/uL    RBC 4.85 4.70 - 6.10 M/uL    Hemoglobin 15.1 14.0 - 18.0 g/dL    Hematocrit 44.1 42.0 - 52.0 %    MCV 90.9 81.4 - 97.8 fL    MCH 31.1 27.0 - 33.0 pg    MCHC 34.2 33.7 - 35.3 g/dL    RDW 40.6 35.9 - 50.0 fL    Platelet Count 262 164 - 446 K/uL    MPV 9.0 9.0 - 12.9 fL    Neutrophils-Polys 60.20 44.00 - 72.00 %    Lymphocytes 28.40 22.00 - 41.00 %    Monocytes 7.60 0.00 - 13.40 %    Eosinophils 2.10 0.00 - 6.90 %    Basophils 1.20 0.00 - " 1.80 %    Immature Granulocytes 0.50 0.00 - 0.90 %    Nucleated RBC 0.00 /100 WBC    Neutrophils (Absolute) 4.67 1.82 - 7.42 K/uL    Lymphs (Absolute) 2.20 1.00 - 4.80 K/uL    Monos (Absolute) 0.59 0.00 - 0.85 K/uL    Eos (Absolute) 0.16 0.00 - 0.51 K/uL    Baso (Absolute) 0.09 0.00 - 0.12 K/uL    Immature Granulocytes (abs) 0.04 0.00 - 0.11 K/uL    NRBC (Absolute) 0.00 K/uL   COMP METABOLIC PANEL   Result Value Ref Range    Sodium 135 135 - 145 mmol/L    Potassium 4.0 3.6 - 5.5 mmol/L    Chloride 102 96 - 112 mmol/L    Co2 24 20 - 33 mmol/L    Anion Gap 9.0 0.0 - 11.9    Glucose 92 65 - 99 mg/dL    Bun 20 8 - 22 mg/dL    Creatinine 1.09 0.50 - 1.40 mg/dL    Calcium 9.4 8.5 - 10.5 mg/dL    AST(SGOT) 45 12 - 45 U/L    ALT(SGPT) 46 2 - 50 U/L    Alkaline Phosphatase 89 30 - 99 U/L    Total Bilirubin 1.7 (H) 0.1 - 1.5 mg/dL    Albumin 4.5 3.2 - 4.9 g/dL    Total Protein 7.6 6.0 - 8.2 g/dL    Globulin 3.1 1.9 - 3.5 g/dL    A-G Ratio 1.5 g/dL   LIPASE   Result Value Ref Range    Lipase 28 11 - 82 U/L   URINALYSIS,CULTURE IF INDICATED   Result Value Ref Range    Color Yellow     Character Clear     Specific Gravity 1.014 <1.035    Ph 5.5 5.0 - 8.0    Glucose Negative Negative mg/dL    Ketones Negative Negative mg/dL    Protein Negative Negative mg/dL    Bilirubin Negative Negative    Urobilinogen, Urine 1.0 Negative    Nitrite Negative Negative    Leukocyte Esterase Negative Negative    Occult Blood Negative Negative    Micro Urine Req see below    ESTIMATED GFR   Result Value Ref Range    GFR If African American >60 >60 mL/min/1.73 m 2    GFR If Non African American >60 >60 mL/min/1.73 m 2     All labs reviewed by me.      RADIOLOGY  US-RUQ   Final Result      1.  Cholelithiasis and gallbladder sludge without sonographic evidence of cholecystitis.      2.  Mildly increased hepatic echotexture suggestive of fatty infiltration. Hepatocellular disease could have a similar appearance.        The radiologist's interpretation  of all radiological studies have been reviewed by me.    COURSE & MEDICAL DECISION MAKING  Pertinent Labs & Imaging studies reviewed. (See chart for details)    5:09 PM - Patient seen and examined at bedside. Patient will be treated with GI cocktail 30 ml. Ordered US-RUQ, estimated GFR, CBC, CMP, lipase, and UA culture  to evaluate his symptoms.     7:00 PM - Reviewed lab and radiology results.     7:10 PM - Patient was reevaluated at bedside. Discussed lab and radiology results with the patient. Patient will be discharged home with plan as outlined below. He is understanding and agreeable to plan.     This is a charming 48 y.o. male that presents with cholelithiasis.  The patient has no evidence of cholecystitis.  Has a slightly elevated total bilirubin of 1.7 yet no evidence of elevation of alkaline phosphatase, AST, ALT elevated white blood cell count.  In addition, the patient has no evidence of pancreatitis with a lipase of 28.  The patient was reevaluated, is positive p.o., nontender, nonsurgical abdomen.  I did recommend he follow-up with general surgery for further evaluation and management.  The patient understands the need to follow-up for elective surgery consultation return to the emergency department for increasing cytology such as fever, nausea, vomiting, uncontrolled pain.  I reviewed prescription monitoring program for patient's narcotic use before prescribing a scheduled drug.The patient will not drink alcohol nor drive with prescribed medications. The patient will return for new or worsening symptoms and is stable at the time of discharge.    In prescribing controlled substances to this patient, I certify that I have obtained and reviewed the medical history of Dav Claire. I have also made a good augusto effort to obtain applicable records from other providers who have treated the patient and no other records are available at this time.     I have conducted a physical exam and documented it. I  have reviewed Mr. Claire’s prescription history as maintained by the [place] Prescription Monitoring Program.     I have assessed the patient’s risk for abuse, dependency, and addiction using the validated Opioid Risk Tool available at https://www.mdcalc.com/zunskc-ndij-ynhy-ort-narcotic-abuse.     Given the above, I believe the benefits of controlled substance therapy outweigh the risks. The reasons for prescribing controlled substances include non-narcotic, oral analgesic alternatives have been inadequate for pain control. Accordingly, I have discussed the risk and benefits, treatment plan, and alternative therapies with the patient.       DISPOSITION:  Patient will be discharged home in stable condition.    FOLLOW UP:  [facility], Emergency Dept  [address] [facility] [id number] [phone]    If symptoms worsen    Santhosh Nuñez M.D.  [address]  [address]  [facility] [id number] [phone]    Schedule an appointment as soon as possible for a visit in 1 week  As needed      OUTPATIENT MEDICATIONS:  Discharge Medication List as of 3/5/2019  7:21 PM      START taking these medications    Details   ondansetron (ZOFRAN ODT) 4 MG TABLET DISPERSIBLE Take 1 Tab by mouth every 8 hours as needed., Disp-10 Tab, R-0, Print Rx Paper      HYDROcodone-acetaminophen (NORCO) 5-325 MG Tab per tablet Take 1-2 Tabs by mouth every four hours as needed for up to 3 days., Disp-12 Tab, R-0, Print Rx Paper, For 3 days             FINAL IMPRESSION  1. Calculus of gallbladder without cholecystitis without obstruction Active         ITara), am scribing for, and in the presence of, Jamshid Everett D.O    Electronically signed by: Tara Obregon), 3/5/2019    IJamshid D.O. personally performed the services described in this documentation, as scribed by Tara Stern in my presence, and it is both accurate and complete. C.     The note  accurately reflects work and decisions made by me.  Jamshid Everett  3/5/2019  8:50 PM

## 2021-08-26 NOTE — PROGRESS NOTES
Elsa Mccallum MD, [facility], [facility]  Tiffanie Kehrt CNP Pauline Avers CNP [address]  [place] [address], [address] [facility]- [phone]   [facility] [address]. [address] [facility] [phone]     [address] [facility] [address] [phone]      Assessment/Plan:     1. SURAJ (obstructive sleep apnea)  Assessment & Plan:  Chronic-Stable: Reviewed and analyzed results of physiologic download from patient's machine and reviewed with patient. Supplies and parts as needed for his machine. These are medically necessary. Limit caffeine use after 3pm. Based on the analyzed data will continue with current settings . Stable on his machine at current settings, getting benefit from the use, and having minimal side effects. Patient to work with his equipment company on mask fit and comfort. Will provide information on mask liners. Verbal and written instruction on PAP equipment cleaning and disinfection schedule provided. Follow up in 3 months. 2. Persistent atrial fibrillation (HCC)  Assessment & Plan:  Chronic- Stable. Discussed the importance of treating obstructive sleep apnea as part of the management of this disorder. Cont any meds per PCP and other physicians. 3. Essential hypertension  Assessment & Plan:   Chronic- Stable. Discussed the importance of treating obstructive sleep apnea as part of the management of this disorder. Cont any meds per PCP and other physicians. 4. 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 Discussed the importance of treating obstructive sleep apnea as part of the management of this disorder. Cont any meds per PCP and other physicians. 5. Major depression, chronic  Assessment & Plan:  Chronic- Stable. Discussed the importance of treating obstructive sleep apnea as part of the management of this disorder.   Cont any meds per PCP and other physicians. Reviewed, analyzed, and documented physiologic data from patient's PAP machine. This information was analyzed to assess complexity and medical decision making in regards to further testing and management. The primary encounter diagnosis was SURAJ (obstructive sleep apnea). Diagnoses of Persistent atrial fibrillation ([facility] [address] Essential hypertension, Type 2 diabetes mellitus with peripheral autonomic neuropathy ([facility] [address] and Major depression, chronic were also pertinent to this visit. The chronic medical conditions listed are directly related to the primary diagnosis listed above. The management of the primary diagnosis affects the secondary diagnosis and vice versa. Subjective:   Subjective   Patient ID: Ainsley Peng is a 70 y.o. male. Chief Complaint   Patient presents with    Sleep Apnea       HPI:  Machine Modem/Download Info:  Compliance (hours/night): 6.4 hrs/night  % of nights >= 4 hrs: 90 %  Download AHI (/hour): 3.1 /HR   Average IPAP Pressure: 16 cmH2O  Average EPAP Pressure: 11 cmH2O         AUTO BIPAP - Settings (Dax)  IPAP Max: 25 cmH2O  EPAP Min: 11 cmH2O  Pressure Support Min: 3  Pressure Support Max: 7             Comfort Settings  Humidity Level (0-8): 3  Heated Tubing (Yes/No): Yes  Flex/EPR (0-3): 3 PAP Mask  Clinically Relevant Leak: No     Ainsley Peng is doing well with his machine. Pressure feels good and he is waking rested. he denies headaches, congestion, nosebleeds, dryness, aerophagia, or drowsiness while driving. Mask is comfortable but does have some leak in his eyes sometimes. May want to try mask liners or a different type of full face mask. He would like to follow up more frequently.     [address]    [place] - Total score: 8    Social History     Socioeconomic History    Marital status: Single     Spouse name: Not on file    Number of children: Not on file    Years of education: Not on file    Highest education level: Not on file   Occupational History    Not on file   Tobacco Use    Smoking status: Never Smoker    Smokeless tobacco: Never Used   Substance and Sexual Activity    Alcohol use: Never    Drug use: Never    Sexual activity: Not on file   Other Topics Concern    Not on file   Social History Narrative    Not on file     Social Determinants of Health     Financial Resource Strain:     Difficulty of Paying Living Expenses:    Food Insecurity:     Worried About Running Out of Food in the Last Year:     [address] in the Last Year:    Transportation Needs:     Lack of Transportation (Medical):      Lack of Transportation (Non-Medical):    Physical Activity:     Days of Exercise per Week:     Minutes of Exercise per Session:    Stress:     Feeling of Stress :    Social Connections:     Frequency of Communication with Friends and Family:     Frequency of Social Gatherings with Friends and Family:     Attends Judaism Services:     Active Member of Clubs or Organizations:     Attends Club or Organization Meetings:     Marital Status:    Intimate Partner Violence:     Fear of Current or Ex-Partner:     Emotionally Abused:     Physically Abused:     Sexually Abused:        Current Outpatient Medications   Medication Instructions    amLODIPine (NORVASC) 10 MG tablet 1 tablet, Oral, EVERY MORNING    apixaban (ELIQUIS) 5 mg, Oral, 2 TIMES DAILY    atorvastatin (LIPITOR) 40 mg, Oral, NIGHTLY    blood glucose test strips (TRUETEST TEST) strip 1 strip, 2 TIMES DAILY    ciclopirox (LOPROX) 0.77 % cream Topical, EVERY MORNING    cloNIDine (CATAPRES) 0.2 mg, Oral, 2 TIMES DAILY    doxycycline hyclate (VIBRAMYCIN) 100 mg, Oral, 2 TIMES DAILY, X 5 days     enalapril (VASOTEC) 10 mg, Oral, 2 TIMES DAILY    fenofibrate (TRIGLIDE) 160 mg, Oral, EVERY EVENING    finasteride (PROSCAR) 5 mg, Oral, DAILY    gabapentin (NEURONTIN) 300 mg, Oral, 2 TIMES DAILY    glimepiride (AMARYL) 2 mg, Oral, 2 TIMES DAILY    Lancets MISC Use to check Glucose BID. Dx: 250.00. Dispense Contour Lancets    LORazepam (ATIVAN) 1 mg, Oral, 2 TIMES DAILY    losartan-hydroCHLOROthiazide (HYZAAR) 100-25 MG per tablet 1 tablet, Oral, EVERY EVENING    metFORMIN (GLUCOPHAGE) 1,000 mg, Oral, 2 TIMES DAILY    metoprolol tartrate (LOPRESSOR) 50 mg, Oral, 2 TIMES DAILY    PARoxetine (PAXIL) 20 mg, Oral, EVERY EVENING    sAXagliptin (ONGLYZA) 5 mg, Oral, EVERY EVENING          Vitals:  Weight BMI   Wt Readings from Last 3 Encounters:   08/26/21 204 lb (92.5 kg)   07/16/21 206 lb (93.4 kg)   05/25/21 206 lb 3.2 oz (93.5 kg)    Body mass index is 29.27 kg/m².      BP HR SaO2   BP Readings from Last 3 Encounters:   08/26/21 130/72   07/16/21 138/73   05/25/21 122/62    Pulse Readings from Last 3 Encounters:   08/26/21 71   07/16/21 68   05/25/21 73    SpO2 Readings from Last 3 Encounters:   08/26/21 97%   07/16/21 97%   05/18/21 95%        Electronically signed by SUKUMAR Israel on 8/26/2021 at 3:56 PM

## 2021-11-23 NOTE — PROGRESS NOTES
[facility]   Electrophysiology  Office Visit  Date: 11/30/2021    Chief Complaint   Patient presents with    Atrial Fibrillation    Hypertension       Cardiac HX: Lesly Russ is a 78 y.o. man with a h/o HTN, DM, PVCs ongoing for about 20 years with normal MPI, incidental finding of AF on 1/6/2021 during a routine colonoscopy, sent to the ED, placed on Eliquis, echo (1/12/2021) showed preserved LVEF of 55 to 60%, intermediate diastolic dysfunction and LAE, + SURAJ on CPAP, s/p RFA/PVI of AF (2/22/2021, Dr. Josué Vincent), placed on flecainide post procedure however discontinued due to QRS widening, recurrent AF after COVID vaccine (3/2021), s/p DCCV to NSR (3/19/2021). Interval History/HPI: Patient is here for f/u for pAF. Patient was originally diagnosed with atrial fibrillation in January 2021 when he was discovered to have atrial fibrillation during a routine colonoscopy. He was sent to the emergency room at that time and was placed on Eliquis. His echo was normal.  He did undergo catheter ablation of atrial fibrillation in February 2021. He had been placed on flecainide post procedure however this was discontinued due to QRS widening. He then received a second COVID vaccine in March 2021 and had recurrent atrial fibrillation. He did undergo cardioversion to normal sinus rhythm on 3/19/2021. Today he presents in normal sinus rhythm. He states he has not felt any breakthrough of his atrial fibrillation, he denies any heart racing, palpitations or irregular heartbeats. He does have a [employer] mobile and monitors his heart rate at home. He is having issues with hematuria. He states this was diagnosed in March or April and has been ongoing. Originally in the beginning he was having bright red urine with clots however now it is just pink in color. His labs were reviewed and his last CBC was in May 2021, blood sugar has not been well controlled. He has had no other bleeding issues.   He denies any History:   Diagnosis Date    Allergic reaction to sulfonamide 1960    Atrial fibrillation ([address]     Bee sting reaction 2000    Bulging of cervical intervertebral disc     c4 & c5    Cephalexin adverse reaction 1993    Fracture of left elbow 1956    History of cardioversion     History of right inguinal hernia repair 1987    Hidalgo's neuroma of right foot 1989    Open fracture of left elbow 1991    Open fracture of left humerus 1991    Rupture of appendix 1970        Past Surgical History:   Procedure Laterality Date    TOE AMPUTATION Right 5/17/2021    THIRD DISTAL PHALANYX AMPUTATION VS THIRD GREAT TOE AMPUTATION RIGHT FOOT performed by Milla Busch DPM at [facility]       Allergies   Allergen Reactions    Bee Venom Swelling    Cephalexin Swelling    Cephalosporins     Propoxyphene Other (See Comments)     Hallucinations  (Darvon/Darvocet)    Sulfa Antibiotics        Social History:  Reviewed. reports that he has never smoked. He has never used smokeless tobacco. He reports that he does not drink alcohol and does not use drugs. Family History:  Reviewed. family history includes Sleep Apnea in his father. Review of System:    · Constitutional: No fevers, chills. · Eyes: No visual changes or diplopia. No scleral icterus. · ENT: No Headaches. No mouth sores or sore throat. · Cardiovascular: No for chest pain, No for dyspnea on exertion, No for palpitations or No for loss of consciousness. No cough, hemoptysis, No for pleuritic pain, or phlebitis. · Respiratory: No for cough or wheezing. No hematemesis. · Gastrointestinal: No abdominal pain, blood in stools. · Genitourinary: No dysuria, or hematuria. · Musculoskeletal: No gait disturbance,    · Integumentary: No rash or pruritis. · Neurological: No headache, change in muscle strength, numbness or tingling. · Psychiatric: No anxiety, or depression. · Endocrine: No temperature intolerance.  No excessive thirst, fluid intake, or urination. · Hem/Lymph: No abnormal bruising or bleeding, blood clots or swollen lymph nodes. · Allergic/Immunologic: No nasal congestion or hives. Physical Examination:  Vitals:    11/30/21 1129   BP: 122/80   Pulse: 70         Wt Readings from Last 3 Encounters:   11/30/21 208 lb (94.3 kg)   08/26/21 204 lb (92.5 kg)   07/16/21 206 lb (93.4 kg)       · Constitutional: Oriented. No distress. · Head: Normocephalic and atraumatic. · Mouth/Throat: Oropharynx is clear and moist.   · Eyes: Conjunctivae clear without jaunduice. PERRL. · Neck: Neck supple. No rigidity. No JVD present. · Cardiovascular: Normal rate, regular rhythm, S1&S2. · Pulmonary/Chest: Bilateral respiratory sounds. No wheezes, No rhonchi. · Abdominal: Soft. Bowel sounds present. No distension, No tenderness. · Musculoskeletal: No tenderness. No edema    · Lymphadenopathy: Has no cervical adenopathy. · Neurological: Alert and oriented. Cranial nerve appears intact, No Gross deficit   · Skin: Skin is warm and dry. No rash noted. · Psychiatric: Has a normal mood, affect and behavior     Labs:  Reviewed. No results for input(s): NA, K, CL, CO2, PHOS, BUN, CREATININE in the last 72 hours. Invalid input(s): CA,  TSH  No results for input(s): WBC, HGB, HCT, MCV, PLT in the last 72 hours. Lab Results   Component Value Date    TROPONINI <0.01 01/06/2021     No results found for: BNP  Lab Results   Component Value Date    PROTIME 18.5 05/15/2021    PROTIME 23.3 03/19/2021    PROTIME 16.2 02/22/2021    INR 1.59 05/15/2021    INR 1.99 03/19/2021    INR 1.39 02/22/2021     No results found for: CHOL, HDL, TRIG    ECG: Personally reviewed:NSR, HR 70, , QRS 96, QTc 423    ECHO:  2.22.2021([facility])  Summary   The left atrial size is dilated. There is no evidence of mass or thrombus in the left atrium or appendage. Doppler velocities in the JOON are reduced.     1.12.2021 (Complete)  Summary   Normal left ventricular size. Mild concentric left ventricular hypertrophy. Normal left ventricular systolic function with an estimated ejection   fraction of 55-60%. Indeterminate diastolic function due to afib. The left atrium is moderately dilated. Stress Test: 2.14.2002 ([employer])  IMPRESSION:   NO OBVIOUS SCARRING NOR ISCHEMIA.  50 PERCENT LEFT VENTRICULAR   EJECTION FRACTION. Cardiac Angiography: n/a    Problem List:   Patient Active Problem List    Diagnosis Date Noted    Post-op pain 05/18/2021    Need for diphtheria-tetanus-pertussis (Tdap) vaccine     Group B streptococcal infection     Bacterial infection due to Morganella morganii     Pseudomonas infection     Osteomyelitis of right foot ([facility]     Elevated C-reactive protein (CRP)     Diabetic polyneuropathy associated with type 2 diabetes mellitus (HCC)     Elevated sed rate     Hyponatremia     Weight loss counseling, encounter for     Diabetic foot infection ([facility] 05/14/2021    SURAJ (obstructive sleep apnea) 05/12/2021    Persistent atrial fibrillation (HCC)     New onset a-fib ([facility] 01/11/2021    Major depression, chronic 03/29/2018    Type 2 diabetes mellitus with peripheral autonomic neuropathy ([facility] 10/13/2016    Mixed hyperlipidemia 10/13/2016    Migraine 02/16/2010    Obesity 02/16/2010    Anxiety state 07/30/1997    Essential hypertension 07/30/1997    Reflux esophagitis 07/30/1997        Assessment:   1. Paroxysmal atrial fibrillation (HCC)    2. Benign essential HTN    3.  Chronic anticoagulation        Cardiac HX: Lauren Corea is a 78 y.o. man with a h/o HTN, DM, PVCs ongoing for about 20 years with normal MPI, incidental finding of AF on 1/6/2021 during a routine colonoscopy, sent to the ED, placed on Eliquis, echo (1/12/2021) showed preserved LVEF of 55 to 60%, intermediate diastolic dysfunction and LAE, + SURAJ on CPAP, s/p RFA/PVI of AF (2/22/2021, Dr. Singh Polanco), placed on flecainide post procedure however discontinued due to QRS widening, recurrent AF after COVID vaccine (3/2021), s/p DCCV to NSR (3/19/2021). [id number] 3. TSH 1.85 (1/6/2021). pAF  - In NSR - HR 70 -no breakthrough per patient  - S/p RFA/PVI of AF/atAFL (2/22/2021)  - On Eliquis 5 mg - + hematuria -following with urology  - Will check a CBC, Mg  - Off flecainide d/t QRS prolongation  - On metoprolol 50 mg BID  - Compliant with CPAP  - Reduce risk factors - diet, exercise, optimal glucose control  - Wants to schedule colonoscopy - will send letter when scheduled  - F/u in 6 months  - ECG ordered and results personally reviewed     HTN  - BP well controlled in the office today  - On amlodipine 10 mg daily, clonidine 0.2 mg twice daily, Vasotec 10 mg twice daily, Hyzaar 100/25 mg nightly, metoprolol 50 mg twice a day     EF of 32-64%  No systolic HF  No known CAD  Anticoagulation for AF   No Tobacco use. All questions and concerns were addressed to the patient/family. Alternatives to my treatment were discussed. The note was completed using EMR. Every effort was made to ensure accuracy; however, inadvertent computerized transcription errors may be present. Patient received education regarding their diagnosis, treatment and medications while in the office today. Kathie Peabody [facility]       I  have spent 40 minutes in care of the patient including direct face to face time, chart preparation, reviewing diagnostic testing, other provider notes and coordinating patient care.

## 2021-11-30 ENCOUNTER — OFFICE VISIT (OUTPATIENT)
Dept: CARDIOLOGY CLINIC | Age: 76
End: 2021-11-30
Payer: MEDICARE

## 2021-11-30 VITALS
WEIGHT: 208 LBS | HEART RATE: 70 BPM | DIASTOLIC BLOOD PRESSURE: 80 MMHG | HEIGHT: 70 IN | BODY MASS INDEX: 29.78 KG/M2 | SYSTOLIC BLOOD PRESSURE: 122 MMHG

## 2021-11-30 DIAGNOSIS — I48.0 PAROXYSMAL ATRIAL FIBRILLATION (HCC): Primary | ICD-10-CM

## 2021-11-30 DIAGNOSIS — Z79.01 CHRONIC ANTICOAGULATION: ICD-10-CM

## 2021-11-30 DIAGNOSIS — I10 BENIGN ESSENTIAL HTN: ICD-10-CM

## 2021-11-30 DIAGNOSIS — I48.0 PAROXYSMAL ATRIAL FIBRILLATION (HCC): ICD-10-CM

## 2021-11-30 LAB
HCT VFR BLD CALC: 37.2 % (ref 40.5–52.5)
HEMOGLOBIN: 12.6 G/DL (ref 13.5–17.5)
MAGNESIUM: 1.4 MG/DL (ref 1.8–2.4)
MCH RBC QN AUTO: 28.9 PG (ref 26–34)
MCHC RBC AUTO-ENTMCNC: 33.9 G/DL (ref 31–36)
MCV RBC AUTO: 85.4 FL (ref 80–100)
PDW BLD-RTO: 14.4 % (ref 12.4–15.4)
PLATELET # BLD: 230 K/UL (ref 135–450)
PMV BLD AUTO: 8.7 FL (ref 5–10.5)
RBC # BLD: 4.35 M/UL (ref 4.2–5.9)
WBC # BLD: 5.8 K/UL (ref 4–11)

## 2021-11-30 PROCEDURE — 1123F ACP DISCUSS/DSCN MKR DOCD: CPT | Performed by: NURSE PRACTITIONER

## 2021-11-30 PROCEDURE — 1036F TOBACCO NON-USER: CPT | Performed by: NURSE PRACTITIONER

## 2021-11-30 PROCEDURE — 4040F PNEUMOC VAC/ADMIN/RCVD: CPT | Performed by: NURSE PRACTITIONER

## 2021-11-30 PROCEDURE — G8417 CALC BMI ABV UP PARAM F/U: HCPCS | Performed by: NURSE PRACTITIONER

## 2021-11-30 PROCEDURE — G8427 DOCREV CUR MEDS BY ELIG CLIN: HCPCS | Performed by: NURSE PRACTITIONER

## 2021-11-30 PROCEDURE — G8484 FLU IMMUNIZE NO ADMIN: HCPCS | Performed by: NURSE PRACTITIONER

## 2021-11-30 PROCEDURE — 99215 OFFICE O/P EST HI 40 MIN: CPT | Performed by: NURSE PRACTITIONER

## 2021-11-30 PROCEDURE — 93000 ELECTROCARDIOGRAM COMPLETE: CPT | Performed by: NURSE PRACTITIONER

## 2021-11-30 PROCEDURE — 3017F COLORECTAL CA SCREEN DOC REV: CPT | Performed by: NURSE PRACTITIONER

## 2021-11-30 RX ORDER — AMOXICILLIN 500 MG/1
500 CAPSULE ORAL 4 TIMES DAILY
Status: ON HOLD | COMMUNITY
Start: 2021-11-23 | End: 2022-02-27 | Stop reason: CLARIF

## 2021-11-30 NOTE — PATIENT INSTRUCTIONS
Patient Education        Learning About Low-Carbohydrate Diets  What is a low-carbohydrate diet? A low-carbohydrate (or \"low-carb\") diet limits foods and drinks that have carbohydrates. This includes grains, fruits, milk and yogurt, and starchy vegetables like potatoes, beans, and corn. It also avoids foods and drinks that have added sugar. Instead, low-carb diets include foods that are high in protein and fat. Why might you follow a low-carb diet? Low-carb diets may be used for a variety of reasons, such as for weight loss. People who have diabetes may use a low-carb diet to help manage their blood sugar levels. What should you do before you start the diet? Talk to your doctor before you try any diet. This is even more important if you have health problems like kidney disease, heart disease, or diabetes. Your doctor may suggest that you meet with a registered dietitian. A dietitian can help you make an eating plan that works for you. What foods do you eat on a low-carb diet? On a low-carb diet, you choose foods that are high in protein and fat. Examples of these are:  · Meat, poultry, and fish. · Eggs. · Nuts, such as walnuts, pecans, almonds, and peanuts. · Peanut butter and other nut butters. · Tofu. · Avocado. · Leonila Ped. · Non-starchy vegetables like broccoli, cauliflower, green beans, mushrooms, peppers, lettuce, and spinach. · Unsweetened non-dairy milks like almond milk and coconut milk. · Cheese, cottage cheese, and cream cheese. Current as of: December 17, 2020               Content Version: 13.0  © 2006-2021 Healthwise, [employer]. Care instructions adapted under license by [facility] ([facility]). If you have questions about a medical condition or this instruction, always ask your healthcare professional. [address]  any warranty or liability for your use of this information.          Patient Education        Learning About Low-Carbohydrate Diets  What is a low-carbohydrate diet? A low-carbohydrate (or \"low-carb\") diet limits foods and drinks that have carbohydrates. This includes grains, fruits, milk and yogurt, and starchy vegetables like potatoes, beans, and corn. It also avoids foods and drinks that have added sugar. Instead, low-carb diets include foods that are high in protein and fat. Why might you follow a low-carb diet? Low-carb diets may be used for a variety of reasons, such as for weight loss. People who have diabetes may use a low-carb diet to help manage their blood sugar levels. What should you do before you start the diet? Talk to your doctor before you try any diet. This is even more important if you have health problems like kidney disease, heart disease, or diabetes. Your doctor may suggest that you meet with a registered dietitian. A dietitian can help you make an eating plan that works for you. What foods do you eat on a low-carb diet? On a low-carb diet, you choose foods that are high in protein and fat. Examples of these are:  · Meat, poultry, and fish. · Eggs. · Nuts, such as walnuts, pecans, almonds, and peanuts. · Peanut butter and other nut butters. · Tofu. · Avocado. · Freddrick Bench. · Non-starchy vegetables like broccoli, cauliflower, green beans, mushrooms, peppers, lettuce, and spinach. · Unsweetened non-dairy milks like almond milk and coconut milk. · Cheese, cottage cheese, and cream cheese. Current as of: December 17, 2020               Content Version: 13.0  © 2006-2021 Healthwise, Incorporated. Care instructions adapted under license by [facility] ([facility]). If you have questions about a medical condition or this instruction, always ask your healthcare professional. [address] any warranty or liability for your use of this information.

## 2021-12-01 DIAGNOSIS — E83.42 HYPOMAGNESEMIA: Primary | ICD-10-CM

## 2021-12-01 RX ORDER — MAGNESIUM OXIDE 400 MG/1
400 TABLET ORAL 2 TIMES DAILY
Qty: 180 TABLET | Refills: 3 | Status: SHIPPED | OUTPATIENT
Start: 2021-12-01 | End: 2021-12-08

## 2021-12-08 ENCOUNTER — TELEPHONE (OUTPATIENT)
Dept: CARDIOLOGY CLINIC | Age: 76
End: 2021-12-08

## 2021-12-08 ENCOUNTER — OFFICE VISIT (OUTPATIENT)
Dept: PULMONOLOGY | Age: 76
End: 2021-12-08
Payer: MEDICARE

## 2021-12-08 VITALS
BODY MASS INDEX: 29.78 KG/M2 | WEIGHT: 208 LBS | HEART RATE: 63 BPM | OXYGEN SATURATION: 97 % | SYSTOLIC BLOOD PRESSURE: 100 MMHG | DIASTOLIC BLOOD PRESSURE: 58 MMHG | HEIGHT: 70 IN

## 2021-12-08 DIAGNOSIS — G47.33 OSA (OBSTRUCTIVE SLEEP APNEA): Primary | ICD-10-CM

## 2021-12-08 DIAGNOSIS — I10 ESSENTIAL HYPERTENSION: Chronic | ICD-10-CM

## 2021-12-08 DIAGNOSIS — E66.9 CLASS 1 OBESITY WITH SERIOUS COMORBIDITY AND BODY MASS INDEX (BMI) OF 30.0 TO 30.9 IN ADULT, UNSPECIFIED OBESITY TYPE: ICD-10-CM

## 2021-12-08 DIAGNOSIS — I48.19 PERSISTENT ATRIAL FIBRILLATION (HCC): ICD-10-CM

## 2021-12-08 DIAGNOSIS — E11.43 TYPE 2 DIABETES MELLITUS WITH PERIPHERAL AUTONOMIC NEUROPATHY (HCC): Chronic | ICD-10-CM

## 2021-12-08 PROCEDURE — G8427 DOCREV CUR MEDS BY ELIG CLIN: HCPCS | Performed by: NURSE PRACTITIONER

## 2021-12-08 PROCEDURE — G8417 CALC BMI ABV UP PARAM F/U: HCPCS | Performed by: NURSE PRACTITIONER

## 2021-12-08 PROCEDURE — 2022F DILAT RTA XM EVC RTNOPTHY: CPT | Performed by: NURSE PRACTITIONER

## 2021-12-08 PROCEDURE — 4040F PNEUMOC VAC/ADMIN/RCVD: CPT | Performed by: NURSE PRACTITIONER

## 2021-12-08 PROCEDURE — 99214 OFFICE O/P EST MOD 30 MIN: CPT | Performed by: NURSE PRACTITIONER

## 2021-12-08 PROCEDURE — 1123F ACP DISCUSS/DSCN MKR DOCD: CPT | Performed by: NURSE PRACTITIONER

## 2021-12-08 PROCEDURE — 3017F COLORECTAL CA SCREEN DOC REV: CPT | Performed by: NURSE PRACTITIONER

## 2021-12-08 PROCEDURE — 3052F HG A1C>EQUAL 8.0%<EQUAL 9.0%: CPT | Performed by: NURSE PRACTITIONER

## 2021-12-08 PROCEDURE — 1036F TOBACCO NON-USER: CPT | Performed by: NURSE PRACTITIONER

## 2021-12-08 PROCEDURE — G8484 FLU IMMUNIZE NO ADMIN: HCPCS | Performed by: NURSE PRACTITIONER

## 2021-12-08 ASSESSMENT — SLEEP AND FATIGUE QUESTIONNAIRES
HOW LIKELY ARE YOU TO NOD OFF OR FALL ASLEEP WHILE LYING DOWN TO REST IN THE AFTERNOON WHEN CIRCUMSTANCES PERMIT: 1
HOW LIKELY ARE YOU TO NOD OFF OR FALL ASLEEP WHILE SITTING AND READING: 1
HOW LIKELY ARE YOU TO NOD OFF OR FALL ASLEEP WHILE SITTING AND TALKING TO SOMEONE: 0
HOW LIKELY ARE YOU TO NOD OFF OR FALL ASLEEP IN A CAR, WHILE STOPPED FOR A FEW MINUTES IN TRAFFIC: 0
HOW LIKELY ARE YOU TO NOD OFF OR FALL ASLEEP WHEN YOU ARE A PASSENGER IN A CAR FOR AN HOUR WITHOUT A BREAK: 3
HOW LIKELY ARE YOU TO NOD OFF OR FALL ASLEEP WHILE SITTING INACTIVE IN A PUBLIC PLACE: 1
ESS TOTAL SCORE: 7
HOW LIKELY ARE YOU TO NOD OFF OR FALL ASLEEP WHILE SITTING QUIETLY AFTER LUNCH WITHOUT ALCOHOL: 0
HOW LIKELY ARE YOU TO NOD OFF OR FALL ASLEEP WHILE WATCHING TV: 1

## 2021-12-08 NOTE — TELEPHONE ENCOUNTER
Patient called to let FW know he went to sleep study today. His BP was 100/58. He was told to report BP to his doctors. He has had continuous bleeding with urination for 2 weeks. He just started taking magnesium 12/4/21.

## 2021-12-08 NOTE — PROGRESS NOTES
Rosa Santiago MD, Loly Echols, [facility]  Tiffanie Kehrt CNP Stevan Leader ALFREDA [address]  [address] [facility], [address] [facility]- [phone]   [facility] [address]. [address] [facility]. [address] [phone]     [address] [facility] [address] [phone]      Assessment/Plan:      1. SURAJ (obstructive sleep apnea)  Assessment & Plan:   Chronic-Stable: Reviewed and analyzed results of physiologic download from patient's machine and reviewed with patient. Supplies and parts as needed for his machine. These are medically necessary. Limit caffeine use after 3pm. Based on the analyzed data will continue with current settings. Stable on his machine at current settings, getting benefit from the use, and having minimal side effects. Verbal and written instruction on PAP equipment cleaning and disinfection schedule provided. Encouraged patient to register machine for recall. Encouraged patient to follow up with urology. Patient states he will call urology when he leaves the office today. Follow up in 6 months or sooner if issues arise. Discussed the recall of Repironics devices with patient and the severity of his sleep apnea. Discussed the risks of untreated sleep apnea including but not limited to car accidents, heart attacks, strokes, and death. Alternative therapy may not exist or may be severely limited. Felt the benefits of continued usage of these devices may outweigh the risks identified in the recall notification. Advised to avoid use of unproven cleaning methods, such as ozone. Encouraged patient to register his machine for the recall and provided phone number. 2. Persistent atrial fibrillation (HCC)  Assessment & Plan:   Chronic- Stable. Discussed the importance of treating obstructive sleep apnea as part of the management of this disorder. Cont any meds per PCP and other physicians.     3. Essential hypertension  Assessment & Plan:   Chronic- Stable. Discussed the importance of treating obstructive sleep apnea as part of the management of this disorder. Cont any meds per PCP and other physicians. 4. Type 2 diabetes mellitus with peripheral autonomic neuropathy (HCC)  Assessment & Plan:   Chronic- Stable. Discussed the importance of treating obstructive sleep apnea as part of the management of this disorder. Cont any meds per PCP and other physicians. 5. Class 1 obesity with serious comorbidity and body mass index (BMI) of 30.0 to 30.9 in adult, unspecified obesity type  Assessment & Plan:  Chronic-not stable:  Discussed importance of treating obstructive sleep apnea and getting sufficient sleep to assist with weight control. Encouraged him to work on weight loss through diet and exercise. Recommended DASH or Mediterranean diets. Reviewed, analyzed, and documented physiologic data from patient's PAP machine. This information was analyzed to assess complexity and medical decision making in regards to further testing and management. The primary encounter diagnosis was SURAJ (obstructive sleep apnea). Diagnoses of Persistent atrial fibrillation ([facility] [address] [facility] Essential hypertension, Type 2 diabetes mellitus with peripheral autonomic neuropathy ([facility] [address] [facility] and Class 1 obesity with serious comorbidity and body mass index (BMI) of 30.0 to 30.9 in adult, unspecified obesity type were also pertinent to this visit. The chronic medical conditions listed are directly related to the primary diagnosis listed above. The management of the primary diagnosis affects the secondary diagnosis and vice versa. Subjective:   Subjective   Patient ID: Vicki Hodge is a 78 y.o. male.     Chief Complaint   Patient presents with    Sleep Apnea       HPI:  Machine Modem/Download Info:  Compliance (hours/night): 6.51 hrs/night  % of nights >= 4 hrs: 92.2 %  Download AHI (/hour): 3.1 /HR   Average IPAP Pressure: 16.3 cmH2O  Average EPAP Pressure: 12 cmH2O         AUTO BIPAP - Settings (Dax)  IPAP Max: 25 cmH2O  EPAP Min: 11 cmH2O  Pressure Support Min: 3  Pressure Support Max: 7             Comfort Settings  Humidity Level (0-8): 3  Heated Tubing (Yes/No): Yes  Flex/EPR (0-3): 3 PAP Mask  Clinically Relevant Leak: No     Masha Sun reports he is doing well with his machine. Pressure feels good and he is waking rested. he denies headaches, congestion, nosebleeds, dryness, aerophagia, or drowsiness while driving. Has been having urinary bleeding periodically but reports it recently stopped. Was seen by urology and was given parameters for monitoring urinary bleeding. Reports he was close to that red line over the weekend but bleeding has improved. He has not contacted urology. Reports he will call his urologist when he leaves my office today to discuss. [address]    [place] - Total score: 7    Social History     Socioeconomic History    Marital status: Single     Spouse name: Not on file    Number of children: Not on file    Years of education: Not on file    Highest education level: Not on file   Occupational History    Not on file   Tobacco Use    Smoking status: Never Smoker    Smokeless tobacco: Never Used   Substance and Sexual Activity    Alcohol use: Never    Drug use: Never    Sexual activity: Not on file   Other Topics Concern    Not on file   Social History Narrative    Not on file     Social Determinants of Health     Financial Resource Strain:     Difficulty of Paying Living Expenses: Not on file   Food Insecurity:     Worried About Running Out of Food in the Last Year: Not on file    Alek of Food in the Last Year: Not on file   Transportation Needs:     Lack of Transportation (Medical): Not on file    Lack of Transportation (Non-Medical):  Not on file   Physical Activity:     Days of Exercise per Week: Not on file    Minutes of Exercise per Session: Not on DAILY    PARoxetine (PAXIL) 20 mg, Oral, EVERY EVENING    sAXagliptin (ONGLYZA) 5 mg, Oral, EVERY EVENING          Vitals:  Weight BMI   Wt Readings from Last 3 Encounters:   12/08/21 208 lb (94.3 kg)   11/30/21 208 lb (94.3 kg)   08/26/21 204 lb (92.5 kg)    Body mass index is 29.84 kg/m².      BP HR SaO2   BP Readings from Last 3 Encounters:   12/08/21 (!) 100/58   11/30/21 122/80   08/26/21 130/72    Pulse Readings from Last 3 Encounters:   12/08/21 63   11/30/21 70   08/26/21 71    SpO2 Readings from Last 3 Encounters:   12/08/21 97%   08/26/21 97%   07/16/21 97%        Electronically signed by SUKUMAR Vázquez on 12/8/2021 at 10:26 AM

## 2021-12-08 NOTE — PROGRESS NOTES
Diagnosis: [x] SURAJ (G47.33) [] CSA (G47.31) [] Apnea (G47.30)   Length of Need: [x] 15 Months [] 99 Months [] Other:   Machine (RENUKA!): [] Respironics Dream Station      Auto [] ResMed AirSense     Auto [] Other:     []  CPAP () [] Bilevel ()   Mode: [] Auto [] Spontaneous    Mode: [] Auto [] Spontaneous                        Comfort Settings:      Humidifier: [] Heated ()        [x] Water chamber replacement ()/ 1 per 6 months        Mask:   [] Nasal () /1 per 3 months [x] Full Face () /1 per 3 months   [] Patient choice -Size and fit mask [x] Patient Choice - Size and fit mask   [] Dispense: [] Dispense:   [] Headgear () / 1 per 3 months [x] Headgear () / 1 per 3 months   [] Replacement Nasal Cushion ()/2 per month [x] Interface Replacement ()/1 per month   [] Replacement Nasal Pillows ()/2 per month         Tubing: [x] Heated ()/1 per 3 months    [] Standard ()/1 per 3 months [] Other:           Filters: [x] Non-disposable ()/1 per 6 months     [x] Ultra-Fine, Disposable ()/2 per month        Miscellaneous: [] Chin Strap ()/ 1 per 6 months [] O2 bleed-in:        LPM   [] Oxymetry on CPAP/Bilevel []  Other:         Start Order Date: 12/08/21    MEDICAL JUSTIFICATION:  I, the undersigned, certify that the above prescribed supplies are medically necessary for this patients wellbeing. In my opinion, the supplies are both reasonable and necessary in reference to accepted standards of medicalpractice in treatment of this patients condition. Soraida Merino NP    NPI: [id number]       Order Signed Date: 12/08/21  [address] [facility]  Pulmonary, Sleep, and Critical Care    Pulmonary, Sleep, and Critical Care  [facility] [address] [place] [address].  [address] [facility], [address] [facility] , [address] [facility]  Phone: [phone]    Fax: [address] [facility] 9  1945  [address]  [phone] (home)   [phone] (mobile)      Insurance Info (confirm with patient if correct):  Payor/Plan Subscr  Sex Relation Sub. Ins. ID Effective Group Num   1. MEDICARE - MEDenece Lips 1945 Male Self [id number] 1/1/15                                    [address] [address]   2.  [address] [facility] 1945 Male Self [id number] 1/1/15 [id number]                                   [address]

## 2021-12-08 NOTE — TELEPHONE ENCOUNTER
Spoke with the pt, this is mostly just an [place] call. His BP at the sleep center was 100/58, he is completely asymptomatic, denies LHD/dizziness. He does not check his BP at home, but he going to purchase a BP cuff and start. He will call our office if his numbers sustain too high or low. Regarding hematuria, this is not a new problem for him. He has figured out that certain activities (lifting while bending especially) trigger the bleeding and he is trying to avoid those activities. He has not stopped taking the Eliquis. He has not noticed any blood since 0400 this morning. Advised that he call his urologist for further recommendation and continue Eliquis. Pt verbalized understanding.

## 2021-12-08 NOTE — ASSESSMENT & PLAN NOTE
Chronic-Stable: Reviewed and analyzed results of physiologic download from patient's machine and reviewed with patient. Supplies and parts as needed for his machine. These are medically necessary. Limit caffeine use after 3pm. Based on the analyzed data will continue with current settings. Stable on his machine at current settings, getting benefit from the use, and having minimal side effects. Verbal and written instruction on PAP equipment cleaning and disinfection schedule provided. Encouraged patient to register machine for recall. Encouraged patient to follow up with urology. Patient states he will call urology when he leaves the office today. Follow up in 6 months or sooner if issues arise. Discussed the recall of [employer] devices with patient and the severity of his sleep apnea. Discussed the risks of untreated sleep apnea including but not limited to car accidents, heart attacks, strokes, and death. Alternative therapy may not exist or may be severely limited. Felt the benefits of continued usage of these devices may outweigh the risks identified in the recall notification. Advised to avoid use of unproven cleaning methods, such as ozone. Encouraged patient to register his machine for the recall and provided phone number.

## 2021-12-08 NOTE — LETTER
[facility]  [place] [address] [place] [address]  Phone: [phone]  Fax: [phone]    December 8, 2021       Patient: Masha Sun   MR Number: [id number]   YOB: 1945   Date of Visit: 12/8/2021       Brock Nowak was seen for a follow up visit today. Here is my assessment and plan as well as an attached copy of his visit today:    Essential hypertension   Chronic- Stable. Discussed the importance of treating obstructive sleep apnea as part of the management of this disorder. Cont any meds per PCP and other physicians. Persistent atrial fibrillation (HCC)   Chronic- Stable. Discussed the importance of treating obstructive sleep apnea as part of the management of this disorder. Cont any meds per PCP and other physicians. Type 2 diabetes mellitus with peripheral autonomic neuropathy (HCC)   Chronic- Stable. Discussed the importance of treating obstructive sleep apnea as part of the management of this disorder. Cont any meds per PCP and other physicians. Obesity  Chronic-not stable:  Discussed importance of treating obstructive sleep apnea and getting sufficient sleep to assist with weight control. Encouraged him to work on weight loss through diet and exercise. Recommended DASH or Mediterranean diets. SURAJ (obstructive sleep apnea)   Chronic-Stable: Reviewed and analyzed results of physiologic download from patient's machine and reviewed with patient. Supplies and parts as needed for his machine. These are medically necessary. Limit caffeine use after 3pm. Based on the analyzed data will continue with current settings. Stable on his machine at current settings, getting benefit from the use, and having minimal side effects. Verbal and written instruction on PAP equipment cleaning and disinfection schedule provided. Encouraged patient to register machine for recall. Encouraged patient to follow up with urology.  Patient states he will call

## 2021-12-27 DIAGNOSIS — E83.42 HYPOMAGNESEMIA: ICD-10-CM

## 2021-12-28 DIAGNOSIS — I48.19 PERSISTENT ATRIAL FIBRILLATION (HCC): Primary | ICD-10-CM

## 2021-12-28 LAB — MAGNESIUM: 1.8 MG/DL (ref 1.8–2.4)

## 2021-12-29 ENCOUNTER — TELEPHONE (OUTPATIENT)
Dept: CARDIOLOGY CLINIC | Age: 76
End: 2021-12-29

## 2021-12-29 NOTE — TELEPHONE ENCOUNTER
----- Message from SUKUMAR Trinh CNP sent at 12/28/2021  3:44 PM EST -----  Magnesium improved, stay on magnesium 400 mg BID  Repeat labs in 8 weeks

## 2021-12-29 NOTE — TELEPHONE ENCOUNTER
----- Message from SUKUMAR Graves CNP sent at 12/28/2021  3:44 PM EST -----  Magnesium improved, stay on magnesium 400 mg BID  Repeat labs in 8 weeks

## 2022-02-09 ENCOUNTER — TELEPHONE (OUTPATIENT)
Dept: CARDIOLOGY CLINIC | Age: 77
End: 2022-02-09

## 2022-02-09 NOTE — TELEPHONE ENCOUNTER
2/9/22      [facility]  [address] [place] [address] [address]: [phone]  Fax: [phone]    To Whom It May Concern:    Paul Le, 1945, is a patient under our care for paroxysmal atrial fibrillation . Paul Le is on Eliquis/apixaban for a [id number] of 3. Based on current manufacturing guidelines for the discontinuation of apixaban prior to any surgical procedure, \"ELIQUIS should be discontinued at least 48 hours prior to elective surgery or invasive procedures with a moderate or high risk of unacceptable or clinically significant bleeding. ELIQUIS should be discontinued at least 24 hours prior to elective surgery or invasive procedures with a low risk of bleeding or where the bleeding would be noncritical in location and easily controlled. Bridging anticoagulation during the 24 to 48 hours after stopping ELIQUIS and prior to the intervention is not generally required. ELIQUIS should be restarted after the surgical or other procedures as soon as adequate hemostasis has been established. \"    Please limit time off anticoagulation. If you have any other questions, please feel free to contact us. Sincerely,      Lillian Drew CNP    Patient should not have to be off for root canal unless requested by oral surgeon.

## 2022-02-09 NOTE — TELEPHONE ENCOUNTER
Pt called to ask Ashley Oleary permission to be off his Elquis for 2 upcoming procedure. He doesn't need a clearance just permission to be off. Root canal 2/18/22    Foot surgery 2/21/22      He needs to know when to stop and start back up.      Please advise [phone]

## 2022-02-16 NOTE — PROGRESS NOTES
Name_______________________________________Printed:____________________  Date and time of surgery____2/21/2022___0730_________________Arrival Time:______0600__________   1. The instructions given regarding when and if a patient needs to stop oral intake prior to surgery varies. Follow the specific instructions you were given                  _x__Nothing to eat or to drink after Midnight the night before.                   ____Carbo loading or ERAS instructions will be given to select patients-if you have been given those instructions -please do the following                           The evening before your surgery after dinner before midnight drink 40 ounces of gatorade. If you are diabetic use sugar free. The morning of surgery drink 40 ounces of water. This needs to be finished 3 hours prior to your surgery start time. 2. Take the following pills with a small sip of water on the morning of surgery___gabapentin clonidine metoprolol amlodipine finesteride________________________________________________                  Do not take blood pressure medications ending in pril or sartan the edwar prior to surgery or the morning of surgery_   3. Aspirin, Ibuprofen, Advil, Naproxen, Vitamin E and other Anti-inflammatory products and supplements should be stopped for 5 -7days before surgery or as directed by your physician. 4. Check with your Doctor regarding stopping Plavix, Coumadin,Eliquis, Lovenox,Effient,Pradaxa,Xarelto, Fragmin or other blood thinners and follow their instructions. 5. Do not smoke, and do not drink any alcoholic beverages 24 hours prior to surgery. This includes NA Beer. Refrain from the usage of any recreational drugs. 6. You may brush your teeth and gargle the morning of surgery. DO NOT SWALLOW WATER   7. You MUST make arrangements for a responsible adult to stay on site while you are here and take you home after your surgery. You will not be allowed to leave alone or drive yourself home.   It 19.  Visit our web site for additional information:  [email or web address]              20.During flu season no children under the age of 15 are permitted in the hospital for the safety of all patients. 21. If you take a long acting insulin in the evening only  take half of your usual  dose the night  before your procedure              22. If you use a c-pap please bring DOS if staying overnight,             23.For your convenience [facility] has a pharmacy on site to fill your prescriptions. 24. If you use oxygen and have a portable tank please bring it  with you the DOS             25. Bring a complete list of all your medications with name and dose include any supplements. 26. Other__________________________________________   *Please call pre admission testing if you any further questions   [address] [place]  [phone]   [facility] [address] MARA CAMACHO   [phone]           COVID TESTING    __x_ Covid test to be done 3-5 days prior to scheduled surgery -patient aware they are REQUIRED to bring a copy of the negative result DOS-if they receive a positive result to notify their surgeon         If known - indicate where patient is getting covid test done ___________________________________________________________    ___ [facility] - DOS    ___ Other___________________________________      [place] POLICY(subject to change)    There is a one visitor policy at [facility] for all surgeries and endoscopies. Whether the visitor can stay or will be asked to wait in the car will depend on the current policy and if social distancing can be maintained. The policy is subject to change at any time. Please make sure the visitor has a cell phone that is on,charged and able to accept calls, as this may be the way that the staff communicates with them. Pain management is NO VISITOR policyThe patients ride is expected to remain in the car with a cell phone for communication. If the ride is leaving the hospital grounds please make sure they are back in time for pickup. Have the patient inform the staff on arrival what their rides plans are while the patient is in the facility. At the MAIN there is one visitor allowed. Please note that the visitor policy is subject to change. All above information reviewed with patient in person or by phone. Patient verbalizes understanding. All questions and concerns addressed.                                                                                                  Patient/Rep___patient_________________                                                                                                                                    PRE OP INSTRUCTIONS

## 2022-02-21 ENCOUNTER — ANESTHESIA EVENT (OUTPATIENT)
Dept: OPERATING ROOM | Age: 77
End: 2022-02-21
Payer: MEDICARE

## 2022-02-21 ENCOUNTER — HOSPITAL ENCOUNTER (OUTPATIENT)
Age: 77
Setting detail: OUTPATIENT SURGERY
Discharge: HOME | End: 2022-02-21
Attending: PODIATRIST | Admitting: PODIATRIST
Payer: MEDICARE

## 2022-02-21 ENCOUNTER — ANESTHESIA (OUTPATIENT)
Dept: OPERATING ROOM | Age: 77
End: 2022-02-21
Payer: MEDICARE

## 2022-02-21 ENCOUNTER — APPOINTMENT (OUTPATIENT)
Dept: GENERAL RADIOLOGY | Age: 77
End: 2022-02-21
Attending: PODIATRIST
Payer: MEDICARE

## 2022-02-21 VITALS
HEART RATE: 54 BPM | TEMPERATURE: 97.5 F | BODY MASS INDEX: 29.45 KG/M2 | SYSTOLIC BLOOD PRESSURE: 156 MMHG | WEIGHT: 205.7 LBS | HEIGHT: 70 IN | RESPIRATION RATE: 16 BRPM | OXYGEN SATURATION: 96 % | DIASTOLIC BLOOD PRESSURE: 66 MMHG

## 2022-02-21 VITALS
RESPIRATION RATE: 12 BRPM | SYSTOLIC BLOOD PRESSURE: 106 MMHG | OXYGEN SATURATION: 91 % | DIASTOLIC BLOOD PRESSURE: 57 MMHG

## 2022-02-21 LAB
ANION GAP SERPL CALCULATED.3IONS-SCNC: 13 MMOL/L (ref 3–16)
BUN BLDV-MCNC: 28 MG/DL (ref 7–20)
CALCIUM SERPL-MCNC: 9.8 MG/DL (ref 8.3–10.6)
CHLORIDE BLD-SCNC: 100 MMOL/L (ref 99–110)
CO2: 25 MMOL/L (ref 21–32)
CREAT SERPL-MCNC: 1 MG/DL (ref 0.8–1.3)
GFR AFRICAN AMERICAN: >60
GFR NON-AFRICAN AMERICAN: >60
GLUCOSE BLD-MCNC: 172 MG/DL (ref 70–99)
GLUCOSE BLD-MCNC: 182 MG/DL (ref 70–99)
GLUCOSE BLD-MCNC: 206 MG/DL (ref 70–99)
HCT VFR BLD CALC: 34.9 % (ref 40.5–52.5)
HEMOGLOBIN: 11.8 G/DL (ref 13.5–17.5)
MCH RBC QN AUTO: 28.6 PG (ref 26–34)
MCHC RBC AUTO-ENTMCNC: 33.8 G/DL (ref 31–36)
MCV RBC AUTO: 84.5 FL (ref 80–100)
PDW BLD-RTO: 13.9 % (ref 12.4–15.4)
PERFORMED ON: ABNORMAL
PERFORMED ON: ABNORMAL
PLATELET # BLD: 232 K/UL (ref 135–450)
PMV BLD AUTO: 8.4 FL (ref 5–10.5)
POTASSIUM SERPL-SCNC: 3.6 MMOL/L (ref 3.5–5.1)
RBC # BLD: 4.13 M/UL (ref 4.2–5.9)
SODIUM BLD-SCNC: 138 MMOL/L (ref 136–145)
WBC # BLD: 8.8 K/UL (ref 4–11)

## 2022-02-21 PROCEDURE — 6360000002 HC RX W HCPCS: Performed by: PODIATRIST

## 2022-02-21 PROCEDURE — 85027 COMPLETE CBC AUTOMATED: CPT

## 2022-02-21 PROCEDURE — 6360000002 HC RX W HCPCS: Performed by: NURSE ANESTHETIST, CERTIFIED REGISTERED

## 2022-02-21 PROCEDURE — 73630 X-RAY EXAM OF FOOT: CPT

## 2022-02-21 PROCEDURE — 3700000001 HC ADD 15 MINUTES (ANESTHESIA): Performed by: PODIATRIST

## 2022-02-21 PROCEDURE — 6370000000 HC RX 637 (ALT 250 FOR IP): Performed by: PODIATRIST

## 2022-02-21 PROCEDURE — 88311 DECALCIFY TISSUE: CPT

## 2022-02-21 PROCEDURE — 2580000003 HC RX 258: Performed by: PODIATRIST

## 2022-02-21 PROCEDURE — 7100000011 HC PHASE II RECOVERY - ADDTL 15 MIN: Performed by: PODIATRIST

## 2022-02-21 PROCEDURE — 2709999900 HC NON-CHARGEABLE SUPPLY: Performed by: PODIATRIST

## 2022-02-21 PROCEDURE — 7100000000 HC PACU RECOVERY - FIRST 15 MIN: Performed by: PODIATRIST

## 2022-02-21 PROCEDURE — 2500000003 HC RX 250 WO HCPCS: Performed by: PODIATRIST

## 2022-02-21 PROCEDURE — 7100000010 HC PHASE II RECOVERY - FIRST 15 MIN: Performed by: PODIATRIST

## 2022-02-21 PROCEDURE — 80048 BASIC METABOLIC PNL TOTAL CA: CPT

## 2022-02-21 PROCEDURE — A4217 STERILE WATER/SALINE, 500 ML: HCPCS | Performed by: PODIATRIST

## 2022-02-21 PROCEDURE — 3600000013 HC SURGERY LEVEL 3 ADDTL 15MIN: Performed by: PODIATRIST

## 2022-02-21 PROCEDURE — 3600000003 HC SURGERY LEVEL 3 BASE: Performed by: PODIATRIST

## 2022-02-21 PROCEDURE — 88304 TISSUE EXAM BY PATHOLOGIST: CPT

## 2022-02-21 PROCEDURE — 3700000000 HC ANESTHESIA ATTENDED CARE: Performed by: PODIATRIST

## 2022-02-21 PROCEDURE — 2500000003 HC RX 250 WO HCPCS: Performed by: NURSE ANESTHETIST, CERTIFIED REGISTERED

## 2022-02-21 PROCEDURE — 7100000001 HC PACU RECOVERY - ADDTL 15 MIN: Performed by: PODIATRIST

## 2022-02-21 RX ORDER — SODIUM CHLORIDE 0.9 % (FLUSH) 0.9 %
5-40 SYRINGE (ML) INJECTION PRN
Status: DISCONTINUED | OUTPATIENT
Start: 2022-02-21 | End: 2022-02-21 | Stop reason: HOSPADM

## 2022-02-21 RX ORDER — CIPROFLOXACIN 2 MG/ML
400 INJECTION, SOLUTION INTRAVENOUS
Status: COMPLETED | OUTPATIENT
Start: 2022-02-21 | End: 2022-02-21

## 2022-02-21 RX ORDER — PROPOFOL 10 MG/ML
INJECTION, EMULSION INTRAVENOUS PRN
Status: DISCONTINUED | OUTPATIENT
Start: 2022-02-21 | End: 2022-02-21 | Stop reason: SDUPTHER

## 2022-02-21 RX ORDER — LIDOCAINE HYDROCHLORIDE 10 MG/ML
0.5 INJECTION, SOLUTION EPIDURAL; INFILTRATION; INTRACAUDAL; PERINEURAL ONCE
Status: DISCONTINUED | OUTPATIENT
Start: 2022-02-21 | End: 2022-02-21 | Stop reason: HOSPADM

## 2022-02-21 RX ORDER — ONDANSETRON 2 MG/ML
4 INJECTION INTRAMUSCULAR; INTRAVENOUS
Status: DISCONTINUED | OUTPATIENT
Start: 2022-02-21 | End: 2022-02-21 | Stop reason: HOSPADM

## 2022-02-21 RX ORDER — LIDOCAINE HYDROCHLORIDE 10 MG/ML
INJECTION, SOLUTION EPIDURAL; INFILTRATION; INTRACAUDAL; PERINEURAL
Status: COMPLETED | OUTPATIENT
Start: 2022-02-21 | End: 2022-02-21

## 2022-02-21 RX ORDER — GLYCOPYRROLATE 1 MG/5 ML
SYRINGE (ML) INTRAVENOUS PRN
Status: DISCONTINUED | OUTPATIENT
Start: 2022-02-21 | End: 2022-02-21 | Stop reason: SDUPTHER

## 2022-02-21 RX ORDER — LIDOCAINE HYDROCHLORIDE 20 MG/ML
INJECTION, SOLUTION EPIDURAL; INFILTRATION; INTRACAUDAL; PERINEURAL PRN
Status: DISCONTINUED | OUTPATIENT
Start: 2022-02-21 | End: 2022-02-21 | Stop reason: SDUPTHER

## 2022-02-21 RX ORDER — BACITRACIN ZINC AND POLYMYXIN B SULFATE 500; 1000 [USP'U]/G; [USP'U]/G
OINTMENT TOPICAL
Status: COMPLETED | OUTPATIENT
Start: 2022-02-21 | End: 2022-02-21

## 2022-02-21 RX ORDER — FENTANYL CITRATE 50 UG/ML
INJECTION, SOLUTION INTRAMUSCULAR; INTRAVENOUS PRN
Status: DISCONTINUED | OUTPATIENT
Start: 2022-02-21 | End: 2022-02-21 | Stop reason: SDUPTHER

## 2022-02-21 RX ORDER — MAGNESIUM HYDROXIDE 1200 MG/15ML
LIQUID ORAL CONTINUOUS PRN
Status: COMPLETED | OUTPATIENT
Start: 2022-02-21 | End: 2022-02-21

## 2022-02-21 RX ORDER — SODIUM CHLORIDE 0.9 % (FLUSH) 0.9 %
5-40 SYRINGE (ML) INJECTION EVERY 12 HOURS SCHEDULED
Status: DISCONTINUED | OUTPATIENT
Start: 2022-02-21 | End: 2022-02-21 | Stop reason: HOSPADM

## 2022-02-21 RX ORDER — PROPOFOL 10 MG/ML
INJECTION, EMULSION INTRAVENOUS CONTINUOUS PRN
Status: DISCONTINUED | OUTPATIENT
Start: 2022-02-21 | End: 2022-02-21 | Stop reason: SDUPTHER

## 2022-02-21 RX ORDER — BUPIVACAINE HYDROCHLORIDE 5 MG/ML
INJECTION, SOLUTION EPIDURAL; INTRACAUDAL
Status: COMPLETED | OUTPATIENT
Start: 2022-02-21 | End: 2022-02-21

## 2022-02-21 RX ORDER — HYDROMORPHONE HCL 110MG/55ML
0.5 PATIENT CONTROLLED ANALGESIA SYRINGE INTRAVENOUS EVERY 5 MIN PRN
Status: DISCONTINUED | OUTPATIENT
Start: 2022-02-21 | End: 2022-02-21 | Stop reason: HOSPADM

## 2022-02-21 RX ORDER — SODIUM CHLORIDE 9 MG/ML
25 INJECTION, SOLUTION INTRAVENOUS PRN
Status: DISCONTINUED | OUTPATIENT
Start: 2022-02-21 | End: 2022-02-21 | Stop reason: HOSPADM

## 2022-02-21 RX ORDER — HYDROMORPHONE HCL 110MG/55ML
0.25 PATIENT CONTROLLED ANALGESIA SYRINGE INTRAVENOUS EVERY 5 MIN PRN
Status: DISCONTINUED | OUTPATIENT
Start: 2022-02-21 | End: 2022-02-21 | Stop reason: HOSPADM

## 2022-02-21 RX ORDER — SODIUM CHLORIDE 9 MG/ML
INJECTION, SOLUTION INTRAVENOUS CONTINUOUS
Status: DISCONTINUED | OUTPATIENT
Start: 2022-02-21 | End: 2022-02-21 | Stop reason: HOSPADM

## 2022-02-21 RX ADMIN — LIDOCAINE HYDROCHLORIDE 50 MG: 20 INJECTION, SOLUTION EPIDURAL; INFILTRATION; INTRACAUDAL; PERINEURAL at 07:32

## 2022-02-21 RX ADMIN — Medication 0.1 MG: at 07:29

## 2022-02-21 RX ADMIN — FENTANYL CITRATE 25 MCG: 50 INJECTION, SOLUTION INTRAMUSCULAR; INTRAVENOUS at 07:38

## 2022-02-21 RX ADMIN — SODIUM CHLORIDE: 9 INJECTION, SOLUTION INTRAVENOUS at 06:48

## 2022-02-21 RX ADMIN — FENTANYL CITRATE 25 MCG: 50 INJECTION, SOLUTION INTRAMUSCULAR; INTRAVENOUS at 07:35

## 2022-02-21 RX ADMIN — CIPROFLOXACIN 400 MG: 2 INJECTION, SOLUTION INTRAVENOUS at 06:52

## 2022-02-21 RX ADMIN — PROPOFOL 50 MG: 10 INJECTION, EMULSION INTRAVENOUS at 07:32

## 2022-02-21 RX ADMIN — FENTANYL CITRATE 25 MCG: 50 INJECTION, SOLUTION INTRAMUSCULAR; INTRAVENOUS at 07:32

## 2022-02-21 RX ADMIN — VANCOMYCIN HYDROCHLORIDE 1500 MG: 1 INJECTION, POWDER, LYOPHILIZED, FOR SOLUTION INTRAVENOUS at 08:06

## 2022-02-21 RX ADMIN — PROPOFOL 100 MCG/KG/MIN: 10 INJECTION, EMULSION INTRAVENOUS at 07:32

## 2022-02-21 ASSESSMENT — PULMONARY FUNCTION TESTS
PIF_VALUE: 0
PIF_VALUE: 1
PIF_VALUE: 0
PIF_VALUE: 1
PIF_VALUE: 0
PIF_VALUE: 1
PIF_VALUE: 0
PIF_VALUE: 1
PIF_VALUE: 0
PIF_VALUE: 1
PIF_VALUE: 1

## 2022-02-21 ASSESSMENT — PAIN - FUNCTIONAL ASSESSMENT: PAIN_FUNCTIONAL_ASSESSMENT: 0-10

## 2022-02-21 NOTE — PROGRESS NOTES
Xray of right foot at this time. Transferred to phase 2 level of care. Vanco completed as ordered. No adverse reactions.

## 2022-02-21 NOTE — PROGRESS NOTES
Discharge instructions reviewed with pt and son. Denies pain or discomfort. VSS. Cam boot in place and RLE elevated and iced. Neurovascular checks WNL. Tolerating po without nausea.

## 2022-02-21 NOTE — BRIEF OP NOTE
Brief Postoperative Note      Patient: Abram Hernandez  YOB: 1945  MRN: [id number]    Date of Procedure: 2022    Pre-Op Diagnosis: S91.301D OPEN WOUND-RIGHT FOOT  M86.171 OSTEOMYELITITS-RIGHT FOOT    Post-Op Diagnosis: Same       Procedure(s):  SECOND METATARSAL HEAD RESECTION, RIGHT FOOT; COMPLEX WOUND CLOSURE-RIGHT FOOT; BONE BIOPSY-RIGHT FOOT ([id number], 52049, )-LOCAL    Surgeon(s):  Moriah Albert DPM    Assistant:  Renae Maldonado DPM, PGY-2    Anesthesia: Monitor Anesthesia Care    Hemostasis: Pneumatic ankle tourniquet at 250 mmHg for 22 minutes    Injectables: 20cc of 1% lidocaine plain preoperatively  20 cc of 0.5% Marcaine plain postoperatively    Materials: 4-0 Vicryl, 3-0 nylon    Estimated Blood Loss (mL): Minimal    Complications: None    Specimens:   ID Type Source Tests Collected by Time Destination   A : A) RIGHT FOOT SECOND METATARSAL HEAD Tissue Tissue SURGICAL PATHOLOGY Moriah Albert DPM 2022 0752        Implants:  * No implants in log *      Drains: * No LDAs found *    Findings: As expected, see op report    Electronically signed by Renae Maldonado DPM on 2022 at 8:14 AM

## 2022-02-21 NOTE — ANESTHESIA PRE PROCEDURE
Department of Anesthesiology  Preprocedure Note       Name:  Blanca Hunter   Age:  78 y.o.  :  1945                                          MRN:  [id number]         Date:  2022      Surgeon: Joey Yang):  Murali Cifuentes DPM    Procedure: Procedure(s):  SECOND METATARSAL HEAD RESECTION, RIGHT FOOT; COMPLEX WOUND CLOSURE-RIGHT FOOT; BONE BIOPSY-RIGHT FOOT ([id number], 67433, 75709)-LOCAL    Medications prior to admission:   Prior to Admission medications    Medication Sig Start Date End Date Taking? Authorizing Provider   amoxicillin (AMOXIL) 500 MG capsule 500 mg 4 times daily  21  Yes Historical Provider, MD   finasteride (PROSCAR) 5 MG tablet Take 5 mg by mouth daily 21  Yes Historical Provider, MD   gabapentin (NEURONTIN) 300 MG capsule Take 300 mg by mouth 2 times daily. Yes Historical Provider, MD   amLODIPine (NORVASC) 10 MG tablet Take 1 tablet by mouth every morning 20  Yes Historical Provider, MD   atorvastatin (LIPITOR) 40 MG tablet Take 40 mg by mouth nightly 20  Yes Historical Provider, MD   ciclopirox (LOPROX) 0.77 % cream Apply topically every morning 20  Yes Historical Provider, MD   cloNIDine (CATAPRES) 0.2 MG tablet Take 0.2 mg by mouth 2 times daily 20  Yes Historical Provider, MD   enalapril (VASOTEC) 10 MG tablet Take 10 mg by mouth 2 times daily 20  Yes Historical Provider, MD   fenofibrate (TRIGLIDE) 160 MG tablet Take 160 mg by mouth every evening 20  Yes Historical Provider, MD   glimepiride (AMARYL) 2 MG tablet Take 2 mg by mouth 2 times daily 20  Yes Historical Provider, MD   losartan-hydroCHLOROthiazide (HYZAAR) 100-25 MG per tablet Take 1 tablet by mouth every evening 20  Yes Historical Provider, MD   LORazepam (ATIVAN) 1 MG tablet Take 1 mg by mouth 2 times daily.  20  Yes Historical Provider, MD   metFORMIN (GLUCOPHAGE) 1000 MG tablet Take 1,000 mg by mouth 2 times daily 20  Yes Historical Provider, MD metoprolol tartrate (LOPRESSOR) 50 MG tablet Take 50 mg by mouth 2 times daily 11/4/20  Yes Historical Provider, MD   PARoxetine (PAXIL) 20 MG tablet Take 20 mg by mouth every evening 11/4/20  Yes Historical Provider, MD   blood glucose test strips (TRUETEST TEST) strip 1 strip 2 times daily 4/2/20  Yes Historical Provider, MD   Lancets MISC Use to check Glucose BID. Dx: 250.00. Dispense Contour Lancets 4/2/20  Yes Historical Provider, MD   apixaban (ELIQUIS) 5 MG TABS tablet Take 1 tablet by mouth 2 times daily 11/30/21   Darcus Ok, APRN - CNP   sAXagliptin (ONGLYZA) 5 MG TABS tablet Take 5 mg by mouth every evening 11/4/20   Historical Provider, MD       Current medications:    Current Facility-Administered Medications   Medication Dose Route Frequency Provider Last Rate Last Admin    0.9 % sodium chloride infusion   IntraVENous Continuous Moriah Bruins, DPM 50 mL/hr at 02/21/22 0648 New Bag at 02/21/22 0648    lidocaine PF 1 % injection 0.5 mL  0.5 mL IntraDERmal Once Moriah Bruins, DPM        ciprofloxacin (CIPRO) IVPB 400 mg  400 mg IntraVENous On Call to [address] [place] [address],  mL/hr at 02/21/22 0652 400 mg at 02/21/22 0793    vancomycin (VANCOCIN) 1,500 mg in dextrose 5 % 250 mL IVPB  1,500 mg IntraVENous Once Moriah Bruins, DPM           Allergies:     Allergies   Allergen Reactions    Bee Venom Swelling    Cephalexin Swelling    Cephalosporins     Propoxyphene Other (See Comments)     Hallucinations  (Darvon/Darvocet)    Sulfa Antibiotics        Problem List:    Patient Active Problem List   Diagnosis Code    Anxiety state F41.1    Type 2 diabetes mellitus with peripheral autonomic neuropathy (HCC) E11.43    Essential hypertension I10    Major depression, chronic F32.9    Migraine G43.909    Mixed hyperlipidemia E78.2    New onset a-fib (HCC) I48.91    Reflux esophagitis K21.00    Obesity E66.9    Persistent atrial fibrillation (HCC) I48.19    SURAJ (obstructive sleep apnea) G47.33    Diabetic foot infection (HCC) E11.628, L08.9    Osteomyelitis of right foot (HCC) M86.9    Elevated C-reactive protein (CRP) R79.82    Diabetic polyneuropathy associated with type 2 diabetes mellitus (HCC) E11.42    Elevated sed rate R70.0    Hyponatremia E87.1    Weight loss counseling, encounter for Z71.3    Need for diphtheria-tetanus-pertussis (Tdap) vaccine Z23    Group B streptococcal infection A49.1    Bacterial infection due to Morganella morganii A49.8    Pseudomonas infection A49.8    Post-op pain G89.18       Past Medical History:        Diagnosis Date    Allergic reaction to sulfonamide 1960    Atrial fibrillation ([address]     Bee sting reaction 2000    Bulging of cervical intervertebral disc     c4 & c5    Cephalexin adverse reaction 1993    Fracture of left elbow 1956    History of cardioversion     History of right inguinal hernia repair 1987    Hidalgo's neuroma of right foot 1989    Open fracture of left elbow 1991    Open fracture of left humerus 1991    Rupture of appendix 1970       Past Surgical History:        Procedure Laterality Date    TOE AMPUTATION Right 5/17/2021    THIRD DISTAL PHALANYX AMPUTATION VS THIRD GREAT TOE AMPUTATION RIGHT FOOT performed by Anthony Li DPM at [facility]       Social History:    Social History     Tobacco Use    Smoking status: Never Smoker    Smokeless tobacco: Never Used   Substance Use Topics    Alcohol use: Never                                Counseling given: Not Answered      Vital Signs (Current):   Vitals:    02/16/22 1550 02/21/22 0630   BP:  122/66   Pulse:  63   Resp:  20   Temp:  97.5 °F (36.4 °C)   TempSrc:  Temporal   SpO2:  95%   Weight: 204 lb (92.5 kg) 205 lb 11.2 oz (93.3 kg)   Height: 5' 10\" (1.778 m) 5' 10\" (1.778 m)                                              BP Readings from Last 3 Encounters:   02/21/22 122/66   12/08/21 (!) 100/58   11/30/21 122/80       NPO Status: Time of last liquid consumption: 0500                        Time of last solid consumption: 1800                        Date of last liquid consumption: 02/21/22                        Date of last solid food consumption: 02/20/22    BMI:   Wt Readings from Last 3 Encounters:   02/21/22 205 lb 11.2 oz (93.3 kg)   12/08/21 208 lb (94.3 kg)   11/30/21 208 lb (94.3 kg)     Body mass index is 29.51 kg/m².     CBC:   Lab Results   Component Value Date    WBC 5.8 11/30/2021    RBC 4.35 11/30/2021    HGB 12.6 11/30/2021    HCT 37.2 11/30/2021    MCV 85.4 11/30/2021    RDW 14.4 11/30/2021     11/30/2021       CMP:   Lab Results   Component Value Date     05/18/2021    K 3.9 05/18/2021    K 3.4 05/14/2021    CL 95 05/18/2021    CO2 25 05/18/2021    BUN 17 05/18/2021    CREATININE 0.9 05/18/2021    GFRAA >60 05/18/2021    LABGLOM >60 05/18/2021    GLUCOSE 220 05/18/2021    CALCIUM 9.6 05/18/2021       POC Tests:   Recent Labs     02/21/22  0646   POCGLU 172*       Coags:   Lab Results   Component Value Date    PROTIME 18.5 05/15/2021    INR 1.59 05/15/2021       HCG (If Applicable): No results found for: PREGTESTUR, PREGSERUM, HCG, HCGQUANT     ABGs: No results found for: PHART, PO2ART, SVF2DEC, RAY8GAO, BEART, L5IPBKPW     Type & Screen (If Applicable):  No results found for: LABABO, LABRH    Drug/Infectious Status (If Applicable):  No results found for: HIV, HEPCAB    COVID-19 Screening (If Applicable):   Lab Results   Component Value Date    COVID19 Not Detected 05/15/2021    COVID19 Not Detected 02/26/2021    COVID19 NOT DETECTED 02/17/2021           Anesthesia Evaluation  Patient summary reviewed  Airway: Mallampati: II  TM distance: >3 FB   Neck ROM: full  Mouth opening: > = 3 FB Dental: normal exam         Pulmonary:normal exam  breath sounds clear to auscultation  (+) sleep apnea:                             Cardiovascular:  Exercise tolerance: good (>4 METS),   (+) hypertension:,       ECG reviewed  Rhythm: regular  Rate: normal                    Neuro/Psych:   (+) neuromuscular disease:, headaches:, psychiatric history:            GI/Hepatic/Renal:             Endo/Other:    (+) Diabetes, . Abdominal:             Vascular: Other Findings:             Anesthesia Plan      MAC     ASA 3       Induction: intravenous. Anesthetic plan and risks discussed with patient. Plan discussed with CRNA.                   Janet Hernandez MD   2/21/2022

## 2022-02-21 NOTE — ANESTHESIA POSTPROCEDURE EVALUATION
Department of Anesthesiology  Postprocedure Note    Patient: Jose Hooper  MRN: [id number]  YOB: 1945  Date of evaluation: 2/21/2022  Time:  8:51 AM     Procedure Summary     Date: 02/21/22 Room / Location: [address]    Anesthesia Start: 0100 Anesthesia Stop: 0402    Procedure: SECOND METATARSAL HEAD RESECTION, RIGHT FOOT; COMPLEX WOUND CLOSURE-RIGHT FOOT; BONE BIOPSY-RIGHT FOOT (56903, 94696, 20240)-LOCAL (Right ) Diagnosis:       (S91.301D OPEN WOUND-RIGHT FOOT)      (M86.171 OSTEOMYELITITS-RIGHT FOOT)    Surgeons: Astrid Acosta DPM Responsible Provider: Kwaku Anaya MD    Anesthesia Type: MAC ASA Status: 3          Anesthesia Type: MAC    Nataly Phase I: Nataly Score: 10    Nataly Phase II:      Last vitals: Reviewed and per EMR flowsheets.        Anesthesia Post Evaluation    Patient location during evaluation: PACU  Patient participation: complete - patient participated  Level of consciousness: awake  Airway patency: patent  Nausea & Vomiting: no nausea and no vomiting  Complications: no  Respiratory status: acceptable  Hydration status: stable

## 2022-02-21 NOTE — PROGRESS NOTES
Pt arrived to PACU from OR in Stable condition and is RASS -1 (Drowsy) . Respirations are Regular Pattern; RR 8-20 = 12 on 3L O2 per nasal cannula. Skin warm and dry. Abd is  soft. Pain: denies at this time.     Right foot surgical site(s) intact with dressing= clean, dry and intact. Will continue to monitor for safety and comfort. S/P: AL HEAD RESECTION, RIGHT FOOT; COMPLEX WOUND CLOSURE-RIGHT FOOT; BONE BIOPSY-RIGHT FOOT (20279, 52664, 47219)-LOCAL (Right ), with Dr. Nadine Jackson at [facility].

## 2022-02-21 NOTE — OP NOTE
Operative Note      Patient: Sergio Michaels  YOB: 1945  MRN: [id number]    Date of Procedure: 2/21/2022    Pre-Op Diagnosis: S91.301D OPEN WOUND-RIGHT FOOT  M86.171 OSTEOMYELITITS-RIGHT FOOT    Post-Op Diagnosis: Same       Procedure(s):  SECOND METATARSAL HEAD RESECTION, RIGHT FOOT; COMPLEX WOUND CLOSURE-RIGHT FOOT; BONE BIOPSY-RIGHT FOOT ([id number], 66215, 20240)-LOCAL    Surgeon(s):  Braden Cox DPM    Assistant:   Mikael Sandhoff, DPM, PGY-2     Anesthesia: Monitor Anesthesia Care     Hemostasis: Pneumatic ankle tourniquet at 250 mmHg for 22 minutes     Injectables: 20cc of 1% lidocaine plain preoperatively  20 cc of 0.5% Marcaine plain postoperatively     Materials: 4-0 Vicryl, 3-0 nylon     Estimated Blood Loss (mL): Minimal    Complications: None    Specimens:   ID Type Source Tests Collected by Time Destination   A : A) RIGHT FOOT SECOND METATARSAL HEAD Tissue Tissue SURGICAL PATHOLOGY Braden Cox DPM 2/21/2022 0752        Implants:  * No implants in log *      Drains: * No LDAs found *    Findings: No purulent drainage encountered    INDICATIONS FOR PROCEDURE: This patient has signs and symptoms clinically  consistent with the above mentioned preoperative diagnosis. Having failed conservative treatment, it was determined that the patient would benefit from surgical intervention. All potential risks, benefits, and complications were discussed with the patient prior to the scheduling of surgery. All the patient's questions were answered and no guarantees were given. The patient wished to proceed with surgery, and informed written consent was obtained. DETAILS OF PROCEDURE: The patient was brought from the pre-operative area and placed on the operating table in the supine position. A pneumatic ankle tourniquet was placed around the patient's well-padded right lower extremity.  Following IV sedation, a local anesthetic block was then injected proximal to the incision site consisting of local anesthetic was injected about the incision sites consisting of 20cc of 0.5% marcaine plain, for the patient's postoperative comfort. A soft sterile dressing was applied consisting of antibiotic ointment, adaptic, gauze, cast padding, ACE bandage. The pneumatic ankle tourniquet was rapidly deflated after a total time of 22 minutes and a prompt hyperemic response was noted on all aspects of the patient's right lower extremity. END OF PROCEDURE: The patient tolerated the procedure and anesthesia well and was transported from the operating room to the PACU with vital signs stable and vascular status intact to all aspects of the patient's right lower extremity and digital capillary refill time immediate to the digits of the right  foot. Following a period of post-operative monitoring, the patient will be discharged home with written and oral wound care and follow-up instructions per Dr. Deana White. The patient is to follow-up with Dr. Deana White in his private office within 5-7 days. The patient is to keep dressing clean, dry and intact at all times. The patient is to call with if any complications occur.     Dictated on behalf of Dr. Pao Huerta DPM    Electronically signed by Cheryl Cesar DPM on 2/21/2022 at 10:21 AM

## 2022-02-21 NOTE — H&P
Date of Surgery Update:  Jeff Hickey was seen, history and physical examination reviewed, and patient examined by me today. There have been no significant clinical changes since the completion of the previous history and physical.     The nature of the procedure, possible complications, alternative forms of therapy, post-op course, and post-op goals have been explained to patient (or appropriate guardian) and patient's family and understanding was verbalized.  All questions have been answered. The consent has been signed. Patient's surgical site has been marked. Patient wishes to proceed with surgery.     Dr. Pao Huerta

## 2022-02-26 ENCOUNTER — HOSPITAL ENCOUNTER (INPATIENT)
Age: 77
LOS: 4 days | Discharge: HOME HEALTH | DRG: 638 | End: 2022-03-02
Attending: INTERNAL MEDICINE | Admitting: INTERNAL MEDICINE
Payer: MEDICARE

## 2022-02-26 LAB
GLUCOSE BLD-MCNC: 155 MG/DL (ref 70–99)
PERFORMED ON: ABNORMAL

## 2022-02-26 PROCEDURE — 85652 RBC SED RATE AUTOMATED: CPT

## 2022-02-26 PROCEDURE — 6370000000 HC RX 637 (ALT 250 FOR IP): Performed by: INTERNAL MEDICINE

## 2022-02-26 PROCEDURE — 36415 COLL VENOUS BLD VENIPUNCTURE: CPT

## 2022-02-26 PROCEDURE — 1200000000 HC SEMI PRIVATE

## 2022-02-26 PROCEDURE — 86140 C-REACTIVE PROTEIN: CPT

## 2022-02-26 PROCEDURE — 2580000003 HC RX 258: Performed by: INTERNAL MEDICINE

## 2022-02-26 PROCEDURE — 87040 BLOOD CULTURE FOR BACTERIA: CPT

## 2022-02-26 PROCEDURE — 6360000002 HC RX W HCPCS: Performed by: INTERNAL MEDICINE

## 2022-02-26 RX ORDER — ONDANSETRON 4 MG/1
4 TABLET, ORALLY DISINTEGRATING ORAL EVERY 8 HOURS PRN
Status: DISCONTINUED | OUTPATIENT
Start: 2022-02-26 | End: 2022-03-02 | Stop reason: HOSPADM

## 2022-02-26 RX ORDER — GLIPIZIDE 5 MG/1
5 TABLET ORAL
Status: DISCONTINUED | OUTPATIENT
Start: 2022-02-27 | End: 2022-02-26

## 2022-02-26 RX ORDER — METOPROLOL TARTRATE 50 MG/1
50 TABLET, FILM COATED ORAL 2 TIMES DAILY
Status: DISCONTINUED | OUTPATIENT
Start: 2022-02-26 | End: 2022-03-02 | Stop reason: HOSPADM

## 2022-02-26 RX ORDER — NICOTINE POLACRILEX 4 MG
15 LOZENGE BUCCAL PRN
Status: DISCONTINUED | OUTPATIENT
Start: 2022-02-26 | End: 2022-03-01 | Stop reason: ALTCHOICE

## 2022-02-26 RX ORDER — ATORVASTATIN CALCIUM 40 MG/1
40 TABLET, FILM COATED ORAL NIGHTLY
Status: DISCONTINUED | OUTPATIENT
Start: 2022-02-26 | End: 2022-03-02 | Stop reason: HOSPADM

## 2022-02-26 RX ORDER — INSULIN LISPRO 100 [IU]/ML
0-12 INJECTION, SOLUTION INTRAVENOUS; SUBCUTANEOUS
Status: DISCONTINUED | OUTPATIENT
Start: 2022-02-27 | End: 2022-03-02 | Stop reason: HOSPADM

## 2022-02-26 RX ORDER — AMLODIPINE BESYLATE 10 MG/1
10 TABLET ORAL EVERY MORNING
Status: DISCONTINUED | OUTPATIENT
Start: 2022-02-27 | End: 2022-03-02 | Stop reason: HOSPADM

## 2022-02-26 RX ORDER — PAROXETINE HYDROCHLORIDE 20 MG/1
20 TABLET, FILM COATED ORAL EVERY EVENING
Status: DISCONTINUED | OUTPATIENT
Start: 2022-02-26 | End: 2022-03-02 | Stop reason: HOSPADM

## 2022-02-26 RX ORDER — SODIUM CHLORIDE 0.9 % (FLUSH) 0.9 %
5-40 SYRINGE (ML) INJECTION PRN
Status: DISCONTINUED | OUTPATIENT
Start: 2022-02-26 | End: 2022-02-28 | Stop reason: SDUPTHER

## 2022-02-26 RX ORDER — DEXTROSE MONOHYDRATE 100 MG/ML
125 INJECTION, SOLUTION INTRAVENOUS PRN
Status: DISCONTINUED | OUTPATIENT
Start: 2022-02-26 | End: 2022-03-02 | Stop reason: HOSPADM

## 2022-02-26 RX ORDER — ACETAMINOPHEN 650 MG/1
650 SUPPOSITORY RECTAL EVERY 6 HOURS PRN
Status: DISCONTINUED | OUTPATIENT
Start: 2022-02-26 | End: 2022-03-02 | Stop reason: HOSPADM

## 2022-02-26 RX ORDER — SODIUM CHLORIDE 9 MG/ML
INJECTION, SOLUTION INTRAVENOUS CONTINUOUS
Status: ACTIVE | OUTPATIENT
Start: 2022-02-26 | End: 2022-02-27

## 2022-02-26 RX ORDER — GABAPENTIN 300 MG/1
300 CAPSULE ORAL 2 TIMES DAILY
Status: DISCONTINUED | OUTPATIENT
Start: 2022-02-27 | End: 2022-03-02 | Stop reason: HOSPADM

## 2022-02-26 RX ORDER — OXYCODONE HYDROCHLORIDE AND ACETAMINOPHEN 5; 325 MG/1; MG/1
1 TABLET ORAL EVERY 4 HOURS PRN
Status: DISCONTINUED | OUTPATIENT
Start: 2022-02-26 | End: 2022-03-02 | Stop reason: HOSPADM

## 2022-02-26 RX ORDER — FINASTERIDE 5 MG/1
5 TABLET, FILM COATED ORAL DAILY
Status: DISCONTINUED | OUTPATIENT
Start: 2022-02-27 | End: 2022-03-02 | Stop reason: HOSPADM

## 2022-02-26 RX ORDER — DEXTROSE MONOHYDRATE 50 MG/ML
100 INJECTION, SOLUTION INTRAVENOUS PRN
Status: DISCONTINUED | OUTPATIENT
Start: 2022-02-26 | End: 2022-03-02 | Stop reason: HOSPADM

## 2022-02-26 RX ORDER — ALOGLIPTIN 25 MG/1
25 TABLET, FILM COATED ORAL DAILY
Status: DISCONTINUED | OUTPATIENT
Start: 2022-02-27 | End: 2022-03-02 | Stop reason: HOSPADM

## 2022-02-26 RX ORDER — OXYCODONE HYDROCHLORIDE AND ACETAMINOPHEN 5; 325 MG/1; MG/1
2 TABLET ORAL EVERY 4 HOURS PRN
Status: DISCONTINUED | OUTPATIENT
Start: 2022-02-26 | End: 2022-03-02 | Stop reason: HOSPADM

## 2022-02-26 RX ORDER — SODIUM CHLORIDE 9 MG/ML
25 INJECTION, SOLUTION INTRAVENOUS PRN
Status: DISCONTINUED | OUTPATIENT
Start: 2022-02-26 | End: 2022-02-28 | Stop reason: SDUPTHER

## 2022-02-26 RX ORDER — POLYETHYLENE GLYCOL 3350 17 G/17G
17 POWDER, FOR SOLUTION ORAL DAILY PRN
Status: DISCONTINUED | OUTPATIENT
Start: 2022-02-26 | End: 2022-03-02 | Stop reason: HOSPADM

## 2022-02-26 RX ORDER — CLONIDINE HYDROCHLORIDE 0.1 MG/1
0.2 TABLET ORAL 2 TIMES DAILY
Status: DISCONTINUED | OUTPATIENT
Start: 2022-02-26 | End: 2022-03-02 | Stop reason: HOSPADM

## 2022-02-26 RX ORDER — SODIUM CHLORIDE 0.9 % (FLUSH) 0.9 %
5-40 SYRINGE (ML) INJECTION EVERY 12 HOURS SCHEDULED
Status: DISCONTINUED | OUTPATIENT
Start: 2022-02-26 | End: 2022-02-28 | Stop reason: SDUPTHER

## 2022-02-26 RX ORDER — LORAZEPAM 1 MG/1
1 TABLET ORAL 2 TIMES DAILY
Status: DISCONTINUED | OUTPATIENT
Start: 2022-02-26 | End: 2022-03-02 | Stop reason: HOSPADM

## 2022-02-26 RX ORDER — ONDANSETRON 2 MG/ML
4 INJECTION INTRAMUSCULAR; INTRAVENOUS EVERY 6 HOURS PRN
Status: DISCONTINUED | OUTPATIENT
Start: 2022-02-26 | End: 2022-03-02 | Stop reason: HOSPADM

## 2022-02-26 RX ORDER — ACETAMINOPHEN 325 MG/1
650 TABLET ORAL EVERY 6 HOURS PRN
Status: DISCONTINUED | OUTPATIENT
Start: 2022-02-26 | End: 2022-03-02 | Stop reason: HOSPADM

## 2022-02-26 RX ORDER — INSULIN LISPRO 100 [IU]/ML
0-6 INJECTION, SOLUTION INTRAVENOUS; SUBCUTANEOUS NIGHTLY
Status: DISCONTINUED | OUTPATIENT
Start: 2022-02-26 | End: 2022-03-02 | Stop reason: HOSPADM

## 2022-02-26 RX ORDER — FENOFIBRATE 160 MG/1
160 TABLET ORAL DAILY
Status: DISCONTINUED | OUTPATIENT
Start: 2022-02-27 | End: 2022-03-02 | Stop reason: HOSPADM

## 2022-02-26 RX ADMIN — ATORVASTATIN CALCIUM 40 MG: 40 TABLET, FILM COATED ORAL at 23:46

## 2022-02-26 RX ADMIN — OXYCODONE HYDROCHLORIDE AND ACETAMINOPHEN 2 TABLET: 5; 325 TABLET ORAL at 23:56

## 2022-02-26 RX ADMIN — LORAZEPAM 1 MG: 1 TABLET ORAL at 23:46

## 2022-02-26 RX ADMIN — CLONIDINE HYDROCHLORIDE 0.2 MG: 0.1 TABLET ORAL at 23:45

## 2022-02-26 RX ADMIN — MICONAZOLE NITRATE: 20 CREAM TOPICAL at 23:48

## 2022-02-26 RX ADMIN — MEROPENEM 1000 MG: 1 INJECTION, POWDER, FOR SOLUTION INTRAVENOUS at 23:25

## 2022-02-26 RX ADMIN — SODIUM CHLORIDE, PRESERVATIVE FREE 10 ML: 5 INJECTION INTRAVENOUS at 23:47

## 2022-02-26 RX ADMIN — PAROXETINE HYDROCHLORIDE 20 MG: 20 TABLET, FILM COATED ORAL at 23:46

## 2022-02-26 RX ADMIN — APIXABAN 5 MG: 5 TABLET, FILM COATED ORAL at 23:46

## 2022-02-26 RX ADMIN — METOPROLOL TARTRATE 50 MG: 50 TABLET, FILM COATED ORAL at 23:46

## 2022-02-26 RX ADMIN — INSULIN LISPRO 1 UNITS: 100 INJECTION, SOLUTION INTRAVENOUS; SUBCUTANEOUS at 23:51

## 2022-02-26 ASSESSMENT — PAIN SCALES - GENERAL: PAINLEVEL_OUTOF10: 7

## 2022-02-27 ENCOUNTER — APPOINTMENT (OUTPATIENT)
Dept: GENERAL RADIOLOGY | Age: 77
DRG: 638 | End: 2022-02-27
Attending: INTERNAL MEDICINE
Payer: MEDICARE

## 2022-02-27 PROBLEM — I48.20 CHRONIC ATRIAL FIBRILLATION (HCC): Status: ACTIVE | Noted: 2021-01-11

## 2022-02-27 LAB
ANION GAP SERPL CALCULATED.3IONS-SCNC: 10 MMOL/L (ref 3–16)
BASOPHILS ABSOLUTE: 0.1 K/UL (ref 0–0.2)
BASOPHILS RELATIVE PERCENT: 0.8 %
BUN BLDV-MCNC: 23 MG/DL (ref 7–20)
C-REACTIVE PROTEIN: 205.3 MG/L (ref 0–5.1)
CALCIUM SERPL-MCNC: 8.9 MG/DL (ref 8.3–10.6)
CHLORIDE BLD-SCNC: 99 MMOL/L (ref 99–110)
CO2: 27 MMOL/L (ref 21–32)
CREAT SERPL-MCNC: 1 MG/DL (ref 0.8–1.3)
EOSINOPHILS ABSOLUTE: 0.2 K/UL (ref 0–0.6)
EOSINOPHILS RELATIVE PERCENT: 2.7 %
GFR AFRICAN AMERICAN: >60
GFR NON-AFRICAN AMERICAN: >60
GLUCOSE BLD-MCNC: 157 MG/DL (ref 70–99)
GLUCOSE BLD-MCNC: 176 MG/DL (ref 70–99)
GLUCOSE BLD-MCNC: 192 MG/DL (ref 70–99)
GLUCOSE BLD-MCNC: 213 MG/DL (ref 70–99)
GLUCOSE BLD-MCNC: 270 MG/DL (ref 70–99)
HCT VFR BLD CALC: 27.8 % (ref 40.5–52.5)
HEMOGLOBIN: 9.5 G/DL (ref 13.5–17.5)
LYMPHOCYTES ABSOLUTE: 0.8 K/UL (ref 1–5.1)
LYMPHOCYTES RELATIVE PERCENT: 12.2 %
MCH RBC QN AUTO: 29.2 PG (ref 26–34)
MCHC RBC AUTO-ENTMCNC: 34.3 G/DL (ref 31–36)
MCV RBC AUTO: 85.2 FL (ref 80–100)
MONOCYTES ABSOLUTE: 0.6 K/UL (ref 0–1.3)
MONOCYTES RELATIVE PERCENT: 8.5 %
NEUTROPHILS ABSOLUTE: 4.9 K/UL (ref 1.7–7.7)
NEUTROPHILS RELATIVE PERCENT: 75.8 %
PDW BLD-RTO: 14.2 % (ref 12.4–15.4)
PERFORMED ON: ABNORMAL
PLATELET # BLD: 236 K/UL (ref 135–450)
PMV BLD AUTO: 8.7 FL (ref 5–10.5)
POTASSIUM SERPL-SCNC: 3.4 MMOL/L (ref 3.5–5.1)
RBC # BLD: 3.26 M/UL (ref 4.2–5.9)
SEDIMENTATION RATE, ERYTHROCYTE: >120 MM/HR (ref 0–20)
SODIUM BLD-SCNC: 136 MMOL/L (ref 136–145)
WBC # BLD: 6.5 K/UL (ref 4–11)

## 2022-02-27 PROCEDURE — 6360000002 HC RX W HCPCS: Performed by: INTERNAL MEDICINE

## 2022-02-27 PROCEDURE — 73630 X-RAY EXAM OF FOOT: CPT

## 2022-02-27 PROCEDURE — 87205 SMEAR GRAM STAIN: CPT

## 2022-02-27 PROCEDURE — 36415 COLL VENOUS BLD VENIPUNCTURE: CPT

## 2022-02-27 PROCEDURE — 99223 1ST HOSP IP/OBS HIGH 75: CPT | Performed by: INTERNAL MEDICINE

## 2022-02-27 PROCEDURE — 86403 PARTICLE AGGLUT ANTBDY SCRN: CPT

## 2022-02-27 PROCEDURE — 87070 CULTURE OTHR SPECIMN AEROBIC: CPT

## 2022-02-27 PROCEDURE — 85025 COMPLETE CBC W/AUTO DIFF WBC: CPT

## 2022-02-27 PROCEDURE — 87186 SC STD MICRODIL/AGAR DIL: CPT

## 2022-02-27 PROCEDURE — 2580000003 HC RX 258: Performed by: INTERNAL MEDICINE

## 2022-02-27 PROCEDURE — 1200000000 HC SEMI PRIVATE

## 2022-02-27 PROCEDURE — 87075 CULTR BACTERIA EXCEPT BLOOD: CPT

## 2022-02-27 PROCEDURE — 6370000000 HC RX 637 (ALT 250 FOR IP): Performed by: INTERNAL MEDICINE

## 2022-02-27 PROCEDURE — 87077 CULTURE AEROBIC IDENTIFY: CPT

## 2022-02-27 PROCEDURE — 80048 BASIC METABOLIC PNL TOTAL CA: CPT

## 2022-02-27 RX ORDER — MAGNESIUM OXIDE 400 MG/1
400 TABLET ORAL 2 TIMES DAILY
COMMUNITY
End: 2022-08-03 | Stop reason: SDUPTHER

## 2022-02-27 RX ORDER — NALOXONE HYDROCHLORIDE 4 MG/.1ML
1 SPRAY NASAL PRN
COMMUNITY

## 2022-02-27 RX ORDER — POLYVINYL ALCOHOL 14 MG/ML
1 SOLUTION/ DROPS OPHTHALMIC PRN
Status: DISCONTINUED | OUTPATIENT
Start: 2022-02-27 | End: 2022-03-02 | Stop reason: HOSPADM

## 2022-02-27 RX ORDER — PROMETHAZINE HYDROCHLORIDE 25 MG/1
25 TABLET ORAL EVERY 8 HOURS
COMMUNITY

## 2022-02-27 RX ORDER — DOXYCYCLINE HYCLATE 100 MG
100 TABLET ORAL 2 TIMES DAILY
Status: ON HOLD | COMMUNITY
End: 2022-03-02 | Stop reason: HOSPADM

## 2022-02-27 RX ORDER — AMOXICILLIN 500 MG/1
500 CAPSULE ORAL 3 TIMES DAILY
Status: ON HOLD | COMMUNITY
End: 2022-03-02 | Stop reason: HOSPADM

## 2022-02-27 RX ADMIN — FINASTERIDE 5 MG: 5 TABLET, FILM COATED ORAL at 09:05

## 2022-02-27 RX ADMIN — VANCOMYCIN HYDROCHLORIDE 1500 MG: 500 INJECTION, POWDER, LYOPHILIZED, FOR SOLUTION INTRAVENOUS at 00:15

## 2022-02-27 RX ADMIN — AMLODIPINE BESYLATE 10 MG: 10 TABLET ORAL at 09:04

## 2022-02-27 RX ADMIN — METOPROLOL TARTRATE 50 MG: 50 TABLET, FILM COATED ORAL at 21:06

## 2022-02-27 RX ADMIN — OXYCODONE HYDROCHLORIDE AND ACETAMINOPHEN 1 TABLET: 5; 325 TABLET ORAL at 04:24

## 2022-02-27 RX ADMIN — SODIUM CHLORIDE, PRESERVATIVE FREE 10 ML: 5 INJECTION INTRAVENOUS at 21:06

## 2022-02-27 RX ADMIN — OXYCODONE HYDROCHLORIDE AND ACETAMINOPHEN 2 TABLET: 5; 325 TABLET ORAL at 18:53

## 2022-02-27 RX ADMIN — SODIUM CHLORIDE, PRESERVATIVE FREE 10 ML: 5 INJECTION INTRAVENOUS at 09:05

## 2022-02-27 RX ADMIN — MEROPENEM 1000 MG: 1 INJECTION, POWDER, FOR SOLUTION INTRAVENOUS at 06:57

## 2022-02-27 RX ADMIN — GABAPENTIN 300 MG: 300 CAPSULE ORAL at 15:42

## 2022-02-27 RX ADMIN — LORAZEPAM 1 MG: 1 TABLET ORAL at 21:05

## 2022-02-27 RX ADMIN — POLYVINYL ALCOHOL 1 DROP: 14 SOLUTION/ DROPS OPHTHALMIC at 00:46

## 2022-02-27 RX ADMIN — INSULIN LISPRO 6 UNITS: 100 INJECTION, SOLUTION INTRAVENOUS; SUBCUTANEOUS at 12:01

## 2022-02-27 RX ADMIN — INSULIN LISPRO 2 UNITS: 100 INJECTION, SOLUTION INTRAVENOUS; SUBCUTANEOUS at 09:09

## 2022-02-27 RX ADMIN — MICONAZOLE NITRATE: 20 CREAM TOPICAL at 21:08

## 2022-02-27 RX ADMIN — SODIUM CHLORIDE: 9 INJECTION, SOLUTION INTRAVENOUS at 00:05

## 2022-02-27 RX ADMIN — FENOFIBRATE 160 MG: 160 TABLET ORAL at 09:05

## 2022-02-27 RX ADMIN — VANCOMYCIN HYDROCHLORIDE 1250 MG: 10 INJECTION, POWDER, LYOPHILIZED, FOR SOLUTION INTRAVENOUS at 17:46

## 2022-02-27 RX ADMIN — METOPROLOL TARTRATE 50 MG: 50 TABLET, FILM COATED ORAL at 09:05

## 2022-02-27 RX ADMIN — ALOGLIPTIN 25 MG: 25 TABLET, FILM COATED ORAL at 09:05

## 2022-02-27 RX ADMIN — INSULIN LISPRO 4 UNITS: 100 INJECTION, SOLUTION INTRAVENOUS; SUBCUTANEOUS at 17:28

## 2022-02-27 RX ADMIN — CLONIDINE HYDROCHLORIDE 0.2 MG: 0.1 TABLET ORAL at 21:05

## 2022-02-27 RX ADMIN — CLONIDINE HYDROCHLORIDE 0.2 MG: 0.1 TABLET ORAL at 09:05

## 2022-02-27 RX ADMIN — PAROXETINE HYDROCHLORIDE 20 MG: 20 TABLET, FILM COATED ORAL at 17:30

## 2022-02-27 RX ADMIN — LORAZEPAM 1 MG: 1 TABLET ORAL at 09:04

## 2022-02-27 RX ADMIN — INSULIN LISPRO 1 UNITS: 100 INJECTION, SOLUTION INTRAVENOUS; SUBCUTANEOUS at 21:20

## 2022-02-27 RX ADMIN — OXYCODONE HYDROCHLORIDE AND ACETAMINOPHEN 2 TABLET: 5; 325 TABLET ORAL at 23:48

## 2022-02-27 RX ADMIN — MEROPENEM 1000 MG: 1 INJECTION, POWDER, FOR SOLUTION INTRAVENOUS at 14:17

## 2022-02-27 RX ADMIN — GABAPENTIN 300 MG: 300 CAPSULE ORAL at 09:04

## 2022-02-27 RX ADMIN — APIXABAN 5 MG: 5 TABLET, FILM COATED ORAL at 21:05

## 2022-02-27 RX ADMIN — MEROPENEM 1000 MG: 1 INJECTION, POWDER, FOR SOLUTION INTRAVENOUS at 23:40

## 2022-02-27 RX ADMIN — ATORVASTATIN CALCIUM 40 MG: 40 TABLET, FILM COATED ORAL at 21:06

## 2022-02-27 RX ADMIN — APIXABAN 5 MG: 5 TABLET, FILM COATED ORAL at 09:04

## 2022-02-27 RX ADMIN — ACETAMINOPHEN 650 MG: 325 TABLET ORAL at 13:08

## 2022-02-27 ASSESSMENT — PAIN DESCRIPTION - PAIN TYPE
TYPE: ACUTE PAIN
TYPE: ACUTE PAIN

## 2022-02-27 ASSESSMENT — PAIN - FUNCTIONAL ASSESSMENT
PAIN_FUNCTIONAL_ASSESSMENT: PREVENTS OR INTERFERES SOME ACTIVE ACTIVITIES AND ADLS
PAIN_FUNCTIONAL_ASSESSMENT: ACTIVITIES ARE NOT PREVENTED

## 2022-02-27 ASSESSMENT — PAIN DESCRIPTION - LOCATION
LOCATION: FOOT
LOCATION: FOOT
LOCATION: HEAD

## 2022-02-27 ASSESSMENT — ENCOUNTER SYMPTOMS
SORE THROAT: 0
EYE REDNESS: 0
EYE DISCHARGE: 0
RHINORRHEA: 0
NAUSEA: 0
WHEEZING: 0
DIARRHEA: 0
COUGH: 0
BACK PAIN: 0
ABDOMINAL PAIN: 0
SHORTNESS OF BREATH: 0
CONSTIPATION: 0
TROUBLE SWALLOWING: 0

## 2022-02-27 ASSESSMENT — PAIN SCALES - GENERAL
PAINLEVEL_OUTOF10: 3
PAINLEVEL_OUTOF10: 5
PAINLEVEL_OUTOF10: 0
PAINLEVEL_OUTOF10: 3
PAINLEVEL_OUTOF10: 8
PAINLEVEL_OUTOF10: 0
PAINLEVEL_OUTOF10: 8
PAINLEVEL_OUTOF10: 6

## 2022-02-27 ASSESSMENT — PAIN DESCRIPTION - PROGRESSION
CLINICAL_PROGRESSION: GRADUALLY IMPROVING
CLINICAL_PROGRESSION: GRADUALLY WORSENING

## 2022-02-27 ASSESSMENT — PAIN DESCRIPTION - ONSET
ONSET: ON-GOING
ONSET: ON-GOING

## 2022-02-27 ASSESSMENT — PAIN DESCRIPTION - FREQUENCY
FREQUENCY: CONTINUOUS
FREQUENCY: CONTINUOUS

## 2022-02-27 ASSESSMENT — PAIN DESCRIPTION - DESCRIPTORS
DESCRIPTORS: SHARP
DESCRIPTORS: STABBING
DESCRIPTORS: HEADACHE

## 2022-02-27 ASSESSMENT — PAIN DESCRIPTION - ORIENTATION
ORIENTATION: RIGHT
ORIENTATION: RIGHT

## 2022-02-27 NOTE — PROGRESS NOTES
HOSPITALISTS PROGRESS NOTE    2/27/2022 10:31 AM        Name: Adan Hill . Admitted: 2/26/2022  Primary Care Provider: Benny Snow MD (Tel: [phone])      Problem List  Principal Problem:    Diabetic foot infection [facility])  Resolved Problems:    * No resolved hospital problems. *       Assessment & Plan:   Right foot infection  Recent surgical intervention by podiatry, podiatry and ID on board, antibiotics Vanco and Merrem  Sed rate more than 120    Mild EDER  IVF,    A. fib  Eliquis metoprolol    Hypertension  Norvasc metoprolol    Diabetes mellitus  Insulin sliding scale    IV Access: Peripheral  Sprigner: No  Diet: ADULT DIET; Regular; 4 carb choices (60 gm/meal); Low Fat/Low Chol/High Fiber/MARTIN  Code:Full Code  DVT PPX Eliquis  Disposition monitor in the hospital      Brief Course: Transfer from [facility] ER worsening foot infection. Patient was treated with antibiotic on Wednesday but seems like getting worse. Patient did had a surgery on Monday at [facility]. He had second metatarsal head resection right foot complex wound closure bone biopsy right foot. CC: Foot pain    Subjective:  .     Patient is eating breakfast, sitting in the bed, sugars are better controlled, slightly elevated blood pressure,  Reviewed interval ancillary notes    Current Medications  polyvinyl alcohol (LIQUIFILM TEARS) 1.4 % ophthalmic solution 1 drop, PRN  vancomycin (VANCOCIN) 1,250 mg in dextrose 5 % 250 mL IVPB, Q18H  amLODIPine (NORVASC) tablet 10 mg, QAM  apixaban (ELIQUIS) tablet 5 mg, BID  atorvastatin (LIPITOR) tablet 40 mg, Nightly  miconazole (MICOTIN) 2 % cream, BID  cloNIDine (CATAPRES) tablet 0.2 mg, BID  fenofibrate (TRIGLIDE) tablet 160 mg, Daily  finasteride (PROSCAR) tablet 5 mg, Daily  gabapentin (NEURONTIN) capsule 300 mg, BID  LORazepam (ATIVAN) tablet 1 mg, BID  metoprolol tartrate (LOPRESSOR) tablet 50 mg, BID  PARoxetine (PAXIL) tablet 20 mg, QPM  glucose (GLUTOSE) 40 % oral gel 15 g, PRN  dextrose 10 % infusion, PRN  glucagon (rDNA) injection 1 mg, PRN  dextrose 5 % solution, PRN  insulin lispro (1 Unit Dial) 0-12 Units, TID WC  insulin lispro (1 Unit Dial) 0-6 Units, Nightly  alogliptin (NESINA) tablet 25 mg, Daily  oxyCODONE-acetaminophen (PERCOCET) 5-325 MG per tablet 1 tablet, Q4H PRN   Or  oxyCODONE-acetaminophen (PERCOCET) 5-325 MG per tablet 2 tablet, Q4H PRN  sodium chloride flush 0.9 % injection 5-40 mL, 2 times per day  sodium chloride flush 0.9 % injection 5-40 mL, PRN  0.9 % sodium chloride infusion, PRN  ondansetron (ZOFRAN-ODT) disintegrating tablet 4 mg, Q8H PRN   Or  ondansetron (ZOFRAN) injection 4 mg, Q6H PRN  polyethylene glycol (GLYCOLAX) packet 17 g, Daily PRN  acetaminophen (TYLENOL) tablet 650 mg, Q6H PRN   Or  acetaminophen (TYLENOL) suppository 650 mg, Q6H PRN  meropenem (MERREM) 1,000 mg in sodium chloride 0.9 % 100 mL IVPB (mini-bag), Q8H        Objective:  BP (!) 143/77   Pulse 66   Temp 98.1 °F (36.7 °C) (Oral)   Resp 18   SpO2 98%     Intake/Output Summary (Last 24 hours) at 2/27/2022 1031  Last data filed at 2/27/2022 7602  Gross per 24 hour   Intake 360 ml   Output 725 ml   Net -365 ml      Wt Readings from Last 3 Encounters:   02/21/22 205 lb 11.2 oz (93.3 kg)   12/08/21 208 lb (94.3 kg)   11/30/21 208 lb (94.3 kg)   Right foot dressing noticed, wound pictures are reviewed from [facility] ER note  General appearance:  Appears comfortable  Eyes: Sclera clear. Pupils equal.  ENT: Moist oral mucosa. Trachea midline, no adenopathy. Cardiovascular: Regular rhythm, normal S1, S2. No murmur. No edema in lower extremities  Respiratory: Not using accessory muscles. Good inspiratory effort. Clear to auscultation bilaterally, no wheeze or crackles.    GI: Abdomen soft, no tenderness, not distended, normal bowel sounds  Musculoskeletal: No cyanosis in digits, neck supple  Neurology: CN 2-12 grossly intact. No speech or motor deficits  Psych: Normal affect. Alert and oriented in time, place and person    Labs and Tests:  CBC:   Recent Labs     02/27/22  0611   WBC 6.5   HGB 9.5*        BMP:    Recent Labs     02/27/22  0611      K 3.4*   CL 99   CO2 27   BUN 23*   CREATININE 1.0   GLUCOSE 157*     Hepatic: No results for input(s): AST, ALT, ALB, BILITOT, ALKPHOS in the last 72 hours. XR FOOT RIGHT (MIN 3 VIEWS)   Final Result      Increased soft tissue swelling along the second digit. Mild lucency along the base of the second proximal phalanx is indeterminate for osteomyelitis.               Chantelle Dorado MD   2/27/2022 10:31 AM

## 2022-02-27 NOTE — PROGRESS NOTES
4 Eyes Admission Assessment     I agree as the admission nurse that 2 RN's have performed a thorough Head to Toe Skin Assessment on the patient. ALL assessment sites listed below have been assessed on admission. Areas assessed by both nurses: ***  [x]   Head, Face, and Ears   [x]   Shoulders, Back, and Chest  [x]   Arms, Elbows, and Hands   [x]   Coccyx, Sacrum, and Ischium  [x]   Legs, Feet, and Heels        Does the Patient have Skin Breakdown? Wound to R foot.   Alonzo Prevention initiated:  Yes   Wound Care Orders initiated:  No      [facility] nurse consulted for Pressure Injury (Stage 3,4, Unstageable, DTI, NWPT, and Complex wounds) or Aolnzo score 18 or lower:  No      Nurse 1 eSignature: Electronically signed by Jojo Thompson RN on 2/27/22 at 5:07 AM EST    **SHARE this note so that the co-signing nurse is able to place an eSignature**    Nurse 2 eSignature: {Esignature:[id number]}

## 2022-02-27 NOTE — PROGRESS NOTES
Clinical Pharmacy Progress Note    Vancomycin - Management by Pharmacy    Consult Date(s): 2/27/22  Consulting Provider(s): Kayleigh    Assessment / Plan    Vancomycin - Vancomycin   Concurrent Antimicrobials: meropenem   Day of Vanc Therapy: 1   Current Dosing Method: Bayesian-Guided AUC Dosing   Therapeutic Goal: 400-600 mg/L*hr   Current Dose / Frequency: 1500 mg IV x1 followed by 1250 mg IV every 18 hours   Plan / Rationale: Estimated AUC of 468 mg/L*hr with trough of 14.4 mg/L   Will continue to monitor clinical condition and make adjustments to regimen as appropriate. Thank you for consulting Pharmacy! J Luis Mojica [facility] 2/27/2022, 5:12 AM          Interval update:     Subjective/Objective: Mr. Andrea Martin is a 78 y.o. male with a PMHx significant for Afib on Eliquis, DMII, HTN, admitted as transfer from [facility] ER for surgical site infection. Patient underwent second metatarsal head resection of right foot on 2/21 for osteomyelitis. Pharmacy has been consulted to dose vancomycin. Height:   Ht Readings from Last 1 Encounters:   02/21/22 5' 10\" (1.778 m)     Weight:   Wt Readings from Last 1 Encounters:   02/21/22 205 lb 11.2 oz (93.3 kg)       Current & Prior Antimicrobial Regimen(s):   Meropenem 1g IV q8 hours    Level(s) / Doses:    Date Time Dose Level / Type of Level Interpretation                 Note: Serum levels collected for AUC-based dosing may be high if collected in close proximity to the dose administered. This is not necessarily indicative of toxicity. Cultures & Sensitivities:    Date Site Micro Susceptibility / Result                 Labs / Ancillary Data:    Estimated Creatinine Clearance: 74 mL/min (based on SCr of 1 mg/dL). No results for input(s): CREATININE, BUN, WBC in the last 72 hours. Invalid input(s): CRCL    Additional Lab Values / Findings of Note:    Procalcitonin: No results for input(s): PROCAL in the last 72 hours.

## 2022-02-27 NOTE — CONSULTS
Clinical Pharmacy Progress Note  Medication History     Admit Date: 2/26/2022    Pharmacy has been consulted to review this patient's home medication list by Dr. Ella Diaz on 2/26/22. List of current medications the patient is taking is complete, and home medication list in Epic has been updated to reflect the changes noted below. Source(s) of information: [address] [place] [address] records and prescription fill history. Changes made to medication list:    Medications added:  · Doxycycline 100 mg tablets - 1 tablet PO BID x 10 days  · Naloxone 4 mg/0.1 mL nasal spray  · Promethazine 25 mg tablets - 1 tablet PO Q8 hours for nausea / vomiting (16 day supply filled 2/18/22)  · Magnesium oxide 400 mg tablets - 1 tablet PO BID    Medication doses / instructions adjusted:  · Amoxicillin 500 mg capsules - Frequency adjusted from QID to TID  · Ciclopirox olamine 0.77% Cream - Admin instructions updated    Other notes:   · 10 day course of amoxicillin and doxycycline filled on 2/24/22. Thank you for allowing us to participate in the care of this patient. Please reach out with any questions.     Gerry EdwardsD, BCPS  2/27/2022  Ext. [phone]

## 2022-02-27 NOTE — CONSULTS
Department of Podiatry Consult Note  Resident       Reason for Consult: Foot Infection  Requesting Physician:  Dr. Steven Pederson MD    CHIEF COMPLAINT: Foot Infection    HISTORY OF PRESENT ILLNESS:    The patient is a 78 y.o. male with significant past medical history as listed below who is consulted to podiatry for right foot infection . Patient presented to the hospital secondary to [facility] due to worsening infection, patient was then transferred to [facility] for continuity of care. Patient states that he went to  ED on 2/24 after his kids were concerned after he fell 3 times. At that time it was noted that he had cellulitis to the right lower extremity and he was discharged on amoxicillin. He then presented back to the ED yesterday evening for worsening infection to the right lower extremity. Of note, patient recently had surgical intervention outpatient leg done by Dr. Valentín Lozano, D.P.M. on Monday 2/21 for second metatarsal head resection and complex closure of wound. Patient states that he has tried his best to stay off of the right lower extremity. Patient states that his foot feels a lot better today due to him getting antibiotics through an IV. Patient states his right foot is still mildly tender. Patient denies any other pedal complaints at this time. Patient denies any nausea, vomiting, fever, chills, shortness of breath.     Past Medical History:        Diagnosis Date    Allergic reaction to sulfonamide 1960    Atrial fibrillation ([address]     Bee sting reaction 2000    Bulging of cervical intervertebral disc     c4 & c5    Cephalexin adverse reaction 1993    Fracture of left elbow 1956    History of cardioversion     History of right inguinal hernia repair 1987    Hidalgo's neuroma of right foot 1989    Open fracture of left elbow 1991    Open fracture of left humerus 1991    Rupture of appendix 1970       Past Surgical History:        Procedure Laterality Date    FOOT SURGERY Right 2/21/2022    SECOND METATARSAL HEAD RESECTION, RIGHT FOOT; COMPLEX WOUND CLOSURE-RIGHT FOOT; BONE BIOPSY-RIGHT FOOT (68389, 76615, 95954)-LOCAL performed by Pancho Singleton DPM at [address] [place] [address] [place] TOE AMPUTATION Right 5/17/2021    THIRD DISTAL PHALANYX AMPUTATION VS THIRD GREAT TOE AMPUTATION RIGHT FOOT performed by Pancho Singleton DPM at [facility]       Allergies:   Bee venom, Cephalexin, Cephalosporins, Propoxyphene, and Sulfa antibiotics    Medications:   Home Meds  No current facility-administered medications on file prior to encounter. Current Outpatient Medications on File Prior to Encounter   Medication Sig Dispense Refill    amoxicillin (AMOXIL) 500 MG capsule 500 mg 4 times daily       apixaban (ELIQUIS) 5 MG TABS tablet Take 1 tablet by mouth 2 times daily 180 tablet 3    finasteride (PROSCAR) 5 MG tablet Take 5 mg by mouth daily      gabapentin (NEURONTIN) 300 MG capsule Take 300 mg by mouth 2 times daily.  amLODIPine (NORVASC) 10 MG tablet Take 1 tablet by mouth every morning      atorvastatin (LIPITOR) 40 MG tablet Take 40 mg by mouth nightly      ciclopirox (LOPROX) 0.77 % cream Apply topically every morning      cloNIDine (CATAPRES) 0.2 MG tablet Take 0.2 mg by mouth 2 times daily      enalapril (VASOTEC) 10 MG tablet Take 10 mg by mouth 2 times daily      fenofibrate (TRIGLIDE) 160 MG tablet Take 160 mg by mouth every evening      glimepiride (AMARYL) 2 MG tablet Take 2 mg by mouth 2 times daily      losartan-hydroCHLOROthiazide (HYZAAR) 100-25 MG per tablet Take 1 tablet by mouth every evening      LORazepam (ATIVAN) 1 MG tablet Take 1 mg by mouth 2 times daily.       metFORMIN (GLUCOPHAGE) 1000 MG tablet Take 1,000 mg by mouth 2 times daily      metoprolol tartrate (LOPRESSOR) 50 MG tablet Take 50 mg by mouth 2 times daily      sAXagliptin (ONGLYZA) 5 MG TABS tablet Take 5 mg by mouth every evening      PARoxetine (PAXIL) 20 MG tablet Take 20 mg by mouth every evening      blood glucose test strips (TRUETEST TEST) strip 1 strip 2 times daily      Lancets MISC Use to check Glucose BID. Dx: 250.00.  Dispense Contour Lancets         Current Meds  polyvinyl alcohol (LIQUIFILM TEARS) 1.4 % ophthalmic solution 1 drop, PRN  vancomycin (VANCOCIN) 1,250 mg in dextrose 5 % 250 mL IVPB, Q18H  amLODIPine (NORVASC) tablet 10 mg, QAM  apixaban (ELIQUIS) tablet 5 mg, BID  atorvastatin (LIPITOR) tablet 40 mg, Nightly  miconazole (MICOTIN) 2 % cream, BID  cloNIDine (CATAPRES) tablet 0.2 mg, BID  fenofibrate (TRIGLIDE) tablet 160 mg, Daily  finasteride (PROSCAR) tablet 5 mg, Daily  gabapentin (NEURONTIN) capsule 300 mg, BID  LORazepam (ATIVAN) tablet 1 mg, BID  metoprolol tartrate (LOPRESSOR) tablet 50 mg, BID  PARoxetine (PAXIL) tablet 20 mg, QPM  glucose (GLUTOSE) 40 % oral gel 15 g, PRN  dextrose 10 % infusion, PRN  glucagon (rDNA) injection 1 mg, PRN  dextrose 5 % solution, PRN  insulin lispro (1 Unit Dial) 0-12 Units, TID WC  insulin lispro (1 Unit Dial) 0-6 Units, Nightly  alogliptin (NESINA) tablet 25 mg, Daily  oxyCODONE-acetaminophen (PERCOCET) 5-325 MG per tablet 1 tablet, Q4H PRN   Or  oxyCODONE-acetaminophen (PERCOCET) 5-325 MG per tablet 2 tablet, Q4H PRN  sodium chloride flush 0.9 % injection 5-40 mL, 2 times per day  sodium chloride flush 0.9 % injection 5-40 mL, PRN  0.9 % sodium chloride infusion, PRN  ondansetron (ZOFRAN-ODT) disintegrating tablet 4 mg, Q8H PRN   Or  ondansetron (ZOFRAN) injection 4 mg, Q6H PRN  polyethylene glycol (GLYCOLAX) packet 17 g, Daily PRN  acetaminophen (TYLENOL) tablet 650 mg, Q6H PRN   Or  acetaminophen (TYLENOL) suppository 650 mg, Q6H PRN  meropenem (MERREM) 1,000 mg in sodium chloride 0.9 % 100 mL IVPB (mini-bag), Q8H  0.9 % sodium chloride infusion, Continuous        Family History:   Family History   Problem Relation Age of Onset    Sleep Apnea Father        Social History: TOBACCO:   reports that he has never smoked. He has never used smokeless tobacco.  ETOH:   reports no history of alcohol use. DRUGS:   reports no history of drug use. REVIEW OF SYSTEMS:    A 10 point review of systems was conducted, significant findings as noted in HPI. All other systems negative. PHYSICAL EXAM:      Vitals:    BP (!) 143/75   Pulse 69   Temp 98.9 °F (37.2 °C) (Oral)   Resp 18   SpO2 95%     LABS:   No results for input(s): WBC, HGB, HCT, PLT in the last 72 hours. No results for input(s): NA, K, CL, CO2, PHOS, BUN, CREATININE, CA in the last 72 hours. No results for input(s): PROT, INR, APTT in the last 72 hours. GENERAL: Patient is alert and oriented x3. No signs of acute distress noted. LOWER EXTREMITY EXAMINATION:  VASCULAR: DP and PT pulses are palpable 2/4 left lower extremity. DP and PT pulses are nonpalpable 0/4 to the right lower extremity secondary to edema. Upon hand-held Doppler examination DP and PT pulses were noted to be biphasic. CFT is brisk to the digits of the foot b/l. Skin temperature is warm to cool from proximal to distal with no focal calor noted. + 2 pitting edema noted, right lower extremity. No pain with calf compression b/l. NEUROLOGIC: Gross and epicritic sensation is diminished b/l. Protective sensation is diminished at all pedal sites b/l. DERMATOLOGIC:  Patient provided verbal consent for photos to be obtained today, 02/27/22          Surgical incision noted to the plantar aspect of the right foot. Skin edges remain well adhered and sutures remain intact. Mild serosanguineous drainage noted from incision site. No fluctuance or crepitus noted at the incision site. No malodor noted. Erythema noted from the distal aspect of the right foot extending proximally to the midfoot-improving. Fluid-filled bulla noted to the medial and plantar aspect of the second digit with roof intact. Fluctuance present.   No crepitus, malodor, or drainage noted. Left LE soft tissue envelope is closed without ulceration or acute signs of infection. MUSCULOSKELETAL: Muscle strength is 5/5 for all pedal groups tested. No pain with palpation of the foot or ankle b/l. Ankle joint ROM is decreased in dorsiflexion with the knee extended. Third digit amputation noted to right LE. IMAGING:  XR Right Foot 2/27/2022  Narrative   EXAM: XR FOOT RIGHT (MIN 3 VIEWS)        INDICATION: infection to the right foot        COMPARISON: 2/21/2022.       TECHNIQUE: 3 views of the right foot       FINDINGS:       Postsurgical changes with resection of the second metatarsal head and amputation of the third digit at the MTP joint. There is increased soft tissue swelling at the second digit and small foci of subcutaneous gas which may be postsurgical. Questionable    ill-defined lucency at the base of the second proximal phalanx. Otherwise, no new destructive osseous changes identified. Fourth and fifth hammertoe deformities.  No acute fracture.           Impression       Increased soft tissue swelling along the second digit. Mild lucency along the base of the second proximal phalanx is indeterminate for osteomyelitis. ASSESSMENT/PLAN:   -Cellulitis, right lower extremity  -Dislocated second digit secondary to trauma, right foot  -Serosanguineous bulla, right second digit  -S/P second metatarsal head resection with complex wound closure, right foot (DOS 2/21/2022)  -Diabetes mellitus with peripheral neuropathy    -Patient seen and examined at bedside this AM  -VSS, No leukocytosis noted (WBC 6.5)  -ESR >120 and .3  -Blood cultures 1 & 2 pending  -XR images reviewed, impression noted above  -MRI Right foot ordered  -Wound culture obtained, will follow up results  Utilizing #10 blade, bulla noted to the second digit to the right lower extremity was lysed but not deroofed at this time. 3 cc of serosanguineous and purulent drainage expressed.   Patient tolerated

## 2022-02-27 NOTE — CONSULTS
Infectious Diseases   Consult Note        Admission Date: 2/26/2022  Hospital Day: Hospital Day: 2   Attending: Miguel A Reyes MD  Date of service: 2/27/22     Reason for admission: Diabetic foot infection ([facility] [E11.628, L08.9]    Chief complaint/ Reason for consult: Complicated right diabetic foot infection    Microbiology:        I have reviewed allavailable micro lab data and cultures    · Blood culture (2/2) - collected on 2/26/2022: In process  · Right foot wound culture  - collected on 2/26/2022: In process      Antibiotics and immunizations:       Current antibiotics: All antibiotics and their doses were reviewed by me    Recent Abx Admin                   meropenem (MERREM) 1,000 mg in sodium chloride 0.9 % 100 mL IVPB (mini-bag) (mg) 1,000 mg New Bag 02/27/22 1417     1,000 mg New Bag  0657    vancomycin (VANCOCIN) 1,500 mg in dextrose 5 % 250 mL IVPB (mg) 1,500 mg New Bag 02/27/22 0015    meropenem (MERREM) 1,000 mg in sodium chloride 0.9 % 100 mL IVPB (mini-bag) (mg) 1,000 mg New Bag 02/26/22 2325                  Immunization History: All immunization history was reviewed by me today. Immunization History   Administered Date(s) Administered    COVID-19, Pfizer Purple top, DILUTE for use, 12+ yrs, 30mcg/0.3mL dose 02/19/2021, 03/12/2021, 12/23/2021    Tdap (Boostrix, Adacel) 05/15/2021       Known drug allergies: All allergies were reviewed and updated    Allergies   Allergen Reactions    Bee Venom Swelling    Cephalexin Swelling    Cephalosporins     Propoxyphene Other (See Comments)     Hallucinations  (Darvon/Darvocet)    Sulfa Antibiotics        Social history:     Social History:  All social andepidemiologic history was reviewed and updated by me today as needed. · Tobacco use:   reports that he has never smoked. He has never used smokeless tobacco.  · Alcohol use:   reports no history of alcohol use.   · Currently lives in: [address]  ·  reports no history of drug use. COVID VACCINATION AND LAB RESULT RECORDS:     Internal Administration   First Dose COVID-19, Pfizer Purple top, DILUTE for use, 12+ yrs, 30mcg/0.3mL dose  02/19/2021   Second Dose COVID-19, [employer] top, DILUTE for use, 12+ yrs, 30mcg/0.3mL dose   03/12/2021       Last COVID Lab SARS-CoV-2 (no units)   Date Value   02/26/2021 Not Detected     SARS-CoV-2, JOSE GUADALUPE (no units)   Date Value   02/17/2021 NOT DETECTED     SARS-CoV-2, NAAT (no units)   Date Value   05/15/2021 Not Detected            Assessment:     The patient is a 78 y.o. old male who  has a past medical history of Allergic reaction to sulfonamide (1960), Atrial fibrillation ([address] Bee sting reaction (2000), Bulging of cervical intervertebral disc, Cephalexin adverse reaction (1993), Fracture of left elbow (1956), History of cardioversion, History of right inguinal hernia repair (1987), Hidalgo's neuroma of right foot (1989), Open fracture of left elbow (1991), Open fracture of left humerus (1991), and Rupture of appendix (1970). with following problems:    · Complicated right diabetic foot infection  · History of right foot surgical debridement and second metatarsal head debridement on 2/21/2022 as an outpatient-no surgical cultures available-surgical pathology report showed negative clearance fragment for osteomyelitis  · Failure of outpatient oral amoxicillin  · Elevated CRP of 205.3 on admission  · Elevated sed rate of greater than 120 on admission  · Chronic atrial fibrillation  · Longstanding type 2 diabetes mellitus  · Diabetic polyneuropathy type II  · Overweight due to excess calorie intake : There is no height or weight on file to calculate BMI. Discussion:      The patient has longstanding type 2 diabetes mellitus with diabetic polyneuropathy and he underwent surgical debridement of the right second metatarsal head on 2/21/2022 for right diabetic foot infection.     The patient subsequently was placed on oral amoxicillin with primary team, however, feeling that and went to [facility] ER and he was transferred from there. Plan:     Diagnostic Workup:    · 2 sets of blood cultures were ordered stat by primary team.  Please make sure they are collected and sent. · Follow-up on right foot wound culture  · Continue to follow fever curve, WBC count and blood cultures  · Follow up on liverand renal functions closely   · Follow-up on MRI of the right foot    Antimicrobials:    · Will continue empiric IV vancomycin for now  Check Vancomycin trough before the 5th dose. Target vancomycin trough level of 15-20  mg/L or vancomycin area under curve (AUC) of 400-600 mg*h/L by Bayesian modeling method. If dosing vancomycin based on trough levels, keep vancomycin trough below 20 at all times. Avoid increasing the dose of vancomycin above a total of 4 grams in a 24-hour period in patients younger than 45 years and above 3 grams in a 24-hour period in a patient of age 39 years or older. Continue to monitor serum creatinine and Vanco levels closely, while the patient is on I/v Vancomycin. · I will leave him on empiric IV meropenem 1 g every 8 hours for now  · Will order oral probiotic twice daily  · We will follow up on the culture results and clinical progress and will make further recommendations accordingly. · Continue close vitals monitoring. · Maintain good glycemic control. · Fall precautions. Aspiration precautions. · Continue to watch for new fever or diarrhea. · DVT prophylaxis. · Discussed all above with patient and RN. Drug Monitoring:    · Continue serial monitoring for antibiotic toxicity as follows: Vancomycin trough, CBC, CMP  · Continue to watch for following: new or worsening fever, hypotension, hives, lip swelling and redness or purulence at vascular access sites. I/v access Management:    · Continue to monitor i.v access sites for erythema, induration, discharge or tenderness.    · As always, continue efforts to minimizetubes/lines/drains as clinically appropriate to reduce chances of line associated infections. Current isolation precautions: There are no current isolations documented for this patient. Diabetes mellitus education and counseling:    Patient was educated in detail about the importance of keeping diabetes under control to improve the cure rate of infection and prevent future infections. Patient was advised to check blood glucose level regularly and to stay compliant with the diabetes medications. Patient was advised to the trim the toe nails carefully, wear shoes or slippers at all times and check both feet everyday before going to bed to look for any cuts, blisters, swelling or redness. Importance of washing the feet everyday with soap and water and keeping them dry, and seeking prompt medical attention in case of a non-healing wound or ulcer on the feet was also highlighted. Level of complexity of consult: High     Risk of Complications/Morbidity: High     · Illness(es)/ Infection present that pose threat to bodily function. · There is potential for severe exacerbation of infection/side effects of treatment. · Therapy requires intensive monitoring for antimicrobial agent toxicity. Thank you for involving me in the care of your patient. I will continue to follow. If you have any additional questions, please do not hesitate to contact me. Subjective:     Presenting complaint in ER:     No chief complaint on file. HPI: Corwin Mix is a 78 y.o. male patient, who was seen at the request of Dr. Dorothy Chahal MD.    History was obtained from chart review and the patient. The patient was admitted on 2/26/2022. I have been consulted to see the patient for above mentioned reason(s).     The patient has multiple medical comorbidities, and presented to the [facility] as a transfer from [facility] ER for complicated right diabetic foot infection with (LOPROX) 0.77 % cream, Apply topically 2 times daily Apply to affected areas twice daily. doxycycline hyclate (VIBRA-TABS) 100 MG tablet, Take 100 mg by mouth 2 times daily  promethazine (PHENERGAN) 25 MG tablet, Take 25 mg by mouth every 8 hours Take one tablet by mouth every 8 hours for nausea and vomiting. naloxone 4 MG/0.1ML LIQD nasal spray, 1 spray by Nasal route as needed for Opioid Reversal For suspected opioid overdose, administer 1 spray into one nostril, then repeat in other nostril using a new device after 2-3 minutes, or if no or minimal response. Call 911 if used. magnesium oxide (MAG-OX) 400 MG tablet, Take 400 mg by mouth 2 times daily  apixaban (ELIQUIS) 5 MG TABS tablet, Take 1 tablet by mouth 2 times daily  finasteride (PROSCAR) 5 MG tablet, Take 5 mg by mouth daily  gabapentin (NEURONTIN) 300 MG capsule, Take 300 mg by mouth 2 times daily. amLODIPine (NORVASC) 10 MG tablet, Take 1 tablet by mouth every morning  atorvastatin (LIPITOR) 40 MG tablet, Take 40 mg by mouth nightly  cloNIDine (CATAPRES) 0.2 MG tablet, Take 0.2 mg by mouth 2 times daily  enalapril (VASOTEC) 10 MG tablet, Take 10 mg by mouth 2 times daily  fenofibrate (TRIGLIDE) 160 MG tablet, Take 160 mg by mouth every evening Take one tablet by mouth daily at 6 PM.  glimepiride (AMARYL) 2 MG tablet, Take 2 mg by mouth 2 times daily  losartan-hydroCHLOROthiazide (HYZAAR) 100-25 MG per tablet, Take 1 tablet by mouth every evening  LORazepam (ATIVAN) 1 MG tablet, Take 1 mg by mouth 2 times daily. metFORMIN (GLUCOPHAGE) 1000 MG tablet, Take 1,000 mg by mouth 2 times daily  metoprolol tartrate (LOPRESSOR) 50 MG tablet, Take 50 mg by mouth 2 times daily  sAXagliptin (ONGLYZA) 5 MG TABS tablet, Take 5 mg by mouth every evening  PARoxetine (PAXIL) 20 MG tablet, Take 20 mg by mouth every evening  blood glucose test strips (TRUETEST TEST) strip, 1 strip 2 times daily  Lancets MISC, Use to check Glucose BID. Dx: 250.00.  Dispense Contour Lancets     vancomycin  1,250 mg IntraVENous Q18H    amLODIPine  10 mg Oral QAM    apixaban  5 mg Oral BID    atorvastatin  40 mg Oral Nightly    miconazole   Topical BID    cloNIDine  0.2 mg Oral BID    fenofibrate  160 mg Oral Daily    finasteride  5 mg Oral Daily    gabapentin  300 mg Oral BID    LORazepam  1 mg Oral BID    metoprolol tartrate  50 mg Oral BID    PARoxetine  20 mg Oral QPM    insulin lispro  0-12 Units SubCUTAneous TID WC    insulin lispro  0-6 Units SubCUTAneous Nightly    alogliptin  25 mg Oral Daily    sodium chloride flush  5-40 mL IntraVENous 2 times per day    meropenem  1,000 mg IntraVENous Q8H          REVIEW OF SYSTEMS:       Review of Systems   Constitutional: Positive for fatigue and fever. Negative for chills and diaphoresis. HENT: Negative for ear discharge, ear pain, rhinorrhea, sore throat and trouble swallowing. Eyes: Negative for discharge and redness. Respiratory: Negative for cough, shortness of breath and wheezing. Cardiovascular: Negative for chest pain and leg swelling. Gastrointestinal: Negative for abdominal pain, constipation, diarrhea and nausea. Endocrine: Negative for polyuria. Genitourinary: Negative for dysuria, flank pain, frequency, hematuria and urgency. Musculoskeletal: Positive for arthralgias. Negative for back pain and myalgias. Right foot pain and swelling   Skin: Negative for rash. Neurological: Negative for dizziness, seizures and headaches. Hematological: Does not bruise/bleed easily. Psychiatric/Behavioral: Negative for hallucinations and suicidal ideas. All other systems reviewed and are negative.          Objective:       PHYSICAL EXAM:      Vitals:   Vitals:    02/27/22 0903 02/27/22 1148 02/27/22 1308 02/27/22 1540   BP: (!) 143/77 135/71  134/76   Pulse: 66 68  73   Resp: 18 16  16   Temp: 98.1 °F (36.7 °C) 97.6 °F (36.4 °C)  98.6 °F (37 °C)   TempSrc: Oral Oral  Oral   SpO2: 98% 94% 95%        Physical Exam  Vitals and nursing note reviewed. Constitutional:       Appearance: Normal appearance. He is well-developed. HENT:      Head: Normocephalic and atraumatic. Right Ear: External ear normal.      Left Ear: External ear normal.      Nose: Nose normal. No congestion or rhinorrhea. Mouth/Throat:      Mouth: Mucous membranes are moist.      Pharynx: No oropharyngeal exudate or posterior oropharyngeal erythema. Eyes:      General: No scleral icterus. Right eye: No discharge. Left eye: No discharge. Conjunctiva/sclera: Conjunctivae normal.      Pupils: Pupils are equal, round, and reactive to light. Cardiovascular:      Rate and Rhythm: Normal rate and regular rhythm. Pulses: Normal pulses. Heart sounds: No murmur heard. No friction rub. Pulmonary:      Effort: Pulmonary effort is normal. No respiratory distress. Breath sounds: Normal breath sounds. No stridor. No wheezing, rhonchi or rales. Abdominal:      General: Bowel sounds are normal.      Palpations: Abdomen is soft. Tenderness: There is no abdominal tenderness. There is no right CVA tenderness, left CVA tenderness, guarding or rebound. Musculoskeletal:         General: Swelling and tenderness present. Normal range of motion. Cervical back: Normal range of motion and neck supple. No rigidity. No muscular tenderness. Lymphadenopathy:      Cervical: No cervical adenopathy. Skin:     General: Skin is warm and dry. Coloration: Skin is not jaundiced. Findings: No erythema or rash. Comments: Swelling of right second toe, sutures on the plantar side of the right second metatarsal bone area   Neurological:      General: No focal deficit present. Mental Status: He is alert and oriented to person, place, and time. Mental status is at baseline. Motor: No abnormal muscle tone.    Psychiatric:         Mood and Affect: Mood normal.         Behavior: Behavior normal. Thought Content: Thought content normal.           Lines and drains: All vascular access sites are healthy with no local erythema, discharge or tenderness. Intake and output:     I/O last 3 completed shifts: In: 360 [P.O.:360]  Out: 575 [Urine:575]    Lab Data:   All available labs were reviewed by me today. CBC:   Recent Labs     02/27/22  0611   WBC 6.5   RBC 3.26*   HGB 9.5*   HCT 27.8*      MCV 85.2   MCH 29.2   MCHC 34.3   RDW 14.2        BMP:  Recent Labs     02/27/22  0611      K 3.4*   CL 99   CO2 27   BUN 23*   CREATININE 1.0   CALCIUM 8.9   GLUCOSE 157*        Hepatic FunctionPanel:   Lab Results   Component Value Date    LABALBU 3.9 05/18/2021       CPK: No results found for: CKTOTAL  ESR:   Lab Results   Component Value Date    SEDRATE >120 (H) 02/26/2022     CRP:   Lab Results   Component Value Date    .3 (H) 02/26/2022         Imaging: All pertinent images and reports for the current visit were reviewed by me during this visit. I reviewed the chest x-ray/CT scan/MRI images and independently interpreted the findings and results today. XR FOOT RIGHT (MIN 3 VIEWS)   Final Result      Increased soft tissue swelling along the second digit. Mild lucency along the base of the second proximal phalanx is indeterminate for osteomyelitis. MRI FOOT RIGHT WO CONTRAST    (Results Pending)       Outside records:    Labs, Microbiology, Radiology and pertinent results from Care everywhere, if available, were reviewed as a part ofthe consultation.       Problem list:       Patient Active Problem List   Diagnosis Code    Anxiety state F41.1    Type 2 diabetes mellitus with other specified complication (HCC) Z80.09    Essential hypertension I10    Major depression, chronic F32.9    Migraine G43.909    Mixed hyperlipidemia E78.2    Chronic atrial fibrillation ([facility]) I48.20    Reflux esophagitis K21.00    Obesity E66.9    Persistent atrial fibrillation ([facility] I48.19    SURAJ (obstructive sleep apnea) G47.33    Diabetic foot infection (HCC) E11.628, L08.9    Osteomyelitis of right foot (HCC) M86.9    Elevated C-reactive protein (CRP) R79.82    Diabetic polyneuropathy associated with type 2 diabetes mellitus (HCC) E11.42    Elevated sed rate R70.0    Hyponatremia E87.1    Diabetes education, encounter for Z71.89    Need for diphtheria-tetanus-pertussis (Tdap) vaccine Z23    Group B streptococcal infection A49.1    Bacterial infection due to Morganella morganii A49.8    Pseudomonas infection A49.8    Post-op pain G89.18    Failure of outpatient treatment Z78.9    Overweight E66.3         Please note that this chart was generated using Dragon dictation software. Although every effort was made to ensure the accuracy of this automated transcription, some errors in transcription may have occurred inadvertently. If you may need any clarification, please do not hesitate to contact me through EPIC or at the phone number provided below with my electronic signature. Any pictures or media included in this note were obtained after taking informed verbal consent from the patient and with their approval to include those in the patient's medical record.         Shireen Renner MD, MPH, [facility]  2/27/2022, 5:09 PM  [facility] Infectious [facility]   [address] [place] [address], [address] [place] [address]  Office: [phone]  Fax: [phone]  Clinic days:  Tuesday & Thursday

## 2022-02-27 NOTE — PROGRESS NOTES
Patient alert and oriented, vitals stable. Afebrile. Right foot red, toes covered with dressing by podiatry. IV infusing NS at 100 ml/hr, right hand. Intermittent antibiotics given. Denies pain. Patient up with walking boot, gait unsteady. Bed and chairs alarms used. SS used for elevated blood sugar. Will continue to monitor.

## 2022-02-27 NOTE — H&P
Hospital Medicine History & Physical      PCP: Birda Rinne, MD    Date of Admission: 2/26/2022    Date of Service: Pt seen/examined on 2/26/2022 and Admitted to Inpatient with expected LOS greater than two midnights due to medical therapy. Chief Complaint:  Right foot infection      History Of Present Illness: The patient is a 78 y.o. male with medical history as below, notable for a fib on eliquis, DM type 2, HTN, who presents to [facility] as a transfer for continuity of care with his surgeon from [facility]. Patient underwent second metatarsal head resection of right foot on 2/21 due to right foot osteomyelitis. He was seen on 2/24 and was started on amoxicillin for infection at the surgical site. Despite antibiotic, patient had worsening pain, drainage and swelling of right foot. Reports temp 100.7 at home. Past Medical History:        Diagnosis Date    Allergic reaction to sulfonamide 1960    Atrial fibrillation ([address]     Bee sting reaction 2000    Bulging of cervical intervertebral disc     c4 & c5    Cephalexin adverse reaction 1993    Fracture of left elbow 1956    History of cardioversion     History of right inguinal hernia repair 1987    Hidalgo's neuroma of right foot 1989    Open fracture of left elbow 1991    Open fracture of left humerus 1991    Rupture of appendix 1970       Past Surgical History:        Procedure Laterality Date    FOOT SURGERY Right 2/21/2022    SECOND METATARSAL HEAD RESECTION, RIGHT FOOT; COMPLEX WOUND CLOSURE-RIGHT FOOT; BONE BIOPSY-RIGHT FOOT (45564, 88193, 20240)-LOCAL performed by Luciano Cranker, DPM at [facility] Right 5/17/2021    THIRD DISTAL PHALANYX AMPUTATION VS THIRD GREAT TOE AMPUTATION RIGHT FOOT performed by Luciano Cranker, DPM at [facility]       Medications Prior to Admission:    Prior to Admission medications    Medication Sig Start Date End Date Taking?  Authorizing Provider   amoxicillin (AMOXIL) 500 MG capsule 500 mg 4 times daily  11/23/21  Yes Historical Provider, MD   apixaban (ELIQUIS) 5 MG TABS tablet Take 1 tablet by mouth 2 times daily 11/30/21  Yes SUKUMAR Escobar - CNP   finasteride (PROSCAR) 5 MG tablet Take 5 mg by mouth daily 8/19/21  Yes Historical Provider, MD   gabapentin (NEURONTIN) 300 MG capsule Take 300 mg by mouth 2 times daily. Yes Historical Provider, MD   amLODIPine (NORVASC) 10 MG tablet Take 1 tablet by mouth every morning 11/4/20  Yes Historical Provider, MD   atorvastatin (LIPITOR) 40 MG tablet Take 40 mg by mouth nightly 11/4/20  Yes Historical Provider, MD   ciclopirox (LOPROX) 0.77 % cream Apply topically every morning 11/4/20  Yes Historical Provider, MD   cloNIDine (CATAPRES) 0.2 MG tablet Take 0.2 mg by mouth 2 times daily 11/4/20  Yes Historical Provider, MD   enalapril (VASOTEC) 10 MG tablet Take 10 mg by mouth 2 times daily 11/4/20  Yes Historical Provider, MD   fenofibrate (TRIGLIDE) 160 MG tablet Take 160 mg by mouth every evening 11/4/20  Yes Historical Provider, MD   glimepiride (AMARYL) 2 MG tablet Take 2 mg by mouth 2 times daily 11/4/20  Yes Historical Provider, MD   losartan-hydroCHLOROthiazide (HYZAAR) 100-25 MG per tablet Take 1 tablet by mouth every evening 11/4/20  Yes Historical Provider, MD   LORazepam (ATIVAN) 1 MG tablet Take 1 mg by mouth 2 times daily.  11/4/20  Yes Historical Provider, MD   metFORMIN (GLUCOPHAGE) 1000 MG tablet Take 1,000 mg by mouth 2 times daily 11/4/20  Yes Historical Provider, MD   metoprolol tartrate (LOPRESSOR) 50 MG tablet Take 50 mg by mouth 2 times daily 11/4/20  Yes Historical Provider, MD   sAXagliptin (ONGLYZA) 5 MG TABS tablet Take 5 mg by mouth every evening 11/4/20  Yes Historical Provider, MD   PARoxetine (PAXIL) 20 MG tablet Take 20 mg by mouth every evening 11/4/20  Yes Historical Provider, MD   blood glucose test strips (TRUETEST TEST) strip 1 strip 2 times daily 4/2/20   Historical Provider, MD   Lancets MISC Use to check Glucose BID. Dx: 250.00. Dispense Contour Lancets 4/2/20   Historical Provider, MD       Allergies:  Bee venom, Cephalexin, Cephalosporins, Propoxyphene, and Sulfa antibiotics    Social History:  The patient currently lives at home. TOBACCO:   reports that he has never smoked. He has never used smokeless tobacco.  ETOH:   reports no history of alcohol use. Family History:  Reviewed in detail and  Positive as follows:        Problem Relation Age of Onset    Sleep Apnea Father        REVIEW OF SYSTEMS:   Positive and negative as noted in the HPI. All other systems reviewed and negative. PHYSICAL EXAM:    There were no vitals taken for this visit. General appearance: No apparent distress appears stated age and cooperative. HEENT Normal cephalic, atraumatic without obvious deformity. Conjunctivae/corneas clear. Neck: Supple, No jugular venous distention/bruits. Trachea midline without thyromegaly or adenopathy with full range of motion. Lungs: Clear to auscultation, bilaterally without Rales/Wheezes/Rhonchi with good respiratory effort. Heart: Regular rate and rhythm with Normal S1/S2 without murmurs, rubs or gallops, point of maximum impulse non-displaced  Abdomen: Soft, non-tender or non-distended without rigidity or guarding and positive bowel sounds all four quadrants. Extremities: No clubbing, cyanosis, there is edema of RLE with ascending erythema  Skin: right foot is covered in dressing  Neurologic: Alert and oriented X 3,  grossly non-focal.  Mental status: Alert, oriented, thought content appropriate. Capillary refill is brisk  Peripheral pulses 2=    foot xray 2/21:  FINDINGS:   Patient is status post amputation of the distal 2nd metatarsal.  Bony   alignment is normal.  No additional acute osseous abnormalities are   identified.      Foot xray 2/26:  Stable postsurgical changes in the foot with similar dorsal forefoot soft tissue swelling and soft tissue gas. No interval erosive changes identified. If there is concern for osteomyelitis, MRI could be considered for increased sensitivity. ECHO 2021:  Summary   Normal left ventricular size. Mild concentric left ventricular hypertrophy. Normal left ventricular systolic function with an estimated ejection   fraction of 55-60%. Indeterminate diastolic function due to afib. The left atrium is moderately dilated. CBC   No results for input(s): WBC, HGB, HCT, PLT in the last 72 hours. RENAL  No results for input(s): NA, K, CL, CO2, PHOS, BUN, CREATININE, CA in the last 72 hours. LFT'S  No results for input(s): AST, ALT, ALB, BILIDIR, BILITOT, ALKPHOS in the last 72 hours. COAG  No results for input(s): INR in the last 72 hours. CARDIAC ENZYMES  No results for input(s): CKTOTAL, CKMB, CKMBINDEX, TROPONINI in the last 72 hours. U/A:  No results found for: NITRITE, COLORU, WBCUA, RBCUA, MUCUS, BACTERIA, CLARITYU, SPECGRAV, LEUKOCYTESUR, BLOODU, GLUCOSEU, AMORPHOUS    ABG  No results found for: VOM2VQU, BEART, H8KASZVR, PHART, THGBART, KKO8HHQ, PO2ART, QFI0QIQ        Active Hospital Problems    Diagnosis Date Noted    [facility] [U97.012, L08.9] 05/14/2021         ASSESSMENT/PLAN:    Right foot infection:  Start meropenem and vancomycin (allergic to cephalosporines)  Consult with podiatry surgery  Consult with ID  Pain control as needed    Mild EDER:  Creatinine 1.35 at outside ER  Gentle IVF overnight and repeat in am  Hold ACEI/ARB    A fib:  Cont metoprolol, eliquis, place on tele    HTN:  Cont norvasc and metoprolol  Holding ACEiARB due to mild EDER and until med rec is reconciled    DM type 2:  ISS, DPP4    DVT Prophylaxis: eliquis  Diet: ADULT DIET; Regular; 4 carb choices (60 gm/meal);  Low Fat/Low Chol/High Fiber/MARTIN  Code Status: Full Code  PT/OT Eval Status: at baseline    Dispo - Jazzy Castano MD    Thank you Anne-Marie Loving MD for the opportunity to be involved in this patient's care. If you have any questions or concerns please feel free to contact me at [phone].

## 2022-02-27 NOTE — PROGRESS NOTES
Clinical Pharmacy Progress Note    Vancomycin - Management by Pharmacy    Consult Date(s): 2/27/22  Consulting Provider(s): Kayleigh    Assessment / Plan    R foot SSTI - Vancomycin   Concurrent Antimicrobials: Meropenem - day #2   Day of Vanc Therapy: #1   Current Dosing Method: Bayesian-Guided AUC Dosing   Therapeutic Goal: <500 mg/L*hr   Current Dose / Frequency: 1250 mg IV q18h   Plan / Rationale:   o Current regimen predicts an AUC of 468 mg/L*hr and steady state trough of 14.4 mg/L.  o Level ordered for tomorrow AM to assess kinetics.  Will continue to monitor clinical condition and make adjustments to regimen as appropriate. Please call with any questions. Floyd Flores, GerryD, [facility] Hospital[facility]  Wireless: [phone]  2/27/2022 8:53 AM      Interval update: ID and podiatry have been consulted. Subjective/Objective: Mr. Audra Guerrero is a 78 y.o. male with a PMHx significant for Afib on Eliquis, DMII, HTN, admitted as transfer from [facility] ER for surgical site infection. Patient underwent second metatarsal head resection of right foot on 2/21 for osteomyelitis. Pharmacy has been consulted to dose vancomycin. Height:   Ht Readings from Last 1 Encounters:   02/21/22 5' 10\" (1.778 m)     Weight:   Wt Readings from Last 1 Encounters:   02/21/22 205 lb 11.2 oz (93.3 kg)       Current & Prior Antimicrobial Regimen(s):   Meropenem (2/26-current)   Vancomycin (2/27-current)    Level(s) / Doses:    Date Time Dose Level / Type of Level Interpretation                 Note: Serum levels collected for AUC-based dosing may be high if collected in close proximity to the dose administered. This is not necessarily indicative of toxicity. Cultures & Sensitivities:    Date Site Micro Susceptibility / Result   2/26 Blood x2 Ordered            Labs / Ancillary Data:    Estimated Creatinine Clearance: 74 mL/min (based on SCr of 1 mg/dL).     Recent Labs     02/27/22  0611   CREATININE 1.0   BUN 23*   WBC 6.5 Additional Lab Values / Findings of Note:    Procalcitonin: No results for input(s): PROCAL in the last 72 hours.

## 2022-02-27 NOTE — PLAN OF CARE
Problem: Falls - Risk of:  Goal: Will remain free from falls  Description: Will remain free from falls  Outcome: Ongoing  Note: Patient remains free from physical injury. Fall precautions in place: Patient in bed lowest position,wheels locked. 2/4 side rails up Call light and beside table within reach. Will continue to monitor       Problem: Pain:  Goal: Pain level will decrease  Description: Pain level will decrease  Outcome: Ongoing  Note: Patient c/o pain rated as 7 out of 10 Percocet given per protocol. Upon reassessment patient was asleep with RR >10.   Will continue to monitor

## 2022-02-27 NOTE — PROGRESS NOTES
Patient alert and oriented. VSS Patient c/o pain, Percocet given with benefits. CDI dressing to R foot. Blood sugar elevated, Insulin given per protocol. Patient in bed lowest position call light and bedside table within reach. All needs are met at this time. Patient aware to call if any help needed. Will continue to monitor  BP (!) 143/75   Pulse 69   Temp 98.9 °F (37.2 °C) (Oral)   Resp 18   SpO2 95%

## 2022-02-28 ENCOUNTER — APPOINTMENT (OUTPATIENT)
Dept: GENERAL RADIOLOGY | Age: 77
DRG: 638 | End: 2022-02-28
Attending: INTERNAL MEDICINE
Payer: MEDICARE

## 2022-02-28 ENCOUNTER — APPOINTMENT (OUTPATIENT)
Dept: MRI IMAGING | Age: 77
DRG: 638 | End: 2022-02-28
Attending: INTERNAL MEDICINE
Payer: MEDICARE

## 2022-02-28 LAB
ANION GAP SERPL CALCULATED.3IONS-SCNC: 9 MMOL/L (ref 3–16)
BASOPHILS ABSOLUTE: 0.1 K/UL (ref 0–0.2)
BASOPHILS RELATIVE PERCENT: 0.8 %
BUN BLDV-MCNC: 15 MG/DL (ref 7–20)
CALCIUM SERPL-MCNC: 8.9 MG/DL (ref 8.3–10.6)
CHLORIDE BLD-SCNC: 101 MMOL/L (ref 99–110)
CO2: 27 MMOL/L (ref 21–32)
CREAT SERPL-MCNC: 0.8 MG/DL (ref 0.8–1.3)
EOSINOPHILS ABSOLUTE: 0.2 K/UL (ref 0–0.6)
EOSINOPHILS RELATIVE PERCENT: 2.7 %
GFR AFRICAN AMERICAN: >60
GFR NON-AFRICAN AMERICAN: >60
GLUCOSE BLD-MCNC: 170 MG/DL (ref 70–99)
GLUCOSE BLD-MCNC: 181 MG/DL (ref 70–99)
GLUCOSE BLD-MCNC: 185 MG/DL (ref 70–99)
GLUCOSE BLD-MCNC: 234 MG/DL (ref 70–99)
GLUCOSE BLD-MCNC: 271 MG/DL (ref 70–99)
HCT VFR BLD CALC: 29.2 % (ref 40.5–52.5)
HEMOGLOBIN: 10.1 G/DL (ref 13.5–17.5)
LYMPHOCYTES ABSOLUTE: 0.9 K/UL (ref 1–5.1)
LYMPHOCYTES RELATIVE PERCENT: 14.4 %
MCH RBC QN AUTO: 29.7 PG (ref 26–34)
MCHC RBC AUTO-ENTMCNC: 34.6 G/DL (ref 31–36)
MCV RBC AUTO: 85.8 FL (ref 80–100)
MONOCYTES ABSOLUTE: 0.6 K/UL (ref 0–1.3)
MONOCYTES RELATIVE PERCENT: 9.2 %
NEUTROPHILS ABSOLUTE: 4.6 K/UL (ref 1.7–7.7)
NEUTROPHILS RELATIVE PERCENT: 72.9 %
PDW BLD-RTO: 14.1 % (ref 12.4–15.4)
PERFORMED ON: ABNORMAL
PLATELET # BLD: 277 K/UL (ref 135–450)
PMV BLD AUTO: 8.4 FL (ref 5–10.5)
POTASSIUM SERPL-SCNC: 3.8 MMOL/L (ref 3.5–5.1)
RBC # BLD: 3.4 M/UL (ref 4.2–5.9)
SODIUM BLD-SCNC: 137 MMOL/L (ref 136–145)
VANCOMYCIN RANDOM: 9.4 UG/ML
WBC # BLD: 6.3 K/UL (ref 4–11)

## 2022-02-28 PROCEDURE — 99233 SBSQ HOSP IP/OBS HIGH 50: CPT | Performed by: INTERNAL MEDICINE

## 2022-02-28 PROCEDURE — 6370000000 HC RX 637 (ALT 250 FOR IP): Performed by: INTERNAL MEDICINE

## 2022-02-28 PROCEDURE — 02HV33Z INSERTION OF INFUSION DEVICE INTO SUPERIOR VENA CAVA, PERCUTANEOUS APPROACH: ICD-10-PCS | Performed by: INTERNAL MEDICINE

## 2022-02-28 PROCEDURE — A9579 GAD-BASE MR CONTRAST NOS,1ML: HCPCS

## 2022-02-28 PROCEDURE — 1200000000 HC SEMI PRIVATE

## 2022-02-28 PROCEDURE — 2580000003 HC RX 258: Performed by: INTERNAL MEDICINE

## 2022-02-28 PROCEDURE — 73720 MRI LWR EXTREMITY W/O&W/DYE: CPT

## 2022-02-28 PROCEDURE — 80048 BASIC METABOLIC PNL TOTAL CA: CPT

## 2022-02-28 PROCEDURE — 6360000004 HC RX CONTRAST MEDICATION

## 2022-02-28 PROCEDURE — 6360000002 HC RX W HCPCS: Performed by: INTERNAL MEDICINE

## 2022-02-28 PROCEDURE — C1751 CATH, INF, PER/CENT/MIDLINE: HCPCS

## 2022-02-28 PROCEDURE — 80202 ASSAY OF VANCOMYCIN: CPT

## 2022-02-28 PROCEDURE — 36415 COLL VENOUS BLD VENIPUNCTURE: CPT

## 2022-02-28 PROCEDURE — 36569 INSJ PICC 5 YR+ W/O IMAGING: CPT

## 2022-02-28 PROCEDURE — 2500000003 HC RX 250 WO HCPCS: Performed by: INTERNAL MEDICINE

## 2022-02-28 PROCEDURE — 85025 COMPLETE CBC W/AUTO DIFF WBC: CPT

## 2022-02-28 PROCEDURE — 71045 X-RAY EXAM CHEST 1 VIEW: CPT

## 2022-02-28 RX ORDER — SODIUM CHLORIDE 0.9 % (FLUSH) 0.9 %
5-40 SYRINGE (ML) INJECTION PRN
Status: DISCONTINUED | OUTPATIENT
Start: 2022-02-28 | End: 2022-03-02 | Stop reason: HOSPADM

## 2022-02-28 RX ORDER — SODIUM CHLORIDE 0.9 % (FLUSH) 0.9 %
5-40 SYRINGE (ML) INJECTION EVERY 12 HOURS SCHEDULED
Status: DISCONTINUED | OUTPATIENT
Start: 2022-02-28 | End: 2022-03-02 | Stop reason: HOSPADM

## 2022-02-28 RX ORDER — LIDOCAINE HYDROCHLORIDE 10 MG/ML
5 INJECTION, SOLUTION EPIDURAL; INFILTRATION; INTRACAUDAL; PERINEURAL ONCE
Status: COMPLETED | OUTPATIENT
Start: 2022-02-28 | End: 2022-02-28

## 2022-02-28 RX ORDER — SODIUM CHLORIDE 9 MG/ML
25 INJECTION, SOLUTION INTRAVENOUS PRN
Status: DISCONTINUED | OUTPATIENT
Start: 2022-02-28 | End: 2022-03-02 | Stop reason: HOSPADM

## 2022-02-28 RX ADMIN — VANCOMYCIN HYDROCHLORIDE 1250 MG: 10 INJECTION, POWDER, LYOPHILIZED, FOR SOLUTION INTRAVENOUS at 11:05

## 2022-02-28 RX ADMIN — INSULIN LISPRO 6 UNITS: 100 INJECTION, SOLUTION INTRAVENOUS; SUBCUTANEOUS at 12:30

## 2022-02-28 RX ADMIN — SODIUM CHLORIDE, PRESERVATIVE FREE 10 ML: 5 INJECTION INTRAVENOUS at 20:33

## 2022-02-28 RX ADMIN — APIXABAN 5 MG: 5 TABLET, FILM COATED ORAL at 09:15

## 2022-02-28 RX ADMIN — OXYCODONE HYDROCHLORIDE AND ACETAMINOPHEN 2 TABLET: 5; 325 TABLET ORAL at 08:30

## 2022-02-28 RX ADMIN — INSULIN GLARGINE 10 UNITS: 100 INJECTION, SOLUTION SUBCUTANEOUS at 21:47

## 2022-02-28 RX ADMIN — INSULIN LISPRO 2 UNITS: 100 INJECTION, SOLUTION INTRAVENOUS; SUBCUTANEOUS at 08:26

## 2022-02-28 RX ADMIN — LIDOCAINE HYDROCHLORIDE 5 ML: 10 INJECTION, SOLUTION EPIDURAL; INFILTRATION; INTRACAUDAL; PERINEURAL at 16:55

## 2022-02-28 RX ADMIN — METOPROLOL TARTRATE 50 MG: 50 TABLET, FILM COATED ORAL at 09:16

## 2022-02-28 RX ADMIN — LORAZEPAM 1 MG: 1 TABLET ORAL at 20:26

## 2022-02-28 RX ADMIN — ALOGLIPTIN 25 MG: 25 TABLET, FILM COATED ORAL at 09:15

## 2022-02-28 RX ADMIN — INSULIN LISPRO 2 UNITS: 100 INJECTION, SOLUTION INTRAVENOUS; SUBCUTANEOUS at 20:27

## 2022-02-28 RX ADMIN — SODIUM CHLORIDE 25 ML: 9 INJECTION, SOLUTION INTRAVENOUS at 11:03

## 2022-02-28 RX ADMIN — CLONIDINE HYDROCHLORIDE 0.2 MG: 0.1 TABLET ORAL at 09:15

## 2022-02-28 RX ADMIN — APIXABAN 5 MG: 5 TABLET, FILM COATED ORAL at 20:25

## 2022-02-28 RX ADMIN — CLONIDINE HYDROCHLORIDE 0.2 MG: 0.1 TABLET ORAL at 20:25

## 2022-02-28 RX ADMIN — METOPROLOL TARTRATE 50 MG: 50 TABLET, FILM COATED ORAL at 20:25

## 2022-02-28 RX ADMIN — INSULIN LISPRO 2 UNITS: 100 INJECTION, SOLUTION INTRAVENOUS; SUBCUTANEOUS at 17:15

## 2022-02-28 RX ADMIN — PAROXETINE HYDROCHLORIDE 20 MG: 20 TABLET, FILM COATED ORAL at 18:00

## 2022-02-28 RX ADMIN — ATORVASTATIN CALCIUM 40 MG: 40 TABLET, FILM COATED ORAL at 20:26

## 2022-02-28 RX ADMIN — MEROPENEM 1000 MG: 1 INJECTION, POWDER, FOR SOLUTION INTRAVENOUS at 07:42

## 2022-02-28 RX ADMIN — FINASTERIDE 5 MG: 5 TABLET, FILM COATED ORAL at 09:15

## 2022-02-28 RX ADMIN — OXYCODONE HYDROCHLORIDE AND ACETAMINOPHEN 2 TABLET: 5; 325 TABLET ORAL at 18:03

## 2022-02-28 RX ADMIN — MICONAZOLE NITRATE: 20 CREAM TOPICAL at 09:31

## 2022-02-28 RX ADMIN — GABAPENTIN 300 MG: 300 CAPSULE ORAL at 15:02

## 2022-02-28 RX ADMIN — VANCOMYCIN HYDROCHLORIDE 1250 MG: 10 INJECTION, POWDER, LYOPHILIZED, FOR SOLUTION INTRAVENOUS at 20:36

## 2022-02-28 RX ADMIN — FENOFIBRATE 160 MG: 160 TABLET ORAL at 09:16

## 2022-02-28 RX ADMIN — GADOTERIDOL 20 ML: 279.3 INJECTION, SOLUTION INTRAVENOUS at 05:38

## 2022-02-28 RX ADMIN — POLYVINYL ALCOHOL 1 DROP: 14 SOLUTION/ DROPS OPHTHALMIC at 03:53

## 2022-02-28 RX ADMIN — GABAPENTIN 300 MG: 300 CAPSULE ORAL at 09:15

## 2022-02-28 RX ADMIN — LORAZEPAM 1 MG: 1 TABLET ORAL at 09:16

## 2022-02-28 RX ADMIN — AMLODIPINE BESYLATE 10 MG: 10 TABLET ORAL at 09:15

## 2022-02-28 RX ADMIN — SODIUM CHLORIDE 25 ML: 9 INJECTION, SOLUTION INTRAVENOUS at 07:38

## 2022-02-28 RX ADMIN — MICONAZOLE NITRATE: 20 CREAM TOPICAL at 21:00

## 2022-02-28 ASSESSMENT — PAIN SCALES - GENERAL
PAINLEVEL_OUTOF10: 0
PAINLEVEL_OUTOF10: 0
PAINLEVEL_OUTOF10: 3
PAINLEVEL_OUTOF10: 0
PAINLEVEL_OUTOF10: 0
PAINLEVEL_OUTOF10: 7
PAINLEVEL_OUTOF10: 7

## 2022-02-28 ASSESSMENT — PAIN DESCRIPTION - PAIN TYPE: TYPE: ACUTE PAIN

## 2022-02-28 NOTE — PROCEDURES
PICC PROCEDURE NOTE WITH PCXR NEEDED  Chart reviewed for allergies, diagnosis, labs, known contraindications, reason for line placement and planned length of treatment. Informed consent noted to be signed and on chart. Insertion procedure discussed with patient/family member. Three patient identifiers - Patient name,   and MRN -  completed &  confirmed verbally. Time out performed Hat, mask and eye shield donned. PICC site scrubbed with Chloraprep  One-Step applicator for 30 seconds x 1. Hand Hygiene  performed with 3% Chlorhexidine surgical scrub x1 min prior to  Sterile gloves, sterile gown being donned. Patient draped using maximal sterile barrier technique ( head to toe ). PICC site scrubbed a 2nd time with Chloraprep One-Step applicator x 30 sec. Modified Seldinger  technique/ultrasound assisted and tip locating system utilized for insertion. 1% Lidocaine 5 ml injected interdermally pre-insertion. PCXR obtained d/t unable to confirm PICC tip with 3CG, will place note and order with clarification of radiologist reading for clearance to use line when available and notify RN. Positive brisk blood return obtained from all lumens  and each lumen flushed with 10 mls  0.9% Sterile Sodium Chloride. All lumens flush easily with no resistance. Valve placed on all lumens followed by Alcohol Swab Caps on end of each. Bio-patch in place. Catheter secured with non-sutured locking device per hospital protocol. Sterile Tegaderm applied over PICC site. Sterile field maintained during procedure. Guide wire and positioning wire accounted for post procedure and disposed of in sharps. Appearance of site is Clean dry and intact without bleeding or edema. All edges of Tegaderm occlusive. Site marked with date and initials of RN placing line. Teaching performed to pt/family and noted in education section. Bed placed in low position post-procedure. Top 2 side rails in up position call button in reach. Pt. Response to procedure tolerated well. RN notified of all of the above. A Single Power Injectable PICC was trimmed at 42 CM and placed CLAIRE Basilic vein. 2 cm showing from insertion site.

## 2022-02-28 NOTE — PROGRESS NOTES
Podiatric Surgery Daily Progress Note      Admit Date: 2/26/2022      Code:Full Code    Patient seen and examined, labs and records reviewed    Subjective:     Patient seen at bedside this AM with attending present. Patient denies any overnight acute event. Patient denies f/c/n/v/sob/cp. Patient is feeling better and noticed improvement to his foot. Review of Systems: A 10 point review of symptoms is unremarkable with the exception of the chief complaint. Patient specifically denies nausea, fever, vomiting, chills, shortness of breath, chest pain, abdominal pain, constipation or difficulty urinating. Objective     BP (!) 163/77   Pulse 65   Temp 97.6 °F (36.4 °C) (Oral)   Resp 18   SpO2 97%      I/O:    Intake/Output Summary (Last 24 hours) at 2/28/2022 0516  Last data filed at 2/27/2022 2341  Gross per 24 hour   Intake 2218 ml   Output 1300 ml   Net 918 ml              Wt Readings from Last 3 Encounters:   02/21/22 205 lb 11.2 oz (93.3 kg)   12/08/21 208 lb (94.3 kg)   11/30/21 208 lb (94.3 kg)       LABS:    Recent Labs     02/27/22  0611   WBC 6.5   HGB 9.5*   HCT 27.8*           Recent Labs     02/27/22  0611      K 3.4*   CL 99   CO2 27   BUN 23*   CREATININE 1.0      No results for input(s): PROT, INR, APTT in the last 72 hours. LOWER EXTREMITY EXAMINATION    Dressing to right LE intact. No strikethrough noted to the external dressing. Minimal serosanguineous drainage noted to the internal layers of the dressing. VASCULAR: DP and PT pulses are palpable 2/4 left lower extremity. DP and PT pulses are nonpalpable 0/4 to the right lower extremity secondary to edema. Upon hand-held Doppler examination DP and PT pulses were noted to be biphasic. CFT less than 3 seconds to the distal digits of the foot b/l. Skin temperature is warm to lukewarm from proximal to distal with no focal calor noted. Improving edema with relaxed skin tension lines, right lower extremity.  No pain with calf compression b/l. NEUROLOGIC: Gross and epicritic sensation is diminished b/l. Protective sensation is diminished at all pedal sites b/l. DERMATOLOGIC:     Surgical incision noted to the plantar aspect of the right foot. Suture skin edges remain well adhered and sutures remain intact. Loose tissue noted circumferentially around the second digit 2/2 edema. After using a #15 blade skin base noted pink thyroid tissue. No fluctuance or crepitus noted at the incision site. No malodor noted. Erythema noted from the distal aspect of the right foot extending proximally to the midfoot-improving. Left LE soft tissue envelope is closed without ulceration or acute signs of infection. MUSCULOSKELETAL: Muscle strength is 5/5 for all pedal groups tested. Mild pain with palpation to the right foot. Ankle joint ROM is decreased in dorsiflexion with the knee extended. Third digit amputation noted to right LE. IMAGING:  XR Right Foot 2/27/2022  Narrative   EXAM: XR FOOT RIGHT (MIN 3 VIEWS)        INDICATION: infection to the right foot        COMPARISON: 2/21/2022. TECHNIQUE: 3 views of the right foot       FINDINGS:       Postsurgical changes with resection of the second metatarsal head and amputation of the third digit at the MTP joint. There is increased soft tissue swelling at the second digit and small foci of subcutaneous gas which may be postsurgical. Questionable    ill-defined lucency at the base of the second proximal phalanx. Otherwise, no new destructive osseous changes identified. Fourth and fifth hammertoe deformities. No acute fracture. Impression       Increased soft tissue swelling along the second digit. Mild lucency along the base of the second proximal phalanx is indeterminate for osteomyelitis. MRI Right Foot 2/28/2022  Narrative   MRI of the right foot with and without contrast       HISTORY: Wound. Osteomyelitis.        FINDINGS:       Examination is compromised by patient motion. There has been prior resection of the head of the second metatarsal.       There is slight marrow edema in the distal most aspect of the second metatarsal. There is hyperintense T2 signal in the soft tissues adjacent to the second MTP joint. This is hypointense to fat on T1-weighted sequences and overall it does not enhance on    postcontrast sequences. The signal abnormality extends to the level of the distal phalanx. Scattered areas of marrow edema are present in the second proximal, middle, and distal phalanges, associated with mild postcontrast enhancement. In addition, there is konrad marrow edema involving the distal aspect of the third metatarsal extending all way up to the head with associated T1 signal hypointensity and homogeneous postcontrast enhancement. The soft tissue structures are edematous and    demonstrates postcontrast enhancement. No focal fluid collection is noted adjacent to the third metatarsal.           Impression   1. Slight marrow edema in the distal most aspect of the second metatarsal, which is status post amputation. This could reflect reactive marrow change, however osteomyelitis is suspected. There is also slight marrow edema in the phalanges of the second    digit which could reflect early osteomyelitis as well. 2. Large area of signal abnormality in the second digit as detailed above, demonstrating lack of enhancement but hyperintense on fluid sensitive sequences. This could either reflect phlegmon/abscess or it could reflect devitalized tissue.    3. Osteomyelitis involving the the distal aspect of the third metatarsal.           ASSESSMENT/PLAN  -Cellulitis, right lower extremity -improving  -Osteomyelitis third metatarsal, right foot  -S/P second metatarsal head resection with complex wound closure, right foot (DOS 2/21/2022)  -Diabetes mellitus with peripheral neuropathy    -Patient seen and examined at bedside this AM  -Hypertensive, no leukocytosis noted   -Blood cultures 1 & 2 NGTD  -XR images reviewed, impression noted above  -MRI Right Foot, see impression above  -Wound culture: MRSA  -ID following, Daptomycin  500 mg iv daily through 4/6/22  -Loose nonviable tissue 2/2 ruptured bulla with a #15 blade down to epithelialized tissue. Less than 20 cm² of tissue divided. No blood loss encountered. Patient tolerated procedure well. -Right lower extremity dressed with Betadine, DSD and Ace with gentle compression  -post-op shoe ordered to patient room  -Nonweightbearing to the right lower extremity    DISPO: Cellulitis, dislocated second digit with serosanguineous bulla to the right lower extremity. Labs and imaging reviewed. MRI pending, will follow up results. ID consulted; appreciate recommendations. Will continue to follow patient while in house.     Patient seen and evaluated at the bedside with Dr. Steph Gomez, Giulia Nascimento [address]  Podiatry resident, PGY 3  Perfect serve

## 2022-02-28 NOTE — PROGRESS NOTES
Patient A&OX4. VSS. Patient reported pain rated 7/10 in RLE during shift, PRN percocet was given with benefit. Condom cath in place. Wound culture showed MRSA, placed in contact precautions. Dressing to RLE remains CDI. Tolerating diet as ordered, receiving insulin coverage during shift. IVF and intermittent IVABX infusing. Fall precautions in place. Call light and bedside table are within reach if needed. Will continue to monitor.      Electronically signed by Linda Catalan RN on 2/28/2022 at 1:54 PM

## 2022-02-28 NOTE — CARE COORDINATION
CM following pt from home ind alone has support from child. Pod following, pending wound cx and MRI,    ID following for ABX recs at DC. Will need PT OT evals for DC recs.    Electronically signed by Reba Baird RN on 2/28/2022 at 9:43 AM   [phone]

## 2022-02-28 NOTE — PROGRESS NOTES
Pt has urinary urgency with frequency and had set off bed alarm 2x within first 2 hours of shift. Suggested condom cath to prevent potential fall and assist with bladder elimination safely. Pt resting comfortably, condom cath working appropriately.      Electronically signed by Carley Tello RN on 2/28/22 at 1:57 AM EST

## 2022-02-28 NOTE — PLAN OF CARE
Problem: Falls - Risk of:  Goal: Will remain free from falls  Note: Patient has remained free of falls during shift. Fall precautions remain in place. Call light and bedside table are within reach if needed. Will continue to monitor. Problem: Pain:  Description: Pain management should include both nonpharmacologic and pharmacologic interventions. Goal: Pain level will decrease  Note: Patient pain rated 7/10 in RLE has been controlled with PRN percocet, see MAR. Will continue to monitor.

## 2022-02-28 NOTE — DISCHARGE INSTR - COC
Continuity of Care Form    Patient Name: Nadeem Hobbs   :  1945  MRN:  [id number]    Admit date:  2022  Discharge date:  3/2/2022    Code Status Order: Full Code   Advance Directives:      Admitting Physician:  No admitting provider for patient encounter.   PCP: Ralf Foster MD    Discharging Nurse: [facility] Unit/Room#: 4337/3220-66  Discharging Unit Phone Number: [phone]    Emergency Contact:   Extended Emergency Contact Information  Primary Emergency Contact: [address] [facility] Phone: [phone]  Relation: Child    Past Surgical History:  Past Surgical History:   Procedure Laterality Date    FOOT SURGERY Right 2022    SECOND METATARSAL HEAD RESECTION, RIGHT FOOT; COMPLEX WOUND CLOSURE-RIGHT FOOT; BONE BIOPSY-RIGHT FOOT ([id number], [id number], )-LOCAL performed by Arnol Mcclain DPM at [facility] 2021    THIRD DISTAL PHALANYX AMPUTATION VS THIRD GREAT TOE AMPUTATION RIGHT FOOT performed by Arnol Mcclain DPM at [address] [place] [address]       Immunization History:   Immunization History   Administered Date(s) Administered    COVID-19, Pfizer Purple top, DILUTE for use, 12+ yrs, 30mcg/0.3mL dose 2021, 2021, 2021    Tdap (Boostrix, Adacel) 05/15/2021       Active Problems:  Patient Active Problem List   Diagnosis Code    Anxiety state F41.1    Type 2 diabetes mellitus with other specified complication ([facility] [address] X21.47    Essential hypertension I10    Major depression, chronic F32.9    Migraine G43.909    Mixed hyperlipidemia E78.2    Chronic atrial fibrillation (HCC) I48.20    Reflux esophagitis K21.00    Obesity E66.9    Persistent atrial fibrillation (HCC) I48.19    SURAJ (obstructive sleep apnea) G47.33    Diabetic foot infection (HCC) E11.628, L08.9    Osteomyelitis of right foot (HCC) M86.9    Elevated C-reactive protein (CRP) R79.82    Diabetic polyneuropathy associated with type 2 diabetes mellitus ([facility] E11.42    Elevated sed rate R70.0    Hyponatremia E87.1    Diabetes education, encounter for Z71.89    Need for diphtheria-tetanus-pertussis (Tdap) vaccine Z23    Group B streptococcal infection A49.1    Bacterial infection due to Morganella morganii A49.8    Pseudomonas infection A49.8    Post-op pain G89.18    Failure of outpatient treatment Z78.9    Overweight E66.3       Isolation/Infection:   Isolation            Contact          Patient Infection Status       Infection Onset Added Last Indicated Last Indicated By Review Planned Expiration Resolved Resolved By    MRSA 02/27/22 02/28/22 02/27/22 Culture, Anaerobic and Aerobic                Nurse Assessment:  Last Vital Signs: BP (!) 156/77   Pulse 62   Temp 97.9 °F (36.6 °C) (Oral)   Resp 14   Wt 205 lb 11 oz (93.3 kg)   SpO2 96%   BMI 29.51 kg/m²     Last documented pain score (0-10 scale): Pain Level: 7  Last Weight:   Wt Readings from Last 1 Encounters:   02/28/22 205 lb 11 oz (93.3 kg)     Mental Status:  oriented and alert    IV Access:  - PICC - site  R Upper Arm, insertion date: 2/28/22    Nursing Mobility/ADLs:  Walking   Independent  Transfer  Independent  Bathing  Independent  Dressing  Assisted  Toileting  Independent  Feeding  Independent  Med Admin  Independent  Med Delivery   whole    Wound Care Documentation and Therapy:        Elimination:  Continence: Bowel: Yes  Bladder: Yes  Urinary Catheter: None   Colostomy/Ileostomy/Ileal Conduit: No       Date of Last BM: -    Intake/Output Summary (Last 24 hours) at 2/28/2022 1058  Last data filed at 2/28/2022 0516  Gross per 24 hour   Intake 2218 ml   Output 1250 ml   Net 968 ml     I/O last 3 completed shifts: In: 7272 [P.O.:1200; I.V.:1378]  Out: 1975 [FROXO:9629]    Safety Concerns:      At Risk for Falls, History of Falls    Impairments/Disabilities:      Non wt bearing R foot    Nutrition Therapy:  Current Nutrition Therapy:   - Oral Diet:  Carb Control 4 carbs/meal (1800kcals/day) and Low Fat    Routes of Feeding: Oral  Liquids: No Restrictions  Daily Fluid Restriction: no  Last Modified Barium Swallow with Video (Video Swallowing Test): not done    Treatments at the Time of Hospital Discharge:   Respiratory Treatments: n/a  Oxygen Therapy:  is not on home oxygen therapy.   Ventilator:    - No ventilator support    Rehab Therapies: Physical Therapy and Occupational Therapy  Weight Bearing Status/Restrictions: Non-weight bearing on right leg  Other Medical Equipment (for information only, NOT a DME order):  walker  Other Treatments: n/a    Patient's personal belongings (please select all that are sent with patient):  None    RN SIGNATURE:  Electronically signed by Leatha Mims RN on 3/2/22 at 3:24 PM EST    CASE MANAGEMENT/SOCIAL WORK SECTION    Inpatient Status Date: ***    Readmission Risk Assessment Score:  Readmission Risk              Risk of Unplanned Readmission:  15           Discharging to Facility/ Agency   Name:   Address:  Phone:  Fax:    Dialysis Facility (if applicable)   Name:  Address:  Dialysis Schedule:  Phone:  Fax:    / signature: {Esignature:[id number]}    PHYSICIAN SECTION    Prognosis: {Prognosis:1727140076}    Condition at Discharge: [facility] Patient Condition:[id number]}    Rehab Potential (if transferring to Rehab): {Prognosis:8255032657}    Recommended Labs or Other Treatments After Discharge:   INFUSION ORDERS:  Daptomycin  500 mg iv daily through 4/6/22  - Diagnosis - R DM foot infection, R foot osteomyelitis  - First dose given in hospital  - PICC   - Disposition / date discharge  - Check CBC w diff, CMP, ESR, CRP, CK every Mon or Tue - FAX result to [phone]  - Call with antibiotic / infusion issues, [phone]  - Call with any change in status, transfer in or out of a facility or to hospital - [phone]  - No f/u in outpatient ID office necessary    Podiatry Wound Care Discharge Instructions  Please perform every other day dressing changes to right lower extremity as follows  -Apply betadine to the wound on the inside of the second digit and along the incision on the right  Leg  -Next apply a non adherent dressing or adaptic to the incision site on the bottom of the right leg  -Apply gauze over top then nonadherent dressing to the incision site on the bottom of the right leg  -Next apply gauze to the top of the right foot and ankle  -Next loosely wrap the right lower extremity with Kerlix starting from just in front of the toes and ending just above the ankle  -Next gently wrap the right foot with 4 inch Ace bandage starting from just in front of the toes and ending just above the ankle    Patient is non-weightbearing Right lower extremity  Please follow-up with Dr. Mark Pacheco DPM for post operative appointment    Physician Certification: I certify the above information and transfer of Corwin Mix  is necessary for the continuing treatment of the diagnosis listed and that he requires {Admit to Appropriate Level of Care:12218} for {GREATER/LESS:023060510} 30 days.      Update Admission H&P: {CHP DME Changes in [facility]:348116924}    PHYSICIAN SIGNATURE:  {Esignature:[id number]}

## 2022-02-28 NOTE — PROGRESS NOTES
ID Follow-up NOTE    CC:   R diabetic foot infection  Antibiotics: Vancomycin, Meropenem    Admit Date: 2022  Hospital Day: 3    Subjective:     Patient reports mild R foot pain - worse with dressing change  No other complaint    Objective:     Patient Vitals for the past 8 hrs:   BP Temp Temp src Pulse Resp SpO2 Weight   22 0830 -- -- -- -- -- -- 205 lb 11 oz (93.3 kg)   22 0718 (!) 156/77 97.9 °F (36.6 °C) Oral 62 14 96 % --   22 0514 (!) 163/77 97.6 °F (36.4 °C) Oral 65 18 97 % --     I/O last 3 completed shifts: In: 2890 [P.O.:1200; I.V.:1378]  Out:  [Urine:]  No intake/output data recorded. EXAM:  GENERAL: No apparent distress. RA  HEENT: Membranes moist, no oral lesion  NECK:  Supple, no lymphadenopathy  LUNGS: Clear b/l, no rales, no dullness  CARDIAC: RRR, no murmur appreciated  ABD:  + BS, soft / NT  EXT:  No rash, no edema, no lesions - R foot with dressing (saw picture / image in media section)  NEURO: No focal neurologic findings  PSYCH: Orientation, sensorium, mood normal  LINES:  Peripheral iv       Data Review:  Lab Results   Component Value Date    WBC 6.3 2022    HGB 10.1 (L) 2022    HCT 29.2 (L) 2022    MCV 85.8 2022     2022     Lab Results   Component Value Date    CREATININE 0.8 2022    BUN 15 2022     2022    K 3.8 2022     2022    CO2 27 2022       Hepatic Function Panel:   Lab Results   Component Value Date    LABALBU 3.9 2021       MICRO:   BC no growth to date   R foot cult: GS 2+WBC, 2+GPC; cult heavy MRSA    IMAGIN/28 R foot MRI:  1. Slight marrow edema in the distal most aspect of the second metatarsal, which is status post amputation. This could reflect reactive marrow change, however osteomyelitis is suspected. There is also slight marrow edema in the phalanges of the second   digit which could reflect early osteomyelitis as well.    2. Large area of signal abnormality in the second digit as detailed above, demonstrating lack of enhancement but hyperintense on fluid sensitive sequences. This could either reflect phlegmon/abscess or it could reflect devitalized tissue. 3. Osteomyelitis involving the the distal aspect of the third metatarsal      Scheduled Meds:   vancomycin  1,250 mg IntraVENous Q12H    amLODIPine  10 mg Oral QAM    apixaban  5 mg Oral BID    atorvastatin  40 mg Oral Nightly    miconazole   Topical BID    cloNIDine  0.2 mg Oral BID    fenofibrate  160 mg Oral Daily    finasteride  5 mg Oral Daily    gabapentin  300 mg Oral BID    LORazepam  1 mg Oral BID    metoprolol tartrate  50 mg Oral BID    PARoxetine  20 mg Oral QPM    insulin lispro  0-12 Units SubCUTAneous TID WC    insulin lispro  0-6 Units SubCUTAneous Nightly    alogliptin  25 mg Oral Daily    sodium chloride flush  5-40 mL IntraVENous 2 times per day    meropenem  1,000 mg IntraVENous Q8H       Continuous Infusions:   dextrose      dextrose      sodium chloride 25 mL (02/28/22 0738)       PRN Meds:  polyvinyl alcohol, glucose, dextrose, glucagon (rDNA), dextrose, oxyCODONE-acetaminophen **OR** oxyCODONE-acetaminophen, sodium chloride flush, sodium chloride, ondansetron **OR** ondansetron, polyethylene glycol, acetaminophen **OR** acetaminophen      Assessment:     DM, neuropathy  Other medical co-morbidities: HTN, HL, AF, depression / anxiety, HA, ANDREW    R DM foot infection  POD#7 I&D - R foot I&D with resection 2nd MT head   - Path - 'bone with no significant acute inflammation' (no culture sent)   - Same day, d/c on amoxacillin 500 tid    Admit 2/27 - R foot pain and swelling  Tm 99.1, WBC 6.5,   MRI abnormal    Plan:     Cont Vancomycin  D/c Meropenem  Will f/u on cult - await MRSA sensitivities  Will review MRI     PICC - discussed outpatient antibiotics, line / OPAT with pt   See DARLENE    Will discuss with Podiatry    Medical Decision Making:   The following items were considered in medical decision making:  Discussion of patient care with other providers  Reviewed clinical lab tests  Reviewed radiology tests  Reviewed other diagnostic tests/interventions  Independent review of radiologic images - will review MRI  Microbiology cultures and other micro tests reviewed      Discussed with pt  MD MALIKA Crouch:  Daptomycin  500 mg iv daily through 4/6/22  - Diagnosis - R DM foot infection, R foot osteomyelitis  - First dose given in hospital  - PICC   - Disposition / date discharge  - Check CBC w diff, CMP, ESR, CRP, CK every Mon or Tue - FAX result to [phone]  - Call with antibiotic / infusion issues, [phone]  - Call with any change in status, transfer in or out of a facility or to hospital - [phone]  - No f/u in outpatient ID office necessary

## 2022-02-28 NOTE — PROGRESS NOTES
Clinical Pharmacy Progress Note    Vancomycin - Management by Pharmacy    Consult Date(s): 2/27/22  Consulting Provider(s): Kayleigh    Assessment / Plan:  1)  RLE cellulitis with dislocated 2nd digit- Vancomycin   Concurrent Antimicrobials: Meropenem   Day of Vanc Therapy: #2   Current Dosing Method: Bayesian-Guided AUC Dosing  o Therapeutic Goal: AUC = 400-500 mg/L*hr  o Currently on 1250mg IV q18h. Random level this AM = 9.4mcg/mL - calculated AUC = 269 (subtherapeutic). o Will increase dose to 1250mg IV q12h - regimen predicts AUC = 401 with steady state trough = 10.4.    o Random level is ordered for 3/1 AM to further evaluate above regimen.  Will continue to monitor clinical condition and make adjustments to regimen as appropriate. Please call with questions--  Thanks--  Amy Torres, PharmD, BCPS, BCGP  [id number] ([facility])   2/28/2022 8:48 AM      Interval update:  MRI pending. Wound culture in progress. Subjective/Objective:  Enoch Cushing is a 78 y.o. male with a PMHx significant for Afib on Eliquis, DMII, and HTN who is admitted as transfer from [place] ER with RLE surgical site infection. Patient underwent second metatarsal head resection with complex closure of wound of right foot on 2/21/22  At [facility] for osteomyelitis. Pt did start a 10-day course of Amoxicillin & Doxycycline on 2/24/22.     Pharmacy is consulted to dose vancomycin.  :   Ht Readings from Last 1 Encounters:   02/21/22 5' 10\" (1.778 m)      Wt Readings from Last 1 Encounters:   02/28/22 205 lb 11 oz (93.3 kg)       Current & Prior Antimicrobial Regimen(s):   Meropenem 1000mg EI q8h (2/26-current)   Vancomycin - Pharmacy to dose   2250mg 2/26 17:30 at [place] ER    1500mg IV x1 2/27 00:15 at [facility] ER   1250mg IV q18h (2/27-2/28)   1250mg IV q12h (2/28-current)    Vancomycin Level(s) / Doses:  Date Time Dose Level / Type of Level Interpretation   2/28 04:43 1250mg q18h Random = 9.4 mcg/mL Calculated AUC = 269 with trough = 5.7          Note: Serum levels collected for AUC-based dosing may be high if collected in close proximity to the dose administered. This is not necessarily indicative of toxicity. Cultures & Sensitivities:  Date Site Micro Susceptibility / Result   2/26 Blood x2 No growth to date    2/27 Wound In process      Labs / Ancillary Data:    Estimated Creatinine Clearance: 93 mL/min (based on SCr of 0.8 mg/dL).     Recent Labs     02/27/22  0611 02/28/22  0443   CREATININE 1.0 0.8   BUN 23* 15   WBC 6.5 6.3

## 2022-02-28 NOTE — PROGRESS NOTES
HOSPITALISTS PROGRESS NOTE    2/28/2022 12:07 PM        Name: Audra Price Admitted: 2/26/2022  Primary Care Provider: Denisha Chahal MD (Tel: [phone])      Problem List  Principal Problem:    Diabetic foot infection [facility])  Active Problems:    Type 2 diabetes mellitus with other specified complication ([facility] [address]    Chronic atrial fibrillation ([facility] [address]    Diabetes education, encounter for    Failure of outpatient treatment    Overweight  Resolved Problems:    * No resolved hospital problems. *       Assessment & Plan:   Right foot infection  Recent surgical intervention by podiatry, podiatry and ID on board, antibiotics Vanco   Sed rate more than 120  Antibiotic recommendation as per ID, cultures growing MRSA for now, MRI results reviewed suggestive of osteomyelitis, Merrem has been discontinued      Mild EDER  IVF, improved    A. fib  Eliquis metoprolol    Hypertension  Norvasc metoprolol    Diabetes mellitus controlled,   Lantus 10 units for nighttime ordered  Insulin sliding scale    IV Access: Peripheral  Springer: No  Diet: ADULT DIET; Regular; 4 carb choices (60 gm/meal); Low Fat/Low Chol/High Fiber/MARTIN  Code:Full Code  DVT PPX Eliquis  Disposition monitor in the hospital      Brief Course: Transfer from [facility] ER worsening foot infection. Patient was treated with antibiotic on Wednesday but seems like getting worse. Patient did had a surgery on Monday at [facility]. He had second metatarsal head resection right foot complex wound closure bone biopsy right foot.       CC: Foot pain    Subjective:  .   patient denies any new issues, foot pain is tolerable,  Reviewed interval ancillary notes    Current Medications  vancomycin (VANCOCIN) 1,250 mg in dextrose 5 % 250 mL IVPB, Q12H  polyvinyl alcohol (LIQUIFILM TEARS) 1.4 % ophthalmic solution 1 drop, PRN  amLODIPine (NORVASC) tablet 10 mg, QAM  apixaban (ELIQUIS) tablet 5

## 2022-03-01 LAB
ANION GAP SERPL CALCULATED.3IONS-SCNC: 10 MMOL/L (ref 3–16)
BASOPHILS ABSOLUTE: 0 K/UL (ref 0–0.2)
BASOPHILS RELATIVE PERCENT: 0.6 %
BUN BLDV-MCNC: 15 MG/DL (ref 7–20)
CALCIUM SERPL-MCNC: 9 MG/DL (ref 8.3–10.6)
CHLORIDE BLD-SCNC: 100 MMOL/L (ref 99–110)
CO2: 26 MMOL/L (ref 21–32)
CREAT SERPL-MCNC: 0.9 MG/DL (ref 0.8–1.3)
EOSINOPHILS ABSOLUTE: 0.2 K/UL (ref 0–0.6)
EOSINOPHILS RELATIVE PERCENT: 2.5 %
GFR AFRICAN AMERICAN: >60
GFR NON-AFRICAN AMERICAN: >60
GLUCOSE BLD-MCNC: 166 MG/DL (ref 70–99)
GLUCOSE BLD-MCNC: 184 MG/DL (ref 70–99)
GLUCOSE BLD-MCNC: 214 MG/DL (ref 70–99)
GLUCOSE BLD-MCNC: 255 MG/DL (ref 70–99)
GLUCOSE BLD-MCNC: 278 MG/DL (ref 70–99)
HCT VFR BLD CALC: 30.6 % (ref 40.5–52.5)
HEMOGLOBIN: 10.7 G/DL (ref 13.5–17.5)
LYMPHOCYTES ABSOLUTE: 0.9 K/UL (ref 1–5.1)
LYMPHOCYTES RELATIVE PERCENT: 11.3 %
MCH RBC QN AUTO: 29.5 PG (ref 26–34)
MCHC RBC AUTO-ENTMCNC: 34.9 G/DL (ref 31–36)
MCV RBC AUTO: 84.6 FL (ref 80–100)
MONOCYTES ABSOLUTE: 0.7 K/UL (ref 0–1.3)
MONOCYTES RELATIVE PERCENT: 8.8 %
NEUTROPHILS ABSOLUTE: 6 K/UL (ref 1.7–7.7)
NEUTROPHILS RELATIVE PERCENT: 76.8 %
PDW BLD-RTO: 13.7 % (ref 12.4–15.4)
PERFORMED ON: ABNORMAL
PLATELET # BLD: 337 K/UL (ref 135–450)
PMV BLD AUTO: 7.9 FL (ref 5–10.5)
POTASSIUM SERPL-SCNC: 4 MMOL/L (ref 3.5–5.1)
RBC # BLD: 3.61 M/UL (ref 4.2–5.9)
SODIUM BLD-SCNC: 136 MMOL/L (ref 136–145)
TOTAL CK: 73 U/L (ref 39–308)
VANCOMYCIN RANDOM: 13.1 UG/ML
WBC # BLD: 7.8 K/UL (ref 4–11)

## 2022-03-01 PROCEDURE — 80048 BASIC METABOLIC PNL TOTAL CA: CPT

## 2022-03-01 PROCEDURE — 80202 ASSAY OF VANCOMYCIN: CPT

## 2022-03-01 PROCEDURE — 6360000002 HC RX W HCPCS: Performed by: INTERNAL MEDICINE

## 2022-03-01 PROCEDURE — 6370000000 HC RX 637 (ALT 250 FOR IP): Performed by: INTERNAL MEDICINE

## 2022-03-01 PROCEDURE — 85025 COMPLETE CBC W/AUTO DIFF WBC: CPT

## 2022-03-01 PROCEDURE — 36415 COLL VENOUS BLD VENIPUNCTURE: CPT

## 2022-03-01 PROCEDURE — 2580000003 HC RX 258: Performed by: INTERNAL MEDICINE

## 2022-03-01 PROCEDURE — 82550 ASSAY OF CK (CPK): CPT

## 2022-03-01 PROCEDURE — 99232 SBSQ HOSP IP/OBS MODERATE 35: CPT | Performed by: INTERNAL MEDICINE

## 2022-03-01 PROCEDURE — 1200000000 HC SEMI PRIVATE

## 2022-03-01 RX ORDER — ENALAPRIL MALEATE 10 MG/1
10 TABLET ORAL 2 TIMES DAILY
Status: DISCONTINUED | OUTPATIENT
Start: 2022-03-01 | End: 2022-03-02 | Stop reason: HOSPADM

## 2022-03-01 RX ORDER — HYDROCHLOROTHIAZIDE 25 MG/1
25 TABLET ORAL DAILY
Status: DISCONTINUED | OUTPATIENT
Start: 2022-03-01 | End: 2022-03-02 | Stop reason: HOSPADM

## 2022-03-01 RX ADMIN — GABAPENTIN 300 MG: 300 CAPSULE ORAL at 17:57

## 2022-03-01 RX ADMIN — LORAZEPAM 1 MG: 1 TABLET ORAL at 20:05

## 2022-03-01 RX ADMIN — GABAPENTIN 300 MG: 300 CAPSULE ORAL at 08:24

## 2022-03-01 RX ADMIN — APIXABAN 5 MG: 5 TABLET, FILM COATED ORAL at 08:24

## 2022-03-01 RX ADMIN — ENALAPRIL MALEATE 10 MG: 10 TABLET ORAL at 11:36

## 2022-03-01 RX ADMIN — OXYCODONE HYDROCHLORIDE AND ACETAMINOPHEN 2 TABLET: 5; 325 TABLET ORAL at 03:38

## 2022-03-01 RX ADMIN — CLONIDINE HYDROCHLORIDE 0.2 MG: 0.1 TABLET ORAL at 20:01

## 2022-03-01 RX ADMIN — INSULIN LISPRO 6 UNITS: 100 INJECTION, SOLUTION INTRAVENOUS; SUBCUTANEOUS at 12:24

## 2022-03-01 RX ADMIN — ALOGLIPTIN 25 MG: 25 TABLET, FILM COATED ORAL at 08:25

## 2022-03-01 RX ADMIN — DAPTOMYCIN 500 MG: 500 INJECTION, POWDER, LYOPHILIZED, FOR SOLUTION INTRAVENOUS at 11:37

## 2022-03-01 RX ADMIN — ENALAPRIL MALEATE 10 MG: 10 TABLET ORAL at 20:01

## 2022-03-01 RX ADMIN — CLONIDINE HYDROCHLORIDE 0.2 MG: 0.1 TABLET ORAL at 08:24

## 2022-03-01 RX ADMIN — AMLODIPINE BESYLATE 10 MG: 10 TABLET ORAL at 08:24

## 2022-03-01 RX ADMIN — MICONAZOLE NITRATE: 20 CREAM TOPICAL at 22:31

## 2022-03-01 RX ADMIN — APIXABAN 5 MG: 5 TABLET, FILM COATED ORAL at 20:01

## 2022-03-01 RX ADMIN — INSULIN GLARGINE 10 UNITS: 100 INJECTION, SOLUTION SUBCUTANEOUS at 20:03

## 2022-03-01 RX ADMIN — FENOFIBRATE 160 MG: 160 TABLET ORAL at 08:25

## 2022-03-01 RX ADMIN — ATORVASTATIN CALCIUM 40 MG: 40 TABLET, FILM COATED ORAL at 20:01

## 2022-03-01 RX ADMIN — METOPROLOL TARTRATE 50 MG: 50 TABLET, FILM COATED ORAL at 20:01

## 2022-03-01 RX ADMIN — HYDROCHLOROTHIAZIDE 25 MG: 25 TABLET ORAL at 11:35

## 2022-03-01 RX ADMIN — MICONAZOLE NITRATE: 20 CREAM TOPICAL at 08:26

## 2022-03-01 RX ADMIN — INSULIN LISPRO 2 UNITS: 100 INJECTION, SOLUTION INTRAVENOUS; SUBCUTANEOUS at 08:32

## 2022-03-01 RX ADMIN — VANCOMYCIN HYDROCHLORIDE 1250 MG: 10 INJECTION, POWDER, LYOPHILIZED, FOR SOLUTION INTRAVENOUS at 08:46

## 2022-03-01 RX ADMIN — LORAZEPAM 1 MG: 1 TABLET ORAL at 08:24

## 2022-03-01 RX ADMIN — OXYCODONE HYDROCHLORIDE AND ACETAMINOPHEN 2 TABLET: 5; 325 TABLET ORAL at 20:03

## 2022-03-01 RX ADMIN — PAROXETINE HYDROCHLORIDE 20 MG: 20 TABLET, FILM COATED ORAL at 17:57

## 2022-03-01 RX ADMIN — INSULIN LISPRO 4 UNITS: 100 INJECTION, SOLUTION INTRAVENOUS; SUBCUTANEOUS at 17:57

## 2022-03-01 RX ADMIN — METOPROLOL TARTRATE 50 MG: 50 TABLET, FILM COATED ORAL at 08:24

## 2022-03-01 RX ADMIN — INSULIN LISPRO 3 UNITS: 100 INJECTION, SOLUTION INTRAVENOUS; SUBCUTANEOUS at 20:03

## 2022-03-01 RX ADMIN — SODIUM CHLORIDE, PRESERVATIVE FREE 10 ML: 5 INJECTION INTRAVENOUS at 22:31

## 2022-03-01 RX ADMIN — FINASTERIDE 5 MG: 5 TABLET, FILM COATED ORAL at 08:25

## 2022-03-01 ASSESSMENT — PAIN SCALES - GENERAL
PAINLEVEL_OUTOF10: 8
PAINLEVEL_OUTOF10: 4
PAINLEVEL_OUTOF10: 8

## 2022-03-01 NOTE — PROGRESS NOTES
Patient alert and oriented. . VSS. PRN percocet was given for pain. . Condom cath in place. Dressing to RLE remains CDI., foot elevated. Tolerating diet. IVF and intermittent IVABX infusing. Fall precautions in place. Call light and bedside table are within reach. Will continue to monitor.

## 2022-03-01 NOTE — PROGRESS NOTES
Clinical Pharmacy Progress Note    Vancomycin - Management by Pharmacy    Consult Date(s): 2/27/22  Consulting Provider(s): Kayleigh    Assessment / Plan:  1)  RLE cellulitis with dislocated 2nd digit- Vancomycin   Concurrent Antimicrobials: n/a   Day of Vanc Therapy: #3   Current Dosing Method: Bayesian-Guided AUC Dosing  o Therapeutic Goal: AUC = 400-500 mg/L*hr  o Currently on 1250mg IV q12h. Random level this AM = 13.1mcg/mL - calculated AUC = 441 with steady state trough = 11.9. Continue same dose.  o Will plan to check repeat level in 2-3 days.  Will continue to monitor clinical condition and make adjustments to regimen as appropriate. Please call with questions--  Thanks--  Juana Burgess, PharmD, BCPS, BCGP  [id number] (\[facility]\"")   3/1/2022 7:40 AM      Interval update: Wound culture growing MRSA - sensitivities / MICs pending. PICC placed yesterday - ID recommending IV Daptomycin at discharge. Subjective/Objective:  Ha Mian is a 78 y.o. male with a PMHx significant for Afib on Eliquis, DMII, and HTN who is admitted as transfer from [place] [address] ER with RLE surgical site infection. Patient underwent second metatarsal head resection with complex closure of wound of right foot on 2/21/22  At [facility] for osteomyelitis. Pt did start a 10-day course of Amoxicillin & Doxycycline on 2/24/22.     Pharmacy is consulted to dose vancomycin.  :   Ht Readings from Last 1 Encounters:   02/21/22 5' 10\" (1.778 m)      Wt Readings from Last 1 Encounters:   02/28/22 205 lb 11 oz (93.3 kg)       Current & Prior Antimicrobial Regimen(s):   Meropenem 1000mg EI q8h (2/26-current)   Vancomycin - Pharmacy to dose   2250mg 2/26 17:30 at [place] [address] ER    1500mg IV x1 2/27 00:15 at [facility] ER   1250mg IV q18h (2/27-2/28)   1250mg IV q12h (2/28-current)    Vancomycin Level(s) / Doses:  Date Time Dose Level / Type of Level Interpretation   2/28 04:43 1250mg q18h Random = 9.4 mcg/mL Calculated AUC = 269 with trough = 5.7   3/1 06:04 1250mg q12h Random = 13.1 mcg/mL Drawn ~9.5 h after prior dose. Calculated AUC = 441 with trough = 11.9. Note: Serum levels collected for AUC-based dosing may be high if collected in close proximity to the dose administered. This is not necessarily indicative of toxicity. Cultures & Sensitivities:  Date Site Micro Susceptibility / Result   2/26 Blood x2 No growth to date    2/27 Wound MRSA In process     Labs / Ancillary Data:    Estimated Creatinine Clearance: 83 mL/min (based on SCr of 0.9 mg/dL).     Recent Labs     02/27/22  0611 02/28/22  0443 03/01/22  0604   CREATININE 1.0 0.8 0.9   BUN 23* 15 15   WBC 6.5 6.3 7.8

## 2022-03-01 NOTE — PROGRESS NOTES
Podiatric Surgery Daily Progress Note      Admit Date: 2/26/2022      Code:Full Code    Patient seen and examined, labs and records reviewed    Subjective:      Patient seen at bedside this AM with attending present. Patient denies any overnight acute event. Patient denies f/c/n/v/sob/cp. Patient is feeling better and noticed improvement to his foot. Review of Systems: A 10 point review of symptoms is unremarkable with the exception of the chief complaint. Patient specifically denies nausea, fever, vomiting, chills, shortness of breath, chest pain, abdominal pain, constipation or difficulty urinating. Objective     BP (!) 165/85   Pulse 62   Temp 97.6 °F (36.4 °C) (Axillary)   Resp 14 Comment: sleeping  Wt 205 lb 11 oz (93.3 kg)   SpO2 92%   BMI 29.51 kg/m²      I/O:    Intake/Output Summary (Last 24 hours) at 3/1/2022 0833  Last data filed at 2/28/2022 2249  Gross per 24 hour   Intake 240 ml   Output 1550 ml   Net -1310 ml              Wt Readings from Last 3 Encounters:   02/28/22 205 lb 11 oz (93.3 kg)   02/21/22 205 lb 11.2 oz (93.3 kg)   12/08/21 208 lb (94.3 kg)       LABS:    Recent Labs     02/28/22  0443 03/01/22  0604   WBC 6.3 7.8   HGB 10.1* 10.7*   HCT 29.2* 30.6*    337        Recent Labs     03/01/22  0604      K 4.0      CO2 26   BUN 15   CREATININE 0.9      No results for input(s): PROT, INR, APTT in the last 72 hours. LOWER EXTREMITY EXAMINATION    Dressing to right LE intact. No strikethrough noted to the external dressing. Minimal serosanguineous drainage noted to the internal layers of the dressing. VASCULAR: DP and PT pulses are palpable 2/4 left lower extremity. DP and PT pulses are nonpalpable 0/4 to the right lower extremity secondary to edema. Upon hand-held Doppler examination DP and PT pulses were noted to be biphasic. CFT less than 3 seconds to the distal digits of the foot b/l.  Skin temperature is warm to lukewarm from proximal to distal with no focal calor noted. Improving edema with relaxed skin tension lines, right lower extremity. No pain with calf compression b/l.     NEUROLOGIC: Gross and epicritic sensation is diminished b/l. Protective sensation is diminished at all pedal sites b/l. DERMATOLOGIC:   Right Lower Extremity:          Surgical incision noted to the plantar aspect of the right foot. Suture skin edges remain well adhered and sutures remain intact . No fluctuance or crepitus noted at the incision site. No malodor noted. Erythema noted from the distal aspect of the right foot extending proximally to the midfoot-improving. Partial-thickness ulceration noted to medial aspect of the second digit. Wound base is pink dermal tissue. No fluctuance or crepitus noted. No malodor noted. No active drainage noted. Wound does not probe to bone. Left LE soft tissue envelope is closed without ulceration or acute signs of infection. MUSCULOSKELETAL: Muscle strength is 5/5 for all pedal groups tested. Mild pain with palpation to the right foot. Ankle joint ROM is decreased in dorsiflexion with the knee extended. Third digit amputation noted to right LE. IMAGING:  XR Right Foot 2/27/2022  Narrative   EXAM: XR FOOT RIGHT (MIN 3 VIEWS)        INDICATION: infection to the right foot        COMPARISON: 2/21/2022.       TECHNIQUE: 3 views of the right foot       FINDINGS:       Postsurgical changes with resection of the second metatarsal head and amputation of the third digit at the MTP joint. There is increased soft tissue swelling at the second digit and small foci of subcutaneous gas which may be postsurgical. Questionable    ill-defined lucency at the base of the second proximal phalanx. Otherwise, no new destructive osseous changes identified. Fourth and fifth hammertoe deformities.  No acute fracture.           Impression       Increased soft tissue swelling along the second digit.  Mild lucency along the base of the second proximal phalanx is indeterminate for osteomyelitis. MRI Right Foot 2/28/2022  Narrative   MRI of the right foot with and without contrast       HISTORY: Wound. Osteomyelitis.       FINDINGS:       Examination is compromised by patient motion.       There has been prior resection of the head of the second metatarsal.       There is slight marrow edema in the distal most aspect of the second metatarsal. There is hyperintense T2 signal in the soft tissues adjacent to the second MTP joint. This is hypointense to fat on T1-weighted sequences and overall it does not enhance on    postcontrast sequences. The signal abnormality extends to the level of the distal phalanx.       Scattered areas of marrow edema are present in the second proximal, middle, and distal phalanges, associated with mild postcontrast enhancement.       In addition, there is konrad marrow edema involving the distal aspect of the third metatarsal extending all way up to the head with associated T1 signal hypointensity and homogeneous postcontrast enhancement. The soft tissue structures are edematous and    demonstrates postcontrast enhancement. No focal fluid collection is noted adjacent to the third metatarsal.           Impression   1. Slight marrow edema in the distal most aspect of the second metatarsal, which is status post amputation. This could reflect reactive marrow change, however osteomyelitis is suspected. There is also slight marrow edema in the phalanges of the second    digit which could reflect early osteomyelitis as well. 2. Large area of signal abnormality in the second digit as detailed above, demonstrating lack of enhancement but hyperintense on fluid sensitive sequences. This could either reflect phlegmon/abscess or it could reflect devitalized tissue.    3. Osteomyelitis involving the the distal aspect of the third metatarsal.           ASSESSMENT/PLAN  -Partial thickness ulceration, medial aspect of the right 2nd digit  -Cellulitis, right lower extremity-improving  -Osteomyelitis third metatarsal, right foot  -S/P second metatarsal head resection with complex wound closure, right foot (DOS 2/21/2022)  -Diabetes mellitus with peripheral neuropathy    -Patient seen and examined at bedside this AM  -Hypertensive otherwise VSS, no leukocytosis noted   -Blood cultures 1 & 2 NGTD  -XR images reviewed, impression noted above  -MRI Right Foot, see impression above; large area of signal abnormality in the second digit was most likely devitalized tissue as tissue was removed and debrided yesterday the second digit is noted to be improving in swelling and erythema.  -Wound culture: MRSA  -ID following, Daptomycin  500 mg iv daily through 4/6/22  -Using sterile pickups and suture scissors 2 sutures were removed at the distal aspect of the surgical incision. No purulent drainage was expressed. Patient tolerated well. -Right lower extremity dressed with Betadine, DSD and Ace with gentle compression  -post-op shoe ordered to patient room  -Nonweightbearing to the right lower extremity    DISPO: Partial-thickness ulceration and cellulitis, second digit to the right lower extremity-improving. No podiatric surgical intervention anticipated while patient is in house. Labs and imaging reviewed. ID recs noted above. Will continue to follow patient while in house with local wound care.     Patient seen and evaluated at the bedside with Dr. Bonnie Carter DPM.    Ebenezer Preciado DPM   Podiatric Resident PGY1  Pager [phone] or [employer]  3/1/2022, 8:33 AM

## 2022-03-01 NOTE — CARE COORDINATION
Cm following, spoke with ot at bedside, he plans to return home agreeable to [facility] cynthia Linda at [facility] running benefits, Referral to [address] [place] [address],   PICC line placed,  Electronically signed by Zaida Fontana RN on 3/1/2022 at 5:07 PM s  [phone]

## 2022-03-01 NOTE — PROGRESS NOTES
HOSPITALISTS PROGRESS NOTE    3/1/2022 1:22 PM        Name: Ha Mina . Admitted: 2/26/2022  Primary Care Provider: Carmel Trivedi MD (Tel: [phone])      Problem List  Principal Problem:    Diabetic foot infection [facility])  Active Problems:    Type 2 diabetes mellitus with other specified complication ([facility] [address]    Chronic atrial fibrillation ([facility] [address]    Diabetes education, encounter for    Failure of outpatient treatment    Overweight  Resolved Problems:    * No resolved hospital problems. *       Assessment & Plan:   Right foot infection  Recent surgical intervention by podiatry, podiatry and ID on board, antibiotics Vanco   Sed rate more than 120  Antibiotic recommendation as per ID, cultures growing MRSA for now, MRI results reviewed suggestive of osteomyelitis, Merrem has been discontinued, antibiotics changed to daptomycin      Mild EDER  IVF, improved    A. fib  Eliquis metoprolol    Hypertension  Norvasc metoprolol    Diabetes mellitus controlled,   Lantus 10 units for nighttime ordered  Insulin sliding scale    IV Access: PICC line  Springer: No  Diet: ADULT DIET; Regular; 4 carb choices (60 gm/meal); Low Fat/Low Chol/High Fiber/MARTIN  Code:Full Code  DVT PPX Eliquis  Disposition discharge once okay with podiatry and ID      Brief Course: Transfer from [facility] ER worsening foot infection. Patient was treated with antibiotic on Wednesday but seems like getting worse. Patient did had a surgery on Monday at [facility]. He had second metatarsal head resection right foot complex wound closure bone biopsy right foot. CC: Foot pain    Subjective:  .   Patient blood pressure is slightly elevated, patient feels comfortable, denies any chest pain, no fevers, foot pain is tolerable  Reviewed interval ancillary notes    Current Medications  DAPTOmycin (CUBICIN) 500 mg in sodium chloride 0.9 % 50 mL IVPB, Q24H  enalapril (VASOTEC) tablet 10 mg, BID  hydroCHLOROthiazide (HYDRODIURIL) tablet 25 mg, Daily  glucose chewable tablet 4 each, PRN  insulin glargine (LANTUS;BASAGLAR) injection pen 10 Units, Nightly  sodium chloride flush 0.9 % injection 5-40 mL, 2 times per day  sodium chloride flush 0.9 % injection 5-40 mL, PRN  0.9 % sodium chloride infusion, PRN  polyvinyl alcohol (LIQUIFILM TEARS) 1.4 % ophthalmic solution 1 drop, PRN  amLODIPine (NORVASC) tablet 10 mg, QAM  apixaban (ELIQUIS) tablet 5 mg, BID  atorvastatin (LIPITOR) tablet 40 mg, Nightly  miconazole (MICOTIN) 2 % cream, BID  cloNIDine (CATAPRES) tablet 0.2 mg, BID  fenofibrate (TRIGLIDE) tablet 160 mg, Daily  finasteride (PROSCAR) tablet 5 mg, Daily  gabapentin (NEURONTIN) capsule 300 mg, BID  LORazepam (ATIVAN) tablet 1 mg, BID  metoprolol tartrate (LOPRESSOR) tablet 50 mg, BID  PARoxetine (PAXIL) tablet 20 mg, QPM  dextrose 10 % infusion, PRN  glucagon (rDNA) injection 1 mg, PRN  dextrose 5 % solution, PRN  insulin lispro (1 Unit Dial) 0-12 Units, TID WC  insulin lispro (1 Unit Dial) 0-6 Units, Nightly  alogliptin (NESINA) tablet 25 mg, Daily  oxyCODONE-acetaminophen (PERCOCET) 5-325 MG per tablet 1 tablet, Q4H PRN   Or  oxyCODONE-acetaminophen (PERCOCET) 5-325 MG per tablet 2 tablet, Q4H PRN  ondansetron (ZOFRAN-ODT) disintegrating tablet 4 mg, Q8H PRN   Or  ondansetron (ZOFRAN) injection 4 mg, Q6H PRN  polyethylene glycol (GLYCOLAX) packet 17 g, Daily PRN  acetaminophen (TYLENOL) tablet 650 mg, Q6H PRN   Or  acetaminophen (TYLENOL) suppository 650 mg, Q6H PRN        Objective:  BP (!) 161/80   Pulse 64   Temp 98.2 °F (36.8 °C) (Oral)   Resp 18   Wt 205 lb 11 oz (93.3 kg)   SpO2 93%   BMI 29.51 kg/m²     Intake/Output Summary (Last 24 hours) at 3/1/2022 1322  Last data filed at 3/1/2022 0928  Gross per 24 hour   Intake --   Output 1650 ml   Net -1650 ml      Wt Readings from Last 3 Encounters:   02/28/22 205 lb 11 oz (93.3 kg)   02/21/22 205 lb 11.2 oz (93.3 kg) 12/08/21 208 lb (94.3 kg)   Right foot dressing noticed, wound pictures are reviewed from [facility] ER note  General appearance:  Appears comfortable  Eyes: Sclera clear. Pupils equal.  ENT: Moist oral mucosa. Trachea midline, no adenopathy. Cardiovascular: Regular rhythm, normal S1, S2. No murmur. No edema in lower extremities  Respiratory: Not using accessory muscles. Good inspiratory effort. Clear to auscultation bilaterally, no wheeze or crackles. GI: Abdomen soft, no tenderness, not distended, normal bowel sounds  Musculoskeletal: No cyanosis in digits, neck supple  Neurology: CN 2-12 grossly intact. No speech or motor deficits  Psych: Normal affect. Alert and oriented in time, place and person    Labs and Tests:  CBC:   Recent Labs     02/27/22  0611 02/28/22  0443 03/01/22  0604   WBC 6.5 6.3 7.8   HGB 9.5* 10.1* 10.7*    277 337     BMP:    Recent Labs     02/27/22  0611 02/28/22  0443 03/01/22  0604    137 136   K 3.4* 3.8 4.0   CL 99 101 100   CO2 27 27 26   BUN 23* 15 15   CREATININE 1.0 0.8 0.9   GLUCOSE 157* 181* 184*     Hepatic: No results for input(s): AST, ALT, ALB, BILITOT, ALKPHOS in the last 72 hours. XR CHEST PORTABLE   Final Result   1. No findings for acute cardiopulmonary disease. 2.  Right PICC catheter tip in the mid-distal SVC. MRI FOOT RIGHT W WO CONTRAST   Final Result   1. Slight marrow edema in the distal most aspect of the second metatarsal, which is status post amputation. This could reflect reactive marrow change, however osteomyelitis is suspected. There is also slight marrow edema in the phalanges of the second    digit which could reflect early osteomyelitis as well. 2. Large area of signal abnormality in the second digit as detailed above, demonstrating lack of enhancement but hyperintense on fluid sensitive sequences. This could either reflect phlegmon/abscess or it could reflect devitalized tissue.    3. Osteomyelitis involving the the distal aspect of the third metatarsal.      XR FOOT RIGHT (MIN 3 VIEWS)   Final Result      Increased soft tissue swelling along the second digit. Mild lucency along the base of the second proximal phalanx is indeterminate for osteomyelitis.               Lidia Ware MD   3/1/2022 1:22 PM

## 2022-03-01 NOTE — PROGRESS NOTES
Pt remains A&Ox4 and VSS. R foot dressing clean, dry, intact. RUE PICC intact with IV abx given per orders. Pt OOB and ambulated to restroom, tolerated fairly well. Voiding freely. CHG bath given and linens changed. Insulin given per orders during shift for elevated BG levels. Fall precautions in place and call light within reach.  Will continue to monitor

## 2022-03-01 NOTE — PROGRESS NOTES
ID Follow-up NOTE    CC:   R diabetic foot infection  Antibiotics: Vancomycin, Meropenem    Admit Date: 2/26/2022  Hospital Day: 4    Subjective:     Patient reports mild R foot pain - worse with wt bearing, ambulating   No other complaint    Objective:     Afebrile    Patient Vitals for the past 8 hrs:   BP Temp Temp src Pulse Resp SpO2   03/01/22 0736 (!) 165/85 97.6 °F (36.4 °C) Axillary 62 14 92 %   03/01/22 0338 (!) 152/79 97.3 °F (36.3 °C) Axillary 68 16 93 %     I/O last 3 completed shifts: In: 360 [P.O.:360]  Out: 2500 [Urine:2500]  No intake/output data recorded. EXAM:  GENERAL: No apparent distress. RA  HEENT: Membranes moist, no oral lesion  NECK:  Supple, no lymphadenopathy  LUNGS: Clear b/l, no rales, no dullness  CARDIAC: RRR, no murmur appreciated  ABD:  + BS, soft / NT  EXT:  No rash, no edema, no lesions -   R foot with dressing (saw picture / image in media section)  NEURO: No focal neurologic findings  PSYCH: Orientation, sensorium, mood normal  LINES:  R PICC, placed 2/28       Data Review:  Lab Results   Component Value Date    WBC 7.8 03/01/2022    HGB 10.7 (L) 03/01/2022    HCT 30.6 (L) 03/01/2022    MCV 84.6 03/01/2022     03/01/2022     Lab Results   Component Value Date    CREATININE 0.9 03/01/2022    BUN 15 03/01/2022     03/01/2022    K 4.0 03/01/2022     03/01/2022    CO2 26 03/01/2022       Hepatic Function Panel:   Lab Results   Component Value Date    LABALBU 3.9 05/18/2021       MICRO:  2/26 BC no growth to date  2/26 R foot cult: GS 2+WBC, 2+GPC; cult heavy MRSA  Staph aureus mrsa      BACTERIAL SUSCEPTIBILITY PANEL BY BAKARI    ceFAZolin 16 mcg/mL Resistant    clindamycin >4 mcg/mL Resistant    DAPTOmycin <=0.5 mcg/mL Sensitive    erythromycin >4 mcg/mL Resistant    linezolid 2 mcg/mL Sensitive    oxacillin >2 mcg/mL Resistant    tetracycline <=4 mcg/mL Sensitive    trimethoprim-sulfamethoxazole <=0.5/9.5 m. ..  Sensitive    vancomycin 2 mcg/mL Sensitive IMAGIN/28 R foot MRI:  1. Slight marrow edema in the distal most aspect of the second metatarsal, which is status post amputation. This could reflect reactive marrow change, however osteomyelitis is suspected. There is also slight marrow edema in the phalanges of the second   digit which could reflect early osteomyelitis as well. 2. Large area of signal abnormality in the second digit as detailed above, demonstrating lack of enhancement but hyperintense on fluid sensitive sequences. This could either reflect phlegmon/abscess or it could reflect devitalized tissue.    3. Osteomyelitis involving the the distal aspect of the third metatarsal      Scheduled Meds:   vancomycin  1,250 mg IntraVENous Q12H    insulin glargine  10 Units SubCUTAneous Nightly    sodium chloride flush  5-40 mL IntraVENous 2 times per day    amLODIPine  10 mg Oral QAM    apixaban  5 mg Oral BID    atorvastatin  40 mg Oral Nightly    miconazole   Topical BID    cloNIDine  0.2 mg Oral BID    fenofibrate  160 mg Oral Daily    finasteride  5 mg Oral Daily    gabapentin  300 mg Oral BID    LORazepam  1 mg Oral BID    metoprolol tartrate  50 mg Oral BID    PARoxetine  20 mg Oral QPM    insulin lispro  0-12 Units SubCUTAneous TID WC    insulin lispro  0-6 Units SubCUTAneous Nightly    alogliptin  25 mg Oral Daily       Continuous Infusions:   sodium chloride      dextrose      dextrose         PRN Meds:  sodium chloride flush, sodium chloride, polyvinyl alcohol, glucose, dextrose, glucagon (rDNA), dextrose, oxyCODONE-acetaminophen **OR** oxyCODONE-acetaminophen, ondansetron **OR** ondansetron, polyethylene glycol, acetaminophen **OR** acetaminophen      Assessment:     DM, neuropathy  Other medical co-morbidities: HTN, HL, AF, depression / anxiety, HA, ANDREW    R DM foot infection  POD#7 I&D - R foot I&D with resection 2nd MT head   - Path - 'bone with no significant acute inflammation' (no culture sent)   - Same day, d/c on amoxacillin 500 tid    Admit 2/27 - R foot pain and swelling  Tm 99.1, WBC 6.5,   MRI abnormal    Plan:     Change to Daptomycin (given MRSA isolate with elevated Vanco BAKARI 2)  Will review MRI   Wound care and possible surg intervention per Podiatry    PICC - discussed outpatient antibiotics, line / OPAT with pt   See DARLENE    Will discuss with Podiatry    Medical Decision Making:   The following items were considered in medical decision making:  Discussion of patient care with other providers  Reviewed clinical lab tests  Reviewed radiology tests  Reviewed other diagnostic tests/interventions  Independent review of radiologic images - will review MRI  Microbiology cultures and other micro tests reviewed      Discussed with pt, Discharge MD Kathia Goodman Peer:  Daptomycin  500 mg iv daily through 4/6/22  - Diagnosis - R DM foot infection, R foot osteomyelitis  - First dose given in hospital  - PICC   - Disposition / date discharge  - Check CBC w diff, CMP, ESR, CRP, CK every Mon or Tue - FAX result to [phone]  - Call with antibiotic / infusion issues, [phone]  - Call with any change in status, transfer in or out of a facility or to hospital - [phone]  - No f/u in outpatient ID office necessary

## 2022-03-01 NOTE — CONSULTS
Clinical Pharmacy Progress Note    Vancomycin has been discontinued - pt now on Daptomycin for MRSA infection with Vanc BAKARI = 2. Will sign off pharmacy to dose Vancomycin consult. If medication is restarted and pharmacy is to manage dosing, please re-consult at that time.     Please call with questions--  Thanks--  Joanna Omer, PharmD, [id number]  [phone] ([facility])   3/1/2022 12:23 PM

## 2022-03-01 NOTE — PROGRESS NOTES
Physician Progress Note      Frank Walters  CSN #:                  [id number]  :                       1945  ADMIT DATE:       2022 8:54 PM  [address] DATE:  RESPONDING  PROVIDER #:        Adriel Ryan MD          QUERY TEXT:    Patient admitted with diabetic foot wound. Noted documentation of Acute   Kidney Injury in H&P on  and progress notes  and . In order to   support the diagnosis of EDER, please include additional clinical indicators in   your documentation. Or please document if the diagnosis of EDER has been   ruled out after further study. The medical record reflects the following:  Risk Factors: 78year old male with diabetic foot wound  Clinical Indicators: On admission, creatinine 1.0, down to 0.9 on . GFR   >60  Treatment: IVF, labs    Thank you,    Peace LUNA RN CDS    Defined by Kidney Disease Improving Global Outcomes (KDIGO) clinical practice   guideline for acute kidney injury:  -Increase in SCr by greater than or equal to 0.3 mg/dl within 48 hours; or  -Increase or decrease in SCr to greater than or equal to 1.5 times baseline,   which is known or presumed to have occurred within the prior 7 days; or  -Urine volume < 0.5ml/kg/h for 6 hours  Options provided:  -- Acute kidney injury evidenced by, Please document evidence as well as   baseline creatinine, if known. -- Acute kidney injury ruled out after study  -- Other - I will add my own diagnosis  -- Disagree - Not applicable / Not valid  -- Disagree - Clinically unable to determine / Unknown  -- Refer to Clinical Documentation Reviewer    PROVIDER RESPONSE TEXT:    This patient has an acute kidney injury as evidenced by Creatinine 1.35 at   outside ER    Query created by:  Wm Dale on 3/1/2022 8:24 AM      Electronically signed by:  Adriel Ryan MD 3/1/2022 1:21 PM

## 2022-03-02 VITALS
BODY MASS INDEX: 29.51 KG/M2 | TEMPERATURE: 97.9 F | DIASTOLIC BLOOD PRESSURE: 70 MMHG | SYSTOLIC BLOOD PRESSURE: 124 MMHG | HEART RATE: 64 BPM | OXYGEN SATURATION: 93 % | RESPIRATION RATE: 16 BRPM | WEIGHT: 205.69 LBS

## 2022-03-02 LAB
ANION GAP SERPL CALCULATED.3IONS-SCNC: 9 MMOL/L (ref 3–16)
BASOPHILS ABSOLUTE: 0.1 K/UL (ref 0–0.2)
BASOPHILS RELATIVE PERCENT: 1 %
BUN BLDV-MCNC: 17 MG/DL (ref 7–20)
CALCIUM SERPL-MCNC: 9.5 MG/DL (ref 8.3–10.6)
CHLORIDE BLD-SCNC: 96 MMOL/L (ref 99–110)
CO2: 30 MMOL/L (ref 21–32)
CREAT SERPL-MCNC: 1 MG/DL (ref 0.8–1.3)
EOSINOPHILS ABSOLUTE: 0.2 K/UL (ref 0–0.6)
EOSINOPHILS RELATIVE PERCENT: 2.1 %
GFR AFRICAN AMERICAN: >60
GFR NON-AFRICAN AMERICAN: >60
GLUCOSE BLD-MCNC: 182 MG/DL (ref 70–99)
GLUCOSE BLD-MCNC: 185 MG/DL (ref 70–99)
GLUCOSE BLD-MCNC: 305 MG/DL (ref 70–99)
HCT VFR BLD CALC: 31.1 % (ref 40.5–52.5)
HEMOGLOBIN: 10.9 G/DL (ref 13.5–17.5)
LYMPHOCYTES ABSOLUTE: 1.2 K/UL (ref 1–5.1)
LYMPHOCYTES RELATIVE PERCENT: 14.9 %
MCH RBC QN AUTO: 29.2 PG (ref 26–34)
MCHC RBC AUTO-ENTMCNC: 34.9 G/DL (ref 31–36)
MCV RBC AUTO: 83.7 FL (ref 80–100)
MONOCYTES ABSOLUTE: 0.7 K/UL (ref 0–1.3)
MONOCYTES RELATIVE PERCENT: 8.8 %
NEUTROPHILS ABSOLUTE: 6.1 K/UL (ref 1.7–7.7)
NEUTROPHILS RELATIVE PERCENT: 73.2 %
PDW BLD-RTO: 14.2 % (ref 12.4–15.4)
PERFORMED ON: ABNORMAL
PERFORMED ON: ABNORMAL
PLATELET # BLD: 355 K/UL (ref 135–450)
PMV BLD AUTO: 7.7 FL (ref 5–10.5)
POTASSIUM SERPL-SCNC: 3.5 MMOL/L (ref 3.5–5.1)
RBC # BLD: 3.72 M/UL (ref 4.2–5.9)
SODIUM BLD-SCNC: 135 MMOL/L (ref 136–145)
WBC # BLD: 8.4 K/UL (ref 4–11)

## 2022-03-02 PROCEDURE — 97535 SELF CARE MNGMENT TRAINING: CPT

## 2022-03-02 PROCEDURE — 97530 THERAPEUTIC ACTIVITIES: CPT

## 2022-03-02 PROCEDURE — 2580000003 HC RX 258: Performed by: INTERNAL MEDICINE

## 2022-03-02 PROCEDURE — 97162 PT EVAL MOD COMPLEX 30 MIN: CPT

## 2022-03-02 PROCEDURE — 99232 SBSQ HOSP IP/OBS MODERATE 35: CPT | Performed by: INTERNAL MEDICINE

## 2022-03-02 PROCEDURE — 97166 OT EVAL MOD COMPLEX 45 MIN: CPT

## 2022-03-02 PROCEDURE — 85025 COMPLETE CBC W/AUTO DIFF WBC: CPT

## 2022-03-02 PROCEDURE — 97116 GAIT TRAINING THERAPY: CPT

## 2022-03-02 PROCEDURE — 80048 BASIC METABOLIC PNL TOTAL CA: CPT

## 2022-03-02 PROCEDURE — 6370000000 HC RX 637 (ALT 250 FOR IP): Performed by: INTERNAL MEDICINE

## 2022-03-02 PROCEDURE — 6360000002 HC RX W HCPCS: Performed by: INTERNAL MEDICINE

## 2022-03-02 RX ADMIN — LORAZEPAM 1 MG: 1 TABLET ORAL at 08:04

## 2022-03-02 RX ADMIN — SODIUM CHLORIDE, PRESERVATIVE FREE 10 ML: 5 INJECTION INTRAVENOUS at 08:03

## 2022-03-02 RX ADMIN — GABAPENTIN 300 MG: 300 CAPSULE ORAL at 08:04

## 2022-03-02 RX ADMIN — FINASTERIDE 5 MG: 5 TABLET, FILM COATED ORAL at 08:04

## 2022-03-02 RX ADMIN — DAPTOMYCIN 500 MG: 500 INJECTION, POWDER, LYOPHILIZED, FOR SOLUTION INTRAVENOUS at 11:03

## 2022-03-02 RX ADMIN — APIXABAN 5 MG: 5 TABLET, FILM COATED ORAL at 08:04

## 2022-03-02 RX ADMIN — CLONIDINE HYDROCHLORIDE 0.2 MG: 0.1 TABLET ORAL at 08:04

## 2022-03-02 RX ADMIN — INSULIN LISPRO 2 UNITS: 100 INJECTION, SOLUTION INTRAVENOUS; SUBCUTANEOUS at 08:25

## 2022-03-02 RX ADMIN — ENALAPRIL MALEATE 10 MG: 10 TABLET ORAL at 08:16

## 2022-03-02 RX ADMIN — HYDROCHLOROTHIAZIDE 25 MG: 25 TABLET ORAL at 08:04

## 2022-03-02 RX ADMIN — AMLODIPINE BESYLATE 10 MG: 10 TABLET ORAL at 08:03

## 2022-03-02 RX ADMIN — OXYCODONE HYDROCHLORIDE AND ACETAMINOPHEN 1 TABLET: 5; 325 TABLET ORAL at 10:11

## 2022-03-02 RX ADMIN — BENZOCAINE AND MENTHOL 1 LOZENGE: 15; 3.6 LOZENGE ORAL at 08:16

## 2022-03-02 RX ADMIN — ALOGLIPTIN 25 MG: 25 TABLET, FILM COATED ORAL at 08:04

## 2022-03-02 RX ADMIN — METOPROLOL TARTRATE 50 MG: 50 TABLET, FILM COATED ORAL at 08:04

## 2022-03-02 RX ADMIN — INSULIN LISPRO 8 UNITS: 100 INJECTION, SOLUTION INTRAVENOUS; SUBCUTANEOUS at 12:08

## 2022-03-02 RX ADMIN — FENOFIBRATE 160 MG: 160 TABLET ORAL at 08:05

## 2022-03-02 ASSESSMENT — PAIN SCALES - GENERAL: PAINLEVEL_OUTOF10: 6

## 2022-03-02 NOTE — PROGRESS NOTES
Podiatric Surgery Daily Progress Note      Admit Date: 2/26/2022      Code:Full Code    Subjective:      Patient seen at bedside this AM.  Patient denies any overnight acute event. Patient denies f/c/n/v/sob/cp. Review of Systems: A 10 point review of symptoms is unremarkable with the exception of the chief complaint. Patient specifically denies nausea, fever, vomiting, chills, shortness of breath, chest pain, abdominal pain, constipation or difficulty urinating. Objective     BP (!) 154/79   Pulse 64   Temp 98.2 °F (36.8 °C) (Oral)   Resp 16   Wt 205 lb 11 oz (93.3 kg)   SpO2 92%   BMI 29.51 kg/m²      I/O:    Intake/Output Summary (Last 24 hours) at 3/2/2022 0848  Last data filed at 3/1/2022 2105  Gross per 24 hour   Intake 720 ml   Output 2050 ml   Net -1330 ml              Wt Readings from Last 3 Encounters:   02/28/22 205 lb 11 oz (93.3 kg)   02/21/22 205 lb 11.2 oz (93.3 kg)   12/08/21 208 lb (94.3 kg)       LABS:    Recent Labs     03/01/22  0604 03/02/22  0602   WBC 7.8 8.4   HGB 10.7* 10.9*   HCT 30.6* 31.1*    355        Recent Labs     03/02/22  0602   *   K 3.5   CL 96*   CO2 30   BUN 17   CREATININE 1.0      No results for input(s): PROT, INR, APTT in the last 72 hours. LOWER EXTREMITY EXAMINATION    Dressing to right LE intact. No strikethrough noted to the external dressing. Scant serosanguineous drainage noted to the internal layers of the dressing. VASCULAR: DP and PT pulses are palpable 2/4 left lower extremity. DP and PT pulses are nonpalpable 0/4 to the right lower extremity secondary to edema. Upon hand-held Doppler examination DP and PT pulses were noted to be biphasic. CFT less than 3 seconds to the distal digits of the foot b/l. Skin temperature is warm to lukewarm from proximal to distal with no focal calor noted. Improving edema with relaxed skin tension lines, right lower extremity.  No pain with calf compression b/l.     NEUROLOGIC: Gross and epicritic sensation is diminished b/l. Protective sensation is diminished at all pedal sites b/l. DERMATOLOGIC:   Right Lower Extremity:  Surgical incision noted to the plantar aspect of the right foot. Suture skin edges remain well adhered and sutures remain intact . No fluctuance or crepitus noted at the incision site. No malodor noted. Previous erythema noted from the distal aspect of the right foot extending proximally to the midfoot is resolved. Partial-thickness ulceration noted to medial aspect of the second digit. Wound base is pink dermal tissue. No fluctuance or crepitus noted. No malodor noted. No active drainage noted. Wound does not probe to bone. Left LE soft tissue envelope is closed without ulceration or acute signs of infection. MUSCULOSKELETAL: Muscle strength is 5/5 for all pedal groups tested. Mild pain with palpation to the right foot. Ankle joint ROM is decreased in dorsiflexion with the knee extended. Third digit amputation noted to right LE. IMAGING:  XR Right Foot 2/27/2022  Narrative   EXAM: XR FOOT RIGHT (MIN 3 VIEWS)        INDICATION: infection to the right foot        COMPARISON: 2/21/2022.       TECHNIQUE: 3 views of the right foot       FINDINGS:       Postsurgical changes with resection of the second metatarsal head and amputation of the third digit at the MTP joint. There is increased soft tissue swelling at the second digit and small foci of subcutaneous gas which may be postsurgical. Questionable    ill-defined lucency at the base of the second proximal phalanx. Otherwise, no new destructive osseous changes identified. Fourth and fifth hammertoe deformities.  No acute fracture.           Impression       Increased soft tissue swelling along the second digit. Mild lucency along the base of the second proximal phalanx is indeterminate for osteomyelitis. MRI Right Foot 2/28/2022  Narrative   MRI of the right foot with and without contrast       HISTORY: Wound. Osteomyelitis.       FINDINGS:       Examination is compromised by patient motion.       There has been prior resection of the head of the second metatarsal.       There is slight marrow edema in the distal most aspect of the second metatarsal. There is hyperintense T2 signal in the soft tissues adjacent to the second MTP joint. This is hypointense to fat on T1-weighted sequences and overall it does not enhance on    postcontrast sequences. The signal abnormality extends to the level of the distal phalanx.       Scattered areas of marrow edema are present in the second proximal, middle, and distal phalanges, associated with mild postcontrast enhancement.       In addition, there is konrad marrow edema involving the distal aspect of the third metatarsal extending all way up to the head with associated T1 signal hypointensity and homogeneous postcontrast enhancement. The soft tissue structures are edematous and    demonstrates postcontrast enhancement. No focal fluid collection is noted adjacent to the third metatarsal.           Impression   1. Slight marrow edema in the distal most aspect of the second metatarsal, which is status post amputation. This could reflect reactive marrow change, however osteomyelitis is suspected. There is also slight marrow edema in the phalanges of the second    digit which could reflect early osteomyelitis as well. 2. Large area of signal abnormality in the second digit as detailed above, demonstrating lack of enhancement but hyperintense on fluid sensitive sequences. This could either reflect phlegmon/abscess or it could reflect devitalized tissue.    3. Osteomyelitis involving the the distal aspect of the third metatarsal.           ASSESSMENT/PLAN  -Partial thickness ulceration, medial aspect of the right 2nd digit  -Cellulitis, right lower extremity-resolved  -Osteomyelitis third metatarsal, right foot  -S/P second metatarsal head resection with complex wound closure, right foot (DOS 2/21/2022)  -Diabetes mellitus with peripheral neuropathy    -Patient seen and examined at bedside this AM  -Hypertensive otherwise VSS, no leukocytosis noted   -Blood cultures 1 & 2 NGTD  -XR images reviewed, impression noted above  -MRI Right Foot, see impression above; large area of signal abnormality in the second digit was most likely devitalized tissue as tissue was removed and debrided yesterday the second digit is noted to be improving in swelling and erythema.  -Wound culture: MRSA  -ID following, Daptomycin  500 mg iv daily through 4/6/22  -Right lower extremity dressed with Betadine, DSD and Ace with gentle compression  -post-op shoe ordered to patient room  -Nonweightbearing to the right lower extremity    DISPO: Partial-thickness ulceration and cellulitis, second digit to the right lower extremity-improving. No podiatric surgical intervention anticipated while patient is in house. Labs and imaging reviewed. ID recs noted above. Patient okay for discharge from podiatric standpoint pending medical clearance.     Patient assessment and plan discussed with Dr. Kaylynn García DPM.    CHULA Plata - [facility]  03/02/22  8:53 AM

## 2022-03-02 NOTE — CARE COORDINATION
Case Management Assessment            Discharge Note                    Date / Time of Note: 3/2/2022 3:47 PM                  Discharge Note Completed by: Norma Gordon RN    Patient Name: Nieves Turcios   YOB: 1945  Diagnosis: Diabetic foot infection ([facility] [address] [E11.628, L08.9]   Date / Time: 2/26/2022  8:54 PM    Current PCP: Randi Hodge MD  Clinic patient: Yes    Hospitalization in the last 30 days: No    Advance Directives:  Code Status: Full Code  [place] DNR form completed and on chart: Not Indicated    Financial:  Payor: MEDICARE / Plan: MEDICARE PART A AND B / Product Type: *No Product type* /      Pharmacy:    [place] [address]  Phone: [phone] Fax: [phone]      Assistance purchasing medications?:    Assistance provided by Case Management: None at this time    Does patient want to participate in local refill/ meds to beds program?:      Meds To Beds General Rules:  1. Can ONLY be done Monday- Friday between 8:30am-5pm  2. Prescription(s) must be in pharmacy by 3pm to be filled same day  3. Copy of patient's insurance/ prescription drug card and patient face sheet must be sent along with the prescription(s)  4. Cost of Rx cannot be added to hospital bill. If financial assistance is needed, please contact unit  or ;  or  CANNOT provide pharmacy voucher for patients co-pays  5.  Patients can then  the prescription on their way out of the hospital at discharge, or pharmacy can deliver to the bedside if staff is available. (payment due at time of pick-up or delivery - cash, check, or card accepted)     Able to afford home medications/ co-pay costs: Yes    ADLS:  Current PT AM-PAC Score: 20 /24  Current OT AM-PAC Score: 21 /24      DISCHARGE Disposition: Home with [address]: [address] [place] [address] skilled care  and Home Infusion: [facility]  Phone: [phone] Fax: [phone]    LOC at discharge: Not Applicable  DARLENE Completed: Not Indicated    Notification completed in HENS/PAS?:  Not Applicable    IMM Completed:       Transportation:  Transportation PLAN for discharge: family   Mode of Transport: Private Car      Home Care:  Home Care ordered at discharge: Yes  [address]: [facility]  Phone: [phone]  Fax: [phone]  Orders faxed: Eliud Domingo aware of DC today    Durable Medical Equipment:  DME Provider: Chely Phelps obtained during hospitalization: walker    Home Oxygen and Respiratory Equipment:  Oxygen needed at discharge?: Not [address]: Not Applicable  Portable tank available for discharge?: Not Indicated    Dialysis:  Dialysis patient: No    Dialysis Center:  Not Applicable      Additional CM Notes: Pt from home will return home with [employer] skilled care and [facility]. Maddi spoke with pt and pt agrees to cost, will have start of care 3/3. RW was delivered from [employer]. Will follow up with Pod team.     The Plan for Transition of Care is related to the following treatment goals of Diabetic foot infection ([facility] [address] [U81.877, L08.9]    The Patient and/or patient representative Alex Johnson and his family were provided with a choice of provider and agrees with the discharge plan Yes    Freedom of choice list was provided with basic dialogue that supports the patient's individualized plan of care/goals and shares the quality data associated with the providers.  Not Indicated    Care Transitions patient: No    Carolee Guajardo RN  [facility] [employer]  Case Management Department  Ph: [phone]  Fax: [phone]

## 2022-03-02 NOTE — PROGRESS NOTES
Occupational Therapy   Occupational Therapy Initial Assessment and Treatment  Discharge    Date: 3/2/2022   Patient Name: Alexa Barton  MRN: [id number]     : 1945    Date of Service: 3/2/2022    Discharge Recommendations:  Alexa Barton scored a 21/24 on the AM-PAC ADL Inpatient form. Current research shows that an AM-PAC score of 18 or greater is typically associated with a discharge to the patient's home setting. OT Equipment Recommendations  Equipment Needed: No    Assessment   Assessment: Demo good awareness and compliance of NWB status RLE - using both walker and knee scooter. Extensive education provided for modified techniques for transfers/mobility and ADLs. Pt stated and demo understanding/comprehension. No furhter OT needs indicated. Plans to discharge home w/ prn assist of housemate and family. Plans to spongebathe initially - discussed recommendation for shower chair (if showering). Will sign off. No furhter education required. Decision Making: Medium Complexity  OT Education: OT Role;Plan of Care;Transfer Training;IADL Safety; ADL Adaptive Strategies;Precautions; Equipment  No Skilled OT: No OT goals identified  REQUIRES OT FOLLOW UP: No  Activity Tolerance  Activity Tolerance: Patient Tolerated treatment well  Safety Devices  Safety Devices in place: Yes  Type of devices: Nurse notified; Left in chair;Chair alarm in place;Call light within reach (chair in reclined position (extremity elevated), nursing student at bedside, nurse informed)           Patient Diagnosis(es): There were no encounter diagnoses.      has a past medical history of Allergic reaction to sulfonamide, Atrial fibrillation ([address] Bee sting reaction, Bulging of cervical intervertebral disc, Cephalexin adverse reaction, Fracture of left elbow, History of cardioversion, History of right inguinal hernia repair, Hidalgo's neuroma of right foot, Open fracture of left elbow, Open fracture of left humerus, and Rupture of [facility] Radiation Treatment Management Note 16-20    Patient:  Lakisha Hughes  Age:  54year old  Visit Diagnosis:    1. Malignant neoplasm of upper-outer quadrant of right breast in female, estrogen receptor positive ([facility] [address]      Primary Rad/Onc:  Dr. Cathy William    Site Delivered Dose (cGy) Prescribed Dose (cGy) Fraction #   Right breast 4005 4005 15/15   Right breast boost 400 1000 2/5     First treatment date:   2.02.2022  Concurrent chemotherapy:  NA    Oncology Vitals 2/10/2022 2/17/2022 2/24/2022   Weight - - 136 lb 6.4 oz   Weight - - 61.871 kg   BSA (m2) - - 1.73 m2   /80 112/69 120/71   Pulse 65 59 60   Resp 16 18 18   Temp 98.7 98.2 98.3   SpO2 100 99 99   Pain Score 0 0 0   Some recent data might be hidden        Toxicities:  Fatigue Grade 1= Fatigue relieved by rest  Pruritis Grade 1= Mild or localized; topical intervention indicated  Dry Skin absent  Dermatitis associated with radiation Grade 1= Faint erythema or dry desquamation  Moisturizer used Eucerin applied Number of times: 2 times daily. Hyperpigmentation absent     Nursing Note:  VSS  Mild fatigue, manageable. C/o pruritis to upper inner quadrant of left breast. Pinpoint rash noted to same area. Advised that she can use topical hydrocortisone several times a day for next 2-3 days with continued moisturizer use. AVS given and reviewed. She verbalized understanding to all. Willem Hardy, RN    Physician Note:  Subjective:  Fatigue  Sensitive skin, uiq      Objective:  Redness uiq, intact      Treatment setup imaging have been reviewed:   Yes    Assessment/Plan:  hc otc bid 3 d  F/u june    Dr. Cathy William appendix. has a past surgical history that includes Tonsillectomy (1953); Toe amputation (Right, 5/17/2021); and Foot surgery (Right, 2/21/2022). Restrictions  Position Activity Restriction  Other position/activity restrictions: up with assist, NWB R in surgical shoe per podiatry notes, contact precaution due to MRSA    Subjective   General  Chart Reviewed: Yes  Additional Pertinent Hx: 78 y.o. M presented to the hospital 2/26 secondary to [facility] ED due to worsening infection, patient was then transferred to [facility] for continuity of care (was also at ED day 2/24 for 3 falls and noted to have RLE cellulitis - d/c on antibiotic). Hospital Course: Wound culture showed MRSA; IV antibiotics; Podiatry Consult: post-op shoe and NWB RLE; MRI R Foot: results reviewed suggestive of osteomyelitis. PMH: OR 2/21 for second metatarsal head resection and complex closure of wound,  Family / Caregiver Present: No  Referring Practitioner: Shubham Wetzel MD  Diagnosis: Diabetic Foot Infection    Subjective  Subjective: In bed on arrival \"Up until now, I've been able to walk on my heel. \" \"I just need to know the rules. \" \"Had I known that (NWB), I would have arranged for the scooter. \"    Patient Currently in Pain: Denies      Social/Functional History  Social/Functional History  Lives With: Other (comment) (housemate (student - is unavailable for assist during virtual classes \"the professors are really strict\"))  Home Layout: Performs ADL's on one level,Multi-level,Able to Live on Main level with bedroom/bathroom,1/2 bath on main level  Home Access: Stairs to enter without rails  Entrance Stairs - Number of Steps: 1 LINDSEY w/o rail; 1 flight to second floor w/ unilateral rail  Bathroom Shower/Tub: Tub/Shower unit (upstairs (discussed recommendation for shower chair); plans to spongebathe on first level initially)  Bathroom Toilet: Standard (unilateral leverage)  Bathroom Accessibility: Accessible  Home Equipment: Crutches  Receives Help From: Pernell Becker (comment) (housemate (available to assist prn - unavailable during school hours))  ADL Assistance: Independent  Homemaking Assistance: Independent  Ambulation Assistance: Independent (was wearing a surgical shoe - was heel WB at that time)  Transfer Assistance: Independent  Active : Yes  Occupation: Full time employment  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        Objective   Vision: Within Functional Limits  Hearing: Within functional limits      Orientation  Overall Orientation Status: Within Normal Limits        Balance  Sitting Balance: Independent  Standing Balance: Stand by assistance    Functional Mobility  Functional - Mobility Device: Rolling Walker  Activity: To/from bathroom  Assist Level: Stand by assistance (CGA progressing to SBA)  Functional Mobility Comments: Note: good awareness of NWB status using walker; improved to Supervision using knee scooter    Toilet Transfers  Toilet - Technique: Ambulating (using wh walker, hopping)  Equipment Used: Standard toilet (w/ bar)  Toilet Transfer: Stand by assistance    Tub Transfers  Tub Transfers Comments: Note: discussed recommendation for shower chair (if to shower) and educated in transfer technique - stated understanding; emphasized importance of keeping wound dry - stating understanding; states he intends to spongebathe in half bath (on first floor initially)  ADL  LE Dressing: Stand by assistance (pants - maintaining NWB RLE during task in standing; modified independent - socks/shoes)  Additional Comments: Note: demonstrating good awareness/compliance w/ NWB status during all ADLs     Tone RUE  RUE Tone: Normotonic  Tone LUE  LUE Tone: Normotonic  Coordination  Movements Are Fluid And Coordinated: Yes        Bed mobility  Supine to Sit: Modified independent (HOB elevated)  Scooting: Independent     Transfers  Sit to stand: Stand by assistance  Stand to sit: Stand by assistance  Transfer Comments: Note: demonstrating good awareness/compliance w/ NWB status during all transfers        Cognition  Overall Cognitive Status: WNL  Cognition Comment: demo good awareness of his abilities and limitations                      LUE Strength  Gross LUE Strength: WFL  RUE Strength  Gross RUE Strength: WFL               Pt seen by OT for eval and treat. Treatment included: bed mobility, functional transfer/mobility, ADL, pt education         Plan    Discharge acute OT - no furhter OT needs indicated.                                                     AM-PAC Score        AM-[facility] Inpatient Daily Activity Raw Score: 21 (03/02/22 1120)  AM-PAC Inpatient ADL T-Scale Score : 44.27 (03/02/22 1120)  ADL Inpatient CMS 0-100% Score: 32.79 (03/02/22 1120)  ADL Inpatient CMS G-Code Modifier : CJ (03/02/22 1120)              Therapy Time   Individual Concurrent Group Co-treatment   Time In 0955         Time Out 1110         Minutes 75           Timed Code Treatment Minutes:   60    Total Treatment Minutes:  MYRANDA Jacques 119 OTR/L #[id number]

## 2022-03-02 NOTE — PROGRESS NOTES
Patient alert and oriented. . VSS. PRN percocet was given for pain. . Voiding appropriately, using a urinal.  Dressing to RLE remains CDI., foot elevated. Tolerating diet. IVF and intermittent IVABX infusing. Fall precautions in place. Call light and bedside table are within reach. Will continue to monitor.

## 2022-03-02 NOTE — PROGRESS NOTES
Discharge order acknowledged and IV removed. Pt to be d/c with PICC line - dressing changed prior to d/c. Pt received discharge education and instructions at bedside, all questions answered.  Pt escorted off unit by wheelchair safely and discharged around 1540    Electronically signed by Jen Chavis RN on 3/2/2022 at 4:04 PM

## 2022-03-02 NOTE — DISCHARGE SUMMARY
HOSPITALISTS DISCHARGE SUMMARY    Patient Demographics    Patient. Charly Nolan  Date of Birth. 1945  MRN. [id number]     Primary care provider. Licha Mejia MD  (Tel: [phone])    Admit date: 2/26/2022    Discharge date (blank if same as Note Date): Note Date: 3/2/2022     Reason for Hospitalization. No chief complaint on file. Significant Findings. Principal Problem:    Diabetic foot infection ([address]  Active Problems:    Type 2 diabetes mellitus with other specified complication ([address]    Chronic atrial fibrillation ([address]    Diabetes education, encounter for    Failure of outpatient treatment    Overweight  Resolved Problems:    * No resolved hospital problems. *       Problems and results from this hospitalization that need follow up. 1. Diabetic foot infection    Significant test results and incidental findings. 1.   XR CHEST PORTABLE   Final Result   1. No findings for acute cardiopulmonary disease. 2.  Right PICC catheter tip in the mid-distal SVC. MRI FOOT RIGHT W WO CONTRAST   Final Result   1. Slight marrow edema in the distal most aspect of the second metatarsal, which is status post amputation. This could reflect reactive marrow change, however osteomyelitis is suspected. There is also slight marrow edema in the phalanges of the second    digit which could reflect early osteomyelitis as well. 2. Large area of signal abnormality in the second digit as detailed above, demonstrating lack of enhancement but hyperintense on fluid sensitive sequences. This could either reflect phlegmon/abscess or it could reflect devitalized tissue. 3. Osteomyelitis involving the the distal aspect of the third metatarsal.      XR FOOT RIGHT (MIN 3 VIEWS)   Final Result      Increased soft tissue swelling along the second digit. Mild lucency along the base of the second proximal phalanx is indeterminate for osteomyelitis. Invasive procedures and treatments. [facility] [facility]. Right foot infection with cellulitis of right lower extremity, osteomyelitis of third metatarsal right foot, recent second metatarsal head resection with complex wound closure on 21 February    Recent surgical intervention by podiatry, podiatry and ID on board, antibiotics Vanco   Sed rate more than 120  Antibiotic recommendation as per ID, cultures growing MRSA for now, MRI results reviewed suggestive of osteomyelitis, Merrem has been discontinued, antibiotics changed to daptomycin, patient did had a PICC line placed during the stay patient will be needing antibiotic daptomycin through 6 April, recommended follow-up with the podiatry and primary care provider.        Mild EDER  IVF, improved     A. fib  Eliquis metoprolol     Hypertension  Norvasc metoprolol          Consults. PHARMACY TO DOSE VANCOMYCIN  IP CONSULT TO PHARMACY  IP CONSULT TO PODIATRY  IP CONSULT TO INFECTIOUS DISEASES    Physical examination on discharge day. /70   Pulse 64   Temp 97.9 °F (36.6 °C) (Oral)   Resp 16   Wt 205 lb 11 oz (93.3 kg)   SpO2 93%   BMI 29.51 kg/m²   General appearance. Alert. Looks comfortable. HEENT. Sclera clear. Moist mucus membranes. Cardiovascular. Regular rate and rhythm, normal S1, S2. No murmur. Respiratory. Not using accessory muscles. Clear to auscultation bilaterally, no wheeze. Gastrointestinal. Abdomen soft, non-tender, not distended, normal bowel sounds  Neurology. Facial symmetry. No speech deficits. Moving all extremities equally. Extremities. No edema in lower extremities. Skin. Warm, dry, normal turgor        Condition at time of discharge stable     Medication instructions provided to patient at discharge. Medication List      START taking these medications    DAPTOmycin  infusion  Commonly known as: CUBICIN  Infuse 500 mg intravenously every 24 hours Compound per protocol.         CONTINUE taking these medications    amLODIPine 10 MG tablet  Commonly known as: NORVASC     apixaban 5 MG Tabs tablet  Commonly known as: ELIQUIS  Take 1 tablet by mouth 2 times daily     atorvastatin 40 MG tablet  Commonly known as: LIPITOR     ciclopirox 0.77 % cream  Commonly known as: LOPROX     cloNIDine 0.2 MG tablet  Commonly known as: CATAPRES     enalapril 10 MG tablet  Commonly known as: VASOTEC     fenofibrate 160 MG tablet  Commonly known as: TRIGLIDE     finasteride 5 MG tablet  Commonly known as: PROSCAR     gabapentin 300 MG capsule  Commonly known as: NEURONTIN     glimepiride 2 MG tablet  Commonly known as: AMARYL     Lancets Misc     LORazepam 1 MG tablet  Commonly known as: ATIVAN     losartan-hydroCHLOROthiazide 100-25 MG per tablet  Commonly known as: HYZAAR     magnesium oxide 400 MG tablet  Commonly known as: MAG-OX     metFORMIN 1000 MG tablet  Commonly known as: GLUCOPHAGE     metoprolol tartrate 50 MG tablet  Commonly known as: LOPRESSOR     naloxone 4 MG/0.1ML Liqd nasal spray     PARoxetine 20 MG tablet  Commonly known as: PAXIL     promethazine 25 MG tablet  Commonly known as: PHENERGAN     sAXagliptin 5 MG Tabs tablet  Commonly known as: ONGLYZA     TRUEtest Test strip  Generic drug: blood glucose test strips        STOP taking these medications    amoxicillin 500 MG capsule  Commonly known as: AMOXIL     doxycycline hyclate 100 MG tablet  Commonly known as: VIBRA-TABS           Where to Get Your Medications      You can get these medications from any pharmacy    Bring a paper prescription for each of these medications  · DAPTOmycin  infusion         Discharge recommendations given to patient. Follow Up. Primary care provider, podiatry  Disposition. home  Activity. activity as tolerated  Diet: ADULT DIET; Regular; 4 carb choices (60 gm/meal); Low Fat/Low Chol/High Fiber/MARTIN      Spent 35 minutes in discharge process.     Signed:  Jatinder Swift MD     3/2/2022 2:51 PM

## 2022-03-02 NOTE — PROGRESS NOTES
ID Follow-up NOTE    CC:   R diabetic foot infection  Antibiotics: Daptomycin    Admit Date: 2/26/2022  Hospital Day: 5    Subjective:     Patient reports mild R foot pain - worse with wt bearing, ambulating   No other complaint    Objective:     Afebrile    Patient Vitals for the past 8 hrs:   BP Temp Temp src Pulse Resp SpO2   03/02/22 1359 124/70 97.9 °F (36.6 °C) Oral 64 16 93 %   03/02/22 1114 126/75 98 °F (36.7 °C) Oral 65 16 96 %     I/O last 3 completed shifts: In: 720 [P.O.:720]  Out: 2400 [Urine:2400]  I/O this shift: In: 480 [P.O.:480]  Out: 125 [Urine:125]    EXAM:  GENERAL: No apparent distress. RA  HEENT: Membranes moist, no oral lesion  NECK:  Supple, no lymphadenopathy  LUNGS: Clear b/l, no rales, no dullness  CARDIAC: RRR, no murmur appreciated  ABD:  + BS, soft / NT  EXT:  No rash, no edema, no lesions -   R foot with dressing (saw picture / image in media section)  NEURO: No focal neurologic findings  PSYCH: Orientation, sensorium, mood normal  LINES:  R PICC, placed 2/28       Data Review:  Lab Results   Component Value Date    WBC 8.4 03/02/2022    HGB 10.9 (L) 03/02/2022    HCT 31.1 (L) 03/02/2022    MCV 83.7 03/02/2022     03/02/2022     Lab Results   Component Value Date    CREATININE 1.0 03/02/2022    BUN 17 03/02/2022     (L) 03/02/2022    K 3.5 03/02/2022    CL 96 (L) 03/02/2022    CO2 30 03/02/2022       Hepatic Function Panel:   Lab Results   Component Value Date    LABALBU 3.9 05/18/2021       MICRO:  2/26 BC no growth to date  2/26 R foot cult: GS 2+WBC, 2+GPC; cult heavy MRSA  Staph aureus mrsa      BACTERIAL SUSCEPTIBILITY PANEL BY BAKARI    ceFAZolin 16 mcg/mL Resistant    clindamycin >4 mcg/mL Resistant    DAPTOmycin <=0.5 mcg/mL Sensitive    erythromycin >4 mcg/mL Resistant    linezolid 2 mcg/mL Sensitive    oxacillin >2 mcg/mL Resistant    tetracycline <=4 mcg/mL Sensitive    trimethoprim-sulfamethoxazole <=0.5/9.5 m. ..  Sensitive    vancomycin 2 mcg/mL Sensitive IMAGIN/28 R foot MRI:  1. Slight marrow edema in the distal most aspect of the second metatarsal, which is status post amputation. This could reflect reactive marrow change, however osteomyelitis is suspected. There is also slight marrow edema in the phalanges of the second   digit which could reflect early osteomyelitis as well. 2. Large area of signal abnormality in the second digit as detailed above, demonstrating lack of enhancement but hyperintense on fluid sensitive sequences. This could either reflect phlegmon/abscess or it could reflect devitalized tissue.    3. Osteomyelitis involving the the distal aspect of the third metatarsal      Scheduled Meds:   daptomycin (CUBICIN) IVPB  6 mg/kg (Adjusted) IntraVENous Q24H    enalapril  10 mg Oral BID    hydroCHLOROthiazide  25 mg Oral Daily    insulin glargine  10 Units SubCUTAneous Nightly    sodium chloride flush  5-40 mL IntraVENous 2 times per day    amLODIPine  10 mg Oral QAM    apixaban  5 mg Oral BID    atorvastatin  40 mg Oral Nightly    miconazole   Topical BID    cloNIDine  0.2 mg Oral BID    fenofibrate  160 mg Oral Daily    finasteride  5 mg Oral Daily    gabapentin  300 mg Oral BID    LORazepam  1 mg Oral BID    metoprolol tartrate  50 mg Oral BID    PARoxetine  20 mg Oral QPM    insulin lispro  0-12 Units SubCUTAneous TID WC    insulin lispro  0-6 Units SubCUTAneous Nightly    alogliptin  25 mg Oral Daily       Continuous Infusions:   sodium chloride      dextrose      dextrose         PRN Meds:  glucose, benzocaine-menthol, sodium chloride flush, sodium chloride, polyvinyl alcohol, dextrose, glucagon (rDNA), dextrose, oxyCODONE-acetaminophen **OR** oxyCODONE-acetaminophen, ondansetron **OR** ondansetron, polyethylene glycol, acetaminophen **OR** acetaminophen      Assessment:     DM, neuropathy  Other medical co-morbidities: HTN, HL, AF, depression / anxiety, HA, ANDREW    R DM foot infection  POD#7 I&D - R foot I&D with resection 2nd MT head   - Path - 'bone with no significant acute inflammation' (no culture sent)   - Same day, d/c on amoxacillin 500 tid    Admit 2/27 - R foot pain and swelling  Tm 99.1, WBC 6.5,   MRI abnormal    Plan:     Cont Daptomycin (given MRSA isolate with elevated Vanco BAKARI 2)  Will review MRI   Wound care and possible surg intervention per Podiatry    See DARLENE    Medical Decision Making:   The following items were considered in medical decision making:  Discussion of patient care with other providers  Reviewed clinical lab tests  Reviewed radiology tests  Reviewed other diagnostic tests/interventions  Independent review of radiologic images - will review MRI  Microbiology cultures and other micro tests reviewed      Discussed with pt  Amanda Ford MD    INFUION ORSDERS:  Daptomycin 500 mg iv daily through 4/6/22  - Diagnosis - R DM foot infection, R foot osteomyelitis  - First dose given in hospital  - PICC   - Disposition / date discharge  - Check CBC w diff, CMP, ESR, CRP, CK every Mon or Tue - FAX result to [phone]  - Call with antibiotic / infusion issues, [phone]  - Call with any change in status, transfer in or out of a facility or to hospital - [phone]  - No f/u in outpatient ID office necessary

## 2022-03-02 NOTE — PROGRESS NOTES
Physical Therapy    Facility/Department: [facility]  Initial Assessment    NAME: Nieves Turcios  : 1945  MRN: [id number]    Date of Service: 3/2/2022    Discharge Recommendations:Leobardo Llamas scored a 20/24 on the AM-PAC short mobility form. Current research shows that an AM-PAC score of 18 or greater is typically associated with a discharge to the patient's home setting. Based on the patient's AM-PAC score and their current functional mobility deficits, it is recommended that the patient have 2-3 sessions per week of Physical Therapy at d/c to increase the patient's independence. At this time, this patient demonstrates the endurance and safety to discharge home with  (home  services) and a follow up treatment frequency of 2-3x/wk. Please see assessment section for further patient specific details. If patient discharges prior to next session this note will serve as a discharge summary. Please see below for the latest assessment towards goals. PT Equipment Recommendations  Equipment Needed:  (rolling walker, rollabout)    Assessment   Assessment: 77 yo admitted 21 for post op foot infection and he is now NWB R. Pt demo mobility below his reported baseline of independent in boot heel WB R. Pt able to demo good understanding of NWB R with RW and rollabout this am; pt with good problem solving skills. Pt plans to return home with assist PRN per family/roommate. If home, recommend 24 hour supervision initially, home PT safety eval, use of RW and/or rollabout for longer distances and to maintain NWB R; assist at all times on steps. Treatment Diagnosis: mobility impairment due to foot infection  Decision Making: Medium Complexity  REQUIRES PT FOLLOW UP: Yes  Activity Tolerance  Activity Tolerance: Patient limited by endurance; Patient Tolerated treatment well       Patient Diagnosis(es): There were no encounter diagnoses.      has a past medical history of Allergic reaction to sulfonamide, Atrial fibrillation (HCC), Bee sting reaction, Bulging of cervical intervertebral disc, Cephalexin adverse reaction, Fracture of left elbow, History of cardioversion, History of right inguinal hernia repair, Hidalgo's neuroma of right foot, Open fracture of left elbow, Open fracture of left humerus, and Rupture of appendix. has a past surgical history that includes Tonsillectomy (1953); Toe amputation (Right, 5/17/2021); and Foot surgery (Right, 2/21/2022). Restrictions  Position Activity Restriction  Other position/activity restrictions: up with assist, NWB R in surgical shoe per podiatry notes, contact precaution due to MRSA     Vision/Hearing  Vision: Within Functional Limits  Hearing: Within functional limits       Subjective  General  Chart Reviewed: Yes  Additional Pertinent Hx: 79 yo txfer from OSH 2/26/22 Transfer from [facility] ER worsening foot infection. Surgery on Monday 2/28/22  at [facility] for  second metatarsal head resection right foot complex wound closure bone biopsy right foot. Podiatry consult-no further intervention-NWB R with surgical shoe.   Family / Caregiver Present: No  Diagnosis: foot infection  Follows Commands: Within Functional Limits  Pain Screening  Patient Currently in Pain: Denies          Orientation  Orientation  Overall Orientation Status: Within Functional Limits     Social/Functional History  Social/Functional History  Lives With: Other (comment) (housemate (student - is unavailable for assist during virtual classes \"the professors are really strict\"))  Home Layout: Performs ADL's on one level,Multi-level,Able to Live on Main level with bedroom/bathroom,1/2 bath on main level  Home Access: Stairs to enter without rails  Entrance Stairs - Number of Steps: 1 LINDSEY w/o rail; 1 flight to second floor w/ unilateral rail  Bathroom Shower/Tub: Tub/Shower unit (upstairs (discussed recommendation for shower chair); plans to spongebathe on first level initially)  Bathroom Toilet: Standard (unilateral leverage)  Bathroom Accessibility: Accessible  Home Equipment: [facility] Help From: Loretta Bertrand (comment) (housemate (available to assist prn - unavailable during school hours))  ADL Assistance: Independent  Homemaking Assistance: Independent  Ambulation Assistance: Independent (was wearing a surgical shoe - was heel WB at that time)  Transfer Assistance: Independent  Active : Yes  Occupation: Full time employment  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 Objective          AROM RLE (degrees)  RLE AROM: WFL  AROM LLE (degrees)  LLE AROM : WFL     Strength RLE  Strength RLE: WFL  Strength LLE  Strength LLE: WFL           Transfers  Sit to Stand: Stand by assistance (x6 trials with vc for hand placement; able to maintain NWB R)  Stand to sit: Stand by assistance (x6 trials with vc for hand placement; able to maintain NWB R)     Ambulation  Device: Rolling Walker  Assistance: Stand by assistance  Quality of Gait: slow, steady hops wtih pt able to maintain NWB R; pt fatigued quickly  Distance: 15 ft x2  Comments: Pt able to demo use of rollabout with NWB R to mount/dismount; pt able to maneuver around room with supervision. Stairs/Curb  Stairs? :  (pt states he will have son/roommate assist him up/down one step on his bottom for NWB R; pt declined trial with me)              Plan   Plan  Times per week: 2-5  Safety Devices  Type of devices: Left in chair,Call light within reach,Chair alarm in place,Nurse notified (pt reclined)                          AM-PAC Score  AM-PAC Inpatient Mobility Raw Score : 20 (03/02/22 1116)  AM-PAC Inpatient T-Scale Score : 47.67 (03/02/22 1116)  Mobility Inpatient CMS 0-100% Score: 35.83 (03/02/22 1116)  Mobility Inpatient CMS G-Code Modifier : CJ (03/02/22 1116)          Goals  Short term goals  Time Frame for Short term goals: discharge  Short term goal 1: sit to/from stand mod I NWB R with walker/rollabout  Short term goal 2: amb 20 ft with RW NWB R mod I  Patient Goals   Patient goals : return home       Therapy Time   Individual Concurrent Group Co-treatment   Time In 0318         Time Out 1110         Minutes 75           Timed Code Treatment Minutes:65       Total Treatment Minutes:  [address], PT

## 2022-03-03 LAB
BLOOD CULTURE, ROUTINE: NORMAL
CULTURE, BLOOD 2: NORMAL

## 2022-03-04 LAB
ANAEROBIC CULTURE: ABNORMAL
GRAM STAIN RESULT: ABNORMAL
ORGANISM: ABNORMAL
WOUND/ABSCESS: ABNORMAL

## 2022-03-14 NOTE — PROGRESS NOTES
Physician Progress Note      PATIENT:               Phyllis Oh  CSN #:                  [id number]  :                       1945  ADMIT DATE:       2022 8:54 PM  [address] [place] DATE:        3/2/2022 3:40 PM  RESPONDING  PROVIDER #:        Jose Hartman MD          QUERY TEXT:    Pt admitted with right foot cellulitis. Pt noted to have DM, recent surgery   and osteomyelitis. If possible, please document in progress notes and   discharge summary the relationship, if any, between cellulitis, osteomyelitis,   recent surgery and DM. The medical record reflects the following:  Risk Factors: 78year old male with DM, s/p recent second metatarsal head   resection of right foot on  due to osteomyelitis  Clinical Indicators:  Per H&P- \"He was seen on  and was started on   amoxicillin for infection at the surgical site. Despite antibiotic, patient   had worsening pain, drainage and swelling of right foot. Andrew Punt Andrew Punt Right foot   infection\". Per podiatry consult note- \"Cellulitis, right lower extremity\". Per MRI - \"Slight marrow edema in the distal most aspect of the second   metatarsal, which is status post amputation. This could reflect reactive   marrow change, however osteomyelitis is suspected. There is also slight marrow   edema in the phalanges of the second digit which could r  Treatment: Podiatry consult, ID consult, IV daptomycin, merrem and vancomycin. Bedside I&D per podiatry. Imaging, labs. Thank you,  Shante GROVESN RN CDS  Options provided:  -- Right foot cellulitis directly related to recent surgery/post operative   infection  -- Right foot cellulitis due to diabetes  -- Right foot cellulitis unrelated to surgery or diabetes  -- Continued treatment for osteomyelitis  -- A combination of above, please specify, please specify.   -- Unable to determine  -- Other - I will add my own diagnosis  -- Disagree - Not applicable / Not valid  -- Disagree - Clinically unable to determine / Unknown  -- Refer to Clinical Documentation Reviewer    PROVIDER RESPONSE TEXT:    Unable to determine. Query created by:  Jeanna Hurley on 3/9/2022 8:18 AM      Electronically signed by:  Yahaira Marin MD 3/14/2022 7:22 AM

## 2022-03-31 ENCOUNTER — TELEPHONE (OUTPATIENT)
Dept: INFECTIOUS DISEASES | Age: 77
End: 2022-03-31

## 2022-03-31 NOTE — TELEPHONE ENCOUNTER
Called pt --    R foot '85%' healed.     Had f/u with Dr Maria Alejandra Wilson today    End iv Daptomycin, remove PICC after dose 4/6/22

## 2022-06-08 ENCOUNTER — OFFICE VISIT (OUTPATIENT)
Dept: PULMONOLOGY | Age: 77
End: 2022-06-08
Payer: MEDICARE

## 2022-06-08 VITALS
DIASTOLIC BLOOD PRESSURE: 60 MMHG | SYSTOLIC BLOOD PRESSURE: 120 MMHG | HEIGHT: 70 IN | WEIGHT: 202.8 LBS | HEART RATE: 57 BPM | OXYGEN SATURATION: 98 % | BODY MASS INDEX: 29.03 KG/M2

## 2022-06-08 DIAGNOSIS — I10 ESSENTIAL HYPERTENSION: Chronic | ICD-10-CM

## 2022-06-08 DIAGNOSIS — F32.9 MAJOR DEPRESSION, CHRONIC: Chronic | ICD-10-CM

## 2022-06-08 DIAGNOSIS — E11.69 TYPE 2 DIABETES MELLITUS WITH OTHER SPECIFIED COMPLICATION, UNSPECIFIED WHETHER LONG TERM INSULIN USE (HCC): ICD-10-CM

## 2022-06-08 DIAGNOSIS — G47.33 OSA (OBSTRUCTIVE SLEEP APNEA): Primary | ICD-10-CM

## 2022-06-08 DIAGNOSIS — I48.20 CHRONIC ATRIAL FIBRILLATION (HCC): ICD-10-CM

## 2022-06-08 PROCEDURE — 3046F HEMOGLOBIN A1C LEVEL >9.0%: CPT | Performed by: NURSE PRACTITIONER

## 2022-06-08 PROCEDURE — 99214 OFFICE O/P EST MOD 30 MIN: CPT | Performed by: NURSE PRACTITIONER

## 2022-06-08 PROCEDURE — G8417 CALC BMI ABV UP PARAM F/U: HCPCS | Performed by: NURSE PRACTITIONER

## 2022-06-08 PROCEDURE — 3017F COLORECTAL CA SCREEN DOC REV: CPT | Performed by: NURSE PRACTITIONER

## 2022-06-08 PROCEDURE — G8427 DOCREV CUR MEDS BY ELIG CLIN: HCPCS | Performed by: NURSE PRACTITIONER

## 2022-06-08 PROCEDURE — 1123F ACP DISCUSS/DSCN MKR DOCD: CPT | Performed by: NURSE PRACTITIONER

## 2022-06-08 PROCEDURE — 2022F DILAT RTA XM EVC RTNOPTHY: CPT | Performed by: NURSE PRACTITIONER

## 2022-06-08 PROCEDURE — 1036F TOBACCO NON-USER: CPT | Performed by: NURSE PRACTITIONER

## 2022-06-08 ASSESSMENT — SLEEP AND FATIGUE QUESTIONNAIRES
HOW LIKELY ARE YOU TO NOD OFF OR FALL ASLEEP IN A CAR, WHILE STOPPED FOR A FEW MINUTES IN TRAFFIC: 0
HOW LIKELY ARE YOU TO NOD OFF OR FALL ASLEEP WHILE SITTING AND TALKING TO SOMEONE: 0
HOW LIKELY ARE YOU TO NOD OFF OR FALL ASLEEP WHILE SITTING INACTIVE IN A PUBLIC PLACE: 1
HOW LIKELY ARE YOU TO NOD OFF OR FALL ASLEEP WHILE LYING DOWN TO REST IN THE AFTERNOON WHEN CIRCUMSTANCES PERMIT: 0
HOW LIKELY ARE YOU TO NOD OFF OR FALL ASLEEP WHILE SITTING AND READING: 1
HOW LIKELY ARE YOU TO NOD OFF OR FALL ASLEEP WHEN YOU ARE A PASSENGER IN A CAR FOR AN HOUR WITHOUT A BREAK: 2
HOW LIKELY ARE YOU TO NOD OFF OR FALL ASLEEP WHILE SITTING QUIETLY AFTER LUNCH WITHOUT ALCOHOL: 1
HOW LIKELY ARE YOU TO NOD OFF OR FALL ASLEEP WHILE WATCHING TV: 1
ESS TOTAL SCORE: 6

## 2022-06-08 NOTE — PROGRESS NOTES
Mony Robledo MD  [facility]  [address] [facility], [address] [facility]  P- [phone]   [facility] Steve Patel. [address] [place] [address]. Adina Reardon [phone]     [facility] [address] [phone]      Assessment/Plan:      1. SURAJ (obstructive sleep apnea)  Assessment & Plan:   Chronic-Stable: Reviewed and analyzed results of physiologic download from patient's machine and reviewed with patient. Supplies and parts as needed for his machine. These are medically necessary. Limit caffeine use after 3pm. Based on the analyzed data will change following settings: EPAP min increased to 13 for patient comfort. Provided resources for Lizbeth mask liners to help control mask leak. Discussed avoiding self cleaning machines for his BiPAP. Will see him back in 1 year or sooner if issues arise. 2. Chronic atrial fibrillation (HCC)  Assessment & Plan:  Chronic- Stable. Discussed the importance of treating obstructive sleep apnea as part of the management of this disorder. Cont any meds per PCP and other physicians. 3. Essential hypertension  Assessment & Plan:  Chronic- Stable. Discussed the importance of treating obstructive sleep apnea as part of the management of this disorder. Cont any meds per PCP and other physicians. 4. Type 2 diabetes mellitus with other specified complication, unspecified whether long term insulin use ([facility] [address]  Assessment & Plan:   Chronic- Stable. Discussed the importance of treating obstructive sleep apnea as part of the management of this disorder. Cont any meds per PCP and other physicians. 5. Major depression, chronic  Assessment & Plan:   Chronic- Stable. Discussed the importance of treating obstructive sleep apnea as part of the management of this disorder. Cont any meds per PCP and other physicians.       Reviewed, analyzed, and documented physiologic data from patient's PAP machine. This information was analyzed to assess complexity and medical decision making in regards to further testing and management. The primary encounter diagnosis was SURAJ (obstructive sleep apnea). Diagnoses of Chronic atrial fibrillation ([facility] [address] Essential hypertension, Type 2 diabetes mellitus with other specified complication, unspecified whether long term insulin use ([facility] [address] and Major depression, chronic were also pertinent to this visit. The chronic medical conditions listed are directly related to the primary diagnosis listed above. The management of the primary diagnosis affects the secondary diagnosis and vice versa. Subjective:   Subjective   Patient ID: Pao Gastelum is a 78 y.o. male. Chief Complaint   Patient presents with    Sleep Apnea       HPI:  Machine Modem/Download Info:  Compliance (hours/night): 6.51 hrs/night  % of nights >= 4 hrs: 90 %  Download AHI (/hour): 2.5 /HR   Average IPAP Pressure: 15.9 cmH2O  Average EPAP Pressure: 12 cmH2O         AUTO BIPAP - Settings (Dax)  IPAP Max: 25 cmH2O  EPAP Min: 11 cmH2O  Pressure Support Min: 3  Pressure Support Max: 7             Comfort Settings  Humidity Level (0-8): 3  Flex/EPR (0-3): 3 PAP Mask  Mask Type: Full Face mask     Pao Gastelum reports he is doing well with his machine. He has received his replacement machine for the  recall. The pressure on his machine is comfortable and he is waking rested. he denies headaches, congestion, nosebleeds, dryness, aerophagia, or drowsiness while driving. Over the winter he had surgery on his foot and contracted osteomyelitis. He required 9 weeks IV antibiotics at home. He was not able to use his machine some during that time. He has been recovering well.     [address]    [place] - Total score: 6    Social History     Socioeconomic History    Marital status: Single     Spouse name: Not on file   Hammer Number of children: Not on file    Years of education: Not on file    Highest education level: Not on file   Occupational History    Not on file   Tobacco Use    Smoking status: Never Smoker    Smokeless tobacco: Never Used   Substance and Sexual Activity    Alcohol use: Never    Drug use: Never    Sexual activity: Not on file   Other Topics Concern    Not on file   Social History Narrative    Not on file     Social Determinants of Health     Financial Resource Strain:     Difficulty of Paying Living Expenses: Not on file   Food Insecurity:     Worried About Running Out of Food in the Last Year: Not on file    Alek of Food in the Last Year: Not on file   Transportation Needs:     Lack of Transportation (Medical): Not on file    Lack of Transportation (Non-Medical):  Not on file   Physical Activity:     Days of Exercise per Week: Not on file    Minutes of Exercise per Session: Not on file   Stress:     Feeling of Stress : Not on file   Social Connections:     Frequency of Communication with Friends and Family: Not on file    Frequency of Social Gatherings with Friends and Family: Not on file    Attends Congregation Services: Not on file    Active Member of [address] [place] [address] or Organizations: Not on file    Attends Club or Organization Meetings: Not on file    Marital Status: Not on file   Intimate Partner Violence:     Fear of Current or Ex-Partner: Not on file    Emotionally Abused: Not on file    Physically Abused: Not on file    Sexually Abused: Not on file   Housing Stability:     Unable to Pay for Housing in the Last Year: Not on file    Number of [place] in the Last Year: Not on file    Unstable Housing in the Last Year: Not on file       Current Outpatient Medications   Medication Instructions    amLODIPine (NORVASC) 10 MG tablet 1 tablet, Oral, EVERY MORNING    apixaban (ELIQUIS) 5 mg, Oral, 2 TIMES DAILY    atorvastatin (LIPITOR) 40 mg, Oral, NIGHTLY    blood glucose test strips (TRUETEST TEST) strip 1 strip, 2 TIMES DAILY    ciclopirox (LOPROX) 0.77 % cream Topical, 2 TIMES DAILY, Apply to affected areas twice daily.  cloNIDine (CATAPRES) 0.2 mg, Oral, 2 TIMES DAILY    enalapril (VASOTEC) 10 mg, Oral, 2 TIMES DAILY    fenofibrate (TRIGLIDE) 160 mg, Oral, EVERY EVENING, Take one tablet by mouth daily at 6 PM.    finasteride (PROSCAR) 5 mg, Oral, DAILY    gabapentin (NEURONTIN) 300 mg, Oral, 2 TIMES DAILY    glimepiride (AMARYL) 2 mg, Oral, 2 TIMES DAILY    Lancets MISC Use to check Glucose BID. Dx: 250.00. Dispense Contour Lancets    LORazepam (ATIVAN) 1 mg, Oral, 2 TIMES DAILY    losartan-hydroCHLOROthiazide (HYZAAR) 100-25 MG per tablet 1 tablet, Oral, EVERY EVENING    magnesium oxide (MAG-OX) 400 mg, Oral, 2 TIMES DAILY    metFORMIN (GLUCOPHAGE) 1,000 mg, Oral, 2 TIMES DAILY    metoprolol tartrate (LOPRESSOR) 50 mg, Oral, 2 TIMES DAILY    naloxone 4 MG/0.1ML LIQD nasal spray 1 spray, Nasal, PRN, For suspected opioid overdose, administer 1 spray into one nostril, then repeat in other nostril using a new device after 2-3 minutes, or if no or minimal response. Call 911 if used.  PARoxetine (PAXIL) 20 mg, Oral, EVERY EVENING    promethazine (PHENERGAN) 25 mg, Oral, EVERY 8 HOURS, Take one tablet by mouth every 8 hours for nausea and vomiting.  sAXagliptin (ONGLYZA) 5 mg, Oral, EVERY EVENING          Vitals:  Weight BMI   Wt Readings from Last 3 Encounters:   06/08/22 202 lb 12.8 oz (92 kg)   02/28/22 205 lb 11 oz (93.3 kg)   02/21/22 205 lb 11.2 oz (93.3 kg)    Body mass index is 29.1 kg/m².      BP HR SaO2   BP Readings from Last 3 Encounters:   06/08/22 120/60   03/02/22 124/70   02/21/22 (!) 156/66    Pulse Readings from Last 3 Encounters:   06/08/22 57   03/02/22 64   02/21/22 54    SpO2 Readings from Last 3 Encounters:   06/08/22 98%   03/02/22 93%   02/21/22 96%        Electronically signed by SUKUMAR Okeefe on 6/8/2022 at 10:12 AM

## 2022-06-08 NOTE — LETTER
[facility]  [place] [address] [place] [address]  Phone: [phone]  Fax: [phone]    June 8, 2022       Patient: Ritchie Sierra   MR Number: [id number]   YOB: 1945   Date of Visit: 6/8/2022       Caroline Killian was seen for a follow up visit today. Here is my assessment and plan as well as an attached copy of his visit today:    Chronic atrial fibrillation (HCC)  Chronic- Stable. Discussed the importance of treating obstructive sleep apnea as part of the management of this disorder. Cont any meds per PCP and other physicians. Essential hypertension  Chronic- Stable. Discussed the importance of treating obstructive sleep apnea as part of the management of this disorder. Cont any meds per PCP and other physicians. Type 2 diabetes mellitus with other specified complication (HCC)   Chronic- Stable. Discussed the importance of treating obstructive sleep apnea as part of the management of this disorder. Cont any meds per PCP and other physicians. Major depression, chronic   Chronic- Stable. Discussed the importance of treating obstructive sleep apnea as part of the management of this disorder. Cont any meds per PCP and other physicians. SURAJ (obstructive sleep apnea)   Chronic-Stable: Reviewed and analyzed results of physiologic download from patient's machine and reviewed with patient. Supplies and parts as needed for his machine. These are medically necessary. Limit caffeine use after 3pm. Based on the analyzed data will change following settings: EPAP min increased to 13 for patient comfort. Provided resources for Lizbeth mask liners to help control mask leak. Discussed avoiding self cleaning machines for his BiPAP. Will see him back in 1 year or sooner if issues arise. If you have questions or concerns, please do not hesitate to call me. I look forward to following Sharif Baeza along with you.     Sincerely,    SUKUMAR Giron    CC providers:  Vicente Anderson MD  [facility] [address]  Via Fax: [phone]

## 2022-06-08 NOTE — ASSESSMENT & PLAN NOTE
Chronic-Stable: Reviewed and analyzed results of physiologic download from patient's machine and reviewed with patient. Supplies and parts as needed for his machine. These are medically necessary. Limit caffeine use after 3pm. Based on the analyzed data will change following settings: EPAP min increased to 13 for patient comfort. Provided resources for Lizbeth mask liners to help control mask leak. Discussed avoiding self cleaning machines for his BiPAP. Will see him back in 1 year or sooner if issues arise.

## 2022-06-08 NOTE — PROGRESS NOTES
Diagnosis: [x] SURAJ (G47.33) [] CSA (G47.31) [] Apnea (G47.30)   Length of Need: [x] 15 Months [] 99 Months [] Other:   Machine (RENUKA!): [] Respironics Dream Station      Auto [] ResMed AirSense     Auto [] Other:     []  CPAP () [] Bilevel ()   Mode: [] Auto [] Spontaneous    Mode: [] Auto [] Spontaneous             Comfort Settings:      Humidifier: [] Heated ()        [x] Water chamber replacement ()/ 1 per 6 months        Mask:   [] Nasal () /1 per 3 months [x] Full Face () /1 per 3 months   [] Patient choice -Size and fit mask [x] Patient Choice - Size and fit mask   [] Dispense: [] Dispense:   [] Headgear () / 1 per 3 months [x] Headgear () / 1 per 3 months   [] Replacement Nasal Cushion ()/2 per month [x] Interface Replacement ()/1 per month   [] Replacement Nasal Pillows ()/2 per month         Tubing: [x] Heated ()/1 per 3 months    [] Standard ()/1 per 3 months [] Other:           Filters: [x] Non-disposable ()/1 per 6 months     [x] Ultra-Fine, Disposable ()/2 per month        Miscellaneous: [] Chin Strap ()/ 1 per 6 months [] O2 bleed-in:        LPM   [] Oxymetry on CPAP/Bilevel []  Other:         Start Order Date: 06/08/22    MEDICAL JUSTIFICATION:  I, the undersigned, certify that the above prescribed supplies are medically necessary for this patients wellbeing. In my opinion, the supplies are both reasonable and necessary in reference to accepted standards of medicalpractice in treatment of this patients condition. Vazquez Lora NP    NPI: [id number]       Order Signed Date: 06/08/22  [address] [facility]  Pulmonary, Sleep, and Critical Care    Pulmonary, Sleep, and Critical Care  [facility] [address] [place] [address].  [address] [facility], [address] [facility] , [address] [place], [place]  Phone: [phone]    Fax: [address] [facility] [address]  1945  [address] [place] [address] T[place] Ana M  [phone] (home)   [phone] (mobile)      Insurance Info (confirm with patient if correct):  Payor/Plan Subscr  Sex Relation Sub.  Ins. ID Effective Group Num

## 2022-07-19 NOTE — PROGRESS NOTES
[facility]   Electrophysiology  Office Visit  Date: 8/2/2022    Chief Complaint   Patient presents with    Atrial Fibrillation    Hypertension       Cardiac HX: Cristi Mendoza is a 78 y.o. man with a h/o HTN, DM, PVCs ongoing for about 20 years with normal MPI, incidental finding of AF (1/6/2021) during a routine colonoscopy, placed on Eliquis, echo (1/12/2021) showed preserved LVEF of 55 to 60%, intermediate diastolic dysfunction and LAE, + SURAJ on CPAP, s/p RFA/PVI of AF (2/22/2021, Dr. Joanna Hillman), placed on flecainide post procedure however discontinued due to QRS widening, recurrent AF after COVID vaccine (3/2021), s/p DCCV to NSR (3/19/2021). Interval History/HPI: Patient is here for follow-up for paroxysmal atrial fibrillation. Patient was diagnosed with paroxysmal atrial fibrillation in January 2021 during a routine colonoscopy. He was sent to the emergency room at that time and was placed on Eliquis. He had an echo that showed his EF was 55-60 percent. He underwent an RFA/PVI of AF on February 22, 2021. He was placed on flecainide postprocedure however his QRS widened and this was discontinued. He received the COVID-vaccine in March 2021 and had recurrent AF afterwards. He did undergo a DCCV to NSR on 3/19/2021. He has not felt any heart racing, palpitations or breakthrough of his arrhythmia. He does have a [employer] and monitors his heart rate intermittently at home. He presents in NSR today. He remains on the Eliquis. He is not currently having any issues with bleeding or dark tarry stools. He was having some hematuria and states that since he was placed on finasteride he really has not had any further issues. He was hospitalized in the beginning of this year for surgery and developed MRSA along with osteomyelitis. He was on IV antibiotics outpatient. He states that since that time he has not recovered as well as he would like to. Feels like his legs are still weak.   He had been placed on supplemental magnesium for a low mag level. His last magnesium level was 1.8 in December 2021. He states that he never had this rechecked as he was spending so much time in the hospital due to his foot infection. Review of his labs in the chart show that his sodium was 133 when he was in the hospital.  He states he is doing better with his blood sugar control. He states his last hemoglobin A1c was in the low sevens. We did discuss other ways for better management of his glucose in the office today. His blood pressure is well controlled in the office today. He remains on amlodipine 10 mg daily, clonidine 0.2 mg twice a day, Vasotec 10 mg 2 times a day, losartan/HCTZ 100/25 mg daily, metoprolol 50 mg twice a day. He denies chest pain, shortness of breath, PND, orthopnea or lower extremity edema. Home medications:   Current Outpatient Medications on File Prior to Visit   Medication Sig Dispense Refill    ciclopirox (LOPROX) 0.77 % cream Apply topically 2 times daily Apply to affected areas twice daily. promethazine (PHENERGAN) 25 MG tablet Take 25 mg by mouth every 8 hours Take one tablet by mouth every 8 hours for nausea and vomiting. naloxone 4 MG/0.1ML LIQD nasal spray 1 spray by Nasal route as needed for Opioid Reversal For suspected opioid overdose, administer 1 spray into one nostril, then repeat in other nostril using a new device after 2-3 minutes, or if no or minimal response. Call 911 if used. magnesium oxide (MAG-OX) 400 MG tablet Take 400 mg by mouth 2 times daily      apixaban (ELIQUIS) 5 MG TABS tablet Take 1 tablet by mouth 2 times daily 180 tablet 3    finasteride (PROSCAR) 5 MG tablet Take 5 mg by mouth daily      gabapentin (NEURONTIN) 300 MG capsule Take 300 mg by mouth 2 times daily.       amLODIPine (NORVASC) 10 MG tablet Take 1 tablet by mouth every morning      atorvastatin (LIPITOR) 40 MG tablet Take 40 mg by mouth nightly      cloNIDine (CATAPRES) 0.2 MG tablet Take 0.2 mg by mouth 2 times daily      enalapril (VASOTEC) 10 MG tablet Take 10 mg by mouth 2 times daily      fenofibrate (TRIGLIDE) 160 MG tablet Take 160 mg by mouth every evening Take one tablet by mouth daily at 6 PM.      glimepiride (AMARYL) 2 MG tablet Take 2 mg by mouth 2 times daily      losartan-hydroCHLOROthiazide (HYZAAR) 100-25 MG per tablet Take 1 tablet by mouth every evening      LORazepam (ATIVAN) 1 MG tablet Take 1 mg by mouth 2 times daily. metFORMIN (GLUCOPHAGE) 1000 MG tablet Take 1,000 mg by mouth 2 times daily      metoprolol tartrate (LOPRESSOR) 50 MG tablet Take 50 mg by mouth 2 times daily      sAXagliptin (ONGLYZA) 5 MG TABS tablet Take 5 mg by mouth every evening      PARoxetine (PAXIL) 20 MG tablet Take 20 mg by mouth every evening      blood glucose test strips (TRUETEST TEST) strip 1 strip 2 times daily      Lancets MISC Use to check Glucose BID. Dx: 250.00. Dispense Contour Lancets       No current facility-administered medications on file prior to visit.        Past Medical History:   Diagnosis Date    Allergic reaction to sulfonamide 1960    Atrial fibrillation ([facility]     Bacterial infection due to Morganella morganii     Bee sting reaction 2000    Bulging of cervical intervertebral disc     c4 & c5    Cephalexin adverse reaction 1993    Fracture of left elbow 1956    Group B streptococcal infection     History of cardioversion     History of right inguinal hernia repair 1987    Hidalgo's neuroma of right foot 1989    Open fracture of left elbow 1991    Open fracture of left humerus 1991    Pseudomonas infection     Rupture of appendix 1970        Past Surgical History:   Procedure Laterality Date    FOOT SURGERY Right 2/21/2022    SECOND METATARSAL HEAD RESECTION, RIGHT FOOT; COMPLEX WOUND CLOSURE-RIGHT FOOT; BONE BIOPSY-RIGHT FOOT (06895, 42240, 97925)-LOCAL performed by Malena Polanco DPM at [facility] 5/17/2021    [address] AMPUTATION VS THIRD GREAT TOE AMPUTATION RIGHT FOOT performed by Blanka Cline DPM at [address]       Allergies   Allergen Reactions    Bee Venom Swelling    Cephalexin Swelling    Cephalosporins     Propoxyphene Other (See Comments)     Hallucinations  (Darvon/Darvocet)    Sulfa Antibiotics        Social History:  Reviewed. reports that he has never smoked. He has never used smokeless tobacco. He reports that he does not drink alcohol and does not use drugs. Family History:  Reviewed. family history includes Sleep Apnea in his father. Review of System:    Constitutional: No fevers, chills. Eyes: No visual changes or diplopia. No scleral icterus. ENT: No Headaches. No mouth sores or sore throat. Cardiovascular: No for chest pain, No for dyspnea on exertion, No for palpitations or No for loss of consciousness. No cough, hemoptysis, No for pleuritic pain, or phlebitis. Respiratory: No for cough or wheezing. No hematemesis. Gastrointestinal: No abdominal pain, blood in stools. Genitourinary: No dysuria, or hematuria. Musculoskeletal: No gait disturbance,    Integumentary: No rash or pruritis. Neurological: No headache, change in muscle strength, numbness or tingling. Psychiatric: No anxiety, or depression. Endocrine: No temperature intolerance. No excessive thirst, fluid intake, or urination. Hem/Lymph: No abnormal bruising or bleeding, blood clots or swollen lymph nodes. Allergic/Immunologic: No nasal congestion or hives. Physical Examination:  Vitals:    08/02/22 0908   BP: 130/82   Pulse: 68           Wt Readings from Last 3 Encounters:   08/02/22 204 lb 12.8 oz (92.9 kg)   06/08/22 202 lb 12.8 oz (92 kg)   02/28/22 205 lb 11 oz (93.3 kg)       Constitutional: Oriented. No distress. Head: Normocephalic and atraumatic. Mouth/Throat: Oropharynx is clear and moist.   Eyes: Conjunctivae clear without jaunduice. PERRL. Neck: Neck supple. No rigidity.  No JVD present. Cardiovascular: Normal rate, regular rhythm, S1&S2. Pulmonary/Chest: Bilateral respiratory sounds. No wheezes, No rhonchi. Abdominal: Soft. Bowel sounds present. No distension, No tenderness. Musculoskeletal: No tenderness. No edema    Lymphadenopathy: Has no cervical adenopathy. Neurological: Alert and oriented. Cranial nerve appears intact, No Gross deficit   Skin: Skin is warm and dry. No rash noted. Psychiatric: Has a normal mood, affect and behavior     Labs:  Reviewed. No results for input(s): NA, K, CL, CO2, PHOS, BUN, CREATININE, CA in the last 72 hours. Invalid input(s):  TSH  No results for input(s): WBC, HGB, HCT, MCV, PLT in the last 72 hours. Lab Results   Component Value Date/Time    CKTOTAL 121 03/22/2022 10:57 AM    TROPONINI <0.01 01/06/2021 08:04 PM     No results found for: BNP  Lab Results   Component Value Date/Time    PROTIME 18.5 05/15/2021 08:41 AM    PROTIME 23.3 03/19/2021 07:03 AM    PROTIME 16.2 02/22/2021 09:53 AM    INR 1.59 05/15/2021 08:41 AM    INR 1.99 03/19/2021 07:03 AM    INR 1.39 02/22/2021 09:53 AM     No results found for: CHOL, HDL, TRIG    ECG: Personally reviewed: NSR, HR 68, , QRS 84, QTc 428    ECHO:  2.22.2021([facility])  Summary   The left atrial size is dilated. There is no evidence of mass or thrombus in the left atrium or appendage. Doppler velocities in the JOON are reduced. 1.12.2021 (Complete)  Summary   Normal left ventricular size. Mild concentric left ventricular hypertrophy. Normal left ventricular systolic function with an estimated ejection   fraction of 55-60%. Indeterminate diastolic function due to afib. The left atrium is moderately dilated. Stress Test: 2.14.2002 ([facility])  IMPRESSION:   NO OBVIOUS SCARRING NOR ISCHEMIA. 50 PERCENT LEFT VENTRICULAR   EJECTION FRACTION.      Cardiac Angiography: n/a    Problem List:   Patient Active Problem List    Diagnosis Date Noted    Osteomyelitis of right foot ([facility]     Elevated C-reactive protein (CRP)     Diabetic polyneuropathy associated with type 2 diabetes mellitus (HCC)     Elevated sed rate     Hyponatremia     Di[facility] foot [facility] ([facility] 05/14/2021    SURAJ (obstructive sleep apnea) 05/12/2021    Persistent atrial fibrillation (HCC)     Chronic atrial fibrillation ([facility] 01/11/2021    Major depression, chronic 03/29/2018    Type 2 diabetes mellitus with other specified complication ([facility] 53/13/9832    Mixed hyperlipidemia 10/13/2016    Migraine 02/16/2010    Obesity 02/16/2010    Anxiety state 07/30/1997    Essential hypertension 07/30/1997    Reflux esophagitis 07/30/1997        Assessment:   1. Chronic atrial fibrillation ([facility]    2. Hypomagnesemia          Cardiac HX: Jenelle Huerta is a 78 y.o. man with a h/o HTN, DM, PVCs ongoing for about 20 years with normal MPI, incidental finding of AF on 1/6/2021 during a routine colonoscopy, sent to the ED, placed on Eliquis, echo (1/12/2021) showed preserved LVEF of 55 to 60%, intermediate diastolic dysfunction and LAE, + SURAJ on CPAP, s/p RFA/PVI of AF (2/22/2021, Dr. Johnathon Shelton), placed on flecainide post procedure however discontinued due to QRS widening, recurrent AF after COVID vaccine (3/2021), s/p DCCV to NSR (3/19/2021). [id number] 3. TSH 1.85 (1/6/2021).      pAF  - In NSR - HR 70 - no breakthrough per patient  - S/p RFA/PVI of AF/atAFL (2/22/2021)  - On Eliquis 5 mg - hematuria resolved - following with urology  - Will check a BMP, CBC, Mg  - Off flecainide d/t QRS prolongation  - On metoprolol 50 mg BID  - Compliant with CPAP  - Monitor if s/s  - Reduce risk factors - diet, exercise, optimal glucose control  - F/u in 6 months  - ECG ordered and results personally reviewed     HTN  - BP well controlled in the office today  - On amlodipine 10 mg daily, clonidine 0.2 mg twice daily, Vasotec 10 mg twice daily, Hyzaar 100/25 mg nightly, metoprolol 50 mg twice a day     EF of 30-32%  No systolic HF  No known CAD  Anticoagulation for AF   No Tobacco use. All questions and concerns were addressed to the patient/family. Alternatives to my treatment were discussed. The note was completed using EMR. Every effort was made to ensure accuracy; however, inadvertent computerized transcription errors may be present. Patient received education regarding their diagnosis, treatment and medications while in the office today. Christina Montiel CNP  [employer]       I  have spent 40 minutes in care of the patient including direct face to face time, chart preparation, reviewing diagnostic testing, other provider notes and coordinating patient care.

## 2022-08-01 PROBLEM — G89.18 POST-OP PAIN: Status: RESOLVED | Noted: 2021-05-18 | Resolved: 2022-08-01

## 2022-08-02 ENCOUNTER — OFFICE VISIT (OUTPATIENT)
Dept: CARDIOLOGY CLINIC | Age: 77
End: 2022-08-02
Payer: MEDICARE

## 2022-08-02 VITALS
BODY MASS INDEX: 29.39 KG/M2 | DIASTOLIC BLOOD PRESSURE: 82 MMHG | SYSTOLIC BLOOD PRESSURE: 130 MMHG | HEART RATE: 68 BPM | WEIGHT: 204.8 LBS

## 2022-08-02 DIAGNOSIS — I48.20 CHRONIC ATRIAL FIBRILLATION (HCC): ICD-10-CM

## 2022-08-02 DIAGNOSIS — I48.20 CHRONIC ATRIAL FIBRILLATION (HCC): Primary | ICD-10-CM

## 2022-08-02 DIAGNOSIS — E83.42 HYPOMAGNESEMIA: ICD-10-CM

## 2022-08-02 LAB
ANION GAP SERPL CALCULATED.3IONS-SCNC: 17 MMOL/L (ref 3–16)
BUN BLDV-MCNC: 15 MG/DL (ref 7–20)
CALCIUM SERPL-MCNC: 9.5 MG/DL (ref 8.3–10.6)
CHLORIDE BLD-SCNC: 96 MMOL/L (ref 99–110)
CO2: 24 MMOL/L (ref 21–32)
CREAT SERPL-MCNC: 0.9 MG/DL (ref 0.8–1.3)
GFR AFRICAN AMERICAN: >60
GFR NON-AFRICAN AMERICAN: >60
GLUCOSE BLD-MCNC: 235 MG/DL (ref 70–99)
MAGNESIUM: 1.7 MG/DL (ref 1.8–2.4)
POTASSIUM SERPL-SCNC: 4.3 MMOL/L (ref 3.5–5.1)
SODIUM BLD-SCNC: 137 MMOL/L (ref 136–145)

## 2022-08-02 PROCEDURE — 93000 ELECTROCARDIOGRAM COMPLETE: CPT | Performed by: NURSE PRACTITIONER

## 2022-08-02 PROCEDURE — 1036F TOBACCO NON-USER: CPT | Performed by: NURSE PRACTITIONER

## 2022-08-02 PROCEDURE — 1123F ACP DISCUSS/DSCN MKR DOCD: CPT | Performed by: NURSE PRACTITIONER

## 2022-08-02 PROCEDURE — G8427 DOCREV CUR MEDS BY ELIG CLIN: HCPCS | Performed by: NURSE PRACTITIONER

## 2022-08-02 PROCEDURE — 3017F COLORECTAL CA SCREEN DOC REV: CPT | Performed by: NURSE PRACTITIONER

## 2022-08-02 PROCEDURE — 99215 OFFICE O/P EST HI 40 MIN: CPT | Performed by: NURSE PRACTITIONER

## 2022-08-02 PROCEDURE — G8417 CALC BMI ABV UP PARAM F/U: HCPCS | Performed by: NURSE PRACTITIONER

## 2022-08-03 RX ORDER — MAGNESIUM OXIDE 400 MG/1
400 TABLET ORAL 3 TIMES DAILY
Qty: 90 TABLET | Refills: 5 | Status: SHIPPED | OUTPATIENT
Start: 2022-08-03

## 2022-12-21 ENCOUNTER — HOSPITAL ENCOUNTER (EMERGENCY)
Age: 77
Discharge: ANOTHER HOSPITAL | End: 2022-12-21
Attending: STUDENT IN AN ORGANIZED HEALTH CARE EDUCATION/TRAINING PROGRAM
Payer: MEDICARE

## 2022-12-21 ENCOUNTER — APPOINTMENT (OUTPATIENT)
Dept: CT IMAGING | Age: 77
End: 2022-12-21
Payer: MEDICARE

## 2022-12-21 VITALS
TEMPERATURE: 98.1 F | OXYGEN SATURATION: 97 % | SYSTOLIC BLOOD PRESSURE: 140 MMHG | HEIGHT: 70 IN | DIASTOLIC BLOOD PRESSURE: 76 MMHG | HEART RATE: 64 BPM | BODY MASS INDEX: 29.39 KG/M2 | RESPIRATION RATE: 9 BRPM

## 2022-12-21 DIAGNOSIS — S22.088A OTHER CLOSED FRACTURE OF TWELFTH THORACIC VERTEBRA, INITIAL ENCOUNTER (HCC): Primary | ICD-10-CM

## 2022-12-21 DIAGNOSIS — S36.400A: ICD-10-CM

## 2022-12-21 LAB
ANION GAP SERPL CALCULATED.3IONS-SCNC: 12 MMOL/L (ref 3–16)
BASE EXCESS VENOUS: 0.9 MMOL/L (ref -2–3)
BASOPHILS ABSOLUTE: 0 K/UL (ref 0–0.2)
BASOPHILS RELATIVE PERCENT: 0.3 %
BUN BLDV-MCNC: 17 MG/DL (ref 7–20)
CALCIUM SERPL-MCNC: 9.4 MG/DL (ref 8.3–10.6)
CARBOXYHEMOGLOBIN: 0.2 % (ref 0–1.5)
CHLORIDE BLD-SCNC: 95 MMOL/L (ref 99–110)
CO2: 26 MMOL/L (ref 21–32)
CREAT SERPL-MCNC: 1 MG/DL (ref 0.8–1.3)
EOSINOPHILS ABSOLUTE: 0.1 K/UL (ref 0–0.6)
EOSINOPHILS RELATIVE PERCENT: 1.3 %
GFR SERPL CREATININE-BSD FRML MDRD: >60 ML/MIN/{1.73_M2}
GLUCOSE BLD-MCNC: 255 MG/DL (ref 70–99)
HCO3 VENOUS: 25.9 MMOL/L (ref 24–28)
HCT VFR BLD CALC: 34.3 % (ref 40.5–52.5)
HEMOGLOBIN, VEN, REDUCED: 30.8 %
HEMOGLOBIN: 11.8 G/DL (ref 13.5–17.5)
INR BLD: 1.25 (ref 0.87–1.14)
LYMPHOCYTES ABSOLUTE: 1 K/UL (ref 1–5.1)
LYMPHOCYTES RELATIVE PERCENT: 15.2 %
MCH RBC QN AUTO: 27.8 PG (ref 26–34)
MCHC RBC AUTO-ENTMCNC: 34.2 G/DL (ref 31–36)
MCV RBC AUTO: 81.2 FL (ref 80–100)
METHEMOGLOBIN VENOUS: 0.5 % (ref 0–1.5)
MONOCYTES ABSOLUTE: 0.4 K/UL (ref 0–1.3)
MONOCYTES RELATIVE PERCENT: 6.5 %
NEUTROPHILS ABSOLUTE: 5.2 K/UL (ref 1.7–7.7)
NEUTROPHILS RELATIVE PERCENT: 76.7 %
O2 SAT, VEN: 69 %
PCO2, VEN: 41.8 MMHG (ref 41–51)
PDW BLD-RTO: 14.6 % (ref 12.4–15.4)
PH VENOUS: 7.4 (ref 7.35–7.45)
PLATELET # BLD: 226 K/UL (ref 135–450)
PMV BLD AUTO: 8.1 FL (ref 5–10.5)
PO2, VEN: 36.8 MMHG (ref 25–40)
POTASSIUM REFLEX MAGNESIUM: 4 MMOL/L (ref 3.5–5.1)
PROTHROMBIN TIME: 15.6 SEC (ref 11.7–14.5)
RBC # BLD: 4.23 M/UL (ref 4.2–5.9)
SODIUM BLD-SCNC: 133 MMOL/L (ref 136–145)
TCO2 CALC VENOUS: 27 MMOL/L
TROPONIN: <0.01 NG/ML
WBC # BLD: 6.8 K/UL (ref 4–11)

## 2022-12-21 PROCEDURE — 84484 ASSAY OF TROPONIN QUANT: CPT

## 2022-12-21 PROCEDURE — 36415 COLL VENOUS BLD VENIPUNCTURE: CPT

## 2022-12-21 PROCEDURE — 99285 EMERGENCY DEPT VISIT HI MDM: CPT

## 2022-12-21 PROCEDURE — 85610 PROTHROMBIN TIME: CPT

## 2022-12-21 PROCEDURE — 85025 COMPLETE CBC W/AUTO DIFF WBC: CPT

## 2022-12-21 PROCEDURE — 76376 3D RENDER W/INTRP POSTPROCES: CPT

## 2022-12-21 PROCEDURE — 96374 THER/PROPH/DIAG INJ IV PUSH: CPT

## 2022-12-21 PROCEDURE — 82803 BLOOD GASES ANY COMBINATION: CPT

## 2022-12-21 PROCEDURE — 6360000004 HC RX CONTRAST MEDICATION: Performed by: STUDENT IN AN ORGANIZED HEALTH CARE EDUCATION/TRAINING PROGRAM

## 2022-12-21 PROCEDURE — 71260 CT THORAX DX C+: CPT

## 2022-12-21 PROCEDURE — 70450 CT HEAD/BRAIN W/O DYE: CPT

## 2022-12-21 PROCEDURE — 6360000002 HC RX W HCPCS: Performed by: STUDENT IN AN ORGANIZED HEALTH CARE EDUCATION/TRAINING PROGRAM

## 2022-12-21 PROCEDURE — 80048 BASIC METABOLIC PNL TOTAL CA: CPT

## 2022-12-21 PROCEDURE — 72125 CT NECK SPINE W/O DYE: CPT

## 2022-12-21 RX ORDER — MORPHINE SULFATE 4 MG/ML
4 INJECTION, SOLUTION INTRAMUSCULAR; INTRAVENOUS ONCE
Status: COMPLETED | OUTPATIENT
Start: 2022-12-21 | End: 2022-12-21

## 2022-12-21 RX ADMIN — MORPHINE SULFATE 4 MG: 4 INJECTION INTRAVENOUS at 19:02

## 2022-12-21 RX ADMIN — IOPAMIDOL 80 ML: 755 INJECTION, SOLUTION INTRAVENOUS at 16:57

## 2022-12-21 NOTE — ED PROVIDER NOTES
[address] [facility]          ATTENDING PHYSICIAN NOTE       Date of evaluation: 12/21/2022    Chief Complaint     Fall (Pt had fall off of roof. Fell head first into bush )      History of Present Illness     Barron Burton is a 78 y.o. male who presents with fall, back pain    Pain is described as in the lower back region, moderate in severity, aching in quality, constant in its course since onset about 1 hour ago, and worsened by attempts at movement. He denies numbness, weakness, paresthesias. He did not attempt to ambulate after the fall. He did fall from the roof. He reports falling headfirst when he struck the ground. Reports a mechanical fall that he thought would be stop by harness system but the harness system broke. He denies chest pain, palpitations, syncope, loss consciousness, or shortness of breath. He denies any abdominal pain nausea or vomiting. PMHx: Atrial fibrillation on anticoagulation, prior fracture of left elbow, prior right inguinal repair, and as below  SH: Never smoker, no alcohol, and as below    Review of Systems       ROS:   Positive  as per HPI.   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 His family history includes Sleep Apnea in his father. He reports that he has never smoked. He has never used smokeless tobacco. He reports that he does not drink alcohol and does not use drugs. Medications     Previous Medications    AMLODIPINE (NORVASC) 10 MG TABLET    Take 1 tablet by mouth every morning    APIXABAN (ELIQUIS) 5 MG TABS TABLET    Take 1 tablet by mouth 2 times daily    ATORVASTATIN (LIPITOR) 40 MG TABLET    Take 40 mg by mouth nightly    BLOOD GLUCOSE TEST STRIPS (TRUETEST TEST) STRIP    1 strip 2 times daily    CICLOPIROX (LOPROX) 0.77 % CREAM    Apply topically 2 times daily Apply to affected areas twice daily. CLONIDINE (CATAPRES) 0.2 MG TABLET    Take 0.2 mg by mouth 2 times daily    ENALAPRIL (VASOTEC) 10 MG TABLET    Take 10 mg by mouth 2 times daily    FENOFIBRATE (TRIGLIDE) 160 MG TABLET    Take 160 mg by mouth every evening Take one tablet by mouth daily at 6 PM.    FINASTERIDE (PROSCAR) 5 MG TABLET    Take 5 mg by mouth daily    GABAPENTIN (NEURONTIN) 300 MG CAPSULE    Take 300 mg by mouth 2 times daily. GLIMEPIRIDE (AMARYL) 2 MG TABLET    Take 2 mg by mouth 2 times daily    LANCETS MISC    Use to check Glucose BID. Dx: 250.00. Dispense Contour Lancets    LORAZEPAM (ATIVAN) 1 MG TABLET    Take 1 mg by mouth 2 times daily. LOSARTAN-HYDROCHLOROTHIAZIDE (HYZAAR) 100-25 MG PER TABLET    Take 1 tablet by mouth every evening    MAGNESIUM OXIDE (MAG-OX) 400 MG TABLET    Take 1 tablet by mouth in the morning and 1 tablet at noon and 1 tablet before bedtime.     METFORMIN (GLUCOPHAGE) 1000 MG TABLET    Take 1,000 mg by mouth 2 times daily    METOPROLOL TARTRATE (LOPRESSOR) 50 MG TABLET Take 50 mg by mouth 2 times daily    NALOXONE 4 MG/0.1ML LIQD NASAL SPRAY    1 spray by Nasal route as needed for Opioid Reversal For suspected opioid overdose, administer 1 spray into one nostril, then repeat in other nostril using a new device after 2-3 minutes, or if no or minimal response. Call 911 if used. PAROXETINE (PAXIL) 20 MG TABLET    Take 20 mg by mouth every evening    PROMETHAZINE (PHENERGAN) 25 MG TABLET    Take 25 mg by mouth every 8 hours Take one tablet by mouth every 8 hours for nausea and vomiting. SAXAGLIPTIN (ONGLYZA) 5 MG TABS TABLET    Take 5 mg by mouth every evening       Allergies     He is allergic to bee venom, cephalexin, cephalosporins, propoxyphene, and sulfa antibiotics. Physical Exam     INITIAL VITALS: BP: (!) 152/81, Temp: 98.1 °F (36.7 °C), Heart Rate: 69, Resp: 16, SpO2: 97 %     General:  Well appearing. No acute distress. Non-toxic appearing    HEENT: Head atraumatic, no Cunningham's sign or [place] eyes, pupils equal round and reactive to light, EOMI, sclera clear, no facial tenderness to palpation or step offs, no midface instability, no hemotympanum bilaterally, mucus membranes moist, no trismus, no jaw malocclusion, oropharynx WNL     Neck:  Supple. C-collar in place    Pulmonary:   Non-labored breathing. Breath sounds clear bilaterally. Cardiac:  Regular rate and rhythm. No murmurs. Abdomen:  Soft. Non-tender. Non-distended. No masses. Musculoskeletal: No obvious deformities, no tenderness to palpation, +mildine upper L spine TTP, no T or C spine tenderness to palpation, C-collar in place, full ROM in all extremities with no pain, negative logroll bilaterally    Vascular:  Extremities warm and perfused. Radial pulses 2+ bilaterally. Dorsalis pedis pulses 2+ bilaterally. Skin:  No rash. No abrasions or lacerations    Neuro: GCS15, AAOx4. CN 2-12 intact. Sensation intact. Strength grossly equal and symmetric. Extremities:  No peripheral edema.  LE symmetric        Diagnostic Results     EKG   None indicated    RADIOLOGY:  CT THORACIC RECONSTRUCTION WO POST PROCESS   Final Result      CT CHEST:      Acute fracture of the T12 vertebral body with approximately 50% height loss. No significant retropulsion. No definite evidence of spinal canal narrowing at this level. 3 mm nodule in the right upper lobe. Following the Fleischner Society 2017 guidelines for single solid nodules <6 mm, if patient is low risk no routine follow-up is suggested unless suspicious morphology or upper lobe location. If patient is high risk,    then optional CT at 12 months is suggested. CT ABDOMEN AND PELVIS:      Marked distention of the urinary bladder. Focal irregularity within the distal duodenum which could be physiologic but, given the mechanism of injury, correlation with physical exam is recommended to exclude acute injury. CT LUMBAR RECONSTRUCTION WO POST PROCESS   Final Result      CT CHEST:      Acute fracture of the T12 vertebral body with approximately 50% height loss. No significant retropulsion. No definite evidence of spinal canal narrowing at this level. 3 mm nodule in the right upper lobe. Following the Fleischner Society 2017 guidelines for single solid nodules <6 mm, if patient is low risk no routine follow-up is suggested unless suspicious morphology or upper lobe location. If patient is high risk,    then optional CT at 12 months is suggested. CT ABDOMEN AND PELVIS:      Marked distention of the urinary bladder. Focal irregularity within the distal duodenum which could be physiologic but, given the mechanism of injury, correlation with physical exam is recommended to exclude acute injury. CT CHEST ABDOMEN PELVIS W CONTRAST Additional Contrast? None   Final Result      CT CHEST:      Acute fracture of the T12 vertebral body with approximately 50% height loss. No significant retropulsion.  No definite evidence of spinal canal narrowing at this level. 3 mm nodule in the right upper lobe. Following the Fleischner Society 2017 guidelines for single solid nodules <6 mm, if patient is low risk no routine follow-up is suggested unless suspicious morphology or upper lobe location. If patient is high risk,    then optional CT at 12 months is suggested. CT ABDOMEN AND PELVIS:      Marked distention of the urinary bladder. Focal irregularity within the distal duodenum which could be physiologic but, given the mechanism of injury, correlation with physical exam is recommended to exclude acute injury. CT CERVICAL SPINE WO CONTRAST   Final Result      No definite evidence of cervical spine trauma. CT HEAD WO CONTRAST   Final Result      No evidence of acute intracranial abnormality.            LABS:   Results for orders placed or performed during the hospital encounter of 12/21/22   BMP w/ Reflex to MG   Result Value Ref Range    Sodium 133 (L) 136 - 145 mmol/L    Potassium reflex Magnesium 4.0 3.5 - 5.1 mmol/L    Chloride 95 (L) 99 - 110 mmol/L    CO2 26 21 - 32 mmol/L    Anion Gap 12 3 - 16    Glucose 255 (H) 70 - 99 mg/dL    BUN 17 7 - 20 mg/dL    Creatinine 1.0 0.8 - 1.3 mg/dL    Est, Glom Filt Rate >60 >60    Calcium 9.4 8.3 - 10.6 mg/dL   CBC with Auto Differential   Result Value Ref Range    WBC 6.8 4.0 - 11.0 K/uL    RBC 4.23 4.20 - 5.90 M/uL    Hemoglobin 11.8 (L) 13.5 - 17.5 g/dL    Hematocrit 34.3 (L) 40.5 - 52.5 %    MCV 81.2 80.0 - 100.0 fL    MCH 27.8 26.0 - 34.0 pg    MCHC 34.2 31.0 - 36.0 g/dL    RDW 14.6 12.4 - 15.4 %    Platelets 940 337 - 476 K/uL    MPV 8.1 5.0 - 10.5 fL    Neutrophils % 76.7 %    Lymphocytes % 15.2 %    Monocytes % 6.5 %    Eosinophils % 1.3 %    Basophils % 0.3 %    Neutrophils Absolute 5.2 1.7 - 7.7 K/uL    Lymphocytes Absolute 1.0 1.0 - 5.1 K/uL    Monocytes Absolute 0.4 0.0 - 1.3 K/uL    Eosinophils Absolute 0.1 0.0 - 0.6 K/uL    Basophils Absolute 0.0 0.0 - 0.2 K/uL Protime-INR   Result Value Ref Range    Protime 15.6 (H) 11.7 - 14.5 sec    INR 1.25 (H) 0.87 - 1.14   Blood gas, venous (Lab)   Result Value Ref Range    pH, Claudio 7.401 7.350 - 7.450    pCO2, Claudio 41.8 41.0 - 51.0 mmHg    pO2, Claudio 36.8 25.0 - 40.0 mmHg    HCO3, Venous 25.9 24.0 - 28.0 mmol/L    Base Excess, Claudio 0.9 -2.0 - 3.0 mmol/L    O2 Sat, Claudio 69 Not established %    Carboxyhemoglobin 0.2 0.0 - 1.5 %    MetHgb, Claudio 0.5 0.0 - 1.5 %    TC02 (Calc), Claudio 27 mmol/L    Hemoglobin, Claudio, Reduced 30.80 %   Troponin   Result Value Ref Range    Troponin <0.01 <0.01 ng/mL       ED BEDSIDE ULTRASOUND:  None performed    Procedures     None performed    ED Course     Nursing Notes, Past Medical Hx, Past Surgical Hx, Social Hx, Allergies, and Family Hx were reviewed. The patient was given the following medications:  Orders Placed This Encounter   Medications    iopamidol (ISOVUE-370) 76 % injection 80 mL    morphine injection 4 mg       CONSULTS:  None    MEDICAL DECISIONMAKING / ASSESSMENT / PLAN     Joanna Garcia is a 78 y.o. male with fall, back pain. Pt was hemodynamically stableand afebrile in the Emergency Department. Patient arrives as a trauma. ATLS primary survey is intact. ATLS primary and secondary surveys notable for the findings listed above. I was concerned for the possibility of intracranial hemorrhage given his anticoagulated status, spine fracture. I considered in the differential but had a lower suspicion for spinal cord injury to include spinal cord concussion, intra-abdominal injury such as splenic rupture, intrathoracic injury such as rib fracture. Given this patient's advanced age and anticoagulated status, I did feel that cross-sectional imaging of the head was mandated. Given the presence of spine tenderness to palpation, cross-sectional imaging of the complete spine was obtained because I have a high suspicion at the outset for likely lumbar spine fracture.   The patient was maintained in full spinal precautions throughout his stay in the emergency department and logroll was used for any movement. The patient was maintained in cervical spine precautions pending transfer and evaluation by the spine surgery. Given his use of anticoagulation, I did obtain cross-sectional imaging of the chest abdomen and pelvis which was notable for the potential for a duodenal injury. The spine injuries are noted to be a T12 vertebral body fracture. The patient is neurovascularly intact and hemodynamically stable. I discussed the patient with Dr. Kevin Church and Dr. Justus Quesada from the [facility] trauma surgery team and emergency department respectively and the patient was accepted in transfer as an ED to ED transfer for trauma surgery evaluation and further consultation regarding the spine fracture. The patient's duodenal injury will require serial exams and I felt that he would be best served at a trauma center for evaluation of this potential injury, particular given his advanced age and anticoagulated status. Image does not demonstrate any pneumothorax or rib fractures. Troponin is not elevated making blunt cardiac injury less likely. INR is nonspecifically prolonged, consistent with his anticoagulated status. Renal panel shows a mild hyperglycemia which is likely reactive. There is a pseudohyponatremia which is minimal.  There is no evidence for acute kidney injury or other significant electrolyte abnormality this time. VBG is unremarkable and there is no acidemia. CBC shows a mild stable anemia. There is no thrombocytopenia or leukocytosis. The patient's pain was treated with IV morphine. The patient was monitored pending transfer. Clinical Impression     1. Other closed fracture of twelfth thoracic vertebra, initial encounter ([facility] [address]    2.  Duodenum injury, initial encounter         Disposition     PATIENT REFERRED TO:  No follow-up provider specified.     DISCHARGE MEDICATIONS:  New Prescriptions    No medications on file       DISPOSITION Decision To Transfer 12/21/2022 06:18:30 PM         Nida Mcadams MD  12/21/22 2002

## 2022-12-21 NOTE — ED TRIAGE NOTES
Pt sustained 1 story fall from roof of house. States he landed head first in a bush after harness gave way. On eliquis.  Denies pain other than lower back

## 2022-12-22 NOTE — ED NOTES
Patient transfer to [facility] emergency room via [facility] transport.       Ginger Alfaro RN  12/21/22 2027

## 2023-01-08 ENCOUNTER — HOSPITAL ENCOUNTER (EMERGENCY)
Age: 78
Discharge: HOME | End: 2023-01-09
Attending: STUDENT IN AN ORGANIZED HEALTH CARE EDUCATION/TRAINING PROGRAM
Payer: MEDICARE

## 2023-01-08 DIAGNOSIS — N39.0 URINARY TRACT INFECTION IN MALE: Primary | ICD-10-CM

## 2023-01-08 PROCEDURE — 99283 EMERGENCY DEPT VISIT LOW MDM: CPT

## 2023-01-09 VITALS
SYSTOLIC BLOOD PRESSURE: 149 MMHG | OXYGEN SATURATION: 100 % | DIASTOLIC BLOOD PRESSURE: 81 MMHG | RESPIRATION RATE: 18 BRPM | HEART RATE: 91 BPM | TEMPERATURE: 98.1 F

## 2023-01-09 LAB
ANION GAP SERPL CALCULATED.3IONS-SCNC: 13 MMOL/L (ref 3–16)
BACTERIA: ABNORMAL /HPF
BASOPHILS ABSOLUTE: 0.1 K/UL (ref 0–0.2)
BASOPHILS RELATIVE PERCENT: 0.8 %
BILIRUBIN URINE: NEGATIVE
BLOOD, URINE: ABNORMAL
BUN BLDV-MCNC: 19 MG/DL (ref 7–20)
CALCIUM SERPL-MCNC: 9.3 MG/DL (ref 8.3–10.6)
CHLORIDE BLD-SCNC: 97 MMOL/L (ref 99–110)
CLARITY: CLEAR
CO2: 24 MMOL/L (ref 21–32)
COLOR: YELLOW
CREAT SERPL-MCNC: 1 MG/DL (ref 0.8–1.3)
EOSINOPHILS ABSOLUTE: 0.2 K/UL (ref 0–0.6)
EOSINOPHILS RELATIVE PERCENT: 1.5 %
EPITHELIAL CELLS, UA: ABNORMAL /HPF (ref 0–5)
GFR SERPL CREATININE-BSD FRML MDRD: >60 ML/MIN/{1.73_M2}
GLUCOSE BLD-MCNC: 178 MG/DL (ref 70–99)
GLUCOSE URINE: 250 MG/DL
HCT VFR BLD CALC: 32.4 % (ref 40.5–52.5)
HEMOGLOBIN: 10.8 G/DL (ref 13.5–17.5)
KETONES, URINE: ABNORMAL MG/DL
LEUKOCYTE ESTERASE, URINE: ABNORMAL
LYMPHOCYTES ABSOLUTE: 1.7 K/UL (ref 1–5.1)
LYMPHOCYTES RELATIVE PERCENT: 16.7 %
MCH RBC QN AUTO: 27.6 PG (ref 26–34)
MCHC RBC AUTO-ENTMCNC: 33.4 G/DL (ref 31–36)
MCV RBC AUTO: 82.6 FL (ref 80–100)
MICROSCOPIC EXAMINATION: YES
MONOCYTES ABSOLUTE: 0.7 K/UL (ref 0–1.3)
MONOCYTES RELATIVE PERCENT: 6.8 %
NEUTROPHILS ABSOLUTE: 7.5 K/UL (ref 1.7–7.7)
NEUTROPHILS RELATIVE PERCENT: 74.2 %
NITRITE, URINE: NEGATIVE
PDW BLD-RTO: 15.7 % (ref 12.4–15.4)
PH UA: 6 (ref 5–8)
PLATELET # BLD: 316 K/UL (ref 135–450)
PMV BLD AUTO: 8.2 FL (ref 5–10.5)
POTASSIUM REFLEX MAGNESIUM: 4.1 MMOL/L (ref 3.5–5.1)
PROTEIN UA: 100 MG/DL
RBC # BLD: 3.93 M/UL (ref 4.2–5.9)
RBC UA: ABNORMAL /HPF (ref 0–4)
SODIUM BLD-SCNC: 134 MMOL/L (ref 136–145)
SPECIFIC GRAVITY UA: >=1.03 (ref 1–1.03)
URINE REFLEX TO CULTURE: YES
URINE TYPE: ABNORMAL
UROBILINOGEN, URINE: 0.2 E.U./DL
WBC # BLD: 10.1 K/UL (ref 4–11)
WBC UA: >100 /HPF (ref 0–5)

## 2023-01-09 PROCEDURE — 81001 URINALYSIS AUTO W/SCOPE: CPT

## 2023-01-09 PROCEDURE — 87086 URINE CULTURE/COLONY COUNT: CPT

## 2023-01-09 PROCEDURE — 87186 SC STD MICRODIL/AGAR DIL: CPT

## 2023-01-09 PROCEDURE — 6370000000 HC RX 637 (ALT 250 FOR IP)

## 2023-01-09 PROCEDURE — 51798 US URINE CAPACITY MEASURE: CPT

## 2023-01-09 PROCEDURE — 87077 CULTURE AEROBIC IDENTIFY: CPT

## 2023-01-09 PROCEDURE — 36415 COLL VENOUS BLD VENIPUNCTURE: CPT

## 2023-01-09 PROCEDURE — 85025 COMPLETE CBC W/AUTO DIFF WBC: CPT

## 2023-01-09 PROCEDURE — 80048 BASIC METABOLIC PNL TOTAL CA: CPT

## 2023-01-09 RX ORDER — CIPROFLOXACIN 500 MG/1
500 TABLET, FILM COATED ORAL ONCE
Status: COMPLETED | OUTPATIENT
Start: 2023-01-09 | End: 2023-01-09

## 2023-01-09 RX ORDER — CIPROFLOXACIN 500 MG/1
500 TABLET, FILM COATED ORAL 2 TIMES DAILY
Qty: 14 TABLET | Refills: 0 | Status: SHIPPED | OUTPATIENT
Start: 2023-01-09 | End: 2023-01-16

## 2023-01-09 RX ADMIN — CIPROFLOXACIN HYDROCHLORIDE 500 MG: 500 TABLET, FILM COATED ORAL at 02:17

## 2023-01-09 ASSESSMENT — ENCOUNTER SYMPTOMS
ABDOMINAL PAIN: 0
COUGH: 0
VOMITING: 0
NAUSEA: 0
BACK PAIN: 0

## 2023-01-09 NOTE — DISCHARGE INSTRUCTIONS
Seen in the emergency department for a UTI. Your urine did demonstrate an infection. I am prescribing you a 7-day course of ciprofloxacin. Please take 500 mg twice a day for the next 7 days. Return to the emergency department if your symptoms do not improve, you have worsening pain on either side of your back, you develop fevers, nausea vomiting or anything else that may worry you.

## 2023-01-09 NOTE — ED PROVIDER NOTES
ED Attending Attestation Note     Date of evaluation: 1/8/2023    This patient was seen by the resident. I have seen and examined the patient, agree with the workup, evaluation, management and diagnosis. The care plan has been discussed. My assessment reveals with several intermittent lower urinary tract symptoms including dysuria and hesitancy. He has a history of a prior episode of UTI and says this is reminiscent of that. He has absolutely no flank pain, radiating pain to the groin, or pain in the costovertebral angle region. On exam he is awake alert conversant. His abdomen is completely benign, free of any tenderness to palpation, rebound or guarding. He has no pain in the flanks. He has intact strength for hip flexion bilaterally, knee flexion extension bilaterally, ankle plantar flexion and dorsiflexion bilaterally. He has no clonus at the ankles bilaterally. He has intact sensation diffusely throughout his bilateral lower extremities. He is wearing a brace from back fracture that he sustained about a month ago. He has had no change in his back and denies any new pain. He denies any focal numbness, weakness, paresthesias. He denies any urinary retention (although he does endorse hesitancy) or urinary incontinence. He denies any saddle anesthesia or fecal incontinence. The patient has no new back pain. He has no risk factors aside from his old fracture without acute exacerbation to suggest any risk for cauda equina syndrome. He does not have any red flags for cauda equina syndrome. He also has no history of kidney stones and does not have any flank pain to suggest this diagnosis. I discussed with him the possibility of both of these diagnoses and he agrees that he does not have any of the symptoms. However in our review I did emphasize that should any of the symptoms develop he should return emergently for imaging to exclude potential kidney stone or cauda equina syndrome.   He indicated understanding. Urinalysis does show evidence for infection, and he will be treated for this.      Anupama Mejia MD  01/09/23 [id number]

## 2023-01-09 NOTE — ED TRIAGE NOTES
Pt presents to the ED for dysuria. Pt reports that around 1600 yesterday, he had the sudden urge to void but then could not let it pass. Hx of UTIs.

## 2023-01-09 NOTE — ED PROVIDER NOTES
[id number] [facility] RESIDENT NOTE       Date of evaluation: 1/8/2023    Chief Complaint     Dysuria      of Present Illness     dS Johns is a 78 y.o. male w/ hx of afib was presenting to the emergency department with concerns of UTI. Pt states he has had cloudy urine for a dew days. Patient states he has developed urinary urgency and frequency today. He endorses some mild dysuria. Denies any fevers or flank pain. Patient has chronic back pain, and finds it difficult to delineate back pain and flank pain. Pt has a hx of a UTI in 11/2022 discharged on cipro. Symptoms resolved for sometime, but now he thinks they returned. Review of Systems     Review of Systems   Constitutional:  Negative for fever. HENT:  Negative for congestion. Eyes:  Negative for visual disturbance. Respiratory:  Negative for cough. Cardiovascular:  Negative for chest pain. Gastrointestinal:  Negative for abdominal pain, nausea and vomiting. Genitourinary:  Positive for difficulty urinating, dysuria, frequency and urgency. Negative for flank pain and hematuria. Musculoskeletal:  Negative for back pain. Skin:  Negative for rash and wound. Neurological:  Negative for weakness, numbness and headaches. 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 His family history includes Sleep Apnea in his father. He reports that he has never smoked. He has never used smokeless tobacco. He reports that he does not drink alcohol and does not use drugs. Medications     Previous Medications    AMLODIPINE (NORVASC) 10 MG TABLET    Take 1 tablet by mouth every morning    APIXABAN (ELIQUIS) 5 MG TABS TABLET    Take 1 tablet by mouth 2 times daily    ATORVASTATIN (LIPITOR) 40 MG TABLET    Take 40 mg by mouth nightly    BLOOD GLUCOSE TEST STRIPS (TRUETEST TEST) STRIP    1 strip 2 times daily    CICLOPIROX (LOPROX) 0.77 % CREAM    Apply topically 2 times daily Apply to affected areas twice daily. CLONIDINE (CATAPRES) 0.2 MG TABLET    Take 0.2 mg by mouth 2 times daily    ENALAPRIL (VASOTEC) 10 MG TABLET    Take 10 mg by mouth 2 times daily    FENOFIBRATE (TRIGLIDE) 160 MG TABLET    Take 160 mg by mouth every evening Take one tablet by mouth daily at 6 PM.    FINASTERIDE (PROSCAR) 5 MG TABLET    Take 5 mg by mouth daily    GABAPENTIN (NEURONTIN) 300 MG CAPSULE    Take 300 mg by mouth 2 times daily. GLIMEPIRIDE (AMARYL) 2 MG TABLET    Take 2 mg by mouth 2 times daily    LANCETS MISC    Use to check Glucose BID. Dx: 250.00. Dispense Contour Lancets    LORAZEPAM (ATIVAN) 1 MG TABLET    Take 1 mg by mouth 2 times daily. LOSARTAN-HYDROCHLOROTHIAZIDE (HYZAAR) 100-25 MG PER TABLET    Take 1 tablet by mouth every evening    MAGNESIUM OXIDE (MAG-OX) 400 MG TABLET    Take 1 tablet by mouth in the morning and 1 tablet at noon and 1 tablet before bedtime.     METFORMIN (GLUCOPHAGE) 1000 MG TABLET    Take 1,000 mg by mouth 2 times daily    METOPROLOL TARTRATE (LOPRESSOR) 50 MG TABLET    Take 50 mg by mouth 2 times daily    NALOXONE 4 MG/0.1ML LIQD NASAL SPRAY    1 spray by Nasal route as needed for Opioid Reversal For suspected opioid overdose, administer 1 spray into one nostril, then repeat in other nostril using a new device after 2-3 minutes, or if no or minimal response. Call 911 if used. PAROXETINE (PAXIL) 20 MG TABLET    Take 20 mg by mouth every evening    PROMETHAZINE (PHENERGAN) 25 MG TABLET    Take 25 mg by mouth every 8 hours Take one tablet by mouth every 8 hours for nausea and vomiting. SAXAGLIPTIN (ONGLYZA) 5 MG TABS TABLET    Take 5 mg by mouth every evening       Allergies     He is allergic to bee venom, cephalexin, cephalosporins, propoxyphene, and sulfa antibiotics. Physical Exam     INITIAL VITALS: BP: (!) 157/88, Temp: 98.1 °F (36.7 °C), Heart Rate: 94, Resp: 20, SpO2: 98 %   Physical Exam  Vitals reviewed. Constitutional:       General: He is not in acute distress. Appearance: Normal appearance. HENT:      Right Ear: Tympanic membrane normal.      Left Ear: Tympanic membrane normal.      Nose: No rhinorrhea. Mouth/Throat:      Mouth: Mucous membranes are moist.   Eyes:      General: No scleral icterus. Extraocular Movements: Extraocular movements intact. Pupils: Pupils are equal, round, and reactive to light. Cardiovascular:      Rate and Rhythm: Normal rate and regular rhythm. Heart sounds: Normal heart sounds. No murmur heard. Pulmonary:      Effort: Pulmonary effort is normal.      Breath sounds: Normal breath sounds. No wheezing. Abdominal:      General: Abdomen is flat. There is no distension. Palpations: Abdomen is soft. Comments: Suprapubic tenderness   Musculoskeletal:         General: No swelling or tenderness. Normal range of motion. Cervical back: Normal range of motion and neck supple. Lymphadenopathy:      Cervical: No cervical adenopathy. Skin:     General: Skin is warm. Coloration: Skin is not pale. Findings: No bruising. Neurological:      General: No focal deficit present. Mental Status: He is alert and oriented to person, place, and time. Motor: No weakness.        DiagnosticResults     RADIOLOGY:  No orders to display       LABS:   Results for orders placed or performed during the hospital encounter of 01/08/23   Urinalysis with Reflex to Culture    Specimen: Urine   Result Value Ref Range    Color, UA Yellow Straw/Yellow    Clarity, UA Clear Clear    Glucose, Ur 250 (A) Negative mg/dL    Bilirubin Urine Negative Negative    Ketones, Urine TRACE (A) Negative mg/dL    Specific Gravity, UA >=1.030 1.005 - 1.030    Blood, Urine MODERATE (A) Negative    pH, UA 6.0 5.0 - 8.0    Protein,  (A) Negative mg/dL    Urobilinogen, Urine 0.2 <2.0 E.U./dL    Nitrite, Urine Negative Negative    Leukocyte Esterase, Urine MODERATE (A) Negative    Microscopic Examination YES     Urine Type Voided     Urine Reflex to Culture Yes    BMP w/ Reflex to MG   Result Value Ref Range    Sodium 134 (L) 136 - 145 mmol/L    Potassium reflex Magnesium 4.1 3.5 - 5.1 mmol/L    Chloride 97 (L) 99 - 110 mmol/L    CO2 24 21 - 32 mmol/L    Anion Gap 13 3 - 16    Glucose 178 (H) 70 - 99 mg/dL    BUN 19 7 - 20 mg/dL    Creatinine 1.0 0.8 - 1.3 mg/dL    Est, Glom Filt Rate >60 >60    Calcium 9.3 8.3 - 10.6 mg/dL   CBC with Auto Differential   Result Value Ref Range    WBC 10.1 4.0 - 11.0 K/uL    RBC 3.93 (L) 4.20 - 5.90 M/uL    Hemoglobin 10.8 (L) 13.5 - 17.5 g/dL    Hematocrit 32.4 (L) 40.5 - 52.5 %    MCV 82.6 80.0 - 100.0 fL    MCH 27.6 26.0 - 34.0 pg    MCHC 33.4 31.0 - 36.0 g/dL    RDW 15.7 (H) 12.4 - 15.4 %    Platelets 420 896 - 593 K/uL    MPV 8.2 5.0 - 10.5 fL    Neutrophils % 74.2 %    Lymphocytes % 16.7 %    Monocytes % 6.8 %    Eosinophils % 1.5 %    Basophils % 0.8 %    Neutrophils Absolute 7.5 1.7 - 7.7 K/uL    Lymphocytes Absolute 1.7 1.0 - 5.1 K/uL    Monocytes Absolute 0.7 0.0 - 1.3 K/uL    Eosinophils Absolute 0.2 0.0 - 0.6 K/uL    Basophils Absolute 0.1 0.0 - 0.2 K/uL   Microscopic Urinalysis   Result Value Ref Range    WBC, UA >100 (A) 0 - 5 /HPF    RBC, UA see below (A) 0 - 4 /HPF    Epithelial Cells, UA 0-1 0 - 5 /HPF    Bacteria, UA 4+ (A) None Seen /HPF       ED BEDSIDE ULTRASOUND:  No results found. RECENT VITALS:  BP: (!) 149/81, Temp: 98.1 °F (36.7 °C), Heart Rate: 91,Resp: 18, SpO2: 100 %     Procedures         ED Course     Nursing Notes, Past Medical Hx, Past Surgical Hx, Social Hx, Allergies, and Family Hx were reviewed. ED Course as of 01/09/23 0222 Mon Jan 09, 2023   0133 Blood, Urine(!): MODERATE [DR]   0156 WBC, UA(!): >100  Urine with a marked infection [DR]      ED Course User Index  [DR] Analy Vaughn MD       The patient was given the followingmedications:  Orders Placed This Encounter   Medications    ciprofloxacin (CIPRO) tablet 500 mg     Order Specific Question:   Antimicrobial Indications     Answer:   Urinary Tract Infection     Order Specific Question:   UTI duration of therapy     Answer:   7 days    ciprofloxacin (CIPRO) 500 MG tablet     Sig: Take 1 tablet by mouth 2 times daily for 7 days     Dispense:  14 tablet     Refill:  0         CONSULTS:  None    MEDICAL DECISION MAKING / ASSESSMENT / [address] [place] [address] is a 62-year-old male with past medical history of A. fib presenting to the emergency department with concerns of a UTI. Patient has been complaining of symptoms of urgency, frequency and dysuria. He had a recent UTI in November 2022 that was treated outpatient on Cipro. Given his symptoms, I suspect the patient has a recurrent UTI. Other considerations at this time included cauda equina given that the patient had a recent T4 fracture, and his symptoms may be overflow incontinence. However a bladder scan demonstrated 95 mL of urine, lowering my suspicion at this time. The patient's UA was notable for 3+ bacteria, and >100 WBCs. I was unable to elicit CVA tenderness, due to the patient's chronic back pain and brace. However given I have a moderate suspicion for pyelonephritis I will treat his UTI with a 7-day course of ciprofloxacin. The patient has allergies to cephalosporins.   I instructed the patient to return to the emergency department if his symptoms do not improve, he developed bladder or bowel incontinence, saddle anesthesia, fevers, severe nausea and vomiting. Patient was amendable to this plan. Problems Addressed:  Urinary tract infection in male: complicated acute illness or injury    Amount and/or Complexity of Data Reviewed  Independent Historian:      Details: daughter at bedside, states father has been complaining of back pain  External Data Reviewed: labs and notes. Details: hx of a UTI in 11/2022 discharged on cipro. no hx of prior urine cultures  Labs: ordered. Decision-making details documented in ED Course. Details: Bladder scan completed, noted 95 ml in bladder    Risk  Prescription drug management. This patient was also evaluated by the attending physician. All care plans werediscussed and agreed upon. Clinical Impression     1. Urinary tract infection in male        Disposition     PATIENT REFERRED TO:  No follow-up provider specified.     DISCHARGE MEDICATIONS:  New Prescriptions    CIPROFLOXACIN (CIPRO) 500 MG TABLET    Take 1 tablet by mouth 2 times daily for 7 days       DISPOSITION Discharge - Pending Orders Complete 01/09/2023 02:15:14 AM       Linda Dow MD  Resident  01/09/23 [id number]

## 2023-01-11 LAB
ORGANISM: ABNORMAL
URINE CULTURE, ROUTINE: ABNORMAL

## 2023-01-17 ENCOUNTER — HOSPITAL ENCOUNTER (INPATIENT)
Age: 78
LOS: 1 days | Discharge: HOME | DRG: 068 | End: 2023-01-18
Attending: EMERGENCY MEDICINE | Admitting: INTERNAL MEDICINE
Payer: MEDICARE

## 2023-01-17 ENCOUNTER — APPOINTMENT (OUTPATIENT)
Dept: GENERAL RADIOLOGY | Age: 78
DRG: 068 | End: 2023-01-17
Payer: MEDICARE

## 2023-01-17 ENCOUNTER — APPOINTMENT (OUTPATIENT)
Dept: CT IMAGING | Age: 78
DRG: 068 | End: 2023-01-17
Payer: MEDICARE

## 2023-01-17 DIAGNOSIS — G45.9 TIA (TRANSIENT ISCHEMIC ATTACK): Primary | ICD-10-CM

## 2023-01-17 PROBLEM — R20.0 RIGHT SIDED NUMBNESS: Status: ACTIVE | Noted: 2023-01-17

## 2023-01-17 LAB
A/G RATIO: 1.4 (ref 1.1–2.2)
ALBUMIN SERPL-MCNC: 4.2 G/DL (ref 3.4–5)
ALP BLD-CCNC: 96 U/L (ref 40–129)
ALT SERPL-CCNC: 12 U/L (ref 10–40)
ANION GAP SERPL CALCULATED.3IONS-SCNC: 11 MMOL/L (ref 3–16)
AST SERPL-CCNC: 16 U/L (ref 15–37)
BASOPHILS ABSOLUTE: 0.1 K/UL (ref 0–0.2)
BASOPHILS RELATIVE PERCENT: 1.1 %
BILIRUB SERPL-MCNC: <0.2 MG/DL (ref 0–1)
BUN BLDV-MCNC: 15 MG/DL (ref 7–20)
CALCIUM SERPL-MCNC: 9.8 MG/DL (ref 8.3–10.6)
CHLORIDE BLD-SCNC: 102 MMOL/L (ref 99–110)
CHP ED QC CHECK: NORMAL
CO2: 26 MMOL/L (ref 21–32)
CREAT SERPL-MCNC: 0.8 MG/DL (ref 0.8–1.3)
EOSINOPHILS ABSOLUTE: 0.1 K/UL (ref 0–0.6)
EOSINOPHILS RELATIVE PERCENT: 1.7 %
GFR SERPL CREATININE-BSD FRML MDRD: >60 ML/MIN/{1.73_M2}
GLUCOSE BLD-MCNC: 84 MG/DL
GLUCOSE BLD-MCNC: 84 MG/DL (ref 70–99)
GLUCOSE BLD-MCNC: 87 MG/DL (ref 70–99)
HCT VFR BLD CALC: 36.8 % (ref 40.5–52.5)
HEMOGLOBIN: 12.1 G/DL (ref 13.5–17.5)
INR BLD: 1.16 (ref 0.87–1.14)
LYMPHOCYTES ABSOLUTE: 1.6 K/UL (ref 1–5.1)
LYMPHOCYTES RELATIVE PERCENT: 21.2 %
MCH RBC QN AUTO: 26.9 PG (ref 26–34)
MCHC RBC AUTO-ENTMCNC: 32.9 G/DL (ref 31–36)
MCV RBC AUTO: 81.8 FL (ref 80–100)
MONOCYTES ABSOLUTE: 0.4 K/UL (ref 0–1.3)
MONOCYTES RELATIVE PERCENT: 5.9 %
NEUTROPHILS ABSOLUTE: 5.3 K/UL (ref 1.7–7.7)
NEUTROPHILS RELATIVE PERCENT: 70.1 %
PDW BLD-RTO: 15.7 % (ref 12.4–15.4)
PERFORMED ON: NORMAL
PLATELET # BLD: 266 K/UL (ref 135–450)
PMV BLD AUTO: 7.7 FL (ref 5–10.5)
POTASSIUM REFLEX MAGNESIUM: 3.8 MMOL/L (ref 3.5–5.1)
PROTHROMBIN TIME: 14.8 SEC (ref 11.7–14.5)
RBC # BLD: 4.5 M/UL (ref 4.2–5.9)
SODIUM BLD-SCNC: 139 MMOL/L (ref 136–145)
TOTAL PROTEIN: 7.3 G/DL (ref 6.4–8.2)
TROPONIN: <0.01 NG/ML
WBC # BLD: 7.6 K/UL (ref 4–11)

## 2023-01-17 PROCEDURE — 70498 CT ANGIOGRAPHY NECK: CPT

## 2023-01-17 PROCEDURE — 71045 X-RAY EXAM CHEST 1 VIEW: CPT

## 2023-01-17 PROCEDURE — 1200000000 HC SEMI PRIVATE

## 2023-01-17 PROCEDURE — 6360000004 HC RX CONTRAST MEDICATION: Performed by: STUDENT IN AN ORGANIZED HEALTH CARE EDUCATION/TRAINING PROGRAM

## 2023-01-17 PROCEDURE — 36415 COLL VENOUS BLD VENIPUNCTURE: CPT

## 2023-01-17 PROCEDURE — 80053 COMPREHEN METABOLIC PANEL: CPT

## 2023-01-17 PROCEDURE — 85025 COMPLETE CBC W/AUTO DIFF WBC: CPT

## 2023-01-17 PROCEDURE — 70450 CT HEAD/BRAIN W/O DYE: CPT

## 2023-01-17 PROCEDURE — 84484 ASSAY OF TROPONIN QUANT: CPT

## 2023-01-17 PROCEDURE — 85610 PROTHROMBIN TIME: CPT

## 2023-01-17 PROCEDURE — 93005 ELECTROCARDIOGRAM TRACING: CPT | Performed by: STUDENT IN AN ORGANIZED HEALTH CARE EDUCATION/TRAINING PROGRAM

## 2023-01-17 PROCEDURE — 99285 EMERGENCY DEPT VISIT HI MDM: CPT

## 2023-01-17 PROCEDURE — 4A03X5D MEASUREMENT OF ARTERIAL FLOW, INTRACRANIAL, EXTERNAL APPROACH: ICD-10-PCS | Performed by: INTERNAL MEDICINE

## 2023-01-17 RX ADMIN — IOPAMIDOL 80 ML: 755 INJECTION, SOLUTION INTRAVENOUS at 18:29

## 2023-01-17 ASSESSMENT — ENCOUNTER SYMPTOMS
EYES NEGATIVE: 1
RESPIRATORY NEGATIVE: 1
GASTROINTESTINAL NEGATIVE: 1

## 2023-01-17 ASSESSMENT — PAIN - FUNCTIONAL ASSESSMENT: PAIN_FUNCTIONAL_ASSESSMENT: NONE - DENIES PAIN

## 2023-01-17 NOTE — ED PROVIDER NOTES
[address] [facility]          EM RESIDENT NOTE       Date of evaluation: 1/17/2023    Chief Complaint     Numbness (Right sided hand numbness that traveled up right arm and into right side of face. States when he was calling 911 he felt he was slurring his words. Numbness lasted 25 min. LKW approx 1725. Right hand numbness starting to come back during triage, MD at bedside)      History of Present Illness     Tiarra Tran is a 78 y.o. male who presents to the emergency department with concern for right-sided sensation deficits and slurred speech that started about an hour prior to patient arrival.  Patient states that he was at home and had a sudden onset of right arm tingling and slurred speech. He additionally felt like he had some facial droop so he called EMS right away. That 25 minutes after the symptoms started he felt like they started to improve. However after sitting in the emergency department for several minutes he felt like the symptoms started to return again. He denies any weakness, difficulties with walking, or changes in his vision. Review of Systems     Review of Systems   Constitutional:  Negative for chills and fever. HENT: Negative. Eyes: Negative. Respiratory: Negative. Cardiovascular: Negative. Gastrointestinal: Negative. Genitourinary: Negative. Musculoskeletal: Negative. Neurological:  Positive for speech difficulty and numbness. Negative for syncope and weakness.      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 His family history includes Sleep Apnea in his father. He reports that he has never smoked. He has never used smokeless tobacco. He reports that he does not drink alcohol and does not use drugs. Medications     Discharge Medication List as of 1/18/2023  4:03 PM        CONTINUE these medications which have NOT CHANGED    Details   apixaban (ELIQUIS) 5 MG TABS tablet Take 1 tablet by mouth 2 times daily, Disp-180 tablet, R-3Normal      magnesium oxide (MAG-OX) 400 MG tablet Take 1 tablet by mouth 3 times daily, Disp-270 tablet, R-3Normal      ciclopirox (LOPROX) 0.77 % cream Apply topically daily, Topical, DAILY, Historical Med      finasteride (PROSCAR) 5 MG tablet Take 5 mg by mouth dailyHistorical Med      gabapentin (NEURONTIN) 300 MG capsule Take 300 mg by mouth 2 times daily. Historical Med      amLODIPine (NORVASC) 10 MG tablet Take 1 tablet by mouth every morningHistorical Med      atorvastatin (LIPITOR) 40 MG tablet Take 40 mg by mouth nightlyHistorical Med      cloNIDine (CATAPRES) 0.2 MG tablet Take 0.2 mg by mouth 2 times dailyHistorical Med      enalapril (VASOTEC) 10 MG tablet Take 10 mg by mouth 2 times dailyHistorical Med      fenofibrate (TRIGLIDE) 160 MG tablet Take 160 mg by mouth every evening Take one tablet by mouth daily at 6 PM.Historical Med      glimepiride (AMARYL) 2 MG tablet Take 2 mg by mouth 2 times dailyHistorical Med      losartan-hydroCHLOROthiazide (HYZAAR) 100-25 MG per tablet Take 1 tablet by mouth every eveningHistorical Med      LORazepam (ATIVAN) 1 MG tablet Take 1 mg by mouth 2 times daily. Historical Med      metFORMIN (GLUCOPHAGE) 1000 MG tablet Take 1,000 mg by mouth 2 times dailyHistorical Med      metoprolol tartrate (LOPRESSOR) 50 MG tablet Take 50 mg by mouth 2 times dailyHistorical Med      sAXagliptin (ONGLYZA) 5 MG TABS tablet Take 5 mg by mouth every eveningHistorical Med      PARoxetine (PAXIL) 20 MG tablet Take 20 mg by mouth every eveningHistorical Med             Allergies     He is allergic to bee venom, cephalexin, cephalosporins, propoxyphene, and sulfa antibiotics. Physical Exam     INITIAL VITALS: BP: (!) 169/90, Temp: 98 °F (36.7 °C), Heart Rate: 66, Resp: 18, SpO2: 97 %   Physical Exam  Constitutional:       Appearance: Normal appearance. HENT:      Head: Normocephalic and atraumatic. Nose: Nose normal. No congestion or rhinorrhea. Mouth/Throat:      Mouth: Mucous membranes are moist.      Pharynx: Oropharynx is clear. No oropharyngeal exudate or posterior oropharyngeal erythema. Eyes:      Extraocular Movements: Extraocular movements intact. Pupils: Pupils are equal, round, and reactive to light. Cardiovascular:      Rate and Rhythm: Normal rate and regular rhythm. Pulses: Normal pulses. Heart sounds: Normal heart sounds. Pulmonary:      Effort: Pulmonary effort is normal. No respiratory distress. Breath sounds: Normal breath sounds. No wheezing, rhonchi or rales. Abdominal:      General: Abdomen is flat. Palpations: Abdomen is soft. Musculoskeletal:         General: No swelling, tenderness or deformity. Normal range of motion. Cervical back: Normal range of motion and neck supple. No rigidity. Skin:     Capillary Refill: Capillary refill takes less than 2 seconds. Neurological:      Mental Status: He is alert and oriented to person, place, and time. Cranial Nerves: No cranial nerve deficit. Sensory: Sensory deficit present. Motor: No weakness. Coordination: Coordination normal.      Comments: Slightly slurred speech. Decrease sensation in the right upper extremity. No pronator drift in bilateral upper or lower extremities.   No facial droop or visual field deficits. NIH STROKE SCALE  NIH Stroke Scale  Interval: Baseline  Level of Consciousness (1a): Alert  LOC Questions (1b): Answers both correctly  LOC Commands (1c): Performs both tasks correctly  Best Gaze (2): Normal  Visual (3): No visual loss  Facial Palsy (4): Normal symmetrical movement  Motor Arm, Left (5a): No drift  Motor Arm, Right (5b): No drift  Motor Leg, Left (6a): No drift  Motor Leg, Right (6b): No drift  Limb Ataxia (7): Absent  Sensory (8): (!) Mild to Moderate  Best Language (9): No aphasia  Dysarthria (10): Mild to moderate, slurs some words  Extinction and Inattention (11): No abnormality  Total: 2      Diagnostic Results     EKG   Regular rate, normal sinus, left axis deviation. IL intervals are prolonged, and QT intervals are not prolonged. QRS is narrow. There are no ST segment elevations, depression, or STEMI equivalents. T wave inversions in leads III and aVF. Non-specific T wave changes with normal sinus rhythm and 1st degree AV block. Similar to prior from 8/2/22. RADIOLOGY:  MRA NECK W WO CONTRAST   Final Result      1. Left internal carotid artery origin 50% diameter stenosis by NASCET criteria     2. Left vertebral artery V2 segment focal fenestration with antegrade flow signal and intravascular postcontrast enhancement involving both channels. 3. No right carotid artery right vertebral artery stenosis      MRI brain without contrast   Final Result      1. No intracranial hemorrhage, mass or acute infarction   2. Mild atrophy and chronic small vessel ischemia               XR CHEST PORTABLE   Final Result      No acute pulmonary disease. CTA HEAD NECK W CONTRAST   Final Result      1. No aneurysms, vascular occlusions, or intracranial stenoses identified. 2.  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    3.  50% atherosclerotic stenosis of left internal carotid artery origin. CT HEAD WO CONTRAST   Final Result      1. No acute intracranial hemorrhage. 2.  Chronic bilateral basal ganglia lacunar infarcts. Discussed with Dr. Allyn Gold:   Results for orders placed or performed during the hospital encounter of 01/17/23   CBC with Auto Differential   Result Value Ref Range    WBC 7.6 4.0 - 11.0 K/uL    RBC 4.50 4. 20 - 5.90 M/uL    Hemoglobin 12.1 (L) 13.5 - 17.5 g/dL    Hematocrit 36.8 (L) 40.5 - 52.5 %    MCV 81.8 80.0 - 100.0 fL    MCH 26.9 26.0 - 34.0 pg    MCHC 32.9 31.0 - 36.0 g/dL    RDW 15.7 (H) 12.4 - 15.4 %    Platelets 512 229 - 255 K/uL    MPV 7.7 5.0 - 10.5 fL    Neutrophils % 70.1 %    Lymphocytes % 21.2 %    Monocytes % 5.9 %    Eosinophils % 1.7 %    Basophils % 1.1 %    Neutrophils Absolute 5.3 1.7 - 7.7 K/uL    Lymphocytes Absolute 1.6 1.0 - 5.1 K/uL    Monocytes Absolute 0.4 0.0 - 1.3 K/uL    Eosinophils Absolute 0.1 0.0 - 0.6 K/uL    Basophils Absolute 0.1 0.0 - 0.2 K/uL   Comprehensive Metabolic Panel w/ Reflex to MG   Result Value Ref Range    Sodium 139 136 - 145 mmol/L    Potassium reflex Magnesium 3.8 3.5 - 5.1 mmol/L    Chloride 102 99 - 110 mmol/L    CO2 26 21 - 32 mmol/L    Anion Gap 11 3 - 16    Glucose 87 70 - 99 mg/dL    BUN 15 7 - 20 mg/dL    Creatinine 0.8 0.8 - 1.3 mg/dL    Est, Glom Filt Rate >60 >60    Calcium 9.8 8.3 - 10.6 mg/dL    Total Protein 7.3 6.4 - 8.2 g/dL    Albumin 4.2 3.4 - 5.0 g/dL    Albumin/Globulin Ratio 1.4 1.1 - 2.2    Total Bilirubin <0.2 0.0 - 1.0 mg/dL    Alkaline Phosphatase 96 40 - 129 U/L    ALT 12 10 - 40 U/L    AST 16 15 - 37 U/L   Troponin   Result Value Ref Range    Troponin <0.01 <0.01 ng/mL   Protime-INR   Result Value Ref Range    Protime 14.8 (H) 11.7 - 14.5 sec    INR 1.16 (H) 0.87 - 1.14   Hemoglobin A1c   Result Value Ref Range    Hemoglobin A1C 7.7 See comment %    eAG 174.3 mg/dL   Lipid Panel   Result Value Ref Range    Cholesterol, Total 116 0 - 199 mg/dL    Triglycerides 225 (H) 0 - 150 mg/dL    HDL 28 (L) 40 - 60 mg/dL    LDL Calculated 43 <100 mg/dL    VLDL Cholesterol Calculated 45 Not Established mg/dL   POCT Glucose   Result Value Ref Range    Glucose 84 mg/dL    QC OK? y    POCT Glucose   Result Value Ref Range    POC Glucose 84 70 - 99 mg/dl    Performed on ACCU-CHEK    EKG 12 Lead   Result Value Ref Range    Ventricular Rate 62 BPM    Atrial Rate 62 BPM    P-R Interval 246 ms    QRS Duration 88 ms    Q-T Interval 438 ms    QTc Calculation (Bazett) 444 ms    P Axis 56 degrees    R Axis -26 degrees    T Axis -13 degrees    Diagnosis       EKG performed in ER and to be interpreted by ER physician. Confirmed by MD, ER ([id number]),  Echo Chapa ([id number]) on 1/18/2023 6:00:56 AM       RECENT VITALS:  BP: (!) 142/80, Temp: 97.6 °F (36.4 °C), Heart Rate: 56, Resp: 17, SpO2: 96 %    NIH Stroke Scale  Interval: Reassessment  Level of Consciousness (1a): Alert  LOC Questions (1b): Answers both correctly  LOC Commands (1c): Performs both tasks correctly  Best Gaze (2): Normal  Visual (3): No visual loss  Facial Palsy (4): Normal symmetrical movement  Motor Arm, Left (5a): No drift  Motor Arm, Right (5b): No drift  Motor Leg, Left (6a): No drift  Motor Leg, Right (6b): No drift  Limb Ataxia (7): Absent  Sensory (8): Normal  Best Language (9): No aphasia  Dysarthria (10): Normal  Extinction and Inattention (11): No abnormality  Total: 0     Procedures     N/A    ED Course     Nursing Notes, Past Medical Hx, Past Surgical Hx, Social Hx, Allergies, and Family Hx were reviewed.     The patient was given the following medications:  Orders Placed This Encounter   Medications    iopamidol (ISOVUE-370) 76 % injection 80 mL    DISCONTD: amLODIPine (NORVASC) tablet 10 mg    DISCONTD: apixaban (ELIQUIS) tablet 5 mg     Order Specific Question:   Indication of Use     Answer:   A Fib/A Flutter    DISCONTD: atorvastatin (LIPITOR) tablet 40 mg    DISCONTD: cloNIDine (CATAPRES) tablet 0.2 mg    DISCONTD: enalapril (VASOTEC) tablet 10 mg    DISCONTD: fenofibrate (TRIGLIDE) tablet 160 mg    DISCONTD: finasteride (PROSCAR) tablet 5 mg    DISCONTD: gabapentin (NEURONTIN) capsule 300 mg    DISCONTD: LORazepam (ATIVAN) tablet 1 mg    DISCONTD: metoprolol tartrate (LOPRESSOR) tablet 50 mg    DISCONTD: PARoxetine (PAXIL) tablet 20 mg    DISCONTD: glucose chewable tablet 16 g    DISCONTD: dextrose bolus 10% 125 mL    DISCONTD: dextrose bolus 10% 250 mL    DISCONTD: glucagon injection 1 mg    DISCONTD: dextrose 10 % infusion    DISCONTD: ondansetron (ZOFRAN-ODT) disintegrating tablet 4 mg    DISCONTD: ondansetron (ZOFRAN) injection 4 mg    DISCONTD: polyethylene glycol (GLYCOLAX) packet 17 g    DISCONTD: aspirin EC tablet 81 mg    DISCONTD: aspirin suppository 300 mg    DISCONTD: losartan-hydroCHLOROthiazide (HYZAAR) 100-25 MG per tablet 1 tablet    DISCONTD: acetaminophen (TYLENOL) tablet 650 mg    DISCONTD: gabapentin (NEURONTIN) capsule 300 mg    gadoteridol (PROHANCE) injection 20 mL       CONSULTS:  IP CONSULT TO PHARMACY  PHARMACY TO CHANGE BASE FLUIDS  IP CONSULT TO HOSPITALIST  IP CONSULT TO NEUROSURGERY  IP CONSULT TO NEUROSURGERY  IP CONSULT TO SPIRITUAL SERVICES  IP CONSULT TO Adenike Singh / ARMANDO / Patel Rod is a 78 y.o. male presenting to the emergency department right-sided sensation deficits and slurred speech that started around 1750. On arrival patient was sent to the CT scanner for symptoms with possible concern for stroke as the cause. The patient had waxing and waning symptoms and therefore TNK was not immediately administered upon CT head. CT head, CTAs, and laboratory assessment were pending at the time of sign out. At this time I will be going off service. ADIS Salomon will be taking over for me at this time.   His responsibilities will include follow-up on the CT head, discussion with Dr. Matilde Paniagua and potentially stroke team on TNK administration, laboratory assessment, and dispo. Clinical Impression     1.  TIA (transient ischemic attack)        Disposition     PATIENT REFERRED TO:  Dr. Fracisco Newberry- neurosurgery  [facility]  [address], [address]  Follow up on 2/2/2023  appointment @ [address], [place]  [address] [facility] [place]. [address] [place] [address]  [phone]    Schedule an appointment as soon as possible for a visit in 1 month(s)  Follow up after hospital visit, transient ischemic attack    DISCHARGE MEDICATIONS:  Discharge Medication List as of 1/18/2023  4:03 PM          DISPOSITION Admitted 01/17/2023 11:30:08 PM         Merlyn Ballard MD  01/20/23 0107

## 2023-01-17 NOTE — TELEPHONE ENCOUNTER
Patient needs refill on the following medication:    magnesium oxide (MAG-OX) 400 MG tablet [[id number]]     Order Details  Dose: 400 mg Route: Oral Frequency: 3 TIMES DAILY   Dispense Quantity: 90 tablet Refills: 5          Sig: Take 1 tablet by mouth in the morning and 1 tablet at noon and 1 tablet before bedtime.      apixaban (ELIQUIS) 5 MG TABS tablet [[id number]]     Order Details  Dose: 5 mg Route: Oral Frequency: 2 TIMES DAILY   Dispense Quantity: 180 tablet Refills: 3          Sig: Take 1 tablet by mouth 2 times daily     [facility] [id number] - [address]   Phone:  [phone]  Fax:  [phone]    Lov 8/2/22 Nov 2/2/23

## 2023-01-17 NOTE — PROGRESS NOTES
Clinical Pharmacy Consult Note    78 y.o. male admitted with right sided hand numbness and slurred seoeach. Pharmacy has been asked by Dr. Jessika Amaya to adjust all drips to normal saline as appropriate based on compatibility to avoid fluid shifts since D5 is osmotically active. The following intermittent IV drips/infusions have been adjusted to saline:  None at this time    The following medications must remain in D5W due to incompatibility with normal saline:  Amphotericin  Mycophenolate  Nitroprusside  Penicillin G Potassium    Please be aware that patient has D5W ordered as part of hypoglycemia orderset. Total IV fluid delivered to patient over last 24h: TBD Tomorrow    [facility] will follow daily to ensure all new IVPBs + drips are in NS. Please call with questions.   Karyna Valentin, PharmD  Main Pharmacy: [id number]  1/17/2023 6:55 PM

## 2023-01-18 ENCOUNTER — APPOINTMENT (OUTPATIENT)
Dept: MRI IMAGING | Age: 78
DRG: 068 | End: 2023-01-18
Payer: MEDICARE

## 2023-01-18 VITALS
DIASTOLIC BLOOD PRESSURE: 80 MMHG | RESPIRATION RATE: 17 BRPM | HEIGHT: 70 IN | TEMPERATURE: 97.6 F | WEIGHT: 216.7 LBS | HEART RATE: 56 BPM | SYSTOLIC BLOOD PRESSURE: 142 MMHG | OXYGEN SATURATION: 96 % | BODY MASS INDEX: 31.02 KG/M2

## 2023-01-18 LAB
CHOLESTEROL, TOTAL: 116 MG/DL (ref 0–199)
EKG ATRIAL RATE: 62 BPM
EKG DIAGNOSIS: NORMAL
EKG P AXIS: 56 DEGREES
EKG P-R INTERVAL: 246 MS
EKG Q-T INTERVAL: 438 MS
EKG QRS DURATION: 88 MS
EKG QTC CALCULATION (BAZETT): 444 MS
EKG R AXIS: -26 DEGREES
EKG T AXIS: -13 DEGREES
EKG VENTRICULAR RATE: 62 BPM
ESTIMATED AVERAGE GLUCOSE: 174.3 MG/DL
HBA1C MFR BLD: 7.7 %
HDLC SERPL-MCNC: 28 MG/DL (ref 40–60)
LDL CHOLESTEROL CALCULATED: 43 MG/DL
TRIGL SERPL-MCNC: 225 MG/DL (ref 0–150)
VLDLC SERPL CALC-MCNC: 45 MG/DL

## 2023-01-18 PROCEDURE — 70551 MRI BRAIN STEM W/O DYE: CPT

## 2023-01-18 PROCEDURE — 70549 MR ANGIOGRAPH NECK W/O&W/DYE: CPT

## 2023-01-18 PROCEDURE — A9576 INJ PROHANCE MULTIPACK: HCPCS | Performed by: NURSE PRACTITIONER

## 2023-01-18 PROCEDURE — 6360000004 HC RX CONTRAST MEDICATION: Performed by: NURSE PRACTITIONER

## 2023-01-18 PROCEDURE — 92526 ORAL FUNCTION THERAPY: CPT

## 2023-01-18 PROCEDURE — 92610 EVALUATE SWALLOWING FUNCTION: CPT

## 2023-01-18 PROCEDURE — 83036 HEMOGLOBIN GLYCOSYLATED A1C: CPT

## 2023-01-18 PROCEDURE — 97161 PT EVAL LOW COMPLEX 20 MIN: CPT

## 2023-01-18 PROCEDURE — 80061 LIPID PANEL: CPT

## 2023-01-18 PROCEDURE — 6370000000 HC RX 637 (ALT 250 FOR IP): Performed by: INTERNAL MEDICINE

## 2023-01-18 PROCEDURE — 97116 GAIT TRAINING THERAPY: CPT

## 2023-01-18 PROCEDURE — 36415 COLL VENOUS BLD VENIPUNCTURE: CPT

## 2023-01-18 RX ORDER — FINASTERIDE 5 MG/1
5 TABLET, FILM COATED ORAL DAILY
Status: DISCONTINUED | OUTPATIENT
Start: 2023-01-18 | End: 2023-01-18 | Stop reason: HOSPADM

## 2023-01-18 RX ORDER — ACETAMINOPHEN 325 MG/1
650 TABLET ORAL EVERY 4 HOURS PRN
Status: DISCONTINUED | OUTPATIENT
Start: 2023-01-18 | End: 2023-01-18 | Stop reason: HOSPADM

## 2023-01-18 RX ORDER — LOSARTAN POTASSIUM AND HYDROCHLOROTHIAZIDE 25; 100 MG/1; MG/1
1 TABLET ORAL EVERY EVENING
Status: DISCONTINUED | OUTPATIENT
Start: 2023-01-18 | End: 2023-01-18 | Stop reason: HOSPADM

## 2023-01-18 RX ORDER — POLYETHYLENE GLYCOL 3350 17 G/17G
17 POWDER, FOR SOLUTION ORAL DAILY PRN
Status: DISCONTINUED | OUTPATIENT
Start: 2023-01-18 | End: 2023-01-18 | Stop reason: HOSPADM

## 2023-01-18 RX ORDER — GLUCAGON 1 MG/ML
1 KIT INJECTION PRN
Status: DISCONTINUED | OUTPATIENT
Start: 2023-01-18 | End: 2023-01-18 | Stop reason: HOSPADM

## 2023-01-18 RX ORDER — CLONIDINE HYDROCHLORIDE 0.1 MG/1
0.2 TABLET ORAL 2 TIMES DAILY
Status: DISCONTINUED | OUTPATIENT
Start: 2023-01-18 | End: 2023-01-18 | Stop reason: HOSPADM

## 2023-01-18 RX ORDER — ASPIRIN 81 MG/1
81 TABLET ORAL DAILY
Status: DISCONTINUED | OUTPATIENT
Start: 2023-01-18 | End: 2023-01-18 | Stop reason: HOSPADM

## 2023-01-18 RX ORDER — PAROXETINE HYDROCHLORIDE 20 MG/1
20 TABLET, FILM COATED ORAL EVERY EVENING
Status: DISCONTINUED | OUTPATIENT
Start: 2023-01-18 | End: 2023-01-18 | Stop reason: HOSPADM

## 2023-01-18 RX ORDER — ENALAPRIL MALEATE 10 MG/1
10 TABLET ORAL 2 TIMES DAILY
Status: DISCONTINUED | OUTPATIENT
Start: 2023-01-18 | End: 2023-01-18 | Stop reason: HOSPADM

## 2023-01-18 RX ORDER — METOPROLOL TARTRATE 50 MG/1
50 TABLET, FILM COATED ORAL 2 TIMES DAILY
Status: DISCONTINUED | OUTPATIENT
Start: 2023-01-18 | End: 2023-01-18 | Stop reason: HOSPADM

## 2023-01-18 RX ORDER — GABAPENTIN 300 MG/1
300 CAPSULE ORAL 2 TIMES DAILY
Status: DISCONTINUED | OUTPATIENT
Start: 2023-01-18 | End: 2023-01-18

## 2023-01-18 RX ORDER — ONDANSETRON 2 MG/ML
4 INJECTION INTRAMUSCULAR; INTRAVENOUS EVERY 6 HOURS PRN
Status: DISCONTINUED | OUTPATIENT
Start: 2023-01-18 | End: 2023-01-18 | Stop reason: HOSPADM

## 2023-01-18 RX ORDER — MAGNESIUM OXIDE 400 MG/1
400 TABLET ORAL 3 TIMES DAILY
Qty: 270 TABLET | Refills: 3 | Status: SHIPPED | OUTPATIENT
Start: 2023-01-18

## 2023-01-18 RX ORDER — ATORVASTATIN CALCIUM 40 MG/1
40 TABLET, FILM COATED ORAL NIGHTLY
Status: DISCONTINUED | OUTPATIENT
Start: 2023-01-18 | End: 2023-01-18 | Stop reason: HOSPADM

## 2023-01-18 RX ORDER — FENOFIBRATE 160 MG/1
160 TABLET ORAL DAILY
Status: DISCONTINUED | OUTPATIENT
Start: 2023-01-18 | End: 2023-01-18 | Stop reason: HOSPADM

## 2023-01-18 RX ORDER — DEXTROSE MONOHYDRATE 100 MG/ML
INJECTION, SOLUTION INTRAVENOUS CONTINUOUS PRN
Status: DISCONTINUED | OUTPATIENT
Start: 2023-01-18 | End: 2023-01-18 | Stop reason: HOSPADM

## 2023-01-18 RX ORDER — ONDANSETRON 4 MG/1
4 TABLET, ORALLY DISINTEGRATING ORAL EVERY 8 HOURS PRN
Status: DISCONTINUED | OUTPATIENT
Start: 2023-01-18 | End: 2023-01-18 | Stop reason: HOSPADM

## 2023-01-18 RX ORDER — AMLODIPINE BESYLATE 10 MG/1
10 TABLET ORAL EVERY MORNING
Status: DISCONTINUED | OUTPATIENT
Start: 2023-01-18 | End: 2023-01-18 | Stop reason: HOSPADM

## 2023-01-18 RX ORDER — ASPIRIN 300 MG/1
300 SUPPOSITORY RECTAL DAILY
Status: DISCONTINUED | OUTPATIENT
Start: 2023-01-18 | End: 2023-01-18 | Stop reason: HOSPADM

## 2023-01-18 RX ORDER — GABAPENTIN 300 MG/1
300 CAPSULE ORAL 2 TIMES DAILY
Status: DISCONTINUED | OUTPATIENT
Start: 2023-01-18 | End: 2023-01-18 | Stop reason: HOSPADM

## 2023-01-18 RX ORDER — LORAZEPAM 1 MG/1
1 TABLET ORAL 2 TIMES DAILY
Status: DISCONTINUED | OUTPATIENT
Start: 2023-01-18 | End: 2023-01-18 | Stop reason: HOSPADM

## 2023-01-18 RX ADMIN — GABAPENTIN 300 MG: 300 CAPSULE ORAL at 02:45

## 2023-01-18 RX ADMIN — GABAPENTIN 300 MG: 300 CAPSULE ORAL at 09:31

## 2023-01-18 RX ADMIN — AMLODIPINE BESYLATE 10 MG: 10 TABLET ORAL at 09:31

## 2023-01-18 RX ADMIN — FINASTERIDE 5 MG: 5 TABLET, FILM COATED ORAL at 09:31

## 2023-01-18 RX ADMIN — CLONIDINE HYDROCHLORIDE 0.2 MG: 0.1 TABLET ORAL at 09:31

## 2023-01-18 RX ADMIN — LORAZEPAM 1 MG: 1 TABLET ORAL at 09:31

## 2023-01-18 RX ADMIN — ASPIRIN 81 MG: 81 TABLET, COATED ORAL at 09:30

## 2023-01-18 RX ADMIN — LORAZEPAM 1 MG: 1 TABLET ORAL at 02:46

## 2023-01-18 RX ADMIN — GADOTERIDOL 20 ML: 279.3 INJECTION, SOLUTION INTRAVENOUS at 10:31

## 2023-01-18 RX ADMIN — ACETAMINOPHEN 650 MG: 325 TABLET ORAL at 02:46

## 2023-01-18 RX ADMIN — METOPROLOL TARTRATE 50 MG: 50 TABLET, FILM COATED ORAL at 02:46

## 2023-01-18 RX ADMIN — FENOFIBRATE 160 MG: 160 TABLET ORAL at 09:31

## 2023-01-18 RX ADMIN — METOPROLOL TARTRATE 50 MG: 50 TABLET, FILM COATED ORAL at 09:31

## 2023-01-18 RX ADMIN — CLONIDINE HYDROCHLORIDE 0.2 MG: 0.1 TABLET ORAL at 02:46

## 2023-01-18 NOTE — PROGRESS NOTES
Progress Note  Admit Date: 1/17/2023         CC: F/U for TIA    Subjective:  Pt reports resolution of rt arm weakness w/minimal residual tingling. No slurred speech. No rt le weakness. PHYSICAL EXAM:  BP (!) 156/73   Pulse 60   Temp 98.1 °F (36.7 °C) (Oral)   Resp 16   Ht 5' 10\" (1.778 m)   Wt 216 lb 11.2 oz (98.3 kg)   SpO2 97%   BMI 31.09 kg/m²   Recent Labs     01/17/23 1823   POCGLU 84       Intake/Output Summary (Last 24 hours) at 1/18/2023 1025  Last data filed at 1/17/2023 2303  Gross per 24 hour   Intake --   Output 850 ml   Net -850 ml     General appearance: alert, appears stated age, and cooperative  Head: Normocephalic, without obvious abnormality, atraumatic  Lungs: clear to auscultation bilaterally  Heart: regular rate and rhythm  Extremities: extremities normal, atraumatic, no cyanosis or edema  Neurologic: Mental status: Alert, oriented, thought content appropriate  Motor:grossly normal  5/5 B/l upper and lower extremities    LABS:  Recent Labs     01/17/23 1827   WBC 7.6   HGB 12.1*   HCT 36.8*                                                                     Recent Labs     01/17/23 1827 01/17/23  2356     --    K 3.8  --      --    CO2 26  --    BUN 15  --    CREATININE 0.8  --    GLUCOSE 87 84     Recent Labs     01/17/23 1827   INR 1.16*     MRI Result (most recent):  MRA NECK W WO CONTRAST 01/18/2023    Narrative  EXAM: MRA NECK W WO CONTRAST    INDICATION: Left vertebral artery fenestration vs dissection and left internal carotid artery stenosis; stroke symptoms    COMPARISON: CTA 1/17/2023    TECHNIQUE: Time-of-flight and postcontrast MR angiogram of the neck obtained. MRA source images and reconstructed MIP images were reviewed. ProHance 20 mL mL ProHance IV. FINDINGS:    AORTIC ARCH: Standard three-vessel aortic arch anatomy is present.     INNOMINATE ARTERY: Normal    RIGHT SUBCLAVIAN ARTERY: Normal.    RIGHT VERTEBRAL ARTERY: Normal.    RIGHT CAROTID ARTERY: No stenosis of the common, external or internal carotid artery    LEFT SUBCLAVIAN ARTERY: Normal    LEFT VERTEBRAL ARTERY: Left vertebral artery shows patent intravascular contrast enhancement and antegrade flow signal in the time-of-flight images. In the V2 segment, there is focal to channel of antegrade flow signal and enhancement at the site of the  left vertebral artery finding described on the CTA report. LEFT CAROTID ARTERY: There is 50% diameter stenosis of the left internal carotid artery origin by standard criteria. There is stenosis involving origin of the left external carotid artery. No stenosis of the common carotid artery. Impression  1. Left internal carotid artery origin 50% diameter stenosis by NASCET criteria  2. Left vertebral artery V2 segment focal fenestration with antegrade flow signal and intravascular postcontrast enhancement involving both channels. 3. No right carotid artery right vertebral artery stenosis     Assessment & Plan:        ICD-10-CM    1. TIA (transient ischemic attack)  G45.9       Resolved- MRI and MRA negatiev for acute finding ro flow limiting stenosis respectively. Neuro consulted. Neurosurg consulted- recommend cont eliquis. Wll cont eliquis/ statin/ bp control. No speech, PT/OT needs. HTN urgency:  Improving- cont PTA meds. The patient was informed of the results of tests, a time was given to answer questions, a plan was proposed and they agreed with plan. Disposition: Home today if ok w/ Neuro.     Full Code      Monserrat Roberto MD

## 2023-01-18 NOTE — CONSULTS
[address] [facility]  [id number]   1945   1/18/2023    Requesting physician: Elisa Geronimo MD    Reason for consultation: carotid stenosis     History of present illness: Patient is a 78 y.o. male w/ PMH of atrial fibrillation (on Eliquis), HTN, DM II, who presented on 1/17/2023 with complaints of right arm numbness. LKW [id number] yesterday. Patient was up doing laundry and had sudden onset of right arm numbness that then extended up to his right face and then had associated slurred speech. EMS was called. Symptoms lasted approx 35 minutes. He had one recurrent episode in the ED around 1930 which lasted 5 minutes and he has since been at his baseline. CTA head neck demonstrated 50% stenosis of L ICA and developmental fenestration vs focal dissection of the left cervical vertebral artery. He takes Eliquis BID and was given aspirin in the ED. Neurosurgery was consulted for recommendations. ROS:   GENERAL:  Denies fever or recent illness.  Denies weight changes   EYES:  Denies vision change or diplopia  EARS:  Denies hearing loss  CARDIAC:  Denies chest pain  RESPIRATORY:  Denies shortness of breath  SKIN:  Denies rash or lesions   HEM:  Denies excessive bruising  PSYCH:  Denies anxiety or depression  NEURO:  Denies headache, numbness or tingling or lateralizing weakness   :  Denies urinary difficulty  GI: Denies nausea, vomiting, diarrhea or constipation  MUSCULOSKELETAL:  + back pain (known T12 fracture)    Allergies   Allergen Reactions    Bee Venom Swelling    Cephalexin Swelling    Cephalosporins     Propoxyphene Other (See Comments)     Hallucinations  (Darvon/Darvocet)    Sulfa Antibiotics        Past Medical History:   Diagnosis Date    Allergic reaction to sulfonamide 1960    Atrial fibrillation ([facility] [address]     Bacterial infection due to Morganella morganii     Bee sting reaction 2000    Bulging of cervical intervertebral disc     c4 & c5    Cephalexin adverse reaction 1993 Fracture of left elbow 1956    Group B streptococcal infection     History of cardioversion     History of right inguinal hernia repair 1987    Hidalgo's neuroma of right foot 1989    Open fracture of left elbow 1991    Open fracture of left humerus 1991    Pseudomonas infection     Rupture of appendix 1970        Past Surgical History:   Procedure Laterality Date    FOOT SURGERY Right 2/21/2022    SECOND METATARSAL HEAD RESECTION, RIGHT FOOT; COMPLEX WOUND CLOSURE-RIGHT FOOT; BONE BIOPSY-RIGHT FOOT (35419, 77229, 46906)-LOCAL performed by Hernandez Poole DPM at [facility] Right 5/17/2021    THIRD DISTAL PHALANYX AMPUTATION VS THIRD GREAT TOE AMPUTATION RIGHT FOOT performed by Hernandez Poole DPM at [address] [place] [address]       Social History     Occupational History    Not on file   Tobacco Use    Smoking status: Never    Smokeless tobacco: Never   Substance and Sexual Activity    Alcohol use: Never    Drug use: Never    Sexual activity: Not on file        Family History   Problem Relation Age of Onset    Sleep Apnea Father         No outpatient medications have been marked as taking for the 1/17/23 encounter [facility] Encounter).       Current Facility-Administered Medications   Medication Dose Route Frequency Provider Last Rate Last Admin    amLODIPine (NORVASC) tablet 10 mg  10 mg Oral QAM Pierre Cerda MD   10 mg at 01/18/23 0931    [Held by provider] apixaban (ELIQUIS) tablet 5 mg  5 mg Oral BID Pierre Cerda MD        atorvastatin (LIPITOR) tablet 40 mg  40 mg Oral Nightly Chalana U Yovany Hastings MD        cloNIDine (CATAPRES) tablet 0.2 mg  0.2 mg Oral BID Pierre Cerda MD   0.2 mg at 01/18/23 0931    [Held by provider] enalapril (VASOTEC) tablet 10 mg  10 mg Oral BID Pierre Cerda MD        fenofibrate (TRIGLIDE) tablet 160 mg  160 mg Oral Daily Pierre Cerda MD   160 mg at 01/18/23 0931    finasteride (PROSCAR) tablet 5 mg  5 mg Oral Daily Franky Loomis MD   5 mg at 01/18/23 0931    LORazepam (ATIVAN) tablet 1 mg  1 mg Oral BID Franky Loomis MD   1 mg at 01/18/23 0931    metoprolol tartrate (LOPRESSOR) tablet 50 mg  50 mg Oral BID Franky Loomis MD   50 mg at 01/18/23 0931    PARoxetine (PAXIL) tablet 20 mg  20 mg Oral QPM Franky Loomis MD        glucose chewable tablet 16 g  4 tablet Oral PRN Franky Loomis MD        dextrose bolus 10% 125 mL  125 mL IntraVENous PRHERMELINDO Loomis MD        Or    dextrose bolus 10% 250 mL  250 mL IntraVENous PRHERMELINDO Loomis MD        glucagon injection 1 mg  1 mg SubCUTAneous PRN Franky Loomis MD        dextrose 10 % infusion   IntraVENous Continuous PRHERMELINDO Loomis MD        ondansetron (ZOFRAN-ODT) disintegrating tablet 4 mg  4 mg Oral Q8H PRN Franky Loomis MD        Or    ondansetron (ZOFRAN) injection 4 mg  4 mg IntraVENous Q6H PRN Franky Loomis MD        polyethylene glycol (GLYCOLAX) packet 17 g  17 g Oral Daily PRN Franky Loomis MD        aspirin EC tablet 81 mg  81 mg Oral Daily Franky Loomis MD   81 mg at 01/18/23 0930    Or    aspirin suppository 300 mg  300 mg Rectal Daily Franky Loomis MD        [Held by provider] losartan-hydroCHLOROthiazide (HYZAAR) 100-25 MG per tablet 1 tablet  1 tablet Oral QPM Franky Loomis MD        acetaminophen (TYLENOL) tablet 650 mg  650 mg Oral Q4H PRN Franky Loomis MD   650 mg at 01/18/23 0246    gabapentin (NEURONTIN) capsule 300 mg  300 mg Oral BID Franky Loomis MD   300 mg at 01/18/23 0931      Objective:  BP (!) 156/73   Pulse 60   Temp 98.1 °F (36.7 °C) (Oral)   Resp 16   Ht 5' 10\" (1.778 m)   Wt 216 lb 11.2 oz (98.3 kg)   SpO2 97%   BMI 31.09 kg/m²     Physical Exam:  Patient seen and examined   General: Well developed. Alert and cooperative in no acute distress.      HENT: atraumatic, neck supple  Eyes: Optic discs: Not tested  Pulmonary: unlabored respiratory effort  Cardiovascular:  Warm well perfused. No peripheral edema  Gastrointestinal: abdomen soft, NT, ND    Neurologic Exam:  Neurological:  Mental Status: Awake, alert, oriented x 4, speech clear and appropriate  Attention: Intact  Language: No aphasia or dysarthria noted  Sensation: Intact to all extremities to light touch  Coordination: Intact    Cranial Nerves:  Cranial Nerves:  II: Visual acuity not tested, denies new visual changes / diplopia  III, IV, VI: PERRL, 3 mm bilaterally, EOMI, no nystagmus noted  V: Facial sensation intact bilaterally to touch  VII: Face symmetric  VIII: Hearing intact bilaterally to spoken voice  IX: Palate movement equal bilaterally  XI: Shoulder shrug equal bilaterally  XII: Tongue midline    Musculoskeletal:   Gait: Not tested   Assist devices: None   Tone:   Motor strength:    Right  Left    Right  Left    Deltoid  5 5  Hip Flex  5 5   Biceps  5 5  Knee Extensors  5 5   Triceps  5 5  Knee Flexors  5 5   Wrist Ext  5 5  Ankle Dorsiflex. 5 5   Wrist Flex  5 5  Ankle Plantarflex. 5 5   Handgrip  5 5  Ext Jd Longus  5 5   Thumb Ext  5 5             Radiological Findings:  MRA NECK W WO CONTRAST   Final Result      1. Left internal carotid artery origin 50% diameter stenosis by NASCET criteria     2. Left vertebral artery V2 segment focal fenestration with antegrade flow signal and intravascular postcontrast enhancement involving both channels. 3. No right carotid artery right vertebral artery stenosis      MRI brain without contrast   Final Result      1. No intracranial hemorrhage, mass or acute infarction   2. Mild atrophy and chronic small vessel ischemia               XR CHEST PORTABLE   Final Result      No acute pulmonary disease. CTA HEAD NECK W CONTRAST   Final Result      1. No aneurysms, vascular occlusions, or intracranial stenoses identified.    2.  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 3.  50% atherosclerotic stenosis of left internal carotid artery origin. CT HEAD WO CONTRAST   Final Result      1. No acute intracranial hemorrhage. 2.  Chronic bilateral basal ganglia lacunar infarcts. Discussed with Dr. Theodora Maciel     01/17/23 1827 01/17/23  2356     --      --    CO2 26  --    BUN 15  --    CREATININE 0.8  --    GLUCOSE 87 84     Recent Labs     01/17/23 1827   WBC 7.6   RBC 4.50   INR 1.16*       Patient Active Problem List    Diagnosis Date Noted    Right sided numbness 01/17/2023    Osteomyelitis of right foot (HCC)     Elevated C-reactive protein (CRP)     Diabetic polyneuropathy associated with type 2 diabetes mellitus (HCC)     Elevated sed rate     Hyponatremia     Diabetic foot infection (HCC) 05/14/2021    SURAJ (obstructive sleep apnea) 05/12/2021    Persistent atrial fibrillation (HCC)     Chronic atrial fibrillation ([facility] [address] 01/11/2021    Major depression, chronic 03/29/2018    Type 2 diabetes mellitus with other specified complication ([facility] [address] 85/03/9371    Mixed hyperlipidemia 10/13/2016    Migraine 02/16/2010    Obesity 02/16/2010    Anxiety state 07/30/1997    Essential hypertension 07/30/1997    Reflux esophagitis 07/30/1997     Assessment:  77 yo male w/ PMH of HTN, DM II, Afib (on Eliquis) who presented with transient right arm and facial numbness with dysarthria      Plan:  No emergent neurosurgical intervention indicated, MRI brain wo contrast is without evidence of acute infarction. MRA neck redemonstrates 62% stenosis of LICA and left vertebral fenestration (no evidence of dissection)   Q 4 hour neuro checks  Continue patient's Eliquis   Goal -169  Patient has follow up with Dr. Susan Major on 2/2/23 @ 3PM for his carotid stenosis   Thank you for consult, will sign off at this time. Please call with questions. DISPO-up to primary team when medically stable for discharge     SUKUMAR Pacheco-CNP  Neurosurgery Nurse Practitioner  [phone]    Patient was discussed with Dr. Keyla Castro who agrees with above assessment and plan. Electronically signed by: SUKUMAR Dan - NP, 1/18/2023 9:40 AM    I spent 60 minutes in the care of this patient.   Over 50% of that time was in face-to-face counseling regarding disease process, diagnostic testing, preventative measures, and answering patient and family questions

## 2023-01-18 NOTE — PROGRESS NOTES
4 Eyes Admission Assessment     I agree as the admission nurse that 2 RN's have performed a thorough Head to Toe Skin Assessment on the patient. ALL assessment sites listed below have been assessed on admission. Areas assessed by both nurses: ye  [x]   Head, Face, and Ears   [x]   Shoulders, Back, and Chest  [x]   Arms, Elbows, and Hands   [x]   Coccyx, Sacrum, and Ischum  [x]   Legs, Feet, and Heels        Does the Patient have Skin Breakdown?   No         Alonzo Prevention initiated:  NA   Wound Care Orders initiated:  NA      [facility] nurse consulted for Pressure Injury (Stage 3,4, Unstageable, DTI, NWPT, and Complex wounds):  NA      Nurse 1 eSignature: Electronically signed by Jadon Lee RN on 1/18/23 at 2:29 AM EST    **SHARE this note so that the co-signing nurse is able to place an eSignature**    Nurse 2 eSignature: Electronically signed by Thomas Davey RN on 1/18/23 at 3:34 AM EST

## 2023-01-18 NOTE — PROGRESS NOTES
01/18/23 0930   Encounter Summary   Encounter Overview/Reason  Initial Encounter   Service Provided For: Patient   Referral/Consult From: Nurse   Support System   Lacie Kulkarni RN)   Last Encounter  01/18/23   Complexity of Encounter High   Begin Time 0915   End Time  0932   Total Time Calculated 17 min   Encounter    Type Initial Screen/Assessment   Assessment/Intervention/Outcome   Assessment Calm; Hopeful   Intervention Active listening;Discussed illness injury and its impact; Explored/Affirmed feelings, thoughts, concerns   Outcome Comfort;Engaged in conversation;Expressed feelings, needs, and concerns;Expressed Gratitude   PT was provided with an [place]-HCPOA packet to consider along with some basic instruction. He also seemed to be in good spirits and hopeful.   Staff Paola Monahan MA, [facility]

## 2023-01-18 NOTE — H&P
Hospital Medicine History & Physical      PCP: Ruth Ann Mathew MD    Date of Admission: 1/17/2023    Date of Service: Pt seen/examined on 1/17/2023 and Admitted to Inpatient with expected LOS greater than two midnights due to medical therapy. Chief Complaint: Numbness      History Of Present Illness:    78 y.o. male who presents with complaints of right arm numbness. Patient states initially numbness started in his right hand which gradually traveled up his right arm and into his right side of the face. Last known normal was 5:25 PM today. Patient also felt he was slurring his words and called 911. His symptoms lasted for 25 minutes. Currently patient seem to have minimal dysarthria, but he states that is his baseline. Patient states the right side of his face and the arm was feeling numb with decreased sensation and this was of sudden onset with tingling and pins-and-needles feeling in the right arm. Patient also felt some facial droop on the right side when he called EMS immediately. Patient thinks about 1025 minutes to half an hour he started that his symptoms were starting to improve. When he arrived in ED his NIHSS was 2 which improved to 0 within few minutes    Patient denies any focal motor deficits, any other focal sensory deficits, changes in vision, gait instability.   Stroke alert was called and patient was noted to candidate for TNK due to rapid improvement of his symptoms    Patient has history of chronic A. fib for which he is on Eliquis    Past Medical History:          Diagnosis Date    Allergic reaction to sulfonamide 1960    Atrial fibrillation ([address]     Bacterial infection due to Morganella morganii     Bee sting reaction 2000    Bulging of cervical intervertebral disc     c4 & c5    Cephalexin adverse reaction 1993    Fracture of left elbow 1956    Group B streptococcal infection     History of cardioversion     History of right inguinal hernia repair 1987    Gloria neuroma of right foot 1989    Open fracture of left elbow 1991    Open fracture of left humerus 1991    Pseudomonas infection     Rupture of appendix 1970       Past Surgical History:          Procedure Laterality Date    FOOT SURGERY Right 2/21/2022    SECOND METATARSAL HEAD RESECTION, RIGHT FOOT; COMPLEX WOUND CLOSURE-RIGHT FOOT; BONE BIOPSY-RIGHT FOOT (58832, 22487, 87870)-LOCAL performed by Talon Vergara DPM at [facility] Right 5/17/2021    THIRD DISTAL PHALANYX AMPUTATION VS THIRD GREAT TOE AMPUTATION RIGHT FOOT performed by Talon Vergara DPM at [address] [place] [address]       Medications Prior to Admission:      Prior to Admission medications    Medication Sig Start Date End Date Taking? Authorizing Provider   magnesium oxide (MAG-OX) 400 MG tablet Take 1 tablet by mouth in the morning and 1 tablet at noon and 1 tablet before bedtime. Patient not taking: Reported on 1/17/2023 8/3/22   SUKUMAR Antonio CNP   ciclopirox (LOPROX) 0.77 % cream Apply topically daily    Historical Provider, MD   promethazine (PHENERGAN) 25 MG tablet Take 25 mg by mouth every 8 hours Take one tablet by mouth every 8 hours for nausea and vomiting. Patient not taking: Reported on 1/17/2023    Historical Provider, MD   naloxone 4 MG/0.1ML LIQD nasal spray 1 spray by Nasal route as needed for Opioid Reversal For suspected opioid overdose, administer 1 spray into one nostril, then repeat in other nostril using a new device after 2-3 minutes, or if no or minimal response. Call 911 if used. Patient not taking: Reported on 1/17/2023    Historical Provider, MD   apixaban (ELIQUIS) 5 MG TABS tablet Take 1 tablet by mouth 2 times daily 11/30/21   SUKUMAR Antonio CNP   finasteride (PROSCAR) 5 MG tablet Take 5 mg by mouth daily 8/19/21   Historical Provider, MD   gabapentin (NEURONTIN) 300 MG capsule Take 300 mg by mouth 2 times daily.     Historical Provider, MD   amLODIPine (NORVASC) 10 MG tablet Take 1 tablet by mouth every morning 11/4/20   Historical Provider, MD   atorvastatin (LIPITOR) 40 MG tablet Take 40 mg by mouth nightly 11/4/20   Historical Provider, MD   cloNIDine (CATAPRES) 0.2 MG tablet Take 0.2 mg by mouth 2 times daily 11/4/20   Historical Provider, MD   enalapril (VASOTEC) 10 MG tablet Take 10 mg by mouth 2 times daily 11/4/20   Historical Provider, MD   fenofibrate (TRIGLIDE) 160 MG tablet Take 160 mg by mouth every evening Take one tablet by mouth daily at 6 PM. 11/4/20   Historical Provider, MD   glimepiride (AMARYL) 2 MG tablet Take 2 mg by mouth 2 times daily 11/4/20   Historical Provider, MD   losartan-hydroCHLOROthiazide (HYZAAR) 100-25 MG per tablet Take 1 tablet by mouth every evening 11/4/20   Historical Provider, MD   LORazepam (ATIVAN) 1 MG tablet Take 1 mg by mouth 2 times daily. 11/4/20   Historical Provider, MD   metFORMIN (GLUCOPHAGE) 1000 MG tablet Take 1,000 mg by mouth 2 times daily 11/4/20   Historical Provider, MD   metoprolol tartrate (LOPRESSOR) 50 MG tablet Take 50 mg by mouth 2 times daily 11/4/20   Historical Provider, MD   sAXagliptin (ONGLYZA) 5 MG TABS tablet Take 5 mg by mouth every evening 11/4/20   Historical Provider, MD   PARoxetine (PAXIL) 20 MG tablet Take 20 mg by mouth every evening 11/4/20   Historical Provider, MD       Allergies:  Bee venom, Cephalexin, Cephalosporins, Propoxyphene, and Sulfa antibiotics    Social History:      The patient currently lives at home    TOBACCO:   reports that he has never smoked. He has never used smokeless tobacco.  ETOH:   reports no history of alcohol use. E-cigarette/Vaping       Questions Responses    E-cigarette/Vaping Use     Start Date     Passive Exposure     Quit Date     Counseling Given     Comments               Family History:    Reviewed and negative in regards to presenting illness/complaint.         Problem Relation Age of Onset    Sleep Apnea Father        REVIEW OF SYSTEMS COMPLETED:   Pertinent positives as noted in the HPI. All other systems reviewed and negative. PHYSICAL EXAM PERFORMED:    BP (!) 169/90   Pulse 66   Temp 98 °F (36.7 °C) (Oral)   Resp 18   Ht 5' 10\" (1.778 m)   Wt 216 lb 11.2 oz (98.3 kg)   SpO2 97%   BMI 31.09 kg/m²     General appearance:  No apparent distress, appears stated age and cooperative. HEENT:  Normal cephalic, atraumatic without obvious deformity. Pupils equal, round, and reactive to light. Extra ocular muscles intact. Conjunctivae/corneas clear. Neck: Supple, with full range of motion. No jugular venous distention. Trachea midline. Respiratory:  Normal respiratory effort. Clear to auscultation, bilaterally without Rales/Wheezes/Rhonchi. Cardiovascular:  Regular rate and rhythm with normal S1/S2 without murmurs, rubs or gallops. Abdomen: Soft, non-tender, non-distended with normal bowel sounds. Musculoskeletal:  No clubbing, cyanosis or edema bilaterally. Full range of motion without deformity. Skin: Skin color, texture, turgor normal.  No rashes or lesions. Neurologic:  Cranial nerves: II-XII intact, slightly slurred speech (according to the patient this is his normal), diminished sensation in the right arm and the face, no facial droop, no pronator drift in upper or lower extremities, motor in all 4 extremities 5/5, no visual field defects  Psychiatric:  Alert and oriented, thought content appropriate, normal insight  Capillary Refill: Brisk,3 seconds, normal  Peripheral Pulses: +2 palpable, equal bilaterally       Labs:    All labs from this admission reviewed by me  Recent Labs     01/17/23 1827   WBC 7.6   HGB 12.1*   HCT 36.8*          Recent Labs     01/17/23 1827      K 3.8      CO2 26   BUN 15   CREATININE 0.8   CALCIUM 9.8       Recent Labs     01/17/23 1827   AST 16   ALT 12   BILITOT <0.2   ALKPHOS 96       Recent Labs     01/17/23 1827   INR 1.16*       Recent Labs     01/17/23 1827   TROPONINI <0.01         Urinalysis: Lab Results   Component Value Date/Time    NITRU Negative 01/09/2023 01:12 AM    WBCUA >100 01/09/2023 01:12 AM    BACTERIA 4+ 01/09/2023 01:12 AM    RBCUA see below 01/09/2023 01:12 AM    BLOODU MODERATE 01/09/2023 01:12 AM    SPECGRAV >=1.030 01/09/2023 01:12 AM    GLUCOSEU 250 01/09/2023 01:12 AM       Radiology:     CXR: I have reviewed the CXR with the following interpretation: No acute airspace disease  EKG:  I have reviewed the EKG with the following interpretation: Sinus rhythm, normal rate and intervals, no acute ST-T changes    XR CHEST PORTABLE   Final Result      No acute pulmonary disease. CTA HEAD NECK W CONTRAST   Final Result      1. No aneurysms, vascular occlusions, or intracranial stenoses identified. 2.  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 3.  50% atherosclerotic stenosis of left internal carotid artery origin. CT HEAD WO CONTRAST   Final Result      1. No acute intracranial hemorrhage. 2.  Chronic bilateral basal ganglia lacunar infarcts. Discussed with Dr. Neftali Huerta.          Consults:    Marysol Mays HOSPITALIST  IP CONSULT TO NEUROSURGERY  IP CONSULT TO NEUROLOGY    ASSESSMENT:    Active Hospital Problems    Diagnosis Date Noted    Right sided numbness [R20.0] 01/17/2023     Priority: Medium   #TIA versus CVA.   Abnormal versus normal variant of CTA head and neck  #Paroxysmal A. fib  #Chronic anticoagulation with Eliquis  #DM-2  #Essential hypertension  #Chronic medical conditions as mentioned in PMH    PLAN:  -N.p.o.  -Bedside swallow evaluation and start on oral medications and diet as ordered  -Neurochecks every 4 hourly  -Neurosurgery consult regarding CTA head and neck  -Neurology consult  -Continue aspirin and statins  -Check lipid panel and A1c  -Continue on current antihypertensives once p.o. intake is initiated  -Will start on Eliquis after consulting neurosurgery and neurology  -Continue on current SSI with hypoglycemia protocol  -MRI brain without contrast  -PT/OT/SLP  -Supportive therapy    DVT Prophylaxis: Eliquis, will restart after neurosurgery evaluation  Diet: Diet NPO  Code Status: Full Code    PT/OT Eval Status: As tolerated with fall precautions    Dispo -GMF with telemetry       Johnathon rGuber MD    Thank you Luis A Muro MD for the opportunity to be involved in this patient's care. If you have any questions or concerns please feel free to contact me at [phone].

## 2023-01-18 NOTE — PLAN OF CARE
Problem: ABCDS Injury Assessment  Goal: Absence of physical injury  Outcome: Progressing     Problem: Neurosensory - Adult  Goal: Achieves stable or improved neurological status  1/18/2023 0737 by Bethany Crenshaw RN  Outcome: Progressing     Problem: Discharge Planning  Goal: Discharge to home or other facility with appropriate resources  1/18/2023 0737 by Bethany Crenshaw RN  Outcome: Progressing     Problem: Pain  Goal: Verbalizes/displays adequate comfort level or baseline comfort level  1/18/2023 0737 by Bethany Crenshaw RN  Outcome: Progressing     Problem: Safety - Adult  Goal: Free from fall injury  1/18/2023 0737 by Bethany Crenshaw RN  Outcome: Progressing

## 2023-01-18 NOTE — PROGRESS NOTES
Speech Language Pathology  Facility/Department: [facility] 5T ORTHO/NEURO   CLINICAL BEDSIDE SWALLOW EVALUATION//treat/speech screen/DC    NAME: Mukul Oseguera  : 1945  MRN: [id number]    ADMISSION DATE: 2023  ADMITTING DIAGNOSIS: has Anxiety state; Type 2 diabetes mellitus with other specified complication ([address] Essential hypertension; Major depression, chronic; Migraine; Mixed hyperlipidemia; Chronic atrial fibrillation ([address] Reflux esophagitis; Obesity; Persistent atrial fibrillation ([address] SURAJ (obstructive sleep apnea); Diabetic foot infection ([address] Osteomyelitis of right foot ([address] Elevated C-reactive protein (CRP); Diabetic polyneuropathy associated with type 2 diabetes mellitus ([address] Elevated sed rate; Hyponatremia; and Right sided numbness on their problem list.  ONSET DATE: 23    CT of Chest: (23)    Impression       No acute pulmonary disease. Date of Eval: 2023  Evaluating Therapist: Alen Palacios    Current Diet level:  Current Diet : Regular      Primary Complaint  Patient Complaint: Pt reports speech was slurred last night, but as resolved since    Pain:  Pain Assessment  Pain Assessment: 0-10    Reason for Referral  Mukul Oseguera was referred for a bedside swallow evaluation to assess the efficiency of his swallow function, identify signs and symptoms of aspiration and make recommendations regarding safe dietary consistencies, effective compensatory strategies, and safe eating environment. Impression  Dysphagia: Pt alert, oriented, follows commands and answered questions appropriately. Oral structures grossly intact, no asymmetry noted. Pt with natural longterm. Pt produced volitional cough and swallow. Pt was analyzed while eating breakfast (eggs, sausage, toast and liquids). Pt demonstrated no overt s/s of aspiration. No coughing/throat clearing or change in vocal quality noted. Adequate labial seal noted with no anterior loss of liquids.  Pt exhibited no difficulty with mastication of eggs, sausage or toast, no oral residue was noted. No c/o globus sensation. Speech screen: Pt speech was fluent and intelligible. No dysarthria or word finding noted. Through informal measures, pt's cognition appeared to be intact. Pt explained job responsibilities, reason for admission and information that has been provided by staff    Dysphagia Diagnosis: Swallow function appears WFL  Dysphagia Outcome Severity Scale: Level 7: Normal in all situations     Treatment Plan  Requires SLP Intervention: no     Recommended Diet and Intervention  Diet recommendation-Regular with thin  Recommended Form of Meds: Whole with water   Recommendations: No follow-up  Therapeutic Interventions: Patient/Family education    Compensatory Swallowing Strategies  90 degrees during all meals      Treatment/Goals  Pt to be seen to address the following goals:  1-the patient Ysabel Knott will verbalize/demonstrate understanding of dysphagia recommendations  1/18; Educated pt to purpose of visit, s/s of aspiration, concern if aspiration occurs, rationale for diet recommendation/strategies to reduce risk for aspiration and instruction to notify staff if any signs emerge. Pt stated comprehension  Goal met    General  Chart Reviewed: Yes  Comments: Pt awake and alert during session  Behavior/Cognition: Alert; Cooperative;Pleasant mood  Follows Directions: Simple  Dentition: Adequate  Patient Positioning: Upright in bed  Baseline Vocal Quality: Normal  Volitional Cough: Strong  Prior Dysphagia History: no formal evaluations located    Vision/Hearing  Pt wears glasses at all times   Hearing appeared functional with tasks presented     Oral Motor Deficits  WFL  No asymmetry observed     Prognosis  Individuals consulted  Consulted and agree with results and recommendations: Patient;RN  RN Name: Pelon Mojica    Education  Education Provided: Pt educated to purpose of evaluation.    Response to Education: Pt verbalized understanding   Safety Devices in place: Yes  Type of devices: Call light within reach; Bed alarm in place       Therapy Time   08:10 to 08:38     Plan:  Recommended diet: Continue regular diet   Pt's discharge plan: n/a   Discharge Goal: Pt goal to be aware of prevented measures to avoid health concerns   Discharge Plan: To be determined closer to discharge  Discussed with RNAshish  Needs within reach. Pg # [phone]    Steven Thomas  1/18/2023 10:22 AM    Speech Therapist was present, directed the patient's care, made skilled judgement, and was responsible for assessment and treatment of the patient. Erin Pitts M.S./[facility]-SLP #[id number]  Pg.  # [phone]

## 2023-01-18 NOTE — PLAN OF CARE
Problem: Neurosensory - Adult  Goal: Achieves stable or improved neurological status  Outcome: Progressing     Problem: Discharge Planning  Goal: Discharge to home or other facility with appropriate resources  Outcome: Progressing     Problem: Pain  Goal: Verbalizes/displays adequate comfort level or baseline comfort level  Outcome: Progressing     Problem: Safety - Adult  Goal: Free from fall injury  Outcome: Progressing

## 2023-01-18 NOTE — PROGRESS NOTES
Pt is A&O, VSS with exception to elevated blood pressure, medicated per MAR. Skin CDI. Tolerating diet and fluids, voiding adequately. No any acute neuro changes. All fall precaution is in place. Call light is within the reach.

## 2023-01-18 NOTE — CARE COORDINATION
CM spoke with patient at bedside. He is from home with family, independent pta. No CM needs at this time. Family to transport home.     Tomi Brock, RN, BSN,   4th Floor Progressive Care Unit  [phone]

## 2023-01-18 NOTE — DISCHARGE INSTRUCTIONS
Secondary Stroke Prevention Goals:      Blood Pressure:  Goal - BP < 140/90  Take your medication as prescribed. Take your blood pressure at home regularly (at least once a day for the first few weeks). Write the numbers down so your primary care provider can adjust medications as needed. Cholesterol:   Continue to take your cholesterol medication to help prevent a stroke. Diabetes Mellitus:   Goal - HbA1c < 7%  Visit this web page for more information on managing diabetes:   [employer]     Alcohol Consumption:  Men - two or fewer drinks per day     Physical Activity:  Goal - at least 30 minutes of moderate intensity physical exercise 1-3 times per week  Okay to start at any level and work your way up to goal. Walking even 5-10 minutes a day is better than no walking. Starting with small goals and working your way up is the best way to be successful. Visit this web page for more information on moving more and living healthy:   Bjorn     Diet:  Low-fat, low-sodium, and Mediterranean or Dietary Approaches to Stop Hypertension (DASH) or Diabetic Diet when applicable.    Visit this web page for more information on ways to improve your diet:   http://www.Swipely.com/     Obesity:  Goal BMI 18.5-25 kg/m2

## 2023-01-18 NOTE — PROGRESS NOTES
Physical Therapy  Facility/Department: [place] [address]  Physical Therapy Initial Assessment/Treatment/Discharge    Name: Fitz Rueda  : 1945  MRN: [id number]  Date of Service: 2023    Discharge Recommendations:    Fitz Rueda scored a 24/24 on the AM-PAC short mobility form. At this time, no further PT is recommended upon discharge. Recommend patient returns to prior setting with prior services. PT Equipment Recommendations  Equipment Needed: No      Patient Diagnosis(es): The encounter diagnosis was TIA (transient ischemic attack). 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 Past Surgical History:  has a past surgical history that includes Tonsillectomy (); Toe amputation (Right, 2021); and Foot surgery (Right, 2022). Assessment   Assessment: Pt at his baseline for functional mobility. Performed bed mobility, transfers, ambulation & stairs independently. Safe to go home upon D/C. No further inpt PT indicated. D/C PT.   Decision Making: Low Complexity  Requires PT Follow-Up: No  Activity Tolerance  Activity Tolerance: Patient tolerated evaluation without incident     Plan   Physcial Therapy Plan  General Plan: Discharge with evaluation only  Safety Devices  Type of Devices: Call light within reach, Chair alarm in place, Left in chair, Nurse notified     Restrictions  Position Activity Restriction  Other position/activity restrictions: TLSO when OOB (per pt, old injury), up as tolerated     Subjective   Pain: pt c/o mild back pain (chronic)  General  Chart Reviewed: Yes  Patient assessed for rehabilitation services?: Yes  Additional Pertinent Hx: pt admitted with right UE numbness & slurred speech. CT (-) for acute CVA, chroninc bilat lacunar infarcts; MRI pending  Family / Caregiver Present: No  Referring Practitioner: Osmar  Referral Date : 01/18/23  Diagnosis: TIA  Follows Commands: Within Functional Limits  Subjective  Subjective: pt supine in bed & agreeable to PT.         Social/Functional History  Social/Functional History  Lives With: Daughter  Type of Home: House  Home Layout: Two level  Home Access: Stairs to enter with rails  Entrance Stairs - Number of Steps: 13  Bathroom Shower/Tub: Tub/Shower unit (sponge bathing since T12 fx in Dec.)  Bathroom Toilet: Standard (next to sink)  Home Equipment: Walker, rolling  Has the patient had two or more falls in the past year or any fall with injury in the past year?: No (one fall off roof 12/12, T12 fx,)  ADL Assistance: Independent  Homemaking Assistance:  (pt & daughter share)  Ambulation Assistance: Independent  Transfer Assistance: Independent  Active : Yes  Occupation: Retired  Vision/Hearing  Vision  Vision: Within Functional Limits (with glasses)  Hearing  Hearing: Within functional limits    Cognition   Orientation  Overall Orientation Status: Within Normal Limits  Cognition  Overall Cognitive Status: WNL     Objective   Heart Rate: 60  Heart Rate Source: Monitor  BP: (!) 156/73  BP Location: Right upper arm  BP Method: Automatic  Patient Position: Supine  MAP (Calculated): 101  Resp: 16  SpO2: 97 %  O2 Device: None (Room air)              AROM RLE (degrees)  RLE AROM: WNL  AROM LLE (degrees)  LLE AROM : WNL  Strength RLE  Strength RLE: WFL  Strength LLE  Strength LLE: WFL        Bed Mobility Training  Bed Mobility Training: Yes  Supine to Sit: Independent (logroll, HOB flat)  Scooting: Independent  Balance  Sitting: Intact  Standing:  Intact  Transfer Training  Transfer Training: Yes  Sit to Stand: Independent (from bed, toilet)  Stand to Sit: Independent  Toilet Transfer: Independent  Gait Training  Gait Training: Yes  Gait  Overall Level of Assistance: Independent (pt amb 400 ft without device IND, no LOB.  pt felt he was at his baseline.)  Rail Use: Left  Stairs - Level of Assistance: Independent  Number of Stairs Trained: 10                                                                      AM-PAC Score  AM-PAC Inpatient Mobility Raw Score : 24 (01/18/23 1020)  AM-PAC Inpatient T-Scale Score : 61.14 (01/18/23 1020)  Mobility Inpatient CMS 0-100% Score: 0 (01/18/23 1020)  Mobility Inpatient CMS G-Code Modifier : [address] (01/18/23 1020)                 Education  Patient Education  Education Given To: Patient  Education Provided: Role of Therapy  Education Method: Verbal  Barriers to Learning: None  Education Outcome: Verbalized understanding      Therapy Time   Individual Concurrent Group Co-treatment   Time In 0848         Time Out 0919         Minutes [address] [facility], PT

## 2023-01-18 NOTE — PROGRESS NOTES
Pt admitted to room 5524 from ED for further workup s/p TIA s/s. Upon admission, pt alert/oriented x4, NIHSS 0, denies abnormal numbness/tingling at this time of which prompted hospitalization. VSS on RA with exception of elevated BP, of which Dr Rafael Perez notified. Plan to administer sched Clonidine once verified. Pt endorsing pain to lower back d/t recent T12 fx, requesting acetaminophen and gabapentin for pain management, MD notified- meds ordered. TLSO brace in place per home routine, call light within reach, bed locked in low position, fall and safety precautions maintained.

## 2023-01-18 NOTE — PROGRESS NOTES
Pt is discharged from the unit along with all of his belongings. Discharged instruction explained to the patient and verbalized understanding.

## 2023-01-18 NOTE — PROGRESS NOTES
Clinical Pharmacy Progress Note    78 y.o. male admitted with TIA vs CVA. Pharmacy has been asked by Dr. Steph Strange to adjust all drips to normal saline as appropriate based on compatibility to avoid fluid shifts since D5 is osmotically active. The following intermittent IV drips/infusions have been adjusted to saline:  None at this time    The following medications must remain in D5W due to incompatibility with normal saline:  None at this time    Please be aware that patient has D5W ordered as part of hypoglycemia orderset. Total IV fluid delivered to patient over last 24h: none - just IV flushes    [facility] will follow daily to ensure all new IVPBs + drips are in NS.     Please call with questions--  Sara Merino PharmD, BCPS  Wireless: [id number]   1/18/2023 8:52 AM

## 2023-01-18 NOTE — ED PROVIDER NOTES
ED Attending Attestation Note     Date of evaluation: 1/17/2023    This patient was seen by the resident. I have seen and examined the patient, agree with the workup, evaluation, management and diagnosis. The care plan has been discussed. My assessment reveals 81-year-old male presenting with transient dysarthria and right arm sensory loss. On my evaluation the patient is awake, alert, oriented x4, has some mild dysarthria for me, but he says that this is baseline and says that he is back to normal with no residual deficits whatsoever. CT showed no acute intracranial findings, CTA showed no large vessel occlusion with a possible fenestration of the left cervical vertebral artery versus less likely dissection. We will discuss this with neurosurgery. Patient will be admitted for further management of TIA. Critical Care:  Due to the immediate potential for life-threatening deterioration due to TIA, I spent 35 minutes providing critical care. This time excludes time spent performing procedures but includes time spent on direct patient care, history retrieval, review of the chart, and discussions with patient, family, and consultant(s).        Melbourne Sacks, MD  01/17/23 [id number]

## 2023-01-18 NOTE — ED NOTES
Report called to RN on 5T. Pt to be transported with all belongings at bedside via stretcher.       Rima Rendon RN  01/18/23 [id number]

## 2023-01-18 NOTE — PROGRESS NOTES
Occupational Therapy  OT order received, per physical therapist, pt is indep with ADLs and mobility. No need for acute OT, will sign off.   Nalini Black, OTR/L [id number]

## 2023-01-18 NOTE — CONSULTS
Neurology / Leticia Lyn Consult Note    Joshua Linda MD is requesting this consult. Reason for Consult: Decreased sensation on the right side of face and rt arm with dysarthria   Admission Chief Complaint: Numbness     History of Present Illness     Tiarra Tran is a 78 y.o. y/o male with PMH of hypertension, type 2 diabetes mellitus, A. fib on Eliquis who presented to the [facility], [facility]. with complaints of right arm numbness. Last known normal was 1725 yesterday. Patient states that he suddenly started having numbness in his right hand which gradually traveled up to his right arm and right side of his face along with some slurring of speech and hence 911 was called. The symptoms lasted for approximately 25 minutes after which it started getting better. He also states of having some associated tingling with pins-and-needles sensation in his right arm along with facial droop on the right side of his face. On arrival to the ED patient had an NIHSS of 2 which improved to 0 within few minutes as reported. Patient denies of having any changes in vision, gait, balance issues, any other focal motor or sensory deficits. In the ED stroke alert was called and patient was not considered a candidate for TNK. Patient was seen at bedside this morning, states of feeling well with no acute overnight events. Vitals are stable and is afebrile. Patient denies of having any further new onset of symptoms with no acute complaints. REVIEW OF SYSTEMS:   Constitutional- No weight loss or fevers   Eyes- No diplopia. No photophobia. Ears/nose/throat- No dysphagia. No Dysarthria   Cardiovascular- No palpitations. No chest pain   Respiratory- No dyspnea. No Cough   Gastrointestinal- No Abdominal pain. No Vomiting. Genitourinary- No incontinence. No urinary retention   Musculoskeletal- No myalgia. No arthralgia   Skin- No rash. No easy bruising. Psychiatric- No depression.  No anxiety   Endocrine- No diabetes. No thyroid issues. Hematologic- No bleeding difficulty.  No fatigue   Neurologic- Numbess of rt arm and face with slurring of speech     Past Medical, Surgical, Family, and Social History   PAST MEDICAL HISTORY:  Past Medical History:   Diagnosis Date    Allergic reaction to sulfonamide 1960    Atrial fibrillation ([address]     Bacterial infection due to Morganella morganii     Bee sting reaction 2000    Bulging of cervical intervertebral disc     c4 & c5    Cephalexin adverse reaction 1993    Fracture of left elbow 1956    Group B streptococcal infection     History of cardioversion     History of right inguinal hernia repair 1987    Hidalgo's neuroma of right foot 1989    Open fracture of left elbow 1991    Open fracture of left humerus 1991    Pseudomonas infection     Rupture of appendix 1970     SURGICAL HISTORY:  Past Surgical History:   Procedure Laterality Date    FOOT SURGERY Right 2/21/2022    SECOND METATARSAL HEAD RESECTION, RIGHT FOOT; COMPLEX WOUND CLOSURE-RIGHT FOOT; BONE BIOPSY-RIGHT FOOT (68055, 12964, 20240)-LOCAL performed by Estrella Newman DPM at [facility] 5/17/2021    THIRD DISTAL PHALANYX AMPUTATION VS THIRD GREAT TOE AMPUTATION RIGHT FOOT performed by Estrella Newman DPM at [address] [place] [address] & SOCIAL HISTORY:  Family history non-contributory  Family History   Problem Relation Age of Onset    Sleep Apnea Father      Social History     Tobacco Use    Smoking status: Never    Smokeless tobacco: Never   Substance Use Topics    Alcohol use: Never    Drug use: Never         Allergies & Outpatient Medications   ALLERGIES:  Allergies   Allergen Reactions    Bee Venom Swelling    Cephalexin Swelling    Cephalosporins     Propoxyphene Other (See Comments)     Hallucinations  (Darvon/Darvocet)    Sulfa Antibiotics      HOME MEDICATIONS:  Current Discharge Medication List        CONTINUE these medications which have NOT CHANGED Details   apixaban (ELIQUIS) 5 MG TABS tablet Take 1 tablet by mouth 2 times daily  Qty: 180 tablet, Refills: 3      magnesium oxide (MAG-OX) 400 MG tablet Take 1 tablet by mouth 3 times daily  Qty: 270 tablet, Refills: 3      ciclopirox (LOPROX) 0.77 % cream Apply topically daily      finasteride (PROSCAR) 5 MG tablet Take 5 mg by mouth daily      gabapentin (NEURONTIN) 300 MG capsule Take 300 mg by mouth 2 times daily. amLODIPine (NORVASC) 10 MG tablet Take 1 tablet by mouth every morning      atorvastatin (LIPITOR) 40 MG tablet Take 40 mg by mouth nightly      cloNIDine (CATAPRES) 0.2 MG tablet Take 0.2 mg by mouth 2 times daily      enalapril (VASOTEC) 10 MG tablet Take 10 mg by mouth 2 times daily      fenofibrate (TRIGLIDE) 160 MG tablet Take 160 mg by mouth every evening Take one tablet by mouth daily at 6 PM.      glimepiride (AMARYL) 2 MG tablet Take 2 mg by mouth 2 times daily      losartan-hydroCHLOROthiazide (HYZAAR) 100-25 MG per tablet Take 1 tablet by mouth every evening      LORazepam (ATIVAN) 1 MG tablet Take 1 mg by mouth 2 times daily.       metFORMIN (GLUCOPHAGE) 1000 MG tablet Take 1,000 mg by mouth 2 times daily      metoprolol tartrate (LOPRESSOR) 50 MG tablet Take 50 mg by mouth 2 times daily      sAXagliptin (ONGLYZA) 5 MG TABS tablet Take 5 mg by mouth every evening      PARoxetine (PAXIL) 20 MG tablet Take 20 mg by mouth every evening           STOP taking these medications       promethazine (PHENERGAN) 25 MG tablet Comments:   Reason for Stopping:         naloxone 4 MG/0.1ML LIQD nasal spray Comments:   Reason for Stopping:         blood glucose test strips (TRUETEST TEST) strip Comments:   Reason for Stopping:         Lancets MISC Comments:   Reason for Stopping:                 Physical Exam   PHYSICAL EXAM:  Vitals:    01/18/23 0650 01/18/23 0931 01/18/23 1129 01/18/23 1453   BP: (!) 183/84 (!) 156/73  (!) 142/80   Pulse: 64 60 60 56   Resp: 16  16 17   Temp: 98.1 °F (36.7 °C) 97.7 °F (36.5 °C) 97.6 °F (36.4 °C)   TempSrc: Oral  Oral Oral   SpO2: 97%  96% 96%   Weight:       Height:             General: Alert, no distress, well-nourished  Neurologic  Mental status: Alert  orientation to person, place, time, situation   Attention intact as able to attend well to the exam     Language fluent in conversation   Comprehension intact; follows simple commands    Cranial nerves:   CN2: Visual fields full w/o extinction on confrontational testing   Fundoscopic Exam no  CN 3,4,6: Pupils equal and reactive to light, extraocular muscles intact  CN5: Facial sensation symmetric   CN7: Face symmetric  CN8: Hearing symmetric to spoken voice  CN9: Palate elevated symmetrically  CN11: Traps full strength on shoulder shrug  CN12: Tongue midline with protrusion    Motor Exam:   R  L    Deltoid 5  5   Biceps 5 5   Triceps 5 5   Wrist extension  5 5   Interossei 5 5      R  L    Hip flexion  5  5   Hip extension  5 5   Knee flexion  5 5   Knee extension  5 5   Ankle dorsiflexion  5 5   Ankle plantar flexion  5 5       Sensory: light touch intact and symmetric in all 4 extremities. No sensory extinction on bilateral simultaneous stimulation  Cerebellar/coordination: finger nose finger normal without ataxia  Tone: normal in all 4 extremities  Gait: normal      OTHER SYSTEMS:  Cardiovascular: Warm, appears well perfused   Respiratory: Easy, non-labored respiratory pattern   Abdominal: Abdomen is without distention   Extremities: Upper and lower extremities are atraumatic in appearance without deformity. No swelling or erythema. Psychiatric: Cooperative with exam    Diagnostic Testing Results   IMAGES:  Images personally reviewed and agree w/ radiology interpretation. Head CT w/o Contrast:  No acute intracranial hemorrhage. Chronic bilateral basal ganglia lacunar infarcts. CTA of Head / Neck w/ Contrast:  1. No aneurysms, vascular occlusions, or intracranial stenoses identified.    2.  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 3.  50% atherosclerotic stenosis of left internal carotid artery origin. MRI Brain w/o Contrast:  1. No intracranial hemorrhage, mass or acute infarction   2. Mild atrophy and chronic small vessel ischemia           MRA Neck w w/o Contrast:  1. Left internal carotid artery origin 50% diameter stenosis by NASCET criteria     2. Left vertebral artery V2 segment focal fenestration with antegrade flow signal and intravascular postcontrast enhancement involving both channels. 3. No right carotid artery right vertebral artery stenosis         LABS:  All results below personally reviewed. Pertinent positives & negatives are addressed in Impression & Recommendations below. LABS   Metabolic Panel Recent Labs     01/17/23 1827 01/17/23  2356     --    K 3.8  --      --    CO2 26  --    BUN 15  --    CREATININE 0.8  --    GLUCOSE 87 84   CALCIUM 9.8  --    LABALBU 4.2  --    ALKPHOS 96  --    ALT 12  --    AST 16  --       CBC / Coags Recent Labs     01/17/23 1827   WBC 7.6   RBC 4.50   HGB 12.1*   HCT 36.8*      INR 1.16*      Other No results for input(s): LABA1C, LDLCALC, TRIG, TSH, ERGUXHBD10, FOLATE, LABSALI, COVID19 in the last 72 hours. No results for input(s): PHENYTOIN, KEPPRA, LACOSA, LAMO, VALPROATE, LACTSEPSIS, LACTA in the last 72 hours.        CURRENT SCHEDULED MEDICATIONS   Inpatient Medications     amLODIPine, 10 mg, Oral, QAM    [Held by provider] apixaban, 5 mg, Oral, BID    atorvastatin, 40 mg, Oral, Nightly    cloNIDine, 0.2 mg, Oral, BID    [Held by provider] enalapril, 10 mg, Oral, BID    fenofibrate, 160 mg, Oral, Daily    finasteride, 5 mg, Oral, Daily    LORazepam, 1 mg, Oral, BID    metoprolol tartrate, 50 mg, Oral, BID    PARoxetine, 20 mg, Oral, QPM    aspirin, 81 mg, Oral, Daily **OR** aspirin, 300 mg, Rectal, Daily    [Held by provider] losartan-hydroCHLOROthiazide, 1 tablet, Oral, QPM    gabapentin, 300 mg, Oral, BID   Infusions    dextrose        Antibiotics   Recent Abx Admin        No antibiotic orders with administrations found. IMPRESSION & RECOMMENDATIONS     IMPRESSION:  Jaz Barnard is a 78 y.o. y/o male with PMH of hypertension, type 2 diabetes mellitus, A. fib on Eliquis who presented to the [facility], [facility]. with complaints of right arm numbness.    TIA     RECOMMENDATIONS:  -MRI brain without contrast with no acute intracranial abnormality   -MRA neck with without contrast  -Can restart Eliquis as MRI brain with no intracranial hemorrhage, mass or acute infarction  -No need for aspirin from neurology standpoint as patient is on eliquis at home   -Continue lipitor 40 mg p.o. nightly  -Neurosurgery following with no need of emergent intervention   -OT/PT with scores 24/24 and SLP consults   -pending HbA1c and lipid panel   -Okay to discharge from neurology standpoint with follow up outpatient in 3-4 weeks     Cy Fernando MD   1/18/2023 2:55 PM    Plan was discussed with Dr Ivory Perez MD.     ICU Patients:   [address] [phone]  [facility]: [facility]    Floor / PCU Patients:  Neurology Line: [phone]  [facility]: [facility]

## 2023-01-18 NOTE — DISCHARGE SUMMARY
Hospital Medicine Discharge Summary    Patient ID: Katrin Hunt      Patient's PCP: Elvia Morillo MD    Admit Date: 1/17/2023     Discharge Date:   1/18/2023    Admitting Provider: Jyoti Bucio MD     Discharge Provider: Jyoti Bucio MD     Discharge Diagnoses: Active Hospital Problems    Diagnosis     Right sided numbness [R20.0]      Priority: Medium       The patient was seen and examined on day of discharge and this discharge summary is in conjunction with any daily progress note from day of discharge. Hospital Course:     TIA:  Presenting w/ Rt arm weakness and Numbness- spontaneous resolution of sx w/I minutes of arriving to the hospital.  CTA showed possible dissection of left vertebral artery- MRA done which confirmed fenestratuion w/o dissection. Per neurosurgery \"No emergent neurosurgical intervention indicated, MRI brain wo contrast is without evidence of acute infarction. MRA neck redemonstrates 36% stenosis of LICA and left vertebral fenestration (no evidence of dissection)\"  Wll cont eliquis/ statin/ bp control. No speech, PT/OT needs. HTN urgency:  Resolved. Cont PTA meds. Physical Exam Performed:     BP (!) 156/73   Pulse 60   Temp 97.7 °F (36.5 °C) (Oral)   Resp 16   Ht 5' 10\" (1.778 m)   Wt 216 lb 11.2 oz (98.3 kg)   SpO2 96%   BMI 31.09 kg/m²     Exam per progress note on same day    Labs:  For convenience and continuity at follow-up the following most recent labs are provided:      CBC:    Lab Results   Component Value Date/Time    WBC 7.6 01/17/2023 06:27 PM    HGB 12.1 01/17/2023 06:27 PM    HCT 36.8 01/17/2023 06:27 PM     01/17/2023 06:27 PM       Renal:    Lab Results   Component Value Date/Time     01/17/2023 06:27 PM    K 3.8 01/17/2023 06:27 PM     01/17/2023 06:27 PM    CO2 26 01/17/2023 06:27 PM    BUN 15 01/17/2023 06:27 PM    CREATININE 0.8 01/17/2023 06:27 PM    CALCIUM 9.8 01/17/2023 06:27 PM    PHOS 2.8 05/18/2021 06:57 AM         Significant Diagnostic Studies    Radiology:   MRA NECK W WO CONTRAST   Final Result      1. Left internal carotid artery origin 50% diameter stenosis by NASCET criteria     2. Left vertebral artery V2 segment focal fenestration with antegrade flow signal and intravascular postcontrast enhancement involving both channels. 3. No right carotid artery right vertebral artery stenosis      MRI brain without contrast   Final Result      1. No intracranial hemorrhage, mass or acute infarction   2. Mild atrophy and chronic small vessel ischemia               XR CHEST PORTABLE   Final Result      No acute pulmonary disease. CTA HEAD NECK W CONTRAST   Final Result      1. No aneurysms, vascular occlusions, or intracranial stenoses identified. 2.  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 3.  50% atherosclerotic stenosis of left internal carotid artery origin. CT HEAD WO CONTRAST   Final Result      1. No acute intracranial hemorrhage. 2.  Chronic bilateral basal ganglia lacunar infarcts.       Discussed with Dr. Adam Batres.             Consults:     Allison Perez  IP CONSULT TO SPIRITUAL SERVICES  IP CONSULT TO NEUROLOGY    Disposition:  Home     Condition at Discharge: Stable    Discharge Instructions/Follow-up: Neuro    Code Status:  Full Code     Activity: activity as tolerated    Diet: regular diet      Discharge Medications:     Current Discharge Medication List             Details   apixaban (ELIQUIS) 5 MG TABS tablet Take 1 tablet by mouth 2 times daily  Qty: 180 tablet, Refills: 3      magnesium oxide (MAG-OX) 400 MG tablet Take 1 tablet by mouth 3 times daily  Qty: 270 tablet, Refills: 3      ciclopirox (LOPROX) 0.77 % cream Apply topically daily      finasteride (PROSCAR) 5 MG tablet Take 5 mg by mouth daily      gabapentin (NEURONTIN) 300 MG capsule Take 300 mg by mouth 2 times daily. amLODIPine (NORVASC) 10 MG tablet Take 1 tablet by mouth every morning      atorvastatin (LIPITOR) 40 MG tablet Take 40 mg by mouth nightly      cloNIDine (CATAPRES) 0.2 MG tablet Take 0.2 mg by mouth 2 times daily      enalapril (VASOTEC) 10 MG tablet Take 10 mg by mouth 2 times daily      fenofibrate (TRIGLIDE) 160 MG tablet Take 160 mg by mouth every evening Take one tablet by mouth daily at 6 PM.      glimepiride (AMARYL) 2 MG tablet Take 2 mg by mouth 2 times daily      losartan-hydroCHLOROthiazide (HYZAAR) 100-25 MG per tablet Take 1 tablet by mouth every evening      LORazepam (ATIVAN) 1 MG tablet Take 1 mg by mouth 2 times daily. metFORMIN (GLUCOPHAGE) 1000 MG tablet Take 1,000 mg by mouth 2 times daily      metoprolol tartrate (LOPRESSOR) 50 MG tablet Take 50 mg by mouth 2 times daily      sAXagliptin (ONGLYZA) 5 MG TABS tablet Take 5 mg by mouth every evening      PARoxetine (PAXIL) 20 MG tablet Take 20 mg by mouth every evening             Time Spent on discharge: 32 minutes in the examination, evaluation, counseling and review of medications and discharge plan. Signed:    Kathy Mcaedo MD   1/18/2023      Thank you César Li MD for the opportunity to be involved in this patient's care.

## 2023-01-19 NOTE — PROGRESS NOTES
Physician Progress Note      Pranay Reed  [facility] #:                  [id number]  :                       1945  ADMIT DATE:       2023 6:05 PM  Li Nieves DATE:        2023 4:36 PM  RESPONDING  PROVIDER #:        Jenny Hannah MD          QUERY TEXT:    Pt admitted with TIA. Pt noted to have 62% stenosis of LICA in the MRA. If   possible, please document in progress notes and discharge summary if you are   evaluating and /or treating any of the following: The medical record reflects the following:  Risk Factors: DM, advanced age, HTN  Clinical Indicators: MRA \"Left internal carotid artery origin 50% diameter   stenosis by NASCET criteria \"  Treatment: CBC, CMP, CT, MRI, MRA, Neurology and Neurosurgery consult,   Meds-Lipitor, Vasotec, Hyzaar, Metoprolol, Norvasc  Neuro. checks every 4 hours. Options provided:  -- TIA due to stenosis of the LICA without infarction  -- Other - I will add my own diagnosis  -- Disagree - Not applicable / Not valid  -- Disagree - Clinically unable to determine / Unknown  -- Refer to Clinical Documentation Reviewer    PROVIDER RESPONSE TEXT:    This patient had a TIA due to stenosis of the LICA without infarction.     Query created by: Breanna Allen on 2023 7:24 AM      Electronically signed by:  Jenny Hannah MD 2023 11:36 AM

## 2023-01-31 NOTE — PROGRESS NOTES
[address]   Electrophysiology  Office Visit  Date: 2/2/2023    Chief Complaint   Patient presents with    Atrial Fibrillation    Hypertension    Cerebrovascular Accident     Cardiac HX: Beatrice Valerio is a 78 y.o. man with a h/o HTN, DM, PVCs ongoing for about 20 years with normal MPI, incidental finding of AF (1/6/2021) during a routine colonoscopy, placed on Eliquis, echo (1/12/2021) showed preserved LVEF of 55 to 60%, intermediate diastolic dysfunction and LAE, + SURAJ on CPAP, s/p RFA/PVI of AF (2/22/2021, Dr. Kathy Hernandez), placed on flecainide post procedure however discontinued due to QRS widening, recurrent AF after COVID vaccine (3/2021), s/p DCCV to NSR (3/19/2021). Interval History/HPI: Patient is here for follow-up for paroxysmal AF. Patient had been diagnosed with paroxysmal AF in January 2021 during a routine colonoscopy. He was sent to the emergency room and was placed on Eliquis. His echo showed his EF was 55 to 60%. He then underwent an RFA/PVI of AF on 2/22/2021. He was placed on flecainide postprocedure however his QRS widened and the flecainide was discontinued. He received the COVID-vaccine in March 2021 and had recurrent AF afterwards. He underwent a DCCV to NSR on 3/19/2021. Today he presents in NSR. He has not felt any breakthrough of his atrial fibrillation. He has not had any heart racing, palpitations or irregular heartbeats. He does have a [employer] mobile and monitors his heart rate intermittently at home. He remains on Eliquis 5 mg twice a day. He has not had any issues with bleeding or dark tarry stools. He has had hematuria in the past however this resolved with being placed on finasteride. Patient with ongoing foot ulcers. He has a h/o osteo from his last foot infection and now has a blister on the other foot for which he is going to have surgery. He fell off a roof on 12/21/23, his safety harness broke, and fx'd back. In a brace today.  He has an appmt with the spine surgeon later today. He did have a series of TIAs on 1/5/23. MRI showed no stroke. CT scan showed 50% stenosis of the LICA and developmental fenestration vs focal dissection of the L cervical vertebral artery. He was seen by neurosurgery and has a f/u appmt later today. No ASA was recommended per neuro. He had another small TIA last week. He denies any chest pain, shortness of breath, PND, orthopnea or lower extremity edema. Home medications:   Current Outpatient Medications on File Prior to Visit   Medication Sig Dispense Refill    apixaban (ELIQUIS) 5 MG TABS tablet Take 1 tablet by mouth 2 times daily 180 tablet 3    magnesium oxide (MAG-OX) 400 MG tablet Take 1 tablet by mouth 3 times daily 270 tablet 3    ciclopirox (LOPROX) 0.77 % cream Apply topically daily      finasteride (PROSCAR) 5 MG tablet Take 5 mg by mouth daily      gabapentin (NEURONTIN) 300 MG capsule Take 300 mg by mouth 2 times daily. amLODIPine (NORVASC) 10 MG tablet Take 1 tablet by mouth every morning      atorvastatin (LIPITOR) 40 MG tablet Take 40 mg by mouth nightly      cloNIDine (CATAPRES) 0.2 MG tablet Take 0.2 mg by mouth 2 times daily      enalapril (VASOTEC) 10 MG tablet Take 10 mg by mouth 2 times daily      fenofibrate (TRIGLIDE) 160 MG tablet Take 160 mg by mouth every evening Take one tablet by mouth daily at 6 PM.      glimepiride (AMARYL) 2 MG tablet Take 2 mg by mouth 2 times daily      losartan-hydroCHLOROthiazide (HYZAAR) 100-25 MG per tablet Take 1 tablet by mouth every evening      LORazepam (ATIVAN) 1 MG tablet Take 1 mg by mouth 2 times daily.       metFORMIN (GLUCOPHAGE) 1000 MG tablet Take 1,000 mg by mouth 2 times daily      metoprolol tartrate (LOPRESSOR) 50 MG tablet Take 50 mg by mouth 2 times daily      sAXagliptin (ONGLYZA) 5 MG TABS tablet Take 5 mg by mouth every evening      PARoxetine (PAXIL) 20 MG tablet Take 20 mg by mouth every evening       No current facility-administered medications on file prior to visit. Past Medical History:   Diagnosis Date    Allergic reaction to sulfonamide 1960    Atrial fibrillation ([address]     Bacterial infection due to Morganella morganii     Bee sting reaction 2000    Bulging of cervical intervertebral disc     c4 & c5    Cephalexin adverse reaction 1993    Fracture of left elbow 1956    Group B streptococcal infection     History of cardioversion     History of right inguinal hernia repair 1987    Hidalgo's neuroma of right foot 1989    Open fracture of left elbow 1991    Open fracture of left humerus 1991    Pseudomonas infection     Rupture of appendix 1970        Past Surgical History:   Procedure Laterality Date    FOOT SURGERY Right 2/21/2022    SECOND METATARSAL HEAD RESECTION, RIGHT FOOT; COMPLEX WOUND CLOSURE-RIGHT FOOT; BONE BIOPSY-RIGHT FOOT (24740, 59506, 88120)-LOCAL performed by Nadine Curran DPM at [facility] Right 5/17/2021    THIRD DISTAL PHALANYX AMPUTATION VS THIRD GREAT TOE AMPUTATION RIGHT FOOT performed by Nadine Curran DPM at [address] [place] [address]       Allergies   Allergen Reactions    Bee Venom Swelling    Cephalexin Swelling    Cephalosporins     Propoxyphene Other (See Comments)     Hallucinations  (Darvon/Darvocet)    Sulfa Antibiotics        Social History:  Reviewed. reports that he has never smoked. He has never used smokeless tobacco. He reports that he does not drink alcohol and does not use drugs. Family History:  Reviewed. family history includes Sleep Apnea in his father. Review of System:    Constitutional: No fevers, chills. Eyes: No visual changes or diplopia. No scleral icterus. ENT: No Headaches. No mouth sores or sore throat. Cardiovascular: No for chest pain, No for dyspnea on exertion, No for palpitations or No for loss of consciousness. No cough, hemoptysis, No for pleuritic pain, or phlebitis. Respiratory: No for cough or wheezing. No hematemesis. Gastrointestinal: No abdominal pain, blood in stools. Genitourinary: No dysuria, or hematuria. Musculoskeletal: No gait disturbance,    Integumentary: No rash or pruritis. Neurological: No headache, change in muscle strength, numbness or tingling. Psychiatric: No anxiety, or depression. Endocrine: No temperature intolerance. No excessive thirst, fluid intake, or urination. Hem/Lymph: No abnormal bruising or bleeding, blood clots or swollen lymph nodes. Allergic/Immunologic: No nasal congestion or hives. Physical Examination:  Vitals:    02/02/23 1040   BP: 134/70   Pulse: 67     Wt Readings from Last 3 Encounters:   02/02/23 207 lb (93.9 kg)   01/17/23 216 lb 11.2 oz (98.3 kg)   08/02/22 204 lb 12.8 oz (92.9 kg)     Constitutional: Oriented. No distress. Head: Normocephalic and atraumatic. Mouth/Throat: Oropharynx is clear and moist.   Eyes: Conjunctivae clear without jaunduice. PERRL. Neck: Neck supple. No rigidity. No JVD present. Cardiovascular: Normal rate, regular rhythm, S1&S2. Pulmonary/Chest: Bilateral respiratory sounds. No wheezes, No rhonchi. Abdominal: Soft. Bowel sounds present. No distension, No tenderness. Musculoskeletal: No tenderness. No edema    Lymphadenopathy: Has no cervical adenopathy. Neurological: Alert and oriented. Cranial nerve appears intact, No Gross deficit   Skin: Skin is warm and dry. No rash noted. Psychiatric: Has a normal mood, affect and behavior     Labs:  Reviewed. No results for input(s): NA, K, CL, CO2, PHOS, BUN, CREATININE, CA in the last 72 hours. Invalid input(s):  TSH  No results for input(s): WBC, HGB, HCT, MCV, PLT in the last 72 hours.   Lab Results   Component Value Date/Time    CKTOTAL 121 03/22/2022 10:57 AM    TROPONINI <0.01 01/17/2023 06:27 PM     No results found for: BNP  Lab Results   Component Value Date/Time    PROTIME 14.8 01/17/2023 06:27 PM    PROTIME 15.6 12/21/2022 04:29 PM    PROTIME 18.5 05/15/2021 08:41 AM INR 1.16 01/17/2023 06:27 PM    INR 1.25 12/21/2022 04:29 PM    INR 1.59 05/15/2021 08:41 AM     Lab Results   Component Value Date/Time    CHOL 116 01/18/2023 05:53 AM    HDL 28 01/18/2023 05:53 AM    TRIG 225 01/18/2023 05:53 AM       ECG: Personally reviewed: NSR, HR 67, , QRS 92, QTc 446    ECHO:  2.22.2021([facility])  Summary   The left atrial size is dilated. There is no evidence of mass or thrombus in the left atrium or appendage. Doppler velocities in the JOON are reduced. 1.12.2021 (Complete)  Summary   Normal left ventricular size. Mild concentric left ventricular hypertrophy. Normal left ventricular systolic function with an estimated ejection   fraction of 55-60%. Indeterminate diastolic function due to afib. The left atrium is moderately dilated. Stress Test: 2.14.2002 ([employer])  IMPRESSION:   NO OBVIOUS SCARRING NOR ISCHEMIA. 50 PERCENT LEFT VENTRICULAR   EJECTION FRACTION. Cardiac Angiography: n/a    Problem List:   Patient Active Problem List    Diagnosis Date Noted    Right sided numbness 01/17/2023    Osteomyelitis of right foot (HCC)     Elevated C-reactive protein (CRP)     Diabetic polyneuropathy associated with type 2 diabetes mellitus (HCC)     Elevated sed rate     Hyponatremia     Diabetic foot infection ([facility] 05/14/2021    SURAJ (obstructive sleep apnea) 05/12/2021    Persistent atrial fibrillation (HCC)     Chronic atrial fibrillation ([facility] 01/11/2021    Major depression, chronic 03/29/2018    Type 2 diabetes mellitus with other specified complication ([facility] 88/47/0544    Mixed hyperlipidemia 10/13/2016    Migraine 02/16/2010    Obesity 02/16/2010    Anxiety state 07/30/1997    Essential hypertension 07/30/1997    Reflux esophagitis 07/30/1997        Assessment:   1. Paroxysmal atrial fibrillation (HCC)    2. On continuous oral anticoagulation    3. Benign essential HTN    4. PVC's (premature ventricular contractions)        Cardiac HX: Gilman Lefort is a 78 y.o. man with a h/o HTN, DM, TIAs, PVCs ongoing for about 20 years with normal MPI, incidental finding of AF on 1/6/2021 during a routine colonoscopy, sent to the ED, placed on Eliquis, echo (1/12/2021) showed preserved LVEF of 55 to 60%, intermediate diastolic dysfunction and LAE, + SURAJ on CPAP, s/p RFA/PVI of AF (2/22/2021, Dr. Jaime Griffith), placed on flecainide post procedure however discontinued due to QRS widening, recurrent AF after COVID vaccine (3/2021), s/p DCCV to NSR (3/19/2021). [id number] 3. TSH 1.85 (1/6/2021). pAF  - In NSR - no breakthrough per patient  - S/p RFA/PVI of AF/atAFL (2/22/2021)  - On Eliquis 5 mg - no s/s bleeding - continue  - Reviewed recent labs  - Off flecainide d/t QRS prolongation  - On metoprolol 50 mg BID  - Compliant with CPAP  - Monitor if s/s  - Reduce risk factors - diet, exercise, optimal glucose control  - F/u in 6 months  - ECG ordered and results personally reviewed     HTN  - BP well controlled in the office today  - On amlodipine 10 mg daily, clonidine 0.2 mg twice daily, Vasotec 10 mg twice daily, Hyzaar 100/25 mg nightly, metoprolol 50 mg twice a day   - Lifestyle modification - diet, exercise, weight loss    EF of 43-54%  No systolic HF  No known CAD  Anticoagulation for AF   No Tobacco use. All questions and concerns were addressed to the patient/family. Alternatives to my treatment were discussed. The note was completed using EMR. Every effort was made to ensure accuracy; however, inadvertent computerized transcription errors may be present. Patient received education regarding their diagnosis, treatment and medications while in the office today. Mena Torres [facility]     I  have spent 35 minutes in care of the patient including direct face to face time, chart preparation, reviewing diagnostic testing, other provider notes and coordinating patient care.

## 2023-02-02 ENCOUNTER — OFFICE VISIT (OUTPATIENT)
Dept: CARDIOLOGY CLINIC | Age: 78
End: 2023-02-02
Payer: MEDICARE

## 2023-02-02 VITALS
DIASTOLIC BLOOD PRESSURE: 70 MMHG | HEART RATE: 67 BPM | SYSTOLIC BLOOD PRESSURE: 134 MMHG | BODY MASS INDEX: 29.7 KG/M2 | WEIGHT: 207 LBS

## 2023-02-02 DIAGNOSIS — Z79.01 ON CONTINUOUS ORAL ANTICOAGULATION: ICD-10-CM

## 2023-02-02 DIAGNOSIS — I49.3 PVC'S (PREMATURE VENTRICULAR CONTRACTIONS): ICD-10-CM

## 2023-02-02 DIAGNOSIS — I48.0 PAROXYSMAL ATRIAL FIBRILLATION (HCC): Primary | ICD-10-CM

## 2023-02-02 DIAGNOSIS — I10 BENIGN ESSENTIAL HTN: ICD-10-CM

## 2023-02-02 PROCEDURE — G8484 FLU IMMUNIZE NO ADMIN: HCPCS | Performed by: NURSE PRACTITIONER

## 2023-02-02 PROCEDURE — 99214 OFFICE O/P EST MOD 30 MIN: CPT | Performed by: NURSE PRACTITIONER

## 2023-02-02 PROCEDURE — G8417 CALC BMI ABV UP PARAM F/U: HCPCS | Performed by: NURSE PRACTITIONER

## 2023-02-02 PROCEDURE — 3075F SYST BP GE 130 - 139MM HG: CPT | Performed by: NURSE PRACTITIONER

## 2023-02-02 PROCEDURE — 1111F DSCHRG MED/CURRENT MED MERGE: CPT | Performed by: NURSE PRACTITIONER

## 2023-02-02 PROCEDURE — 1123F ACP DISCUSS/DSCN MKR DOCD: CPT | Performed by: NURSE PRACTITIONER

## 2023-02-02 PROCEDURE — 3078F DIAST BP <80 MM HG: CPT | Performed by: NURSE PRACTITIONER

## 2023-02-02 PROCEDURE — 93000 ELECTROCARDIOGRAM COMPLETE: CPT | Performed by: NURSE PRACTITIONER

## 2023-02-02 PROCEDURE — G8427 DOCREV CUR MEDS BY ELIG CLIN: HCPCS | Performed by: NURSE PRACTITIONER

## 2023-02-02 PROCEDURE — 1036F TOBACCO NON-USER: CPT | Performed by: NURSE PRACTITIONER

## 2023-02-02 PROCEDURE — 3017F COLORECTAL CA SCREEN DOC REV: CPT | Performed by: NURSE PRACTITIONER

## 2023-02-20 ENCOUNTER — HOSPITAL ENCOUNTER (INPATIENT)
Age: 78
LOS: 1 days | Discharge: HOME | DRG: 069 | End: 2023-02-22
Attending: EMERGENCY MEDICINE | Admitting: HOSPITALIST
Payer: MEDICARE

## 2023-02-20 ENCOUNTER — APPOINTMENT (OUTPATIENT)
Dept: GENERAL RADIOLOGY | Age: 78
DRG: 069 | End: 2023-02-20
Payer: MEDICARE

## 2023-02-20 ENCOUNTER — APPOINTMENT (OUTPATIENT)
Dept: CT IMAGING | Age: 78
DRG: 069 | End: 2023-02-20
Payer: MEDICARE

## 2023-02-20 DIAGNOSIS — G45.9 TIA (TRANSIENT ISCHEMIC ATTACK): Primary | ICD-10-CM

## 2023-02-20 PROBLEM — R35.1 BPH ASSOCIATED WITH NOCTURIA: Status: ACTIVE | Noted: 2023-02-20

## 2023-02-20 PROBLEM — N40.1 BPH ASSOCIATED WITH NOCTURIA: Status: ACTIVE | Noted: 2023-02-20

## 2023-02-20 PROBLEM — D64.9 NORMOCYTIC ANEMIA: Status: ACTIVE | Noted: 2023-02-20

## 2023-02-20 PROBLEM — R31.29 MICROSCOPIC HEMATURIA: Status: ACTIVE | Noted: 2023-02-20

## 2023-02-20 LAB
A/G RATIO: 1.6 (ref 1.1–2.2)
ALBUMIN SERPL-MCNC: 4.2 G/DL (ref 3.4–5)
ALP BLD-CCNC: 64 U/L (ref 40–129)
ALT SERPL-CCNC: 17 U/L (ref 10–40)
ANION GAP SERPL CALCULATED.3IONS-SCNC: 12 MMOL/L (ref 3–16)
AST SERPL-CCNC: 20 U/L (ref 15–37)
BASOPHILS ABSOLUTE: 0.1 K/UL (ref 0–0.2)
BASOPHILS RELATIVE PERCENT: 0.9 %
BILIRUB SERPL-MCNC: <0.2 MG/DL (ref 0–1)
BUN BLDV-MCNC: 18 MG/DL (ref 7–20)
CALCIUM SERPL-MCNC: 9.8 MG/DL (ref 8.3–10.6)
CHLORIDE BLD-SCNC: 96 MMOL/L (ref 99–110)
CHP ED QC CHECK: YES
CO2: 27 MMOL/L (ref 21–32)
CREAT SERPL-MCNC: 0.9 MG/DL (ref 0.8–1.3)
EOSINOPHILS ABSOLUTE: 0.1 K/UL (ref 0–0.6)
EOSINOPHILS RELATIVE PERCENT: 1.4 %
GFR SERPL CREATININE-BSD FRML MDRD: >60 ML/MIN/{1.73_M2}
GLUCOSE BLD-MCNC: 107 MG/DL
GLUCOSE BLD-MCNC: 107 MG/DL (ref 70–99)
GLUCOSE BLD-MCNC: 123 MG/DL (ref 70–99)
HCT VFR BLD CALC: 33.7 % (ref 40.5–52.5)
HEMOGLOBIN: 11.6 G/DL (ref 13.5–17.5)
INR BLD: 1.31 (ref 0.87–1.14)
LYMPHOCYTES ABSOLUTE: 1.4 K/UL (ref 1–5.1)
LYMPHOCYTES RELATIVE PERCENT: 21.8 %
MCH RBC QN AUTO: 27.9 PG (ref 26–34)
MCHC RBC AUTO-ENTMCNC: 34.5 G/DL (ref 31–36)
MCV RBC AUTO: 81 FL (ref 80–100)
MONOCYTES ABSOLUTE: 0.5 K/UL (ref 0–1.3)
MONOCYTES RELATIVE PERCENT: 8.2 %
NEUTROPHILS ABSOLUTE: 4.2 K/UL (ref 1.7–7.7)
NEUTROPHILS RELATIVE PERCENT: 67.7 %
PDW BLD-RTO: 15.5 % (ref 12.4–15.4)
PERFORMED ON: ABNORMAL
PLATELET # BLD: 225 K/UL (ref 135–450)
PMV BLD AUTO: 8 FL (ref 5–10.5)
POTASSIUM REFLEX MAGNESIUM: 3.6 MMOL/L (ref 3.5–5.1)
PROTHROMBIN TIME: 16.3 SEC (ref 11.7–14.5)
RBC # BLD: 4.16 M/UL (ref 4.2–5.9)
SODIUM BLD-SCNC: 135 MMOL/L (ref 136–145)
TOTAL PROTEIN: 6.9 G/DL (ref 6.4–8.2)
TROPONIN: <0.01 NG/ML
WBC # BLD: 6.2 K/UL (ref 4–11)

## 2023-02-20 PROCEDURE — 36415 COLL VENOUS BLD VENIPUNCTURE: CPT

## 2023-02-20 PROCEDURE — G0378 HOSPITAL OBSERVATION PER HR: HCPCS

## 2023-02-20 PROCEDURE — 99285 EMERGENCY DEPT VISIT HI MDM: CPT

## 2023-02-20 PROCEDURE — 71045 X-RAY EXAM CHEST 1 VIEW: CPT

## 2023-02-20 PROCEDURE — 93005 ELECTROCARDIOGRAM TRACING: CPT | Performed by: EMERGENCY MEDICINE

## 2023-02-20 PROCEDURE — 85025 COMPLETE CBC W/AUTO DIFF WBC: CPT

## 2023-02-20 PROCEDURE — 70498 CT ANGIOGRAPHY NECK: CPT

## 2023-02-20 PROCEDURE — 80053 COMPREHEN METABOLIC PANEL: CPT

## 2023-02-20 PROCEDURE — 70450 CT HEAD/BRAIN W/O DYE: CPT

## 2023-02-20 PROCEDURE — 6360000004 HC RX CONTRAST MEDICATION: Performed by: EMERGENCY MEDICINE

## 2023-02-20 PROCEDURE — 84484 ASSAY OF TROPONIN QUANT: CPT

## 2023-02-20 PROCEDURE — 85610 PROTHROMBIN TIME: CPT

## 2023-02-20 RX ORDER — LOSARTAN POTASSIUM AND HYDROCHLOROTHIAZIDE 25; 100 MG/1; MG/1
1 TABLET ORAL EVERY EVENING
Status: DISCONTINUED | OUTPATIENT
Start: 2023-02-21 | End: 2023-02-22 | Stop reason: HOSPADM

## 2023-02-20 RX ORDER — GLUCAGON 1 MG/ML
1 KIT INJECTION PRN
Status: DISCONTINUED | OUTPATIENT
Start: 2023-02-20 | End: 2023-02-22 | Stop reason: HOSPADM

## 2023-02-20 RX ORDER — LORAZEPAM 1 MG/1
1 TABLET ORAL 2 TIMES DAILY
Status: DISCONTINUED | OUTPATIENT
Start: 2023-02-20 | End: 2023-02-22 | Stop reason: HOSPADM

## 2023-02-20 RX ORDER — ONDANSETRON 2 MG/ML
4 INJECTION INTRAMUSCULAR; INTRAVENOUS EVERY 6 HOURS PRN
Status: DISCONTINUED | OUTPATIENT
Start: 2023-02-20 | End: 2023-02-22 | Stop reason: HOSPADM

## 2023-02-20 RX ORDER — ENALAPRIL MALEATE 10 MG/1
10 TABLET ORAL 2 TIMES DAILY
Status: DISCONTINUED | OUTPATIENT
Start: 2023-02-20 | End: 2023-02-22 | Stop reason: HOSPADM

## 2023-02-20 RX ORDER — INSULIN LISPRO 100 [IU]/ML
0-4 INJECTION, SOLUTION INTRAVENOUS; SUBCUTANEOUS NIGHTLY
Status: DISCONTINUED | OUTPATIENT
Start: 2023-02-20 | End: 2023-02-22 | Stop reason: HOSPADM

## 2023-02-20 RX ORDER — ACETAMINOPHEN 500 MG
1000 TABLET ORAL DAILY PRN
COMMUNITY

## 2023-02-20 RX ORDER — GABAPENTIN 300 MG/1
300 CAPSULE ORAL 2 TIMES DAILY
Status: DISCONTINUED | OUTPATIENT
Start: 2023-02-21 | End: 2023-02-22 | Stop reason: HOSPADM

## 2023-02-20 RX ORDER — CLONIDINE HYDROCHLORIDE 0.1 MG/1
0.2 TABLET ORAL 2 TIMES DAILY
Status: DISCONTINUED | OUTPATIENT
Start: 2023-02-20 | End: 2023-02-22 | Stop reason: HOSPADM

## 2023-02-20 RX ORDER — ACETAMINOPHEN 500 MG
1000 TABLET ORAL DAILY PRN
Status: DISCONTINUED | OUTPATIENT
Start: 2023-02-20 | End: 2023-02-22 | Stop reason: HOSPADM

## 2023-02-20 RX ORDER — ASPIRIN 300 MG/1
300 SUPPOSITORY RECTAL DAILY
Status: DISCONTINUED | OUTPATIENT
Start: 2023-02-21 | End: 2023-02-21

## 2023-02-20 RX ORDER — INSULIN LISPRO 100 [IU]/ML
0-4 INJECTION, SOLUTION INTRAVENOUS; SUBCUTANEOUS
Status: DISCONTINUED | OUTPATIENT
Start: 2023-02-21 | End: 2023-02-22 | Stop reason: HOSPADM

## 2023-02-20 RX ORDER — FENOFIBRATE 160 MG/1
160 TABLET ORAL DAILY
Status: DISCONTINUED | OUTPATIENT
Start: 2023-02-21 | End: 2023-02-22 | Stop reason: HOSPADM

## 2023-02-20 RX ORDER — ALOGLIPTIN 25 MG/1
25 TABLET, FILM COATED ORAL DAILY
Status: DISCONTINUED | OUTPATIENT
Start: 2023-02-21 | End: 2023-02-22 | Stop reason: HOSPADM

## 2023-02-20 RX ORDER — ATORVASTATIN CALCIUM 80 MG/1
80 TABLET, FILM COATED ORAL NIGHTLY
Status: DISCONTINUED | OUTPATIENT
Start: 2023-02-20 | End: 2023-02-22 | Stop reason: HOSPADM

## 2023-02-20 RX ORDER — ONDANSETRON 4 MG/1
4 TABLET, ORALLY DISINTEGRATING ORAL EVERY 8 HOURS PRN
Status: DISCONTINUED | OUTPATIENT
Start: 2023-02-20 | End: 2023-02-22 | Stop reason: HOSPADM

## 2023-02-20 RX ORDER — POLYETHYLENE GLYCOL 3350 17 G/17G
17 POWDER, FOR SOLUTION ORAL DAILY PRN
Status: DISCONTINUED | OUTPATIENT
Start: 2023-02-20 | End: 2023-02-22 | Stop reason: HOSPADM

## 2023-02-20 RX ORDER — METOPROLOL TARTRATE 50 MG/1
50 TABLET, FILM COATED ORAL 2 TIMES DAILY
Status: DISCONTINUED | OUTPATIENT
Start: 2023-02-20 | End: 2023-02-22 | Stop reason: HOSPADM

## 2023-02-20 RX ORDER — DEXTROSE MONOHYDRATE 100 MG/ML
INJECTION, SOLUTION INTRAVENOUS CONTINUOUS PRN
Status: DISCONTINUED | OUTPATIENT
Start: 2023-02-20 | End: 2023-02-22 | Stop reason: HOSPADM

## 2023-02-20 RX ORDER — FINASTERIDE 5 MG/1
5 TABLET, FILM COATED ORAL DAILY
Status: DISCONTINUED | OUTPATIENT
Start: 2023-02-21 | End: 2023-02-22 | Stop reason: HOSPADM

## 2023-02-20 RX ORDER — ASPIRIN 81 MG/1
81 TABLET ORAL DAILY
Status: DISCONTINUED | OUTPATIENT
Start: 2023-02-21 | End: 2023-02-20

## 2023-02-20 RX ORDER — AMLODIPINE BESYLATE 10 MG/1
10 TABLET ORAL EVERY MORNING
Status: DISCONTINUED | OUTPATIENT
Start: 2023-02-21 | End: 2023-02-22 | Stop reason: HOSPADM

## 2023-02-20 RX ORDER — PAROXETINE 10 MG/1
20 TABLET, FILM COATED ORAL EVERY EVENING
Status: DISCONTINUED | OUTPATIENT
Start: 2023-02-21 | End: 2023-02-22 | Stop reason: HOSPADM

## 2023-02-20 RX ADMIN — IOPAMIDOL 75 ML: 755 INJECTION, SOLUTION INTRAVENOUS at 20:55

## 2023-02-21 ENCOUNTER — APPOINTMENT (OUTPATIENT)
Dept: MRI IMAGING | Age: 78
DRG: 069 | End: 2023-02-21
Payer: MEDICARE

## 2023-02-21 PROBLEM — R20.2 NUMBNESS AND TINGLING OF RIGHT ARM: Status: ACTIVE | Noted: 2023-01-17

## 2023-02-21 LAB
CHOLESTEROL, TOTAL: 145 MG/DL (ref 0–199)
CHP ED QC CHECK: NORMAL
EKG ATRIAL RATE: 69 BPM
EKG DIAGNOSIS: NORMAL
EKG P AXIS: -13 DEGREES
EKG P-R INTERVAL: 210 MS
EKG Q-T INTERVAL: 398 MS
EKG QRS DURATION: 78 MS
EKG QTC CALCULATION (BAZETT): 426 MS
EKG R AXIS: -33 DEGREES
EKG T AXIS: -24 DEGREES
EKG VENTRICULAR RATE: 69 BPM
ESTIMATED AVERAGE GLUCOSE: 174.3 MG/DL
GLUCOSE BLD-MCNC: 144 MG/DL (ref 70–99)
GLUCOSE BLD-MCNC: 213 MG/DL (ref 70–99)
GLUCOSE BLD-MCNC: 216 MG/DL (ref 70–99)
GLUCOSE BLD-MCNC: 229 MG/DL (ref 70–99)
GLUCOSE BLD-MCNC: 99 MG/DL
GLUCOSE BLD-MCNC: 99 MG/DL (ref 70–99)
HBA1C MFR BLD: 7.7 %
HCT VFR BLD CALC: 34.1 % (ref 40.5–52.5)
HDLC SERPL-MCNC: 28 MG/DL (ref 40–60)
HEMOGLOBIN: 11.5 G/DL (ref 13.5–17.5)
LDL CHOLESTEROL CALCULATED: ABNORMAL MG/DL
LDL CHOLESTEROL DIRECT: 68 MG/DL
LV EF: 63 %
LVEF MODALITY: NORMAL
MCH RBC QN AUTO: 27.5 PG (ref 26–34)
MCHC RBC AUTO-ENTMCNC: 33.8 G/DL (ref 31–36)
MCV RBC AUTO: 81.4 FL (ref 80–100)
PDW BLD-RTO: 15.3 % (ref 12.4–15.4)
PERFORMED ON: ABNORMAL
PERFORMED ON: NORMAL
PLATELET # BLD: 211 K/UL (ref 135–450)
PMV BLD AUTO: 7.9 FL (ref 5–10.5)
PROSTATE SPECIFIC ANTIGEN: 0.95 NG/ML (ref 0–4)
RBC # BLD: 4.19 M/UL (ref 4.2–5.9)
TRIGL SERPL-MCNC: 348 MG/DL (ref 0–150)
TROPONIN: <0.01 NG/ML
VLDLC SERPL CALC-MCNC: ABNORMAL MG/DL
WBC # BLD: 5.4 K/UL (ref 4–11)

## 2023-02-21 PROCEDURE — 6370000000 HC RX 637 (ALT 250 FOR IP): Performed by: HOSPITALIST

## 2023-02-21 PROCEDURE — 97116 GAIT TRAINING THERAPY: CPT

## 2023-02-21 PROCEDURE — 70551 MRI BRAIN STEM W/O DYE: CPT

## 2023-02-21 PROCEDURE — 85027 COMPLETE CBC AUTOMATED: CPT

## 2023-02-21 PROCEDURE — 99223 1ST HOSP IP/OBS HIGH 75: CPT | Performed by: PSYCHIATRY & NEUROLOGY

## 2023-02-21 PROCEDURE — 84153 ASSAY OF PSA TOTAL: CPT

## 2023-02-21 PROCEDURE — 97161 PT EVAL LOW COMPLEX 20 MIN: CPT

## 2023-02-21 PROCEDURE — 92610 EVALUATE SWALLOWING FUNCTION: CPT

## 2023-02-21 PROCEDURE — 80061 LIPID PANEL: CPT

## 2023-02-21 PROCEDURE — G0378 HOSPITAL OBSERVATION PER HR: HCPCS

## 2023-02-21 PROCEDURE — 6370000000 HC RX 637 (ALT 250 FOR IP): Performed by: INTERNAL MEDICINE

## 2023-02-21 PROCEDURE — 97530 THERAPEUTIC ACTIVITIES: CPT

## 2023-02-21 PROCEDURE — 97165 OT EVAL LOW COMPLEX 30 MIN: CPT

## 2023-02-21 PROCEDURE — 95819 EEG AWAKE AND ASLEEP: CPT

## 2023-02-21 PROCEDURE — C8929 TTE W OR WO FOL WCON,DOPPLER: HCPCS

## 2023-02-21 PROCEDURE — 97535 SELF CARE MNGMENT TRAINING: CPT

## 2023-02-21 PROCEDURE — 36415 COLL VENOUS BLD VENIPUNCTURE: CPT

## 2023-02-21 PROCEDURE — 83036 HEMOGLOBIN GLYCOSYLATED A1C: CPT

## 2023-02-21 PROCEDURE — 84484 ASSAY OF TROPONIN QUANT: CPT

## 2023-02-21 RX ADMIN — LOSARTAN POTASSIUM AND HYDROCHLOROTHIAZIDE 1 TABLET: 100; 25 TABLET, FILM COATED ORAL at 17:44

## 2023-02-21 RX ADMIN — ATORVASTATIN CALCIUM 80 MG: 80 TABLET, FILM COATED ORAL at 20:16

## 2023-02-21 RX ADMIN — METOPROLOL TARTRATE 50 MG: 50 TABLET, FILM COATED ORAL at 04:30

## 2023-02-21 RX ADMIN — ENALAPRIL MALEATE 10 MG: 10 TABLET ORAL at 20:17

## 2023-02-21 RX ADMIN — ENALAPRIL MALEATE 10 MG: 10 TABLET ORAL at 01:07

## 2023-02-21 RX ADMIN — LORAZEPAM 1 MG: 1 TABLET ORAL at 20:15

## 2023-02-21 RX ADMIN — LORAZEPAM 1 MG: 1 TABLET ORAL at 08:32

## 2023-02-21 RX ADMIN — AMLODIPINE BESYLATE 10 MG: 10 TABLET ORAL at 08:32

## 2023-02-21 RX ADMIN — FINASTERIDE 5 MG: 5 TABLET, FILM COATED ORAL at 08:32

## 2023-02-21 RX ADMIN — APIXABAN 5 MG: 5 TABLET, FILM COATED ORAL at 14:11

## 2023-02-21 RX ADMIN — ALOGLIPTIN 25 MG: 25 TABLET, FILM COATED ORAL at 08:34

## 2023-02-21 RX ADMIN — METOPROLOL TARTRATE 50 MG: 50 TABLET, FILM COATED ORAL at 08:32

## 2023-02-21 RX ADMIN — GABAPENTIN 300 MG: 300 CAPSULE ORAL at 08:32

## 2023-02-21 RX ADMIN — FENOFIBRATE 160 MG: 160 TABLET, FILM COATED ORAL at 08:32

## 2023-02-21 RX ADMIN — APIXABAN 5 MG: 5 TABLET, FILM COATED ORAL at 20:16

## 2023-02-21 RX ADMIN — CLONIDINE HYDROCHLORIDE 0.2 MG: 0.1 TABLET ORAL at 20:15

## 2023-02-21 RX ADMIN — METOPROLOL TARTRATE 50 MG: 50 TABLET, FILM COATED ORAL at 20:15

## 2023-02-21 RX ADMIN — ENALAPRIL MALEATE 10 MG: 10 TABLET ORAL at 08:38

## 2023-02-21 RX ADMIN — GABAPENTIN 300 MG: 300 CAPSULE ORAL at 17:45

## 2023-02-21 RX ADMIN — PAROXETINE HYDROCHLORIDE 20 MG: 10 TABLET, FILM COATED ORAL at 17:44

## 2023-02-21 RX ADMIN — CLONIDINE HYDROCHLORIDE 0.2 MG: 0.1 TABLET ORAL at 08:32

## 2023-02-21 ASSESSMENT — LIFESTYLE VARIABLES
HOW MANY STANDARD DRINKS CONTAINING ALCOHOL DO YOU HAVE ON A TYPICAL DAY: PATIENT DOES NOT DRINK
HOW OFTEN DO YOU HAVE A DRINK CONTAINING ALCOHOL: PATIENT DECLINED

## 2023-02-21 NOTE — DISCHARGE SUMMARY
Hospital Discharge Summary    Patient's PCP: Alex Landers MD  Admit Date: 2/20/2023   Discharge Date: 2/22/2023    Admitting Physician: Dr. Romina Rousseau MD  Discharge Physician: Dr. Alfredo Vilchis MD   Consults: neurology    Brief HPI:   78 y.o. male with significant past medical history of chronic A-fib on anticoagulation, microscopic hematuria with gross hematuria when taking aspirin, diabetes type 2, TIA who presented to [facility] ED with left facial and right upper extremity weakness and numbness. Episode started around 1600 hrs. and lasted about 45 minutes with complete resolution. However, this since she had similar episode on the contralateral side about a month ago he decided to come in and be checked out. While in the emergency room he had another episode that lasted only a few minutes. In emergency room his temperature was 36.6, blood pressure 147/94, pulse 70, respirations 18, sats 97% on room air. Brief hospital course:     -Transient left facial/right-sided numbness, weakness, likely TIA--resolved. [facility] MRI negative  EEG neg-was ordered due to recurrent episode      -Persistent atrial fibrillation. .  Stable-continue metoprolol and Eliquis     -Essential hypertension-BP stable on multiple medications including. . enalapril, clonidine, amlodipine, losartan/HCTZ     -DM type II. [facility] Continue alogliptin, sliding scale insulin     -Hyperlipidemia-continue statin     -Obstructive sleep apnea current CPAP use     -Diabetic neuropathy-continue Neurontin     -BPH-continue finasteride    Invasive procedures:  See above    Discharge Diagnoses:    Active Problems:    Numbness and tingling of right arm    Microscopic hematuria    Normocytic anemia    BPH associated with nocturia    Type 2 diabetes mellitus with other specified complication (HCC)    Essential hypertension    Major depression, chronic    Mixed hyperlipidemia    Persistent atrial fibrillation (HCC)    SURAJ (obstructive sleep apnea)    Diabetic polyneuropathy associated with type 2 diabetes mellitus ([facility]  Resolved Problems:    * No resolved hospital problems. *      Physical Exam: BP (!) 160/82   Pulse 60   Temp 97.2 °F (36.2 °C) (Oral)   Resp 13   Ht 5' 10\" (1.778 m)   Wt 200 lb 3.2 oz (90.8 kg)   SpO2 98%   BMI 28.73 kg/m²   Gen/overall appearance: Not in acute distress. Alert. Head: Normocephalic, atraumatic  Eyes: EOMI, good acuity  ENT:- Oral mucosa moist  Neck: No JVD, thyromegaly  CVS: Nml S1S2, no MRG, RRR  Pulm: Clear bilaterally. No crackles/wheezes  Gastrointestinal: Soft, NT/ND, +BS  Musculoskeletal: No edema. Warm  Neuro: No focal deficit. Moves extremity spontaneously. Psychiatry: Appropriate affect. Not agitated. Skin: Warm, dry with normal turgor. No rash        Significant Diagnostic Studies:    See above      Treatments: As above.       Discharge Medications:     Medication List        CONTINUE taking these medications      acetaminophen 500 MG tablet  Commonly known as: TYLENOL     amLODIPine 10 MG tablet  Commonly known as: NORVASC     apixaban 5 MG Tabs tablet  Commonly known as: ELIQUIS  Take 1 tablet by mouth 2 times daily     atorvastatin 40 MG tablet  Commonly known as: LIPITOR     ciclopirox 0.77 % cream  Commonly known as: LOPROX     cloNIDine 0.2 MG tablet  Commonly known as: CATAPRES     enalapril 10 MG tablet  Commonly known as: VASOTEC     fenofibrate 160 MG tablet  Commonly known as: TRIGLIDE     finasteride 5 MG tablet  Commonly known as: PROSCAR     gabapentin 300 MG capsule  Commonly known as: NEURONTIN     glimepiride 2 MG tablet  Commonly known as: AMARYL     LORazepam 1 MG tablet  Commonly known as: ATIVAN     losartan-hydroCHLOROthiazide 100-25 MG per tablet  Commonly known as: HYZAAR     magnesium oxide 400 MG tablet  Commonly known as: MAG-OX  Take 1 tablet by mouth 3 times daily     metFORMIN 1000 MG tablet  Commonly known as: GLUCOPHAGE     metoprolol tartrate 50 MG tablet  Commonly known as: LOPRESSOR     PARoxetine 20 MG tablet  Commonly known as: PAXIL     sAXagliptin 5 MG Tabs tablet  Commonly known as: ONGLYZA              Activity: activity as tolerated  Diet: ADULT DIET; Regular; 4 carb choices (60 gm/meal); Low Fat/Low Chol/High Fiber/2 gm Na      Disposition: home  Discharged Condition: Stable  Follow Up:   [facility] - Neurology  [facility] [phone]  Schedule an appointment as soon as possible for a visit in 1 month(s)  for follow-up of numbness and tingling episodes      Code status:  Full Code         Total time spent on discharge, finalizing medications, referrals and arranging outpatient follow up was more than 45 minutes      Thank you Dr. Mo Anderson MD for the opportunity to be involved in this patients care.

## 2023-02-21 NOTE — PROGRESS NOTES
Speech Language Pathology  Facility/Department:[address] [facility]   Dysphagia Evaluation  Speech, Language, Cognition Screen  Discharge                                                          Patient Diagnosis(es):   Patient Active Problem List    Diagnosis Date Noted    Transient ischemic attack (TIA) 02/20/2023    Microscopic hematuria 02/20/2023    Normocytic anemia 02/20/2023    BPH associated with nocturia 02/20/2023    Right sided numbness 01/17/2023    Osteomyelitis of right foot (HCC)     Elevated C-reactive protein (CRP)     Diabetic polyneuropathy associated with type 2 diabetes mellitus (HCC)     Elevated sed rate     Hyponatremia     Diabetic foot infection ([address] 05/14/2021    SURAJ (obstructive sleep apnea) 05/12/2021    Persistent atrial fibrillation (HCC)     Chronic atrial fibrillation ([address] 01/11/2021    Major depression, chronic 03/29/2018    Type 2 diabetes mellitus with other specified complication ([address] 96/83/1149    Mixed hyperlipidemia 10/13/2016    Migraine 02/16/2010    Obesity 02/16/2010    Anxiety state 07/30/1997    Essential hypertension 07/30/1997    Reflux esophagitis 07/30/1997       Past Medical History:   Diagnosis Date    Allergic reaction to sulfonamide 1960    Atrial fibrillation ([address]     Bacterial infection due to Morganella morganii     Bee sting reaction 2000    BPH associated with nocturia     Bulging of cervical intervertebral disc     c4 & c5    Cephalexin adverse reaction 1993    DM (diabetes mellitus), type 2 ([address]     Dyslipidemia     Fracture of left elbow 1956    Group B streptococcal infection     Hematuria     History of cardioversion     History of right inguinal hernia repair 1987    HTN (hypertension)     Hidalgo's neuroma of right foot 1989    Open fracture of left elbow 1991    Open fracture of left humerus 1991    Polyneuropathy due to type 2 diabetes mellitus ([address]     Pseudomonas infection     Rupture of appendix 1970    TIA (transient ischemic attack)      Past Surgical History:   Procedure Laterality Date    APPENDECTOMY      FOOT SURGERY Right 02/21/2022    SECOND METATARSAL HEAD RESECTION, RIGHT FOOT; COMPLEX WOUND CLOSURE-RIGHT FOOT; BONE BIOPSY-RIGHT FOOT (64726, 66660, 61822)-LOCAL performed by Steve Garza DPM at [address] Right     ORIF HUMERUS FRACTURE      TOE AMPUTATION Right 05/17/2021    THIRD DISTAL PHALANYX AMPUTATION VS THIRD GREAT TOE AMPUTATION RIGHT FOOT performed by Steve Garza DPM at [address]       Reason for Referral:  Caridad Stoll  was referred for a Speech Therapy evaluation to assess swallow function and/or communication. History of Present Illness  [facility] is a 78 y.o. male with past medical history noteworthy for TIA presents emergency department for concerns of TIA. He is accompanied with his daughter who helps with the history. He notes that he had left hand weakness as well as left facial droop around 515. He endorsed at that time that his left hand felt heavy as well. The symptoms did resolve on around 545. He at present does not have any symptoms. He does note that he had a previous TIA on the right side with similar symptomatology. At present, he is without symptoms. He denies any numbness or tingling in the lower legs. He endorses compliance on medications. Review: Patient discharged 1/18/2023 after he presented for right arm weakness and numbness, which had spontaneous resolution. At that time there was concern for possible left vertebral artery dissection, which was nonop from neurosurgery evaluation. \"    CXR (02/20/2023): Impression       No acute disease     CT head (02/20/2023): Impression       No evidence of acute intracranial abnormality. MRI Brain (02/20/2023): Impression     No acute infarct, hemorrhage, or hydrocephalus.          Date of onset: 02/20/2023    Current Diet:  Diet NPO      Treatment Diagnosis: Dysphagia     Pain:  Not indicated at time of visit     General:  Chart reviewed: Yes  Behavior/Cognition: Alert, cooperative, pleasant mood   Communication Observation: Functional   Follows Directions: Simple   Dentition/Oral Mucosa: Adequate  Oral motor exam: [facility]  Vocal Quality: Clear   Respiratory Status/oxygen requirements: Room air   Vision/Hearing: Vision, Impaired, wears glasses at all times, Hearing, [facility]  Patient Complaint: None stated at time of visit   Patient Positioning: Upright in bed     Prior Dysphagia History:   Pt previously seen by speech services in January 2023. No concerns for s/s of aspiration at the time and was recommended a regular with thin liquid diet. Bedside Swallowing Evaluation Impression (02/21/2023)  Pt alert, oriented x3, followed commands and answered basic questions appropriately. ROM of oral structures was [facility]. No facial asymmetry noted. Pt with natural CHCF. Pt produced volitional throat clear, cough, and able to produce dry swallow felt upon palpation of anterior neck. Pt presented ice chip, thin liquids via cup and straw including 3 ounces of uninterrupted swallows, applesauce and a tabitha cracker. Pt had no difficulty closing lips around spoon or drawing liquid up a straw. Pt consumed 3oz water uninterrupted by straw without difficulty. Pt had no difficulty with mastication with solids. Pt requested liquid wash to assist with dryness of cracker. Pt demonstrated no s/s of aspiration: no coughing, throat clearing, change in vocal quality or choking observed with any consistency. Pt able to initiate swallow without difficulty with laryngeal movement observed. Speech, Language, Cognitive Screen (02/21/2023)  Pt speech was fluent and intelligible with no instances of word finding difficulty. Pt able to follow commands and respond to questions appropriately.     Education  Educated pt to purpose of visit, s/s of aspiration, concern if aspiration occurs and rationale for diet recommendation/strategies to reduce risk for aspiration. Pt instructed to notify staff if any signs of aspiration emerge or difficulty with communication emerges. Pt stated comprehension     Prognosis:  Prognosis for improvement: Good   Barriers to reach goals: N/A  Consulted and agree with results and recommendations: Pt and RN    Treatment:  N/A    Plan:  Pt discharged from Speech Therapy services. No further speech services needed at this time. Please consult if concerns arise   Recommended diet: Regular with thin liquids   Recommended form of medication: Whole with water   Swallowing Strategies:   - Sit upright 90 degrees   Pt goal:n/a  Pt discharge goal: to home  Total treatment time: 15  Discharge Recommendation: No further speech services needed at this time, consult if concerns arise   Discussed with RNLuciana   Needs met prior to leaving room, call light within reach    CONG Chris.    Clinician     [address]  Speech-Language Pathologist  Pager [phone]  Speech Therapist was present, directed the patient's care, made skilled judgement, and was responsible for assessment and treatment of the patient.            This document will serve as a dc summary if pt dc prior to next visit

## 2023-02-21 NOTE — PROGRESS NOTES
Physical Therapy  Facility/Department: [address]  Physical Therapy Initial Assessment/Discharge    Name: Brenda Dickerson  : 1945  MRN: [id number]  Date of Service: 2023    Discharge Recommendations: Brenda Dickerson scored a 24/24 on the AM-PAC short mobility form. At this time, no further PT is recommended upon discharge. Recommend patient returns to prior setting with prior services. PT Equipment Recommendations  Equipment Needed: No      Patient Diagnosis(es): The encounter diagnosis was TIA (transient ischemic attack). 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 Past Surgical History:  has a past surgical history that includes Tonsillectomy (); Toe amputation (Right, 2021); Foot surgery (Right, 2022); Appendectomy; Inguinal hernia repair (Right); and ORIF humerus fracture. Assessment   Assessment: Patient seen one day after left arm numbness and weakness. Pt is from home with daughter and independent prior to recent admission. Patient is currently recovering from recent T12 fracture. During the session, patient is currently not having any weakness, coordination or numbness in LE or UE's. Patient is Mod I for all mobility, transfers, ambulation and donning and doffing the brace.  PT rec the patient return home, no further acute needs at this time. Will sign off. Please reorder if symptoms change. Decision Making: Low Complexity  Requires PT Follow-Up: No  Activity Tolerance  Activity Tolerance: Patient tolerated treatment well     Plan   Physcial Therapy Plan  General Plan: Discharge with evaluation only  Safety Devices  Type of Devices: All fall risk precautions in place, Left in chair, Call light within reach, Chair alarm in place, Nurse notified     Restrictions  Position Activity Restriction  Other position/activity restrictions: Up as Tolerated     Subjective   General  Chart Reviewed: Yes  Patient assessed for rehabilitation services?: Yes  Additional Pertinent Hx: Patient is a 77 yo male that presents to the hospital 2/20 with co of worsening L arm/thumb numbness, radiating over hand and up the arm. Stated having some intermittent slurring of words today as well. Head CT:  Calcified eccentric atherosclerotic plaque at the origin of the left internal carotid artery results in approximately 50% diameter stenosis. PMH:DM type 2,  Polyneuropathy due to type 2 diabetes mellitus , HTN (hypertension) (I10)  Referring Practitioner: Jamie Seay MD  Diagnosis: Transient Ischemic Attack (TIA)  Subjective  Subjective: Patient found supine in bed. Agreeable to therapy.          Social/Functional History  Social/Functional History  Lives With: Family  Type of Home: Condo  Home Layout: Two level, 1/2 bath on main level  Home Access: Stairs to enter without rails  Entrance Stairs - Number of Steps: 1+1  Bathroom Shower/Tub: Tub/Shower unit  Bathroom Toilet: Standard  Bathroom Equipment: None  Home Equipment: Walker, rolling  Has the patient had two or more falls in the past year or any fall with injury in the past year?: Yes  ADL Assistance: [address]: Independent  Homemaking Responsibilities: Yes  Ambulation Assistance: Independent (Weaning off the RW from T12 fracture 10 weeks ago)  Transfer Assistance: Independent  Active : Yes  Mode of Transportation: Car  Additional Comments: Recovering from T12 fx ~10 weeks ago  Vision/Hearing  Vision  Vision: Impaired  Vision Exceptions: Wears glasses at all times  Hearing  Hearing: Within functional limits    Cognition   Orientation  Orientation Level: Oriented X4     Objective   Heart Rate: 55  Heart Rate Source: Telemetry  BP: 129/75  BP Location: Left upper arm  BP Method: Automatic  Patient Position: Semi fowlers  MAP (Calculated): 93  Resp: 12  SpO2: 95 %  O2 Device: None (Room air)        Gross Assessment  AROM: Within functional limits  Strength:  Within functional limits  Coordination: Within functional limits                    Bed mobility  Rolling to Right: Modified independent (Via log roll)  Supine to Sit: Modified independent  Scooting: Modified independent  Transfers  Sit to Stand: Modified independent  Stand to Sit: Modified independent  Bed to Chair: Modified independent  Ambulation  Surface: Level tile  Device: No Device  Assistance: Modified Independent  Quality of Gait: steady, slowed loreto  Distance: >350ft  Stairs/Curb  Stairs?: Yes  Stairs  # Steps : 12  Stairs Height: 6\"  Rails: Bilateral  Device: No Device  Assistance: Modified independent      Balance  Sitting - Static: Good  Sitting - Dynamic: Good  Standing - Static: Good  Standing - Dynamic: Good           OutComes Score                                                  AM-PAC Score  AM-PAC Inpatient Mobility Raw Score : 24 (02/21/23 1148)  AM-PAC Inpatient T-Scale Score : 61.14 (02/21/23 1148)  Mobility Inpatient CMS 0-100% Score: 0 (02/21/23 1148)  Mobility Inpatient CMS G-Code Modifier : CH (02/21/23 1148)          Tinneti Score       Goals          Education  Patient Education  Education Given To: Patient  Education Provided: Role of Therapy  Education Method: Demonstration  Barriers to Learning: None  Education Outcome: Verbalized understanding;Demonstrated understanding      Therapy Time   Individual Concurrent Group Co-treatment   Time In 0820         Time Out 3919         Minutes 39          Timed Code Treatment Minutes:   24    Total Treatment Minutes:  [address] [facility], [place]

## 2023-02-21 NOTE — ED PROVIDER NOTES
[address] [facility] [address] ENCOUNTER          ATTENDING PHYSICIAN NOTE       Date of evaluation: 2/20/2023    Chief Complaint     Extremity Weakness (Presenting w/ L arm/thumb numbness, radiating over hand and up the arm. Stated having some intermittent slurring of words today as well. )      History of Present Illness     Joanna Garcia is a 78 y.o. male with past medical history noteworthy for TIA presents emergency department for concerns of TIA. He is accompanied with his daughter who helps with the history. He notes that he had left hand weakness as well as left facial droop around 515. He endorsed at that time that his left hand felt heavy as well. The symptoms did resolve on around 545. He at present does not have any symptoms. He does note that he had a previous TIA on the right side with similar symptomatology. At present, he is without symptoms. He denies any numbness or tingling in the lower legs. He endorses compliance on medications. Review: Patient discharged 1/18/2023 after he presented for right arm weakness and numbness, which had spontaneous resolution. At that time there was concern for possible left vertebral artery dissection, which was nonop from neurosurgery evaluation. Triage note:  Presenting w/ L arm/thumb numbness, radiating over hand and up the arm. Stated having some intermittent slurring of words today as well. ASSESSMENT / PLAN  (MEDICAL DECISION MAKING)     INITIAL VITALS: BP: (!) 147/94, Temp: 97.8 °F (36.6 °C), Heart Rate: 70, Resp: 18, SpO2: 97 %      Medical Decision Making  Amount and/or Complexity of Data Reviewed  Labs: ordered. Radiology: ordered. ECG/medicine tests: ordered. Risk  Prescription drug management. Decision regarding hospitalization. Risk:  Joanna Garcia is a 78 y.o. male presenting to the emergency department for concern of TIA. Patient had acute onset of weakness, with complete resolution.   He does have a history noteworthy for TIA, that makes this unlikely diagnosis, as the facial droop was also appreciated by the daughter in the room. On physical examination today, he is without abnormality. Given resolution of symptoms, neurology consulted for recommendations for ongoing care given the patient has known history of TIA. Given his symptoms, they recommended CT head, CTA, and plan for admission and possible MRI in the morning. Endorsed repeat symptomatology around 9 PM with full resolution around 9:45 PM.  Case discussed with inpatient hospitalist, who accepted the patient. Problem: The patient had an acute problem/illness that posed a threat to life or bodily function. Data:  External data reviewed: Yes. Please see HPI and/or MDM. Labs: Ordered and reviewed. Details documented/discussed in MDM. Independent historian: None and Family member. Please see HPI and/or MDM. Radiology: Ordered. Details documented in MDM. Discussed with additional healthcare providers: Consultant - please see above for full details and Hospitalist.     Medications given in the ED or prescribed at discharge:  Medications   iopamidol (ISOVUE-370) 76 % injection 75 mL (75 mLs IntraVENous Given 2/20/23 2055)       Disposition: Admission. Cased discussed/accepted by hospitalist.      Clinical Impression     1. TIA (transient ischemic attack)        Disposition     PATIENT REFERRED TO:  No follow-up provider specified. DISCHARGE MEDICATIONS:  New Prescriptions    No medications on file       DISPOSITION Decision To Admit 02/20/2023 09:21:30 PM      Diagnostic Results and Other Data       RADIOLOGY:  CTA HEAD NECK W CONTRAST   Final Result      Calcified eccentric atherosclerotic plaque at the origin of the left internal carotid artery results in approximately 50% diameter stenosis. Otherwise no significant vascular pathology identified.                PROCEDURE: CT ANGIOGRAPHY HEAD WITH/WITHOUT CONTRAST      INDICATION: concern for TIA      COMPARISON: none      TECHNIQUE: Axial CT imaging obtained through the head prior to and following administration of IV contrast. Axial images, multiplanar reformatted images, and maximum intensity projection images were reviewed for CT angiographic technique. IV contrast: 75 mL Isovue-370. FINDINGS:      ANTERIOR CIRCULATION: The intracranial internal carotid arteries, anterior cerebral arteries, and middle cerebral arteries demonstrate no occlusion or stenosis. No evidence for aneurysm or arteriovenous malformation. POSTERIOR CIRCULATION: The bilateral vertebral arteries, basilar artery and posterior cerebral arteries demonstrate no occlusion or stenosis. No evidence for aneurysm or arteriovenous malformation. INTRACRANIAL VENOUS SYSTEM: No evidence for intracranial venous thrombosis. Refer to recent noncontrast CT head for additional results. .      IMPRESSION:      No significant intracranial vascular pathology seen with no evidence of large vessel occlusion or aneurysm. CT HEAD WO CONTRAST   Final Result      No evidence of acute intracranial abnormality.                      XR CHEST PORTABLE   Final Result      No acute disease             LABS:   Results for orders placed or performed during the hospital encounter of 02/20/23   CBC with Auto Differential   Result Value Ref Range    WBC 6.2 4.0 - 11.0 K/uL    RBC 4.16 (L) 4.20 - 5.90 M/uL    Hemoglobin 11.6 (L) 13.5 - 17.5 g/dL    Hematocrit 33.7 (L) 40.5 - 52.5 %    MCV 81.0 80.0 - 100.0 fL    MCH 27.9 26.0 - 34.0 pg    MCHC 34.5 31.0 - 36.0 g/dL    RDW 15.5 (H) 12.4 - 15.4 %    Platelets 601 088 - 989 K/uL    MPV 8.0 5.0 - 10.5 fL    Neutrophils % 67.7 %    Lymphocytes % 21.8 %    Monocytes % 8.2 %    Eosinophils % 1.4 %    Basophils % 0.9 %    Neutrophils Absolute 4.2 1.7 - 7.7 K/uL    Lymphocytes Absolute 1.4 1.0 - 5.1 K/uL    Monocytes Absolute 0.5 0.0 - 1.3 K/uL    Eosinophils Absolute 0.1 0.0 - 0.6 K/uL    Basophils Absolute 0.1 0.0 - 0.2 K/uL   Comprehensive Metabolic Panel w/ Reflex to MG   Result Value Ref Range    Sodium 135 (L) 136 - 145 mmol/L    Potassium reflex Magnesium 3.6 3.5 - 5.1 mmol/L    Chloride 96 (L) 99 - 110 mmol/L    CO2 27 21 - 32 mmol/L    Anion Gap 12 3 - 16    Glucose 123 (H) 70 - 99 mg/dL    BUN 18 7 - 20 mg/dL    Creatinine 0.9 0.8 - 1.3 mg/dL    Est, Glom Filt Rate >60 >60    Calcium 9.8 8.3 - 10.6 mg/dL    Total Protein 6.9 6.4 - 8.2 g/dL    Albumin 4.2 3.4 - 5.0 g/dL    Albumin/Globulin Ratio 1.6 1.1 - 2.2    Total Bilirubin <0.2 0.0 - 1.0 mg/dL    Alkaline Phosphatase 64 40 - 129 U/L    ALT 17 10 - 40 U/L    AST 20 15 - 37 U/L   Troponin   Result Value Ref Range    Troponin <0.01 <0.01 ng/mL   Protime-INR   Result Value Ref Range    Protime 16.3 (H) 11.7 - 14.5 sec    INR 1.31 (H) 0.87 - 1.14   POCT Glucose   Result Value Ref Range    POC Glucose 107 (H) 70 - 99 mg/dl    Performed on ACCU-CHEK        EKG Interpretation  Normal sinus rhythm, first-degree AV block, left axis deviation, no ST segment elevation, no T wave inversion, possible left anterior fascicular block    ED BEDSIDE ULTRASOUND:  No results found. MOST RECENT VITALS:  BP: 133/69,Temp: 97.8 °F (36.6 °C), Heart Rate: 75, Resp: 20, SpO2: 96 %     Procedures       ED Course     Nursing Notes, Past Medical Hx, Past Surgical Hx, Social Hx,Allergies, and Family Hx were reviewed.          The patient was given the following medications:  Orders Placed This Encounter   Medications    iopamidol (ISOVUE-370) 76 % injection 75 mL       CONSULTS:  [address] BASE FLUIDS  IP CONSULT TO NEUROLOGY  IP CONSULT TO HOSPITALIST    Review of Systems   Constitutional: negative for fevers   Head: negative for headache  Eyes: negative for visual changes  Cardiac: negative for chest pain  Respiratory: negative for SOB  GI: negative for abdominal pain  : negative for hematuria  MSK: negative for muscle pain  Neuro: Positive for weakness, numbness to the left arm; left-sided facial droop  Skin: negative for rashes or lesions    Past Medical, Surgical, Family, and Social History     He has a past medical history of Allergic reaction to sulfonamide, Atrial fibrillation ([address] Bacterial infection due to Morganella morganii, Bee sting reaction, Bulging of cervical intervertebral disc, Cephalexin adverse reaction, Fracture of left elbow, Group B streptococcal infection, History of cardioversion, History of right inguinal hernia repair, Hidalgo's neuroma of right foot, Open fracture of left elbow, Open fracture of left humerus, Pseudomonas infection, and Rupture of appendix. 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 His family history includes Sleep Apnea in his father. He reports that he has never smoked. He has never used smokeless tobacco. He reports that he does not drink alcohol and does not use drugs. Medications     Previous Medications    ACETAMINOPHEN (TYLENOL) 500 MG TABLET    Take 1,000 mg by mouth daily as needed    AMLODIPINE (NORVASC) 10 MG TABLET    Take 1 tablet by mouth every morning    APIXABAN (ELIQUIS) 5 MG TABS TABLET    Take 1 tablet by mouth 2 times daily    ATORVASTATIN (LIPITOR) 40 MG TABLET    Take 40 mg by mouth nightly    CICLOPIROX (LOPROX) 0.77 % CREAM    Apply topically daily    CLONIDINE (CATAPRES) 0.2 MG TABLET    Take 0.2 mg by mouth 2 times daily    ENALAPRIL (VASOTEC) 10 MG TABLET    Take 10 mg by mouth 2 times daily    FENOFIBRATE (TRIGLIDE) 160 MG TABLET    Take 160 mg by mouth every evening Take one tablet by mouth daily at 6 PM.    FINASTERIDE (PROSCAR) 5 MG TABLET    Take 5 mg by mouth daily    GABAPENTIN (NEURONTIN) 300 MG CAPSULE    Take 300 mg by mouth 2 times daily.     GLIMEPIRIDE (AMARYL) 2 MG TABLET    Take 2 mg by mouth 2 times daily    LORAZEPAM (ATIVAN) 1 MG TABLET    Take 1 mg by mouth 2 times daily. LOSARTAN-HYDROCHLOROTHIAZIDE (HYZAAR) 100-25 MG PER TABLET    Take 1 tablet by mouth every evening    MAGNESIUM OXIDE (MAG-OX) 400 MG TABLET    Take 1 tablet by mouth 3 times daily    METFORMIN (GLUCOPHAGE) 1000 MG TABLET    Take 1,000 mg by mouth 2 times daily    METOPROLOL TARTRATE (LOPRESSOR) 50 MG TABLET    Take 50 mg by mouth 2 times daily    PAROXETINE (PAXIL) 20 MG TABLET    Take 20 mg by mouth every evening    SAXAGLIPTIN (ONGLYZA) 5 MG TABS TABLET    Take 5 mg by mouth every evening       Allergies     He is allergic to bee venom, cephalexin, cephalosporins, propoxyphene, and sulfa antibiotics. Physical Exam     INITIAL VITALS: BP: (!) 147/94, Temp: 97.8 °F (36.6 °C), Heart Rate: 70, Resp: 18, SpO2: 97 %   Physical Exam    Physical Exam   General: awake, alert, NAD  Eyes: EOMI, vision grossly intact   HENT: NCAT, no nasal or tympanic discharge  Cardiac: regular\ rate and rhythm w/o MRG  Resp: normal respiratory effort; CTAB; good air movement  Abdomen: soft, non-tender, without rebound/guarding  Back: In back brace   Extremities: no edema bilaterally; bilateral 2+ pulses (radial and dorsalis pedis)  Skin: warm, dry; without rashes or bruising  CN II-XII: tested and without focal neurologic deficit  Sensation: without deficit in upper and lower extremities bilaterally  Motor: Normal tone and bulk. No abnormal movements appreciated.    Right arm:   Shoulder abduction 5/5   Elbow flexion 5/5   Elbow extension 5/5    Strength 5/5  Left arm:    Shoulder abduction 5/5   Elbow flexion 5/5   Elbow extension 5/5    Strength 5/5  Right leg:   Hip flexion 5/5   Hip extension 5/5   Dorsiflexion 5/5   Plantarflexion 5/5  Left leg:   Hip flexion 5/5   Hip extension 5/5   Dorsiflexion 5/5   Plantarflexion 5/5  Coordination: Finger to nose intact bilaterally  No double simultaneous extinction         Sisi Emery MD  02/20/23 5676

## 2023-02-21 NOTE — PLAN OF CARE
Problem: Safety - Adult  Goal: Free from fall injury  Outcome: Progressing  Flowsheets (Taken 2/21/2023 0452)  Free From Fall Injury:   Instruct family/caregiver on patient safety   Based on caregiver fall risk screen, instruct family/caregiver to ask for assistance with transferring infant if caregiver noted to have fall risk factors     Problem: Neurosensory - Adult  Goal: Achieves stable or improved neurological status  Outcome: Progressing  Flowsheets (Taken 2/21/2023 0452)  Achieves stable or improved neurological status:   Assess for and report changes in neurological status   Initiate measures to prevent increased intracranial pressure   Maintain blood pressure and fluid volume within ordered parameters to optimize cerebral perfusion and minimize risk of hemorrhage   Monitor temperature, glucose, and sodium.  Initiate appropriate interventions as ordered     Problem: Respiratory - Adult  Goal: Achieves optimal ventilation and oxygenation  Outcome: Progressing  Flowsheets (Taken 2/21/2023 0452)  Achieves optimal ventilation and oxygenation:   Assess for changes in respiratory status   Position to facilitate oxygenation and minimize respiratory effort   Initiate smoking cessation protocol as indicated   Assess the need for suctioning and aspirate as needed   Respiratory therapy support as indicated   Assess and instruct to report shortness of breath or any respiratory difficulty   Encourage broncho-pulmonary hygiene including cough, deep breathe, incentive spirometry   Oxygen supplementation based on oxygen saturation or arterial blood gases   Assess for changes in mentation and behavior     Problem: Skin/Tissue Integrity - Adult  Goal: Skin integrity remains intact  Outcome: Progressing  Flowsheets (Taken 2/21/2023 0452)  Skin Integrity Remains Intact:   Monitor for areas of redness and/or skin breakdown   Assess vascular access sites hourly   Every 4-6 hours minimum: Change oxygen saturation probe site Every 4-6 hours: If on nasal continuous positive airway pressure, respiratory therapy assesses nares and determine need for appliance change or resting period     Problem: Musculoskeletal - Adult  Goal: Maintain proper alignment of affected body part  Outcome: Progressing  Flowsheets (Taken 2/21/2023 0452)  Maintain proper alignment of affected body part:   Support and protect limb and body alignment per provider's orders   Instruct and reinforce with patient and family use of appropriate assistive device and precautions (e.g. spinal or hip dislocation precautions)     Problem: Metabolic/Fluid and Electrolytes - Adult  Goal: Electrolytes maintained within normal limits  Outcome: Progressing  Flowsheets (Taken 2/21/2023 0452)  Electrolytes maintained within normal limits:   Monitor labs and assess patient for signs and symptoms of electrolyte imbalances   Monitor response to electrolyte replacements, including repeat lab results as appropriate   Instruct patient on fluid and nutrition restrictions as appropriate   Fluid restriction as ordered   Administer electrolyte replacement as ordered     Problem: Metabolic/Fluid and Electrolytes - Adult  Goal: Glucose maintained within prescribed range  Outcome: Progressing  Flowsheets (Taken 2/21/2023 0452)  Glucose maintained within prescribed range:   Administer ordered medications to maintain glucose within target range   Instruct patient on self management of diabetes and initiate consult as needed   Monitor blood glucose as ordered   Assess for signs and symptoms of hyperglycemia and hypoglycemia   Assess barriers to adequate nutritional intake and initiate nutrition consult as needed     Problem: Hematologic - Adult  Goal: Maintains hematologic stability  Outcome: Progressing  Flowsheets (Taken 2/21/2023 0452)  Maintains hematologic stability:   Assess for signs and symptoms of bleeding or hemorrhage   Administer blood products/factors as ordered   Monitor labs for bleeding or clotting disorders

## 2023-02-21 NOTE — CONSULTS
Neurology / Neurocritical Care Consult Note      Alfredo Vilchis MD is requesting this consult. Reason for Consult: TIA  Admission Chief Complaint: TIA    History of Present Illness     Alice Barlow is a 78 y.o. y/o male with history significant for atrial fibrillation, hypertension, hyperlipidemia, and type 2 diabetes mellitus with presents left hand, arm, and face numbness that lasted 30 minutes and has since resolved. Per my interview with the patient, the patient reports sudden onset left hand numbness that progressed to encompass his whole arm and lower section of his left face. He reports left lower facial drooping and slurred speech during the event. The symptoms began to improve after 10 minutes and were completely gone after 30 minutes. Patient denies vision changes, dizziness, and vertigo. There was no reported muscle weakness and patient could close his fist during the event. While in the ER, he reports having another episode of left hand numbness that lasted a few minutes and then resolved. Since admission, patient reports constant headache that he relates to his increased blood pressure. He reports no residual symptoms from the event yesterday. Patient has had two previous similar episodes. 1/10/23 patient presented to ER with right hand, arm, and lower facial weakness that resolved after an hour. While he was in the ER, he reports having had two more episodes of right hand numbness that improved after 5-10 minutes. 1/15/23 patient reports having right hand numbness that lasted 5 minutes. He did not seek medical attention for this episode.       History provided by:  Patient            REVIEW OF SYSTEMS:   Constitutional- No weight loss or fevers   Neurologic- denies ataxia, gait problems, vision changes, vertigo, dizziness    Past Medical, Surgical, Family, and Social History   PAST MEDICAL HISTORY:  Past Medical History:   Diagnosis Date    Allergic reaction to sulfonamide 1960    Atrial fibrillation ([address]     Bacterial infection due to Morganella morganii     Bee sting reaction 2000    BPH associated with nocturia     Bulging of cervical intervertebral disc     c4 & c5    Cephalexin adverse reaction 1993    DM (diabetes mellitus), type 2 ([address]     Dyslipidemia     Fracture of left elbow 1956    Group B streptococcal infection     Hematuria     History of cardioversion     History of right inguinal hernia repair 1987    HTN (hypertension)     Hidalgo's neuroma of right foot 1989    Open fracture of left elbow 1991    Open fracture of left humerus 1991    Polyneuropathy due to type 2 diabetes mellitus ([address]     Pseudomonas infection     Rupture of appendix 1970    TIA (transient ischemic attack)      SURGICAL HISTORY:  Past Surgical History:   Procedure Laterality Date    APPENDECTOMY      FOOT SURGERY Right 02/21/2022    SECOND METATARSAL HEAD RESECTION, RIGHT FOOT; COMPLEX WOUND CLOSURE-RIGHT FOOT; BONE BIOPSY-RIGHT FOOT (48407, 08059, 20240)-LOCAL performed by [facility] at [address] Right     ORIF HUMERUS FRACTURE      TOE AMPUTATION Right 05/17/2021    THIRD DISTAL PHALANYX AMPUTATION VS THIRD GREAT TOE AMPUTATION RIGHT FOOT performed by [facility] at [address] & SOCIAL HISTORY:  Family history non-contributory  Family History   Problem Relation Age of Onset    Diabetes type 2  Mother     Heart Failure Mother     Sleep Apnea Father     Lymphoma Father      Social History     Tobacco Use    Smoking status: Never    Smokeless tobacco: Never   Vaping Use    Vaping Use: Never used   Substance Use Topics    Alcohol use: Never    Drug use: Never         Allergies & Outpatient Medications   ALLERGIES:  Allergies   Allergen Reactions    Bee Venom Swelling    Cephalexin Swelling    Cephalosporins     Propoxyphene Other (See Comments)     Hallucinations  (Darvon/Darvocet)    Sulfa Antibiotics      HOME MEDICATIONS:  Current Discharge Medication List        CONTINUE these medications which have NOT CHANGED    Details   acetaminophen (TYLENOL) 500 MG tablet Take 1,000 mg by mouth daily as needed      apixaban (ELIQUIS) 5 MG TABS tablet Take 1 tablet by mouth 2 times daily  Qty: 180 tablet, Refills: 3      magnesium oxide (MAG-OX) 400 MG tablet Take 1 tablet by mouth 3 times daily  Qty: 270 tablet, Refills: 3      ciclopirox (LOPROX) 0.77 % cream Apply topically daily      finasteride (PROSCAR) 5 MG tablet Take 5 mg by mouth daily      gabapentin (NEURONTIN) 300 MG capsule Take 300 mg by mouth 2 times daily. amLODIPine (NORVASC) 10 MG tablet Take 1 tablet by mouth every morning      atorvastatin (LIPITOR) 40 MG tablet Take 40 mg by mouth nightly      cloNIDine (CATAPRES) 0.2 MG tablet Take 0.2 mg by mouth 2 times daily      enalapril (VASOTEC) 10 MG tablet Take 10 mg by mouth 2 times daily      fenofibrate (TRIGLIDE) 160 MG tablet Take 160 mg by mouth every evening Take one tablet by mouth daily at 6 PM.      glimepiride (AMARYL) 2 MG tablet Take 2 mg by mouth 2 times daily      losartan-hydroCHLOROthiazide (HYZAAR) 100-25 MG per tablet Take 1 tablet by mouth every evening      LORazepam (ATIVAN) 1 MG tablet Take 1 mg by mouth 2 times daily.       metFORMIN (GLUCOPHAGE) 1000 MG tablet Take 1,000 mg by mouth 2 times daily      metoprolol tartrate (LOPRESSOR) 50 MG tablet Take 50 mg by mouth 2 times daily      sAXagliptin (ONGLYZA) 5 MG TABS tablet Take 5 mg by mouth every evening      PARoxetine (PAXIL) 20 MG tablet Take 20 mg by mouth every evening               Physical Exam   PHYSICAL EXAM:  Vitals:    02/21/23 0158 02/21/23 0402 02/21/23 0404 02/21/23 0802   BP: (!) 153/79 (!) 192/97 (!) 178/85 (!) 190/92   Pulse: 61  59 63   Resp: 13  14 12   Temp: 98.2 °F (36.8 °C)  98.4 °F (36.9 °C) 98.3 °F (36.8 °C)   TempSrc: Oral  Oral Oral   SpO2: 96%  94% 100%   Weight:       Height:             General: Alert, no distress, well-nourished  Neurologic  Mental status:   Orientation: to person, place, time, situation   Attention: intact as able to attend well to the exam     Language: fluent in conversation   Comprehension intact; follows simple commands    Cranial nerves:   CN2: Visual fields full w/o extinction on confrontational testing   CN 3,4,6: Pupils equal and reactive to light, extraocular muscles intact  CN5: Facial sensation symmetric   CN7: Face movements symmetric  CN8: Hearing symmetric to spoken voice  CN9: Palate elevated symmetrically  CN11: Traps full strength on shoulder shrug  CN12: Tongue midline with protrusion    Motor Exam:   R  L    Deltoid 5/5  5/5   Biceps 5/5 5/5   Triceps 5/5 5/5   Wrist extension  5/5 5/5   Interossei 5/5 5/5      R  L    Hip flexion  5/5  5/5   Hip extension  5/5 5/5   Knee flexion  5/5 5/5   Knee extension  5/5 5/5   Ankle dorsiflexion  5/5 5/5   Ankle plantar flexion  5/5 5/5     Deep tendon reflexes: deferred   Sensory: light touch intact and symmetric in all 4 extremities. Decreased sensation in medial two digits ( nerve injury during elbow surgery)  Cerebellar/coordination: finger nose finger normal without ataxia  Tone: normal in all 4 extremities  Gait: did not test    LABS:  All results below personally reviewed. Pertinent positives & negatives are addressed in Impression & Recommendations below.      LABS   Metabolic Panel Recent Labs     02/20/23 2009 02/20/23 2102 02/21/23  0115   *  --   --    K 3.6  --   --    CL 96*  --   --    CO2 27  --   --    BUN 18  --   --    CREATININE 0.9  --   --    GLUCOSE 123* 107 99   CALCIUM 9.8  --   --    LABALBU 4.2  --   --    ALKPHOS 64  --   --    ALT 17  --   --    AST 20  --   --       CBC / Coags Recent Labs     02/20/23 2009 02/21/23  0305   WBC 6.2 5.4   RBC 4.16* 4.19*   HGB 11.6* 11.5*   HCT 33.7* 34.1*    211   INR 1.31*  --       Other No results for input(s): LABA1C, LDLCALC, TRIG, TSH, YOGURRGY16, FOLATE, Kamran Osuna in the last 72 hours. No results for input(s): PHENYTOIN, KEPPRA, LACOSA, LAMO, VALPROATE, LACTSEPSIS, LACTA in the last 72 hours. CURRENT SCHEDULED MEDICATIONS   Inpatient Medications     atorvastatin, 80 mg, Oral, Nightly    insulin lispro, 0-4 Units, SubCUTAneous, TID WC    insulin lispro, 0-4 Units, SubCUTAneous, Nightly    amLODIPine, 10 mg, Oral, QAM    cloNIDine, 0.2 mg, Oral, BID    enalapril, 10 mg, Oral, BID    fenofibrate, 160 mg, Oral, Daily    finasteride, 5 mg, Oral, Daily    gabapentin, 300 mg, Oral, BID    LORazepam, 1 mg, Oral, BID    losartan-hydroCHLOROthiazide, 1 tablet, Oral, QPM    metoprolol tartrate, 50 mg, Oral, BID    PARoxetine, 20 mg, Oral, QPM    alogliptin, 25 mg, Oral, Daily   Infusions    dextrose                  IMPRESSION & RECOMMENDATIONS     IMPRESSION:  Mr. Kavita Sim is doing better today after his event yesterday evening. He reports no recurrence of symptoms or remaining deficits. His exam reveals no remaining symptoms from the event yesterday.   -The etiology of these recent events is unknown. TIA would not be likely to present as multiple episodes in this short time period and bilaterally.   -Seizures would also be be a possible explanation due to the coming and going of symptoms. However, it would also be abnormal for the presentation to be bilateral and no post-ictal state.   -Migraines would also be an uncommon presentation due to the patient's age and no previous history of migraines. RECOMMENDATIONS:  EEG to evaluate for possible seizure activity. Follow-up with an outpatient neurologist for further evaluation for migraine or seizures or other possible explanations for these acute, recurrent episodes. Continue following with cardiology to maximize his stroke protection by maintaining control of the atrial fibrillation and hypertension.       Eloy Forrest, [address] [facility]  Neurology & Neurocritical Care   2/21/2023 9:41 AM    ICU Patients:   Neurocritical Care Line: [phone]  [facility]: [facility] Neurocritical Care  Floor / PCU Patients:  Neurology Line: [phone]  [facility]: [facility] Neurology

## 2023-02-21 NOTE — PROGRESS NOTES
Clinical Pharmacy Consult Note    78 y.o. male admitted with Extremity Weakness. Pharmacy has been asked by Dr. Lyle Agee to adjust all drips to normal saline as appropriate based on compatibility to avoid fluid shifts since D5 is osmotically active. The following intermittent IV drips/infusions have been adjusted to saline:  N/a    The following medications must remain in D5W due to incompatibility with normal saline:  Amphotericin  Mycophenolate  Nitroprusside  Penicillin G Potassium    Please be aware that patient has D5W ordered as part of hypoglycemia orderset. [facility] will follow daily to ensure all new IVPBs + drips are in NS. Please call with questions.   Eliezer Doe, PharmD  Main Pharmacy: [id number]

## 2023-02-21 NOTE — CARE COORDINATION
Case Management Assessment            Discharge Note                    Date / Time of Note: 2/21/2023 3:12 PM                  Discharge Note Completed by: ARACELI Chan, KOKIW    Patient Name: Katherine Yan   YOB: 1945  Diagnosis: TIA (transient ischemic attack) [G45.9]  Transient ischemic attack (TIA) [G45.9]   Date / Time: 2/20/2023  6:41 PM    Current PCP: Lisa Galvan MD  Clinic patient: No    Hospitalization in the last 30 days: No    Advance Directives:  Code Status: Full Code  [place] DNR form completed and on chart: No    Financial:  Payor: Melina Quiñones / Plan: MEDICARE PART A AND B / Product Type: *No Product type* /      Pharmacy:    [id number] [address]  [address]  Phone: [phone] Fax: [phone]      Assistance purchasing medications?: Potential Assistance Purchasing Medications: No  Assistance provided by Case Management: None at this time    Does patient want to participate in local refill/ meds to beds program?:      Meds To Beds General Rules:  1. Can ONLY be done Monday- Friday between 8:30am-5pm  2. Prescription(s) must be in pharmacy by 3pm to be filled same day  3. Copy of patient's insurance/ prescription drug card and patient face sheet must be sent along with the prescription(s)  4. Cost of Rx cannot be added to hospital bill. If financial assistance is needed, please contact unit  or ;  or  CANNOT provide pharmacy voucher for patients co-pays  5.  Patients can then  the prescription on their way out of the hospital at discharge, or pharmacy can deliver to the bedside if staff is available. (payment due at time of pick-up or delivery - cash, check, or card accepted)     Able to afford home medications/ co-pay costs: Yes    ADLS:  Current PT AM-PAC Score: 24 /24  Current OT AM-PAC Score: 23 /24      DISCHARGE Disposition: Home- No Services Needed    LOC at discharge: Not Applicable  DARLENE Completed: No    Notification completed in HENS/PAS?:  No    IMM Completed:   Not Indicated    Transportation:  Transportation PLAN for discharge: family   Mode of Transport: Private Car    Referrals made at [employer] for outpatient continued care:  Not Applicable    Additional CM Notes:       SW met w/Pt at bedside. Pt is from home w/his daughter and will dc home. Pt denies [address] and DME needs. Pt stated he is independent with all ADLs. Pt's family will transport him home. The Plan for Transition of Care is related to the following treatment goals of TIA (transient ischemic attack) [G45.9]  Transient ischemic attack (TIA) [G45.9]    The Patient and/or patient representative Eve Lowe and his family were provided with a choice of provider and agrees with the discharge plan Yes    Freedom of choice list was provided with basic dialogue that supports the patient's individualized plan of care/goals and shares the quality data associated with the providers.  Yes    Care Transitions patient: No    ARACELI Huber, [facility]  Case Management Department  Ph: [phone]

## 2023-02-21 NOTE — H&P
Hospital Medicine History & Physical      PCP: Gopal Pollard MD    Date of Admission: 2/20/2023    Date of Service: Pt seen/examined on 2/20/2023 and placed in Observation. Chief Complaint: Left facial and upper extremity weakness and numbness. History Of Present Illness:    78 y.o. male with significant past medical history of chronic A-fib on anticoagulation, microscopic hematuria with gross hematuria when taking aspirin, diabetes type 2, TIA who presented to [facility] ED with left facial and right upper extremity weakness and numbness. Episode started around 1600 hrs. and lasted about 45 minutes with complete resolution. However, this since she had similar episode on the contralateral side about a month ago he decided to come in and be checked out. While in the emergency room he had another episode that lasted only a few minutes. In emergency room his temperature was 36.6, blood pressure 147/94, pulse 70, respirations 18, sats 97% on room air.     Past Medical History:          Diagnosis Date    Allergic reaction to sulfonamide 1960    Atrial fibrillation ([address]     Bacterial infection due to Morganella morganii     Bee sting reaction 2000    BPH associated with nocturia     Bulging of cervical intervertebral disc     c4 & c5    Cephalexin adverse reaction 1993    DM (diabetes mellitus), type 2 ([address]     Dyslipidemia     Fracture of left elbow 1956    Group B streptococcal infection     Hematuria     History of cardioversion     History of right inguinal hernia repair 1987    HTN (hypertension)     Hidalgo's neuroma of right foot 1989    Open fracture of left elbow 1991    Open fracture of left humerus 1991    Polyneuropathy due to type 2 diabetes mellitus ([address]     Pseudomonas infection     Rupture of appendix 1970    TIA (transient ischemic attack)        Past Surgical History:          Procedure Laterality Date    APPENDECTOMY      FOOT SURGERY Right 02/21/2022    SECOND METATARSAL HEAD RESECTION, RIGHT FOOT; COMPLEX WOUND CLOSURE-RIGHT FOOT; BONE BIOPSY-RIGHT FOOT (55268, 37049, 87096)-LOCAL performed by Lin Kline DPM at [address] Right     ORIF HUMERUS FRACTURE      TOE AMPUTATION Right 05/17/2021    THIRD DISTAL PHALANYX AMPUTATION VS THIRD GREAT TOE AMPUTATION RIGHT FOOT performed by Lin Kline DPM at [facility]       Medications Prior to Admission:      Prior to Admission medications    Medication Sig Start Date End Date Taking? Authorizing Provider   acetaminophen (TYLENOL) 500 MG tablet Take 1,000 mg by mouth daily as needed    Historical Provider, MD   apixaban (ELIQUIS) 5 MG TABS tablet Take 1 tablet by mouth 2 times daily 1/18/23   SUKUMAR Hart CNP   magnesium oxide (MAG-OX) 400 MG tablet Take 1 tablet by mouth 3 times daily 1/18/23   SUKUMAR Hart - CNP   ciclopirox (LOPROX) 0.77 % cream Apply topically daily    Historical Provider, MD   finasteride (PROSCAR) 5 MG tablet Take 5 mg by mouth daily 8/19/21   Historical Provider, MD   gabapentin (NEURONTIN) 300 MG capsule Take 300 mg by mouth 2 times daily.     Historical Provider, MD   amLODIPine (NORVASC) 10 MG tablet Take 1 tablet by mouth every morning 11/4/20   Historical Provider, MD   atorvastatin (LIPITOR) 40 MG tablet Take 40 mg by mouth nightly 11/4/20   Historical Provider, MD   cloNIDine (CATAPRES) 0.2 MG tablet Take 0.2 mg by mouth 2 times daily 11/4/20   Historical Provider, MD   enalapril (VASOTEC) 10 MG tablet Take 10 mg by mouth 2 times daily 11/4/20   Historical Provider, MD   fenofibrate (TRIGLIDE) 160 MG tablet Take 160 mg by mouth every evening Take one tablet by mouth daily at 6 PM. 11/4/20   Historical Provider, MD   glimepiride (AMARYL) 2 MG tablet Take 2 mg by mouth 2 times daily 11/4/20   Historical Provider, MD   losartan-hydroCHLOROthiazide (HYZAAR) 100-25 MG per tablet Take 1 tablet by mouth every evening 11/4/20   Historical Provider, MD   LORazepam (ATIVAN) 1 MG tablet Take 1 mg by mouth 2 times daily. 11/4/20   Historical Provider, MD   metFORMIN (GLUCOPHAGE) 1000 MG tablet Take 1,000 mg by mouth 2 times daily 11/4/20   Historical Provider, MD   metoprolol tartrate (LOPRESSOR) 50 MG tablet Take 50 mg by mouth 2 times daily 11/4/20   Historical Provider, MD   sAXagliptin (ONGLYZA) 5 MG TABS tablet Take 5 mg by mouth every evening 11/4/20   Historical Provider, MD   PARoxetine (PAXIL) 20 MG tablet Take 20 mg by mouth every evening 11/4/20   Historical Provider, MD       Allergies:  Bee venom, Cephalexin, Cephalosporins, Propoxyphene, and Sulfa antibiotics    Social History:      The patient currently lives at home. TOBACCO:   reports that he has never smoked. He has never used smokeless tobacco.  ETOH:   reports no history of alcohol use. E-cigarette/Vaping       Questions Responses    E-cigarette/Vaping Use Never User    Start Date     Passive Exposure     Quit Date     Counseling Given     Comments               Family History:       Reviewed and negative in regards to presenting illness/complaint. Problem Relation Age of Onset    Diabetes type 2  Mother     Heart Failure Mother     Sleep Apnea Father     Lymphoma Father        REVIEW OF SYSTEMS COMPLETED:   Pertinent positives as noted in the HPI. All other systems reviewed and negative. PHYSICAL EXAM PERFORMED:    BP (!) 178/85   Pulse 59   Temp 98.4 °F (36.9 °C) (Oral)   Resp 14   Ht 5' 10\" (1.778 m)   Wt 206 lb (93.4 kg)   SpO2 94%   BMI 29.56 kg/m²     General appearance: Well-developed/well-nourished in no apparent distress, appears stated age and cooperative. HEENT:  Normal cephalic, atraumatic without obvious deformity. Pupils equal, round, and reactive to light. Extra ocular muscles intact. Conjunctivae/corneas clear. Moist mucous membranes. Neck: Supple, with full range of motion. No jugular venous distention. No carotid bruit.   No thyromegaly or lymphadenopathy. Trachea midline. Respiratory:  Normal respiratory effort. Clear to auscultation, bilaterally without Rales/Wheezes/Rhonchi. Cardiovascular: Regular rate and rhythm with normal S1 and S2. Abdomen: Soft, non-tender, non-distended with normal bowel sounds. Musculoskeletal:  No clubbing, cyanosis or edema bilaterally. [place] and Anton nodes bilaterally. Skin: Skin color, texture, turgor normal.  No rashes or lesions. Neurologic:  Neurovascularly intact without any focal sensory/motor deficits. Cranial nerves: II-XII intact, grossly non-focal.  Psychiatric:  Alert and oriented, thought content appropriate, normal insight  Capillary Refill: Brisk,3 seconds, normal  Peripheral Pulses: +2 palpable, equal bilaterally       Labs:     Recent Labs     02/20/23 2009 02/21/23  0305   WBC 6.2 5.4   HGB 11.6* 11.5*   HCT 33.7* 34.1*    211     Recent Labs     02/20/23 2009   *   K 3.6   CL 96*   CO2 27   BUN 18   CREATININE 0.9   CALCIUM 9.8     Recent Labs     02/20/23 2009   AST 20   ALT 17   BILITOT <0.2   ALKPHOS 64     Recent Labs     02/20/23 2009   INR 1.31*     Recent Labs     02/20/23 2009 02/21/23  0304   TROPONINI <0.01 <0.01       Urinalysis:      Lab Results   Component Value Date/Time    NITRU Negative 01/09/2023 01:12 AM    WBCUA >100 01/09/2023 01:12 AM    BACTERIA 4+ 01/09/2023 01:12 AM    RBCUA see below 01/09/2023 01:12 AM    BLOODU MODERATE 01/09/2023 01:12 AM    SPECGRAV >=1.030 01/09/2023 01:12 AM    GLUCOSEU 250 01/09/2023 01:12 AM       Radiology:     CXR: I have reviewed the CXR with the following interpretation: No acute cardiopulmonary process. EKG:  I have reviewed the EKG with the following interpretation: Sinus with first-degree AV block, no acute ischemic changes. MRI brain without contrast   Final Result     No acute infarct, hemorrhage, or hydrocephalus.           CTA HEAD NECK W CONTRAST   Final Result      Calcified eccentric atherosclerotic plaque at the origin of the left internal carotid artery results in approximately 50% diameter stenosis. Otherwise no significant vascular pathology identified. PROCEDURE: CT ANGIOGRAPHY HEAD WITH/WITHOUT CONTRAST      INDICATION: concern for TIA      COMPARISON: none      TECHNIQUE: Axial CT imaging obtained through the head prior to and following administration of IV contrast. Axial images, multiplanar reformatted images, and maximum intensity projection images were reviewed for CT angiographic technique. IV contrast: 75 mL Isovue-370. FINDINGS:      ANTERIOR CIRCULATION: The intracranial internal carotid arteries, anterior cerebral arteries, and middle cerebral arteries demonstrate no occlusion or stenosis. No evidence for aneurysm or arteriovenous malformation. POSTERIOR CIRCULATION: The bilateral vertebral arteries, basilar artery and posterior cerebral arteries demonstrate no occlusion or stenosis. No evidence for aneurysm or arteriovenous malformation. INTRACRANIAL VENOUS SYSTEM: No evidence for intracranial venous thrombosis. Refer to recent noncontrast CT head for additional results. .      IMPRESSION:      No significant intracranial vascular pathology seen with no evidence of large vessel occlusion or aneurysm. CT HEAD WO CONTRAST   Final Result      No evidence of acute intracranial abnormality.                      XR CHEST PORTABLE   Final Result      No acute disease             Consults:    IP CONSULT TO PHARMACY  PHARMACY TO CHANGE BASE FLUIDS  IP CONSULT TO NEUROLOGY  IP CONSULT TO HOSPITALIST  IP CONSULT TO NEUROLOGY    ASSESSMENT:    Active Hospital Problems    Diagnosis Date Noted    Transient ischemic attack (TIA) [G45.9] 02/20/2023     Priority: High    Persistent atrial fibrillation (HCC) [I48.19]      Priority: Medium    Microscopic hematuria [R31.29] 02/20/2023     Priority: Low    Normocytic anemia [D64.9] 02/20/2023     Priority: Low    BPH associated with nocturia [N40.1, R35.1] 02/20/2023     Priority: Low    Diabetic polyneuropathy associated with type 2 diabetes mellitus ([facility] [E11.42]      Priority: Low    SURAJ (obstructive sleep apnea) [G47.33] 05/12/2021     Priority: Low    Major depression, chronic [F32.9] 03/29/2018     Priority: Low    Type 2 diabetes mellitus with other specified complication ([facility] [H61.85] 10/13/2016     Priority: Low    Mixed hyperlipidemia [E78.2] 10/13/2016     Priority: Low    Essential hypertension [I10] 07/30/1997     Priority: Low         PLAN:  -Follow-up MRI of the brain without contrast  -Consider repeat echo  -Neurology consult  -Continue statin but hold aspirin secondary to gross hematuria which she needs further evaluation as an outpatient  -Check iron studies  -Low insulin sliding scale before every meal and at bedtime as needed  -Continue home regimen for chronic stable conditions  -Continue CPAP      DVT Prophylaxis: Eliquis  Diet: Diet NPO  Code Status: Full Code    PT/OT Eval Status: Pending    Hillray Katz MD    Thank you Gloria Jacobs MD for the opportunity to be involved in this patient's care. If you have any questions or concerns please feel free to contact me at [phone].

## 2023-02-21 NOTE — PLAN OF CARE
Problem: Safety - Adult  Goal: Free from fall injury  2/21/2023 0452 by Landon Fernandez RN  Outcome: Progressing  Flowsheets (Taken 2/21/2023 3346)  Free From Fall Injury:   Elvira Lambert family/caregiver on patient safety   Based on caregiver fall risk screen, instruct family/caregiver to ask for assistance with transferring infant if caregiver noted to have fall risk factors     Problem: Neurosensory - Adult  Goal: Achieves stable or improved neurological status  2/21/2023 1647 by Mike Man RN  Outcome: Progressing     Problem: Skin/Tissue Integrity - Adult  Goal: Skin integrity remains intact  2/21/2023 1647 by Mike Man RN  Outcome: Progressing

## 2023-02-21 NOTE — PROGRESS NOTES
Occupational Therapy  Facility/Department: [facility]  Occupational Therapy Initial Assessment/Tx/Discharge Note    Name: Teo Izquierdo  : 1945  MRN: [id number]  Date of Service: 2023    Assessment: Pt's L sided sensation impairments have resolved. Pt demonstrated independent mobility and ADL completion. He is not at his baseline given back surgery about 10 weeks ago, but is safe to d/c home. No acute OT needs identified. Discharge Recommendations: Teo Izquierdo scored a 23/24 on the AM-[facility] ADL Inpatient form. At this time, no further OT is recommended upon discharge. Recommend patient returns to prior setting with prior services. OT Equipment Recommendations  Equipment Needed: No           Assessment     Decision Making: Low Complexity  No Skilled OT: Safe to return home; No OT goals identified  REQUIRES OT FOLLOW-UP: No  Activity Tolerance  Activity Tolerance: Patient Tolerated treatment well  Activity Tolerance Comments: Educated on maintain his activity levels during admit - verb understanding        Plan  D/C OT services        Restrictions  Position Activity Restriction  Other position/activity restrictions: Up as Tolerated    Subjective   General  Chart Reviewed: Yes  Patient assessed for rehabilitation services?: Yes  Additional Pertinent Hx: 78 y.o. M admitted  with transient L sided numbness. Has had 2 such episodes in . Undergoing further work-up; thus far, CT demos 50% restriction of L ICA only. Pt recovering from recent back surgery for T12 fx from fall from roof. Family / Caregiver Present: No  Referring Practitioner: Ching  Subjective  Subjective: Pt in bed on entry. Pleasant and cooperative with therapy. General Comment  Comments: Pt has pre-existing low back pain, tolerable.      Social/Functional History  Social/Functional History  Lives With: Family  Type of Home: Condo  Home Layout: Two level, 1/2 bath on main level  Home Access: Stairs to enter without rails  Entrance Stairs - Number of Steps: 1+1  Bathroom Shower/Tub: Tub/Shower unit  Bathroom Toilet: Standard  Bathroom Equipment: None  Home Equipment: Funmilayo Fragmin, rolling  Has the patient had two or more falls in the past year or any fall with injury in the past year?: Yes  ADL Assistance: [address]: Independent  Homemaking Responsibilities: Yes  Ambulation Assistance: Independent (Weaning off the RW from T12 fracture 10 weeks ago)  Transfer Assistance: Independent  Active : Yes  Mode of Transportation: Car  Additional Comments: Recovering from T12 fx ~10 weeks ago       Objective     Safety Devices  Type of Devices: All fall risk precautions in place; Left in chair;Call light within reach; Chair alarm in place;Nurse notified  Bed Mobility Training  Bed Mobility Training: Yes  Supine to Sit: Modified independent  Scooting: Independent  Balance  Sitting: Intact  Standing: Intact (static independent, dynamic spvn)  Transfer Training  Transfer Training: Yes  Sit to Stand: Independent  Stand to Sit: Independent  Bed to Chair: Independent  Toilet Transfer: Modified independent (grab bar)  Gait  Overall Level of Assistance: Modified independent     Strength: Within functional limits  ADL  Grooming: Independent  Grooming Skilled Clinical Factors: standing at sink with spvn  UE Dressing: Modified independent   UE Dressing Skilled Clinical Factors: donned brace standing at EOB  LE Dressing Skilled Clinical Factors: Reports independence  Toileting: Modified independent   Toileting Skilled Clinical Factors: grab bar     Activity Tolerance  Activity Tolerance: Patient tolerated treatment well        Vision - Basic Assessment  Patient Visual Report: No visual complaint reported.   Vision  Vision: Impaired  Vision Exceptions: Wears glasses at all times  Hearing  Hearing: Within functional limits  Cognition  Overall Cognitive Status: WNL  Cognition Comment: speech slow but clear  Orientation  Orientation Level: Oriented X4       Education Given To: Patient  Education Provided: Role of Therapy;Plan of Care;Precautions; Fall Prevention Strategies  Education Method: Verbal  Barriers to Learning: None  Education Outcome: Verbalized understanding           AM-PAC Score    AM-PAC Inpatient Daily Activity Raw Score: 23 (02/21/23 1234)  AM-PAC Inpatient ADL T-Scale Score : 51.12 (02/21/23 1234)  ADL Inpatient CMS 0-100% Score: 15.86 (02/21/23 1234)  ADL Inpatient CMS G-Code Modifier : CI (02/21/23 1234)         Therapy Time   Individual Concurrent Group Co-treatment   Time In 0820         Time Out 0859         Minutes 39          Timed Code Tx Min: 24  Total Tx Time: [facility],

## 2023-02-21 NOTE — PROGRESS NOTES
Hospitalist Progress Note      PCP: Jesus Loomis MD    Date of Admission: 2/20/2023    Chief Complaint:     Hospital Course:     Subjective:     Patient feels better today. All symptoms are resolved, no residual numbness, weakness. .      Medications:  Reviewed    Infusion Medications    dextrose       Scheduled Medications    apixaban  5 mg Oral BID    atorvastatin  80 mg Oral Nightly    insulin lispro  0-4 Units SubCUTAneous TID WC    insulin lispro  0-4 Units SubCUTAneous Nightly    amLODIPine  10 mg Oral QAM    cloNIDine  0.2 mg Oral BID    enalapril  10 mg Oral BID    fenofibrate  160 mg Oral Daily    finasteride  5 mg Oral Daily    gabapentin  300 mg Oral BID    LORazepam  1 mg Oral BID    losartan-hydroCHLOROthiazide  1 tablet Oral QPM    metoprolol tartrate  50 mg Oral BID    PARoxetine  20 mg Oral QPM    alogliptin  25 mg Oral Daily     PRN Meds: ondansetron **OR** ondansetron, polyethylene glycol, perflutren lipid microspheres, glucose, dextrose bolus **OR** dextrose bolus, glucagon (rDNA), dextrose, acetaminophen      Intake/Output Summary (Last 24 hours) at 2/21/2023 1315  Last data filed at 2/21/2023 7906  Gross per 24 hour   Intake --   Output 500 ml   Net -500 ml       Physical Exam Performed:    /75   Pulse 55   Temp 98 °F (36.7 °C) (Oral)   Resp 12   Ht 5' 10\" (1.778 m)   Wt 206 lb (93.4 kg)   SpO2 95%   BMI 29.56 kg/m²     General appearance: No apparent distress, appears stated age and cooperative. HEENT: Pupils equal, round, and reactive to light. Conjunctivae/corneas clear. Neck: Supple, with full range of motion. No jugular venous distention. Trachea midline. Respiratory:  Normal respiratory effort. Clear to auscultation, bilaterally without Rales/Wheezes/Rhonchi. Cardiovascular: Regular rate and rhythm with normal S1/S2 without murmurs, rubs or gallops. Abdomen: Soft, non-tender, non-distended with normal bowel sounds.   Musculoskeletal: No clubbing, cyanosis or edema bilaterally. Full range of motion without deformity. Skin: Skin color, texture, turgor normal.  No rashes or lesions. Neurologic:  Neurovascularly intact without any focal sensory/motor deficits. Cranial nerves: II-XII intact, grossly non-focal.  Psychiatric: Alert and oriented, thought content appropriate, normal insight  Capillary Refill: Brisk, 3 seconds, normal   Peripheral Pulses: +2 palpable, equal bilaterally       Labs:   Recent Labs     02/20/23 2009 02/21/23  0305   WBC 6.2 5.4   HGB 11.6* 11.5*   HCT 33.7* 34.1*    211     Recent Labs     02/20/23 2009   *   K 3.6   CL 96*   CO2 27   BUN 18   CREATININE 0.9   CALCIUM 9.8     Recent Labs     02/20/23 2009   AST 20   ALT 17   BILITOT <0.2   ALKPHOS 64     Recent Labs     02/20/23 2009   INR 1.31*     Recent Labs     02/20/23 2009 02/21/23  0304   TROPONINI <0.01 <0.01       Urinalysis:      Lab Results   Component Value Date/Time    NITRU Negative 01/09/2023 01:12 AM    WBCUA >100 01/09/2023 01:12 AM    BACTERIA 4+ 01/09/2023 01:12 AM    RBCUA see below 01/09/2023 01:12 AM    BLOODU MODERATE 01/09/2023 01:12 AM    SPECGRAV >=1.030 01/09/2023 01:12 AM    GLUCOSEU 250 01/09/2023 01:12 AM       Radiology:  MRI brain without contrast   Final Result     No acute infarct, hemorrhage, or hydrocephalus. CTA HEAD NECK W CONTRAST   Final Result      Calcified eccentric atherosclerotic plaque at the origin of the left internal carotid artery results in approximately 50% diameter stenosis. Otherwise no significant vascular pathology identified. PROCEDURE: CT ANGIOGRAPHY HEAD WITH/WITHOUT CONTRAST      INDICATION: concern for TIA      COMPARISON: none      TECHNIQUE: Axial CT imaging obtained through the head prior to and following administration of IV contrast. Axial images, multiplanar reformatted images, and maximum intensity projection images were reviewed for CT angiographic technique.    IV contrast: 75 mL Isovue-370. FINDINGS:      ANTERIOR CIRCULATION: The intracranial internal carotid arteries, anterior cerebral arteries, and middle cerebral arteries demonstrate no occlusion or stenosis. No evidence for aneurysm or arteriovenous malformation. POSTERIOR CIRCULATION: The bilateral vertebral arteries, basilar artery and posterior cerebral arteries demonstrate no occlusion or stenosis. No evidence for aneurysm or arteriovenous malformation. INTRACRANIAL VENOUS SYSTEM: No evidence for intracranial venous thrombosis. Refer to recent noncontrast CT head for additional results. .      IMPRESSION:      No significant intracranial vascular pathology seen with no evidence of large vessel occlusion or aneurysm. CT HEAD WO CONTRAST   Final Result      No evidence of acute intracranial abnormality. XR CHEST PORTABLE   Final Result      No acute disease             IP CONSULT TO PHARMACY  PHARMACY TO CHANGE BASE FLUIDS  IP CONSULT TO NEUROLOGY  IP CONSULT TO HOSPITALIST  IP CONSULT TO NEUROLOGY    Assessment/Plan:      -Transient left facial/right-sided numbness, weakness, likely TIA--resolved. Gauri Menjivar MRI negative  Further work-up per neurology    -Persistent atrial fibrillation. .  Stable-continue metoprolol and Eliquis    -Essential hypertension-BP stable on multiple medications including. . enalapril, clonidine, amlodipine, losartan/HCTZ    -DM type II. Gauri Menjivar Continue alogliptin, sliding scale insulin    -Hyperlipidemia-continue statin    -Obstructive sleep apnea current CPAP use    -Diabetic neuropathy-continue Neurontin    -BPH-continue finasteride    DVT Prophylaxis: Eliquis  Diet: ADULT DIET; Regular; 4 carb choices (60 gm/meal);  Low Fat/Low Chol/High Fiber/2 gm Na  Code Status: Full Code  PT/OT Eval Status:     Dispo -discharge home once cleared by neurology      Jamie Seay MD

## 2023-02-21 NOTE — ED NOTES
ED TO INPATIENT SBAR HANDOFF    Patient Name: Rufus Pfeiffer   :  1945  78 y.o. MRN:  [id number]  Preferred Name  Donis Garza  ED Room #:  F31/A70-47  Family/Caregiver Present no   Restraints no   Sitter no   Sepsis Risk Score Sepsis Risk Score: 1.36    Situation  Code Status: Full Code No additional code details. Allergies: Bee venom, Cephalexin, Cephalosporins, Propoxyphene, and Sulfa antibiotics  Weight: Patient Vitals for the past 96 hrs (Last 3 readings):   Weight   23 1838 206 lb (93.4 kg)     Arrived from: home  Chief Complaint:   Chief Complaint   Patient presents with    Extremity Weakness     Presenting w/ L arm/thumb numbness, radiating over hand and up the arm. Stated having some intermittent slurring of words today as well. Hospital Problem/Diagnosis:  Principal Problem:    Transient ischemic attack (TIA)  Resolved Problems:    * No resolved hospital problems. *    Imaging:   CTA HEAD NECK W CONTRAST   Final Result      Calcified eccentric atherosclerotic plaque at the origin of the left internal carotid artery results in approximately 50% diameter stenosis. Otherwise no significant vascular pathology identified. PROCEDURE: CT ANGIOGRAPHY HEAD WITH/WITHOUT CONTRAST      INDICATION: concern for TIA      COMPARISON: none      TECHNIQUE: Axial CT imaging obtained through the head prior to and following administration of IV contrast. Axial images, multiplanar reformatted images, and maximum intensity projection images were reviewed for CT angiographic technique. IV contrast: 75 mL Isovue-370. FINDINGS:      ANTERIOR CIRCULATION: The intracranial internal carotid arteries, anterior cerebral arteries, and middle cerebral arteries demonstrate no occlusion or stenosis. No evidence for aneurysm or arteriovenous malformation. POSTERIOR CIRCULATION: The bilateral vertebral arteries, basilar artery and posterior cerebral arteries demonstrate no occlusion or stenosis. No evidence for aneurysm or arteriovenous malformation. INTRACRANIAL VENOUS SYSTEM: No evidence for intracranial venous thrombosis. Refer to recent noncontrast CT head for additional results. .      IMPRESSION:      No significant intracranial vascular pathology seen with no evidence of large vessel occlusion or aneurysm. CT HEAD WO CONTRAST   Final Result      No evidence of acute intracranial abnormality.                      XR CHEST PORTABLE   Final Result      No acute disease         MRI brain without contrast    (Results Pending)     Abnormal labs:   Abnormal Labs Reviewed   CBC WITH AUTO DIFFERENTIAL - Abnormal; Notable for the following components:       Result Value    RBC 4.16 (*)     Hemoglobin 11.6 (*)     Hematocrit 33.7 (*)     RDW 15.5 (*)     All other components within normal limits   COMPREHENSIVE METABOLIC PANEL W/ REFLEX TO MG FOR LOW K - Abnormal; Notable for the following components:    Sodium 135 (*)     Chloride 96 (*)     Glucose 123 (*)     All other components within normal limits   PROTIME-INR - Abnormal; Notable for the following components:    Protime 16.3 (*)     INR 1.31 (*)     All other components within normal limits   POCT GLUCOSE - Abnormal; Notable for the following components:    POC Glucose 107 (*)     All other components within normal limits     Critical values: no     Abnormal Assessment Findings: none patient states symptoms resolved    Background  History:   Past Medical History:   Diagnosis Date    Allergic reaction to sulfonamide 1960    Atrial fibrillation (HCC)     Bacterial infection due to Morganella morganii     Bee sting reaction 2000    Bulging of cervical intervertebral disc     c4 & c5    Cephalexin adverse reaction 1993    Fracture of left elbow 1956    Group B streptococcal infection     History of cardioversion     History of right inguinal hernia repair 1987    Hidalgo's neuroma of right foot 1989    Open fracture of left elbow 1991  Open fracture of left humerus 1991    Pseudomonas infection     Rupture of appendix 1970       Assessment    Vitals/MEWS: MEWS Score: 1  Level of Consciousness: Alert (0)   Vitals:    02/20/23 2100 02/20/23 2130 02/20/23 2200 02/20/23 2230   BP: 133/69 (!) 143/82 (!) 141/76 137/77   Pulse: 75      Resp: 20      Temp:       TempSrc:       SpO2: 96% 97% 96% 94%   Weight:       Height:         FiO2 (%): room air  O2 Flow Rate: O2 Device: None (Room air)    Cardiac Rhythm:    Pain Assessment: 1-10 [x] Verbal [] Theone Bucks Scale  Pain Scale:    Last documented pain score (0-10 scale)    Last documented pain medication administered: none given  Mental Status: oriented and alert  NIH Score:    C-SSRS: Risk of Suicide: No Risk  Bedside swallow:    Patrice Coma Scale (GCS): Clinton Coma Scale  Eye Opening: Spontaneous  Best Verbal Response: Oriented  Best Motor Response: Obeys commands  Clinton Coma Scale Score: 15  Active LDA's:   Peripheral IV 02/20/23 Left Forearm (Active)   Site Assessment Clean, dry & intact 02/20/23 2004   Line Status Flushed 02/20/23 2004     PO Status: Nothing by Mouth  Pertinent or High Risk Medications/Drips: no   o If Yes, please provide details:   Pending Blood Product Administration: no     You may also review the ED PT Care Timeline found under the Summary Nursing Index tab.     Recommendation    Pending orders: MRI screening and MRI to be complete  Plan for Discharge (if known): home  Additional Comments:   If any further questions, please call Sending RN at [phone]    Electronically signed by: Electronically signed by Jonelle House RN on 2/20/2023 at 11:00 PM       Jonelle House RN  02/20/23 [id number]

## 2023-02-22 VITALS
HEIGHT: 70 IN | DIASTOLIC BLOOD PRESSURE: 97 MMHG | SYSTOLIC BLOOD PRESSURE: 182 MMHG | BODY MASS INDEX: 28.66 KG/M2 | TEMPERATURE: 97 F | WEIGHT: 200.2 LBS | RESPIRATION RATE: 11 BRPM | HEART RATE: 60 BPM | OXYGEN SATURATION: 97 %

## 2023-02-22 PROBLEM — G45.9 TIA (TRANSIENT ISCHEMIC ATTACK): Status: ACTIVE | Noted: 2023-02-22

## 2023-02-22 LAB
GLUCOSE BLD-MCNC: 205 MG/DL (ref 70–99)
GLUCOSE BLD-MCNC: 213 MG/DL (ref 70–99)
GLUCOSE BLD-MCNC: 259 MG/DL (ref 70–99)
PERFORMED ON: ABNORMAL

## 2023-02-22 PROCEDURE — G0378 HOSPITAL OBSERVATION PER HR: HCPCS

## 2023-02-22 PROCEDURE — 6370000000 HC RX 637 (ALT 250 FOR IP): Performed by: INTERNAL MEDICINE

## 2023-02-22 PROCEDURE — 1200000000 HC SEMI PRIVATE

## 2023-02-22 PROCEDURE — 6370000000 HC RX 637 (ALT 250 FOR IP): Performed by: HOSPITALIST

## 2023-02-22 RX ADMIN — METOPROLOL TARTRATE 50 MG: 50 TABLET, FILM COATED ORAL at 08:01

## 2023-02-22 RX ADMIN — ENALAPRIL MALEATE 10 MG: 10 TABLET ORAL at 08:07

## 2023-02-22 RX ADMIN — ALOGLIPTIN 25 MG: 25 TABLET, FILM COATED ORAL at 08:03

## 2023-02-22 RX ADMIN — GABAPENTIN 300 MG: 300 CAPSULE ORAL at 15:49

## 2023-02-22 RX ADMIN — CLONIDINE HYDROCHLORIDE 0.2 MG: 0.1 TABLET ORAL at 08:00

## 2023-02-22 RX ADMIN — GABAPENTIN 300 MG: 300 CAPSULE ORAL at 08:00

## 2023-02-22 RX ADMIN — LOSARTAN POTASSIUM AND HYDROCHLOROTHIAZIDE 1 TABLET: 100; 25 TABLET, FILM COATED ORAL at 16:42

## 2023-02-22 RX ADMIN — INSULIN LISPRO 1 UNITS: 100 INJECTION, SOLUTION INTRAVENOUS; SUBCUTANEOUS at 15:57

## 2023-02-22 RX ADMIN — FINASTERIDE 5 MG: 5 TABLET, FILM COATED ORAL at 08:01

## 2023-02-22 RX ADMIN — INSULIN LISPRO 2 UNITS: 100 INJECTION, SOLUTION INTRAVENOUS; SUBCUTANEOUS at 11:28

## 2023-02-22 RX ADMIN — INSULIN LISPRO 1 UNITS: 100 INJECTION, SOLUTION INTRAVENOUS; SUBCUTANEOUS at 08:10

## 2023-02-22 RX ADMIN — FENOFIBRATE 160 MG: 160 TABLET, FILM COATED ORAL at 08:00

## 2023-02-22 RX ADMIN — AMLODIPINE BESYLATE 10 MG: 10 TABLET ORAL at 08:01

## 2023-02-22 RX ADMIN — PAROXETINE HYDROCHLORIDE 20 MG: 10 TABLET, FILM COATED ORAL at 16:42

## 2023-02-22 RX ADMIN — APIXABAN 5 MG: 5 TABLET, FILM COATED ORAL at 08:00

## 2023-02-22 RX ADMIN — LORAZEPAM 1 MG: 1 TABLET ORAL at 08:01

## 2023-02-22 NOTE — DISCHARGE SUMMARY
Reviewed discharge instructions with patient. Reviewed discharge medications including dosing, schedule, indication, and adverse reactions. Reviewed which medications were already taken today and next dosage due for each medication. Patient verbalized understanding of all instructions and questions were answered to his. satisfaction. Signed discharge instructions were given to the patient and a copy placed in the paper-lite chart. Patient discharged to home per wheelchair with family members.       Joanna Sanches RN

## 2023-02-22 NOTE — PLAN OF CARE
Problem: ABCDS Injury Assessment  Goal: Absence of physical injury  2/22/2023 1713 by Leary Mcardle, RN  Outcome: Completed     Problem: Safety - Adult  Goal: Free from fall injury  2/22/2023 1713 by Leary Mcardle, RN  Outcome: Completed     Problem: Neurosensory - Adult  Goal: Achieves stable or improved neurological status  2/22/2023 1713 by Leary Mcardle, RN  Outcome: Completed     Problem: Respiratory - Adult  Goal: Achieves optimal ventilation and oxygenation  2/22/2023 1713 by Leary Mcardle, RN  Outcome: Completed     Problem: Skin/Tissue Integrity - Adult  Goal: Skin integrity remains intact  2/22/2023 1713 by Leary Mcardle, RN  Outcome: Completed     Problem: Skin/Tissue Integrity - Adult  Goal: Incisions, wounds, or drain sites healing without S/S of infection  2/22/2023 1713 by Leary Mcardle, RN  Outcome: Completed     Problem: Musculoskeletal - Adult  Goal: Maintain proper alignment of affected body part  2/22/2023 1713 by Leary Mcardle, RN  Outcome: Completed     Problem: Metabolic/Fluid and Electrolytes - Adult  Goal: Electrolytes maintained within normal limits  2/22/2023 1713 by Leary Mcardle, RN  Outcome: Completed     Problem: Metabolic/Fluid and Electrolytes - Adult  Goal: Glucose maintained within prescribed range  2/22/2023 1713 by Leary Mcardle, RN  Outcome: Completed     Problem: Hematologic - Adult  Goal: Maintains hematologic stability  2/22/2023 1713 by Leary Mcardle, RN  Outcome: Completed     Problem: Chronic Conditions and Co-morbidities  Goal: Patient's chronic conditions and co-morbidity symptoms are monitored and maintained or improved  2/22/2023 1713 by Leary Mcardle, RN  Outcome: Completed

## 2023-02-22 NOTE — PROGRESS NOTES
4 Eyes Admission Assessment     I agree as the admission nurse that 2 RN's have performed a thorough Head to Toe Skin Assessment on the patient. ALL assessment sites listed below have been assessed on admission. Areas assessed by both nurses: Alejandro Crown  [x]   Head, Face, and Ears   [x]   Shoulders, Back, and Chest  [x]   Arms, Elbows, and Hands   [x]   Coccyx, Sacrum, and Ischium  [x]   Legs, Feet, and Heels - Diabetic Ulcer on his right foot, as well as missing toes        Does the Patient have Skin Breakdown? Yes a wound was noted on the Admission Assessment and an LDA was Initiated documentation include the Natali-wound, Wound Assessment, Measurements, Dressing Treatment, Drainage, and Color\",         Alonzo Prevention initiated:  No   Wound Care Orders initiated:  Yes      [address]  nurse consulted for Pressure Injury (Stage 3,4, Unstageable, DTI, NWPT, and Complex wounds) or Alonzo score 18 or lower:  No, but was for a diabetic ulcer.       Nurse 1 eSignature: Electronically signed by Terrence Ferris RN on 2/21/23 at 10:33 PM EST    **SHARE this note so that the co-signing nurse is able to place an eSignature**    Nurse 2 eSignature: Electronically signed by Lili Alan RN on 2/21/23 at 10:35 PM EST

## 2023-02-22 NOTE — CONSULTS
[address] [id number] Continence Nurse  Consult Note       NAME:  Diego Adhikari  MEDICAL RECORD NUMBER:  [id number]  AGE: 78 y.o. GENDER: male  : 1945  TODAY'S DATE:  2023    Subjective: I am suppose to have it operated on . Reason for WOCN Evaluation and Assessment: L Plantar Foot - diabetic      Diego Adhikari is a 78 y.o. male referred by:   [] Physician  [x] Nursing  [] Other:     Wound Identification:  Wound Type: diabetic  Contributing Factors: diabetes, chronic pressure, and polyneuropathy    Wound History: 78 y.o. male with significant past medical history of chronic A-fib on anticoagulation, microscopic hematuria with gross hematuria when taking aspirin, diabetes type 2, TIA who presented to [facility] ED with left facial and right upper extremity weakness and numbness. Episode started around 1600 hrs. and lasted about 45 minutes with complete resolution. However, this since she had similar episode on the contralateral side about a month ago he decided to come in and be checked out. While in the emergency room he had another episode that lasted only a few minutes. In emergency room his temperature was 36.6, blood pressure 147/94, pulse 70, respirations 18, sats 97% on room air.     Current Wound Care Treatment:  L Plantar Foot - adhesive bandage    Patient Goal of Care:  [x] Wound Healing  [] Odor Control  [] Palliative Care  [] Pain Control   [] Other:         PAST MEDICAL HISTORY        Diagnosis Date    Allergic reaction to sulfonamide     Atrial fibrillation ([address]     Bacterial infection due to Morganella morganii     Bee sting reaction     BPH associated with nocturia     Bulging of cervical intervertebral disc     c4 & c5    Cephalexin adverse reaction     DM (diabetes mellitus), type 2 ([address]     Dyslipidemia     Fracture of left elbow     Group B streptococcal infection     Hematuria     History of cardioversion     History of right inguinal hernia repair 1987    HTN (hypertension)     Hidalgo's neuroma of right foot 1989    Open fracture of left elbow 1991    Open fracture of left humerus 1991    Polyneuropathy due to type 2 diabetes mellitus ([facility] [address]     Pseudomonas infection     Rupture of appendix 1970    TIA (transient ischemic attack)        PAST SURGICAL HISTORY    Past Surgical History:   Procedure Laterality Date    APPENDECTOMY      FOOT SURGERY Right 02/21/2022    SECOND METATARSAL HEAD RESECTION, RIGHT FOOT; COMPLEX WOUND CLOSURE-RIGHT FOOT; BONE BIOPSY-RIGHT FOOT (89959, 22092, 20240)-LOCAL performed by Jamshid Brian DPM at [address] Right     ORIF HUMERUS FRACTURE      TOE AMPUTATION Right 05/17/2021    THIRD DISTAL PHALANYX AMPUTATION VS THIRD GREAT TOE AMPUTATION RIGHT FOOT performed by Jamshid Brian DPM at [address] [facility]    Family History   Problem Relation Age of Onset    Diabetes type 2  Mother     Heart Failure Mother     Sleep Apnea Father     Lymphoma Father        SOCIAL HISTORY    Social History     Tobacco Use    Smoking status: Never    Smokeless tobacco: Never   Vaping Use    Vaping Use: Never used   Substance Use Topics    Alcohol use: Never    Drug use: Never       ALLERGIES    Allergies   Allergen Reactions    Bee Venom Swelling    Cephalexin Swelling    Cephalosporins     Propoxyphene Other (See Comments)     Hallucinations  (Darvon/Darvocet)    Sulfa Antibiotics        MEDICATIONS    No current facility-administered medications on file prior to encounter.      Current Outpatient Medications on File Prior to Encounter   Medication Sig Dispense Refill    acetaminophen (TYLENOL) 500 MG tablet Take 1,000 mg by mouth daily as needed      apixaban (ELIQUIS) 5 MG TABS tablet Take 1 tablet by mouth 2 times daily 180 tablet 3    magnesium oxide (MAG-OX) 400 MG tablet Take 1 tablet by mouth 3 times daily 270 tablet 3    ciclopirox (LOPROX) 0.77 % cream Apply topically daily      finasteride (PROSCAR) 5 MG tablet Take 5 mg by mouth daily      gabapentin (NEURONTIN) 300 MG capsule Take 300 mg by mouth 2 times daily. amLODIPine (NORVASC) 10 MG tablet Take 1 tablet by mouth every morning      atorvastatin (LIPITOR) 40 MG tablet Take 40 mg by mouth nightly      cloNIDine (CATAPRES) 0.2 MG tablet Take 0.2 mg by mouth 2 times daily      enalapril (VASOTEC) 10 MG tablet Take 10 mg by mouth 2 times daily      fenofibrate (TRIGLIDE) 160 MG tablet Take 160 mg by mouth every evening Take one tablet by mouth daily at 6 PM.      glimepiride (AMARYL) 2 MG tablet Take 2 mg by mouth 2 times daily      losartan-hydroCHLOROthiazide (HYZAAR) 100-25 MG per tablet Take 1 tablet by mouth every evening      LORazepam (ATIVAN) 1 MG tablet Take 1 mg by mouth 2 times daily.       metFORMIN (GLUCOPHAGE) 1000 MG tablet Take 1,000 mg by mouth 2 times daily      metoprolol tartrate (LOPRESSOR) 50 MG tablet Take 50 mg by mouth 2 times daily      sAXagliptin (ONGLYZA) 5 MG TABS tablet Take 5 mg by mouth every evening      PARoxetine (PAXIL) 20 MG tablet Take 20 mg by mouth every evening         Objective    /66   Pulse 62   Temp 97.1 °F (36.2 °C) (Oral)   Resp 27   Ht 5' 10\" (1.778 m)   Wt 200 lb 3.2 oz (90.8 kg)   SpO2 97%   BMI 28.73 kg/m²     LABS:  WBC:    Lab Results   Component Value Date/Time    WBC 5.4 02/21/2023 03:05 AM     H/H:    Lab Results   Component Value Date/Time    HGB 11.5 02/21/2023 03:05 AM    HCT 34.1 02/21/2023 03:05 AM     PTT:  No results found for: APTT, PTT[APTT}  PT/INR:    Lab Results   Component Value Date/Time    PROTIME 16.3 02/20/2023 08:09 PM    INR 1.31 02/20/2023 08:09 PM     HgBA1c:    Lab Results   Component Value Date/Time    LABA1C 7.7 02/21/2023 03:05 AM       Assessment: L Plantar Foot - small open wound with pink wound bed   Alonzo Risk Score: Alonzo Scale Score: 23    Patient Active Problem List   Diagnosis Code    Anxiety state F41.1    Type 2 diabetes mellitus with other specified complication ([facility] [address] F71.39    Essential hypertension I10    Major depression, chronic F32.9    Migraine G43.909    Mixed hyperlipidemia E78.2    Chronic atrial fibrillation (HCC) I48.20    Reflux esophagitis K21.00    Obesity E66.9    Persistent atrial fibrillation (HCC) I48.19    SURAJ (obstructive sleep apnea) G47.33    Diabetic foot infection (HCC) E11.628, L08.9    Osteomyelitis of right foot (HCC) M86.9    Elevated C-reactive protein (CRP) R79.82    Diabetic polyneuropathy associated with type 2 diabetes mellitus (HCC) E11.42    Elevated sed rate R70.0    Hyponatremia E87.1    Numbness and tingling of right arm R20.0, R20.2    Microscopic hematuria R31.29    Normocytic anemia D64.9    BPH associated with nocturia N40.1, R35.1    TIA (transient ischemic attack) G45.9       Measurements:  Wound 02/21/23 Foot Left;Plantar (Active)   Wound Image   02/22/23 1538   Wound Etiology Diabetic 02/22/23 1538   Dressing Status New dressing applied 02/22/23 1538   Wound Cleansed Cleansed with saline 02/22/23 1538   Dressing/Treatment Adhesive bandage 02/22/23 1538   Wound Length (cm) 0.3 cm 02/22/23 1538   Wound Width (cm) 0.3 cm 02/22/23 1538   Wound Depth (cm) 0.2 cm 02/22/23 1538   Wound Surface Area (cm^2) 0.09 cm^2 02/22/23 1538   Wound Volume (cm^3) 0.018 cm^3 02/22/23 1538   Wound Assessment Pink/red 02/22/23 1538   Drainage Amount None 02/22/23 1538   Odor None 02/22/23 1538   Natali-wound Assessment Hyperkeratosis (callous) 02/22/23 1538   Margins Attached edges; Defined edges 02/22/23 1538   Number of days: 1   L Plantar Foot:      Response to treatment:  Well tolerated by patient.      Pain Assessment:  Severity:  0 / 10  Quality of pain: N/A  Wound Pain Timing/Severity: none  Premedicated: No    Plan   Plan of Care: Wound 02/21/23 Foot Left;Plantar-Dressing/Treatment: Adhesive bandage  Recommendation: L Plantar Foot - clean with NS, cover with adhesive, change prn  Call Wound Care for deterioration [phone]    Specialty Bed Required : N/A   [] Low Air Loss   [] Pressure Redistribution  [] Fluid Immersion  [] Bariatric  [] Total Pressure Relief  [] Other:     Current Diet: ADULT DIET; Regular; 4 carb choices (60 gm/meal);  Low Fat/Low Chol/High Fiber/2 gm Na  Dietician consult:  N/A    Discharge Plan:  Placement for patient upon discharge: home with support    Patient appropriate for Outpatient [address]: No    Referrals:  [x]   [] [facility]  [] Supplies  [] Other    Patient/Caregiver Teaching:  Level of patient/caregiver understanding able to:   [] Indicates understanding       [] Needs reinforcement  [] Unsuccessful      [] Verbal Understanding  [] Demonstrated understanding       [] No evidence of learning  [] Refused teaching         [] N/A       Electronically signed by Marky Sanchez RN, CWOCN on 2/22/2023 at 3:39 PM

## 2023-02-22 NOTE — PROGRESS NOTES
Hospitalist Progress Note      PCP: Dominga Pleitez MD    Date of Admission: 2/20/2023    Chief Complaint:     Hospital Course:     Subjective:     Patient is doing well today with no complaints. No dizziness or lightheadedness. Medications:  Reviewed    Infusion Medications    dextrose       Scheduled Medications    apixaban  5 mg Oral BID    atorvastatin  80 mg Oral Nightly    insulin lispro  0-4 Units SubCUTAneous TID WC    insulin lispro  0-4 Units SubCUTAneous Nightly    amLODIPine  10 mg Oral QAM    cloNIDine  0.2 mg Oral BID    enalapril  10 mg Oral BID    fenofibrate  160 mg Oral Daily    finasteride  5 mg Oral Daily    gabapentin  300 mg Oral BID    LORazepam  1 mg Oral BID    losartan-hydroCHLOROthiazide  1 tablet Oral QPM    metoprolol tartrate  50 mg Oral BID    PARoxetine  20 mg Oral QPM    alogliptin  25 mg Oral Daily     PRN Meds: ondansetron **OR** ondansetron, polyethylene glycol, perflutren lipid microspheres, glucose, dextrose bolus **OR** dextrose bolus, glucagon (rDNA), dextrose, acetaminophen      Intake/Output Summary (Last 24 hours) at 2/22/2023 1307  Last data filed at 2/22/2023 1130  Gross per 24 hour   Intake 240 ml   Output 1500 ml   Net -1260 ml         Physical Exam Performed:    /66   Pulse 62   Temp 97.1 °F (36.2 °C) (Oral)   Resp 27   Ht 5' 10\" (1.778 m)   Wt 200 lb 3.2 oz (90.8 kg)   SpO2 97%   BMI 28.73 kg/m²     General appearance: No apparent distress, appears stated age and cooperative. HEENT: Pupils equal, round, and reactive to light. Conjunctivae/corneas clear. Neck: Supple, with full range of motion. No jugular venous distention. Trachea midline. Respiratory:  Normal respiratory effort. Clear to auscultation, bilaterally without Rales/Wheezes/Rhonchi. Cardiovascular: Regular rate and rhythm with normal S1/S2 without murmurs, rubs or gallops. Abdomen: Soft, non-tender, non-distended with normal bowel sounds.   Musculoskeletal: No clubbing, cyanosis or edema bilaterally. Full range of motion without deformity. Skin: Skin color, texture, turgor normal.  No rashes or lesions. Neurologic:  Neurovascularly intact without any focal sensory/motor deficits. Cranial nerves: II-XII intact, grossly non-focal.  Psychiatric: Alert and oriented, thought content appropriate, normal insight  Capillary Refill: Brisk, 3 seconds, normal   Peripheral Pulses: +2 palpable, equal bilaterally       Labs:   Recent Labs     02/20/23 2009 02/21/23  0305   WBC 6.2 5.4   HGB 11.6* 11.5*   HCT 33.7* 34.1*    211       Recent Labs     02/20/23 2009   *   K 3.6   CL 96*   CO2 27   BUN 18   CREATININE 0.9   CALCIUM 9.8       Recent Labs     02/20/23 2009   AST 20   ALT 17   BILITOT <0.2   ALKPHOS 64       Recent Labs     02/20/23 2009   INR 1.31*       Recent Labs     02/20/23 2009 02/21/23  0304   TROPONINI <0.01 <0.01         Urinalysis:      Lab Results   Component Value Date/Time    NITRU Negative 01/09/2023 01:12 AM    WBCUA >100 01/09/2023 01:12 AM    BACTERIA 4+ 01/09/2023 01:12 AM    RBCUA see below 01/09/2023 01:12 AM    BLOODU MODERATE 01/09/2023 01:12 AM    SPECGRAV >=1.030 01/09/2023 01:12 AM    GLUCOSEU 250 01/09/2023 01:12 AM       Radiology:  MRI brain without contrast   Final Result     No acute infarct, hemorrhage, or hydrocephalus. CTA HEAD NECK W CONTRAST   Final Result      Calcified eccentric atherosclerotic plaque at the origin of the left internal carotid artery results in approximately 50% diameter stenosis. Otherwise no significant vascular pathology identified. PROCEDURE: CT ANGIOGRAPHY HEAD WITH/WITHOUT CONTRAST      INDICATION: concern for TIA      COMPARISON: none      TECHNIQUE: Axial CT imaging obtained through the head prior to and following administration of IV contrast. Axial images, multiplanar reformatted images, and maximum intensity projection images were reviewed for CT angiographic technique. IV contrast: 75 mL Isovue-370. FINDINGS:      ANTERIOR CIRCULATION: The intracranial internal carotid arteries, anterior cerebral arteries, and middle cerebral arteries demonstrate no occlusion or stenosis. No evidence for aneurysm or arteriovenous malformation. POSTERIOR CIRCULATION: The bilateral vertebral arteries, basilar artery and posterior cerebral arteries demonstrate no occlusion or stenosis. No evidence for aneurysm or arteriovenous malformation. INTRACRANIAL VENOUS SYSTEM: No evidence for intracranial venous thrombosis. Refer to recent noncontrast CT head for additional results. .      IMPRESSION:      No significant intracranial vascular pathology seen with no evidence of large vessel occlusion or aneurysm. CT HEAD WO CONTRAST   Final Result      No evidence of acute intracranial abnormality. XR CHEST PORTABLE   Final Result      No acute disease             IP CONSULT TO PHARMACY  PHARMACY TO CHANGE BASE FLUIDS  IP CONSULT TO NEUROLOGY  IP CONSULT TO HOSPITALIST  IP CONSULT TO NEUROLOGY    Assessment/Plan:      -Transient left facial/right-sided numbness, weakness, likely TIA--resolved. [facility] MRI negative  EEG result pending-was ordered due to recurrent episode like likely to be low yield    -Persistent atrial fibrillation. .  Stable-continue metoprolol and Eliquis    -Essential hypertension-BP stable on multiple medications including. . enalapril, clonidine, amlodipine, losartan/HCTZ    -DM type II. [facility] [place] Continue alogliptin, sliding scale insulin    -Hyperlipidemia-continue statin    -Obstructive sleep apnea current CPAP use    -Diabetic neuropathy-continue Neurontin    -BPH-continue finasteride    DVT Prophylaxis: Eliquis  Diet: ADULT DIET; Regular; 4 carb choices (60 gm/meal);  Low Fat/Low Chol/High Fiber/2 gm Na  Code Status: Full Code  PT/OT Eval Status:     Dispo -discharge home once cleared by neurology/EEG results pending      Natan De La O MD

## 2023-02-22 NOTE — PROCEDURES
Name: Danae Allen   : 1945   Interpreting Physician: Ainsley Montes MD   Referring Physician: Baron Goldberg MD  Date of EE2023      Clinical History:Confusion and Altered mental status    Current Antiepileptic Medications: Current Facility-Administered Medications: apixaban (ELIQUIS) tablet 5 mg, 5 mg, Oral, BID  ondansetron (ZOFRAN-ODT) disintegrating tablet 4 mg, 4 mg, Oral, Q8H PRN **OR** ondansetron (ZOFRAN) injection 4 mg, 4 mg, IntraVENous, Q6H PRN  polyethylene glycol (GLYCOLAX) packet 17 g, 17 g, Oral, Daily PRN  perflutren lipid microspheres (DEFINITY) injection 1.5 mL, 1.5 mL, IntraVENous, ONCE PRN  atorvastatin (LIPITOR) tablet 80 mg, 80 mg, Oral, Nightly  glucose chewable tablet 16 g, 4 tablet, Oral, PRN  dextrose bolus 10% 125 mL, 125 mL, IntraVENous, PRN **OR** dextrose bolus 10% 250 mL, 250 mL, IntraVENous, PRN  glucagon injection 1 mg, 1 mg, SubCUTAneous, PRN  dextrose 10 % infusion, , IntraVENous, Continuous PRN  insulin lispro (1 Unit Dial) (HUMALOG/ADMELOG) pen 0-4 Units, 0-4 Units, SubCUTAneous, TID WC  insulin lispro (1 Unit Dial) (HUMALOG/ADMELOG) pen 0-4 Units, 0-4 Units, SubCUTAneous, Nightly  acetaminophen (TYLENOL) tablet 1,000 mg, 1,000 mg, Oral, Daily PRN  amLODIPine (NORVASC) tablet 10 mg, 10 mg, Oral, QAM  cloNIDine (CATAPRES) tablet 0.2 mg, 0.2 mg, Oral, BID  enalapril (VASOTEC) tablet 10 mg, 10 mg, Oral, BID  fenofibrate (TRIGLIDE) tablet 160 mg, 160 mg, Oral, Daily  finasteride (PROSCAR) tablet 5 mg, 5 mg, Oral, Daily  gabapentin (NEURONTIN) capsule 300 mg, 300 mg, Oral, BID  LORazepam (ATIVAN) tablet 1 mg, 1 mg, Oral, BID  losartan-hydroCHLOROthiazide (HYZAAR) 100-25 MG per tablet 1 tablet, 1 tablet, Oral, QPM  metoprolol tartrate (LOPRESSOR) tablet 50 mg, 50 mg, Oral, BID  PARoxetine (PAXIL) tablet 20 mg, 20 mg, Oral, QPM  alogliptin (NESINA) tablet 25 mg, 25 mg, Oral, Daily         Technical Summary:  The EEG was recorded in a digital format on a patient who is reported to be awake and drowsy state during the recording. The patient was not sleep deprived prior to the EEG. The recording revealed an abnormal background rhythm in the theta frequency range that was without posterior dominance. The record was remarkable for the presence of generalized slowing in the form of 5-7hz activity. The record was remarkable for the absence of epileptiform discharges. Photic stimulation was performed at various flash frequencies and without photoparoxysmal response    Hyperventilation was not performed due to medical condition. During the recording stage II sleep  was not seen. The EKG lead revealed no rhythm abnormalties. EEG Interpretation:   The EEG was abnormal due to the presence of: mild generalized slowing. NO epileptiform activity. Generalized slowing is a non-specific finding consistent with a generalized disturbance of cerebral functioning including toxic, metabolic, or structural abnormalities that are multi-focal or diffuse. No focal, lateralizing, or epileptiform features were seen during the recording. Clinical correlation is recommended.   The absence of epileptiform discharges on a single EEG does not rule out a diagnosis of  epilepsy or rule out non-convulsive or complex partial status epilepticus as a cause of altered mental status    Electronically signed by Miguel A Tomas MD on 2/22/2023 at 4:54 PM

## 2023-02-22 NOTE — PLAN OF CARE
Problem: Safety - Adult  Goal: Free from fall injury  Outcome: Progressing     Pt is a standby assist. Pt was educated on the importance of using the call light when in need of assistance. Pt is compliant with using call light. Problem: Neurosensory - Adult  Goal: Achieves stable or improved neurological status  Outcome: Progressing   Pt experienced no neurological episodes during the shift. Denied any numbness or tingling on left side. Will continue to monitor.     Problem: Chronic Conditions and Co-morbidities  Goal: Patient's chronic conditions and co-morbidity symptoms are monitored and maintained or improved  Outcome: Progressing

## 2023-03-03 NOTE — PROGRESS NOTES
Physician Progress Note      PATIENTMinannette Jacobson  CSN #:                  [id number]  :                       1945  ADMIT DATE:       2023 6:41 PM  [address] [place] DATE:        2023 7:00 PM  RESPONDING  PROVIDER #:        Kel Castro MD          QUERY TEXT:    Pt admitted  with TIA. Pt noted to have persistent A-fib and is maintained   on Eliquis. If possible, please document in progress notes and discharge   summary if you are evaluating and /or treating any of the following: The medical record reflects the following:  Risk Factors: 78 y.o. male with Persistent a-fib, HTN, DM2 neuropathy, HLD,   SURAJ, BPH  Clinical Indicators: Presented with left facial, right upper extremity   weakness and numbness and some intermittent slurring of words  Treatment: Eliquis, neurology consult  Options provided:  -- Cerebral embolism w/out infarction likely due to persistent Afib  -- TIA unrelated to cerebral embolism or persistent a-fib  -- Other - I will add my own diagnosis  -- Disagree - Not applicable / Not valid  -- Disagree - Clinically unable to determine / Unknown  -- Refer to Clinical Documentation Reviewer    PROVIDER RESPONSE TEXT:    This patient has cerebral embolism w/out infarction likely due to persistent   a-fib. Query created by: Carlito Rodriguez on 3/2/2023 1:05 PM      Electronically signed by:   Kel Castro MD 3/3/2023 10:07 AM

## 2023-03-09 ENCOUNTER — TELEPHONE (OUTPATIENT)
Dept: CARDIOLOGY CLINIC | Age: 78
End: 2023-03-09

## 2023-03-09 NOTE — TELEPHONE ENCOUNTER
The patient called stating he will be having foot surgery on 03/20/23 and he needs to know if he can stop taking his Eliquis 5 mg medication 48 hours prior.  [phone]

## 2023-03-13 NOTE — TELEPHONE ENCOUNTER
Please provide CV risk assessment for foot surgery on 3/20/23 and needs guidance on holding Eliquis.     Saw FW on 2/2/23, s/p AF ablation (2/2021), s/p DCCV (3/2021), EF 60-65% (2/2023, echo)

## 2023-03-20 ENCOUNTER — ANESTHESIA EVENT (OUTPATIENT)
Dept: OPERATING ROOM | Age: 78
End: 2023-03-20
Payer: MEDICARE

## 2023-03-20 ENCOUNTER — HOSPITAL ENCOUNTER (OUTPATIENT)
Age: 78
Setting detail: OUTPATIENT SURGERY
Discharge: HOME | End: 2023-03-20
Attending: PODIATRIST | Admitting: PODIATRIST
Payer: MEDICARE

## 2023-03-20 ENCOUNTER — ANESTHESIA (OUTPATIENT)
Dept: OPERATING ROOM | Age: 78
End: 2023-03-20
Payer: MEDICARE

## 2023-03-20 ENCOUNTER — APPOINTMENT (OUTPATIENT)
Dept: GENERAL RADIOLOGY | Age: 78
End: 2023-03-20
Attending: PODIATRIST
Payer: MEDICARE

## 2023-03-20 VITALS
DIASTOLIC BLOOD PRESSURE: 70 MMHG | SYSTOLIC BLOOD PRESSURE: 147 MMHG | BODY MASS INDEX: 30.66 KG/M2 | RESPIRATION RATE: 14 BRPM | HEART RATE: 59 BPM | HEIGHT: 69 IN | OXYGEN SATURATION: 92 % | WEIGHT: 207 LBS | TEMPERATURE: 97 F

## 2023-03-20 DIAGNOSIS — L97.529 NON-PRESSURE CHRONIC ULCER OTH PRT LEFT FOOT W UNSP SEVERITY (HCC): ICD-10-CM

## 2023-03-20 DIAGNOSIS — M86.172 OTHER ACUTE OSTEOMYELITIS OF LEFT FOOT (HCC): ICD-10-CM

## 2023-03-20 LAB
GLUCOSE BLD-MCNC: 189 MG/DL (ref 70–99)
PERFORMED ON: ABNORMAL

## 2023-03-20 PROCEDURE — 7100000000 HC PACU RECOVERY - FIRST 15 MIN: Performed by: PODIATRIST

## 2023-03-20 PROCEDURE — 3700000001 HC ADD 15 MINUTES (ANESTHESIA): Performed by: PODIATRIST

## 2023-03-20 PROCEDURE — 88311 DECALCIFY TISSUE: CPT

## 2023-03-20 PROCEDURE — 2580000003 HC RX 258: Performed by: PODIATRIST

## 2023-03-20 PROCEDURE — 73620 X-RAY EXAM OF FOOT: CPT

## 2023-03-20 PROCEDURE — 2709999900 HC NON-CHARGEABLE SUPPLY: Performed by: PODIATRIST

## 2023-03-20 PROCEDURE — 3600000003 HC SURGERY LEVEL 3 BASE: Performed by: PODIATRIST

## 2023-03-20 PROCEDURE — 7100000011 HC PHASE II RECOVERY - ADDTL 15 MIN: Performed by: PODIATRIST

## 2023-03-20 PROCEDURE — 3700000000 HC ANESTHESIA ATTENDED CARE: Performed by: PODIATRIST

## 2023-03-20 PROCEDURE — 88305 TISSUE EXAM BY PATHOLOGIST: CPT

## 2023-03-20 PROCEDURE — 3600000013 HC SURGERY LEVEL 3 ADDTL 15MIN: Performed by: PODIATRIST

## 2023-03-20 PROCEDURE — 2500000003 HC RX 250 WO HCPCS

## 2023-03-20 PROCEDURE — 6360000002 HC RX W HCPCS

## 2023-03-20 PROCEDURE — 6360000002 HC RX W HCPCS: Performed by: PODIATRIST

## 2023-03-20 PROCEDURE — 7100000001 HC PACU RECOVERY - ADDTL 15 MIN: Performed by: PODIATRIST

## 2023-03-20 PROCEDURE — 2500000003 HC RX 250 WO HCPCS: Performed by: PODIATRIST

## 2023-03-20 PROCEDURE — 7100000010 HC PHASE II RECOVERY - FIRST 15 MIN: Performed by: PODIATRIST

## 2023-03-20 PROCEDURE — 2500000003 HC RX 250 WO HCPCS: Performed by: ANESTHESIOLOGY

## 2023-03-20 PROCEDURE — 6360000002 HC RX W HCPCS: Performed by: ANESTHESIOLOGY

## 2023-03-20 PROCEDURE — C1889 IMPLANT/INSERT DEVICE, NOC: HCPCS | Performed by: PODIATRIST

## 2023-03-20 PROCEDURE — 64447 NJX AA&/STRD FEMORAL NRV IMG: CPT | Performed by: ANESTHESIOLOGY

## 2023-03-20 PROCEDURE — 2580000003 HC RX 258: Performed by: ANESTHESIOLOGY

## 2023-03-20 PROCEDURE — 88304 TISSUE EXAM BY PATHOLOGIST: CPT

## 2023-03-20 PROCEDURE — 6370000000 HC RX 637 (ALT 250 FOR IP): Performed by: PODIATRIST

## 2023-03-20 PROCEDURE — 64445 NJX AA&/STRD SCIATIC NRV IMG: CPT | Performed by: ANESTHESIOLOGY

## 2023-03-20 DEVICE — PATCH AMNION 2 LAYR PROTCT 4 X 4CM STERISHIELD II: Type: IMPLANTABLE DEVICE | Site: FOOT | Status: FUNCTIONAL

## 2023-03-20 RX ORDER — LIDOCAINE HYDROCHLORIDE 10 MG/ML
0.5 INJECTION, SOLUTION EPIDURAL; INFILTRATION; INTRACAUDAL; PERINEURAL ONCE
Status: DISCONTINUED | OUTPATIENT
Start: 2023-03-20 | End: 2023-03-20 | Stop reason: HOSPADM

## 2023-03-20 RX ORDER — BACITRACIN ZINC AND POLYMYXIN B SULFATE 500; 1000 [USP'U]/G; [USP'U]/G
OINTMENT TOPICAL
Status: COMPLETED | OUTPATIENT
Start: 2023-03-20 | End: 2023-03-20

## 2023-03-20 RX ORDER — FENTANYL CITRATE 50 UG/ML
25 INJECTION, SOLUTION INTRAMUSCULAR; INTRAVENOUS EVERY 5 MIN PRN
Status: DISCONTINUED | OUTPATIENT
Start: 2023-03-20 | End: 2023-03-20 | Stop reason: HOSPADM

## 2023-03-20 RX ORDER — LIDOCAINE HYDROCHLORIDE 10 MG/ML
1 INJECTION, SOLUTION EPIDURAL; INFILTRATION; INTRACAUDAL; PERINEURAL
Status: DISCONTINUED | OUTPATIENT
Start: 2023-03-20 | End: 2023-03-20 | Stop reason: HOSPADM

## 2023-03-20 RX ORDER — OXYCODONE HYDROCHLORIDE 5 MG/1
5 TABLET ORAL
Status: DISCONTINUED | OUTPATIENT
Start: 2023-03-20 | End: 2023-03-20 | Stop reason: HOSPADM

## 2023-03-20 RX ORDER — HYDRALAZINE HYDROCHLORIDE 20 MG/ML
10 INJECTION INTRAMUSCULAR; INTRAVENOUS
Status: DISCONTINUED | OUTPATIENT
Start: 2023-03-20 | End: 2023-03-20 | Stop reason: HOSPADM

## 2023-03-20 RX ORDER — SODIUM CHLORIDE 0.9 % (FLUSH) 0.9 %
5-40 SYRINGE (ML) INJECTION PRN
Status: DISCONTINUED | OUTPATIENT
Start: 2023-03-20 | End: 2023-03-20 | Stop reason: HOSPADM

## 2023-03-20 RX ORDER — BUPIVACAINE HYDROCHLORIDE 5 MG/ML
INJECTION, SOLUTION EPIDURAL; INTRACAUDAL
Status: COMPLETED | OUTPATIENT
Start: 2023-03-20 | End: 2023-03-20

## 2023-03-20 RX ORDER — ONDANSETRON 2 MG/ML
INJECTION INTRAMUSCULAR; INTRAVENOUS PRN
Status: DISCONTINUED | OUTPATIENT
Start: 2023-03-20 | End: 2023-03-20 | Stop reason: SDUPTHER

## 2023-03-20 RX ORDER — FENTANYL CITRATE 50 UG/ML
INJECTION, SOLUTION INTRAMUSCULAR; INTRAVENOUS PRN
Status: DISCONTINUED | OUTPATIENT
Start: 2023-03-20 | End: 2023-03-20 | Stop reason: SDUPTHER

## 2023-03-20 RX ORDER — SODIUM CHLORIDE 9 MG/ML
INJECTION, SOLUTION INTRAVENOUS CONTINUOUS
Status: DISCONTINUED | OUTPATIENT
Start: 2023-03-20 | End: 2023-03-20 | Stop reason: HOSPADM

## 2023-03-20 RX ORDER — LABETALOL HYDROCHLORIDE 5 MG/ML
10 INJECTION, SOLUTION INTRAVENOUS
Status: DISCONTINUED | OUTPATIENT
Start: 2023-03-20 | End: 2023-03-20 | Stop reason: HOSPADM

## 2023-03-20 RX ORDER — PROPOFOL 10 MG/ML
INJECTION, EMULSION INTRAVENOUS PRN
Status: DISCONTINUED | OUTPATIENT
Start: 2023-03-20 | End: 2023-03-20 | Stop reason: SDUPTHER

## 2023-03-20 RX ORDER — SODIUM CHLORIDE 0.9 % (FLUSH) 0.9 %
5-40 SYRINGE (ML) INJECTION EVERY 12 HOURS SCHEDULED
Status: DISCONTINUED | OUTPATIENT
Start: 2023-03-20 | End: 2023-03-20 | Stop reason: HOSPADM

## 2023-03-20 RX ORDER — LIDOCAINE HYDROCHLORIDE 20 MG/ML
INJECTION, SOLUTION INFILTRATION; PERINEURAL PRN
Status: DISCONTINUED | OUTPATIENT
Start: 2023-03-20 | End: 2023-03-20 | Stop reason: SDUPTHER

## 2023-03-20 RX ORDER — DOXYCYCLINE 100 MG/1
100 TABLET ORAL 2 TIMES DAILY
Qty: 20 TABLET | Refills: 0 | Status: SHIPPED | OUTPATIENT
Start: 2023-03-20 | End: 2023-03-30

## 2023-03-20 RX ORDER — PROCHLORPERAZINE EDISYLATE 5 MG/ML
5 INJECTION INTRAMUSCULAR; INTRAVENOUS
Status: DISCONTINUED | OUTPATIENT
Start: 2023-03-20 | End: 2023-03-20 | Stop reason: HOSPADM

## 2023-03-20 RX ORDER — DEXAMETHASONE SODIUM PHOSPHATE 4 MG/ML
INJECTION, SOLUTION INTRA-ARTICULAR; INTRALESIONAL; INTRAMUSCULAR; INTRAVENOUS; SOFT TISSUE PRN
Status: DISCONTINUED | OUTPATIENT
Start: 2023-03-20 | End: 2023-03-20 | Stop reason: SDUPTHER

## 2023-03-20 RX ORDER — SODIUM CHLORIDE 9 MG/ML
25 INJECTION, SOLUTION INTRAVENOUS PRN
Status: DISCONTINUED | OUTPATIENT
Start: 2023-03-20 | End: 2023-03-20 | Stop reason: HOSPADM

## 2023-03-20 RX ORDER — SODIUM CHLORIDE 9 MG/ML
INJECTION, SOLUTION INTRAVENOUS PRN
Status: DISCONTINUED | OUTPATIENT
Start: 2023-03-20 | End: 2023-03-20 | Stop reason: HOSPADM

## 2023-03-20 RX ORDER — HYDROMORPHONE HYDROCHLORIDE 2 MG/ML
0.25 INJECTION, SOLUTION INTRAMUSCULAR; INTRAVENOUS; SUBCUTANEOUS EVERY 5 MIN PRN
Status: DISCONTINUED | OUTPATIENT
Start: 2023-03-20 | End: 2023-03-20 | Stop reason: HOSPADM

## 2023-03-20 RX ORDER — ONDANSETRON 2 MG/ML
4 INJECTION INTRAMUSCULAR; INTRAVENOUS
Status: DISCONTINUED | OUTPATIENT
Start: 2023-03-20 | End: 2023-03-20 | Stop reason: HOSPADM

## 2023-03-20 RX ORDER — FENTANYL CITRATE 50 UG/ML
50 INJECTION, SOLUTION INTRAMUSCULAR; INTRAVENOUS ONCE
Status: COMPLETED | OUTPATIENT
Start: 2023-03-20 | End: 2023-03-20

## 2023-03-20 RX ADMIN — FENTANYL CITRATE 50 MCG: 50 INJECTION, SOLUTION INTRAMUSCULAR; INTRAVENOUS at 09:11

## 2023-03-20 RX ADMIN — PROPOFOL 50 MG: 10 INJECTION, EMULSION INTRAVENOUS at 09:59

## 2023-03-20 RX ADMIN — FENTANYL CITRATE 25 MCG: 50 INJECTION, SOLUTION INTRAMUSCULAR; INTRAVENOUS at 10:03

## 2023-03-20 RX ADMIN — VANCOMYCIN HYDROCHLORIDE 1500 MG: 10 INJECTION, POWDER, LYOPHILIZED, FOR SOLUTION INTRAVENOUS at 08:49

## 2023-03-20 RX ADMIN — SODIUM CHLORIDE: 9 INJECTION, SOLUTION INTRAVENOUS at 09:47

## 2023-03-20 RX ADMIN — PROPOFOL 150 MG: 10 INJECTION, EMULSION INTRAVENOUS at 09:53

## 2023-03-20 RX ADMIN — BUPIVACAINE HYDROCHLORIDE 20 ML: 5 INJECTION, SOLUTION EPIDURAL; INTRACAUDAL at 09:15

## 2023-03-20 RX ADMIN — DEXAMETHASONE SODIUM PHOSPHATE 8 MG: 4 INJECTION, SOLUTION INTRAMUSCULAR; INTRAVENOUS at 10:02

## 2023-03-20 RX ADMIN — SODIUM CHLORIDE: 9 INJECTION, SOLUTION INTRAVENOUS at 08:47

## 2023-03-20 RX ADMIN — LIDOCAINE HYDROCHLORIDE 100 MG: 20 INJECTION, SOLUTION INFILTRATION; PERINEURAL at 09:53

## 2023-03-20 RX ADMIN — ONDANSETRON 4 MG: 2 INJECTION INTRAMUSCULAR; INTRAVENOUS at 10:35

## 2023-03-20 RX ADMIN — BUPIVACAINE HYDROCHLORIDE 20 ML: 5 INJECTION, SOLUTION EPIDURAL; INTRACAUDAL at 09:30

## 2023-03-20 ASSESSMENT — PAIN - FUNCTIONAL ASSESSMENT: PAIN_FUNCTIONAL_ASSESSMENT: 0-10

## 2023-03-20 ASSESSMENT — ENCOUNTER SYMPTOMS: SHORTNESS OF BREATH: 0

## 2023-03-20 NOTE — BRIEF OP NOTE
Brief Postoperative Note      Patient: Annetta Allen  YOB: 1945  MRN: [id number]    Date of Procedure: 3/20/2023    Pre-Op Diagnosis: M86.172 OSTEOMYELITIS - LEFT FOOT  N97.422 NON-PRESSURE ULCERATION- LEFT FOOT    Post-Op Diagnosis: Same       Procedure(s):  81590 SESAMOIDECTOMY - LEFT FOOT;   11128 COMPLEX WOUND CLOSURE - LEFT FOOT;   20240 BONE BIOPSY; LEFT FOOT;   21031 APPLICATION BELOW KNEE SPLINT - LEFT LOWER LIMB;   EXCISION OF NEOPLASM RIGHT LEG    Surgeon(s):  Elsy Price DPM    Assistant:  Resident: Salina Anguiano, PGY 3    Anesthesia: General with popliteal/saphenous block    Estimated Blood Loss (mL): Minimal    Hemostasis: Pneumatic calf tourniquet at 250 mmHg for 30 minutes    Injectables:  Preoperatively: 6 cc 1% lidocaine with epinephrine left leg; 2 cc 1% lidocaine with epinephrine right leg    Materials: 3-0 Vicryl, 3-0 nylon, SteriShield amniotic graft    Complications: None    Specimens:   ID Type Source Tests Collected by Time Destination   A : A. SEASMOID LEFT FOOT Tissue Tissue SURGICAL PATHOLOGY Elsy Price DPM 3/20/2023 1015    B : B. RIGHT LEG NEOPLASM Tissue Tissue SURGICAL PATHOLOGY Elsy Price DPM 3/20/2023 1044        Implants:  Implant Name Type Inv. Item Serial No.  Lot No. LRB No. Used Action   PATCH AMNION 2 LAYR PROTCT 4 X 4CM STERISHIELD II - [id number]  PATCH AMNION 2 LAYR PROTCT 4 X 4CM STERISHIELD II [id number] [employer]ONE [employer]OGRAFT[employer]-WD  Left 1 Implanted         Drains: * No LDAs found *    Findings:   Left leg: No signs of acute infection noted. Tibial and fibular sesamoids removed in total; bone noted to be hard. Right leg: Biopsy taken of neoplasm right leg, sent for pathology; no signs of local invasion noted.     Electronically signed by Salina Anguiano DPM on 3/20/2023 at 10:50 AM

## 2023-03-20 NOTE — ANESTHESIA PROCEDURE NOTES
Peripheral Block    Patient location during procedure: pre-op  Reason for block: post-op pain management and at surgeon's request  Start time: 3/20/2023 9:30 AM  End time: 3/20/2023 9:32 AM  Staffing  Performed: anesthesiologist   Anesthesiologist: Maribel Ferguson MD  Preanesthetic Checklist  Completed: patient identified, IV checked, site marked, risks and benefits discussed, surgical/procedural consents, equipment checked, pre-op evaluation, timeout performed, anesthesia consent given, oxygen available and monitors applied/VS acknowledged  Peripheral Block   Patient position: supine  Prep: ChloraPrep  Provider prep: mask and sterile gloves  Patient monitoring: cardiac monitor, continuous pulse ox, frequent blood pressure checks and IV access  Block type: Femoral  Adductor canal (Low Femoral)  Laterality: left  Injection technique: single-shot  Guidance: ultrasound guided  Local infiltration: lidocaine  Infiltration strength: 1 %  Local infiltration: lidocaine  Dose: 3 mL    Needle   Needle type: long-bevel   Needle gauge: 20 G  Needle localization: ultrasound guidance  Needle length: 10 cm  Assessment   Injection assessment: negative aspiration for heme, no paresthesia on injection and local visualized surrounding nerve on ultrasound  Paresthesia pain: none  Slow fractionated injection: yes  Hemodynamics: stable  Real-time US image taken/store: yes  Outcomes: patient tolerated procedure well and uncomplicated    Additional Notes  U/S 02122. (1) Under ultrasound guidance, a 20 gauge needle was inserted and placed in close proximity to the saph nerve. (2) Ultrasound was also used to visualize the spread of the anesthetic in close proximity to the nerve being blocked. (3) The nerve appeared anatomically normal, and (4 there were no apparent abnormal pathological findings on the image that were readily visible and related to the nerve being blocked.  (5) A permanent ultrasound image was saved in the
image was saved in the patient's record.   Medications Administered  bupivacaine (PF) 0.5 % - Perineural   20 mL - 3/20/2023 9:15:00 AM

## 2023-03-20 NOTE — PROGRESS NOTES
Discharge instructions review with patient and pt daughter bedside. All home medications have been reviewed, pt v/u. Discharge instructions signed. Pt discharged via wheelchair. Pt discharged with instructions, prescriptions and all belongings. Daughter taking stable pt home.
Pt arrived from OR to PACU bay 4. Report received from OR staff. Pt asleep, unresponsive at time of arrival, spontaneous respirations noted. Pt arrives with nasal cannula in place, infusing 3L oxygen, vitals as charted. Surgical incisions dressings in place to left foot with splint in place and right foot with dressing in place.
Pt awake and alert at this time. Pt on RA, and VSS. Pt denies pain and nausea, tolerating PO. Skin warm to surrounding surgical area. Pt meets criteria to be discharged from Phase 1.
Teaching / education initiated regarding perioperative experience, expectations, and pain management during stay. Patient verbalized understanding.
Xray bedside with pt.
Xray called for pt.
will not be allowed to leave alone or drive yourself home. It is strongly suggested someone stay with you the first 24 hrs. Your surgery will be cancelled if you do not have a ride home. 8. A parent/legal guardian must accompany a child scheduled for surgery and plan to stay at the hospital until the child is discharged. Please do not bring other children with you. 9. Please wear simple, loose fitting clothing to the hospital.  Nick Ra not bring valuables (money, credit cards, checkbooks, etc.) Do not wear any makeup (including no eye makeup) or nail polish on your fingers or toes. 10. DO NOT wear any jewelry or piercings on day of surgery. All body piercing jewelry must be removed. 11. If you have ___dentures, they will be removed before going to the OR; we will provide you a container. If you wear ___contact lenses or _x__glasses, they will be removed; please bring a case for them. 12. Please see your family doctor/pediatrician for a history & physical and/or concerning medications. Bring any test results/reports from your physician's office. PCP__________________Phone___________H&P Appt. Date________             13 If you  have a Living Will and Durable Power of  for Healthcare, please bring in a copy. 15. Notify your Surgeon if you develop any illness between now and surgery  time, cough, cold, fever, sore throat, nausea, vomiting, etc.  Please notify your surgeon if you experience dizziness, shortness of breath or blurred vision between now & the time of your surgery             15. DO NOT shave your operative site 96 hours prior to surgery. For face & neck surgery, men may use an electric razor 48 hours prior to surgery. 16. Shower the night before or morning of surgery using an antibacterial soap or as you have been instructed. 17. To provide excellent care visitors will be limited to one in the room at any given time.

## 2023-03-20 NOTE — ANESTHESIA PRE PROCEDURE
R70.0    Hyponatremia E87.1    Numbness and tingling of right arm R20.0, R20.2    Microscopic hematuria R31.29    Normocytic anemia D64.9    BPH associated with nocturia N40.1, R35.1    TIA (transient ischemic attack) G45.9       Past Medical History:        Diagnosis Date    Allergic reaction to sulfonamide 1960    Atrial fibrillation ([address]     Atrial fibrillation, controlled ([address]     Bacterial infection due to Morganella morganii     Bee sting reaction 2000    BPH associated with nocturia     Bulging of cervical intervertebral disc     c4 & c5    Cephalexin adverse reaction 1993    DM (diabetes mellitus), type 2 ([address]     Dyslipidemia     Fracture of left elbow 1956    Group B streptococcal infection     Hematuria     History of cardioversion     History of right inguinal hernia repair 1987    HTN (hypertension)     Hidalgo's neuroma of right foot 1989    Open fracture of left elbow 1991    Open fracture of left humerus 1991    Polyneuropathy due to type 2 diabetes mellitus ([address]     Pseudomonas infection     Rupture of appendix 1970    TIA (transient ischemic attack)        Past Surgical History:        Procedure Laterality Date    APPENDECTOMY      FOOT SURGERY Right 02/21/2022    SECOND METATARSAL HEAD RESECTION, RIGHT FOOT; COMPLEX WOUND CLOSURE-RIGHT FOOT; BONE BIOPSY-RIGHT FOOT (39674, 63040, 20240)-LOCAL performed by [place] [facility], DPM at [address] Right     ORIF HUMERUS FRACTURE      TOE AMPUTATION Right 05/17/2021    THIRD DISTAL PHALANYX AMPUTATION VS THIRD GREAT TOE AMPUTATION RIGHT FOOT performed by [facility], DPM at [facility]       Social History:    Social History     Tobacco Use    Smoking status: Never    Smokeless tobacco: Never   Substance Use Topics    Alcohol use: Never                                Counseling given: Not Answered      Vital Signs (Current):   Vitals:    03/16/23 1155   Weight:

## 2023-03-20 NOTE — ANESTHESIA POSTPROCEDURE EVALUATION
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 (HCC)      (M86.172 OSTEOMYELITIS - LEFT FOOT)      (N97.422 NON-PRESSURE ULCERATION- LEFT FOOT)    Surgeons: Eric Hernandez DPM Responsible Provider:     Anesthesia Type: General, Regional ASA Status: 3          Anesthesia Type: General, Regional    Nataly Phase I: Nataly Score: 10    Nataly Phase II: Nataly Score: 10      Anesthesia Post Evaluation    Patient location during evaluation: PACU  Patient participation: complete - patient participated  Level of consciousness: awake and alert  Airway patency: patent  Nausea & Vomiting: no nausea and no vomiting  Complications: no  Cardiovascular status: hemodynamically stable  Respiratory status: acceptable  Hydration status: stable  Multimodal analgesia pain management approach

## 2023-03-20 NOTE — DISCHARGE INSTRUCTIONS
[facility] [address]   [address]  [phone]    Dr. Susan Warren Operative Instructions    1. Have Prescriptions filled and take as directed. All medications should be taken with food or milk. 2.  Keep foot elevated six inches above the level of the heart. Support feet, legs, and knees with pillows. 3.  KEEP FOOT ELEVATED AS MUCH AS POSSIBLE UNTIL YOUR NEXT VISIT    4. Place an ice pack on the bandaged site for 15 minutes every hour while awake    5. Keep dressing clean, dry, and intact. DO NOT REMOVE DRESSING. CALL THE OFFICE IF BANDAGE COMES OFF. 6.  For the first 7 days after surgery, take temperature by mouth three times a day. Call the office if greater than 101F.    7.  Ambulate with non weight bearing to the left lower extremity with surgical shoe or crutches / walker. 8.  All instructions are to be followed until otherwise instructed by your surgeon. 9.  Call our office if you have any concerns or questions which arise. Our phones are answered 24 hours a day. [phone]    92. Your first post-operative appointment is scheduled, call the office for appointment date and time if not known prior to today. ANESTHESIA DISCHARGE INSTRUCTIONS    Wear your seatbelt home. You are under the influence of drugs-do not drink alcohol,drive,operate machinery,or make any important decisions or sign any legal documentsfor 24 hours  A responsible adult needs to be with you for 24 hours. You may experience lightheadedness,dizziness,or sleepiness following surgery. Rest at home today- increase activity as tolerated. Progress slowly to a regular diet unless your physician has instructed you otherwise. Drink plenty of water. If nausea becomes a problem call your physician.   Coughing,sore throat,and muscle aches are other side effects of anesthesia,and should improve with

## 2023-03-20 NOTE — H&P
Date of Surgery Update:  Baron Arias was seen, history and physical examination reviewed, and patient examined by me today. There have been no significant clinical changes since the completion of the previous history and physical.    The nature of the procedure, possible complications, alternative forms of therapy, post-op course, and post-op goals have been explained to patient (or appropriate guardian) and patient's family and understanding was verbalized. All questions have been answered. The consent has been signed. Patient's surgical site has been marked. Patient wishes to proceed with surgery.     [facility]   Office: [phone]  Cell: [phone]

## 2023-03-20 NOTE — OP NOTE
determined that the patient would benefit from surgical intervention. All potential risks, benefits, and complications were discussed with the patient prior to the scheduling of surgery. All the patient's questions were answered and no guarantees were given. The patient wished to proceed with surgery, and informed written consent was obtained. DETAILS OF PROCEDURE: In the pre-operative area, the patient received a regional popliteal block to the left lower extremity performed by the anesthesiologist. The patient was then brought from the pre-operative area and placed on the operating table in the supine position. A pneumatic calf tourniquet was placed around the patient's well-padded left lower extremity. Following IV sedation, 6 cc 1% lidocaine with epinephrine was injected subdermally along the planned out incision. The left lower extremity was then scrubbed, prepped, and draped in the usual sterile fashion. A time-out was performed. The patient, procedure, and operative site were confirmed. An Esmarch bandage was then utilized to exsanguinate the patient's left lower extremity. The tourniquet was then inflated to 250 mmHg and the following procedure was performed. Details of Procedure #1-3: 08846 SESAMOIDECTOMY LEFT FOOT;  04633 COMPLEX WOUND CLOSURE - LEFT FOOT;   20240 BONE BIOPSY, LEFT FOOT; At this time, attention was directed to the plantar aspect of the patients left foot at the first metatarsal phalangeal joint where a pinpoint ulceration with overlying hyperkeratotic tissue was noted. Using a #15 blade, two  3 cm converging curvi-linear incisions were made in a 3-to-1 fashion encompassing and ellipsing the wound on the plantar aspect of the 1st metatarsal head. Using sharp and blunt dissection the incision was deepened through the subcutaneous layer with care being taken to identify and retract all vital neurovascular structures. All venous tributaries were electrocoagulated as encountered.  Jessy Renner

## 2023-06-02 ENCOUNTER — APPOINTMENT (OUTPATIENT)
Dept: CT IMAGING | Age: 78
End: 2023-06-02
Payer: MEDICARE

## 2023-06-02 ENCOUNTER — HOSPITAL ENCOUNTER (EMERGENCY)
Age: 78
Discharge: ANOTHER HOSPITAL | End: 2023-06-02
Attending: EMERGENCY MEDICINE
Payer: MEDICARE

## 2023-06-02 VITALS
HEIGHT: 69 IN | SYSTOLIC BLOOD PRESSURE: 179 MMHG | OXYGEN SATURATION: 92 % | WEIGHT: 204.06 LBS | BODY MASS INDEX: 30.22 KG/M2 | DIASTOLIC BLOOD PRESSURE: 86 MMHG | RESPIRATION RATE: 14 BRPM | TEMPERATURE: 98.4 F | HEART RATE: 83 BPM

## 2023-06-02 DIAGNOSIS — R07.81 RIB PAIN ON RIGHT SIDE: Primary | ICD-10-CM

## 2023-06-02 LAB
ANION GAP SERPL CALCULATED.3IONS-SCNC: 13 MMOL/L (ref 3–16)
BASOPHILS # BLD: 0.1 K/UL (ref 0–0.2)
BASOPHILS NFR BLD: 0.4 %
BUN SERPL-MCNC: 23 MG/DL (ref 7–20)
CALCIUM SERPL-MCNC: 9.8 MG/DL (ref 8.3–10.6)
CHLORIDE SERPL-SCNC: 93 MMOL/L (ref 99–110)
CO2 SERPL-SCNC: 29 MMOL/L (ref 21–32)
CREAT SERPL-MCNC: 0.8 MG/DL (ref 0.8–1.3)
DEPRECATED RDW RBC AUTO: 14.9 % (ref 12.4–15.4)
EOSINOPHIL # BLD: 0.1 K/UL (ref 0–0.6)
EOSINOPHIL NFR BLD: 1 %
GFR SERPLBLD CREATININE-BSD FMLA CKD-EPI: >60 ML/MIN/{1.73_M2}
GLUCOSE SERPL-MCNC: 208 MG/DL (ref 70–99)
HCT VFR BLD AUTO: 34.8 % (ref 40.5–52.5)
HGB BLD-MCNC: 11.8 G/DL (ref 13.5–17.5)
LYMPHOCYTES # BLD: 0.9 K/UL (ref 1–5.1)
LYMPHOCYTES NFR BLD: 7.1 %
MAGNESIUM SERPL-MCNC: 3.4 MG/DL (ref 1.8–2.4)
MCH RBC QN AUTO: 28.7 PG (ref 26–34)
MCHC RBC AUTO-ENTMCNC: 33.9 G/DL (ref 31–36)
MCV RBC AUTO: 84.6 FL (ref 80–100)
MONOCYTES # BLD: 0.9 K/UL (ref 0–1.3)
MONOCYTES NFR BLD: 7.5 %
NEUTROPHILS # BLD: 10.1 K/UL (ref 1.7–7.7)
NEUTROPHILS NFR BLD: 84 %
PLATELET # BLD AUTO: 255 K/UL (ref 135–450)
PMV BLD AUTO: 8.5 FL (ref 5–10.5)
POTASSIUM SERPL-SCNC: 3.4 MMOL/L (ref 3.5–5.1)
RBC # BLD AUTO: 4.11 M/UL (ref 4.2–5.9)
SODIUM SERPL-SCNC: 135 MMOL/L (ref 136–145)
WBC # BLD AUTO: 12.1 K/UL (ref 4–11)

## 2023-06-02 PROCEDURE — 96376 TX/PRO/DX INJ SAME DRUG ADON: CPT

## 2023-06-02 PROCEDURE — 71260 CT THORAX DX C+: CPT

## 2023-06-02 PROCEDURE — 85025 COMPLETE CBC W/AUTO DIFF WBC: CPT

## 2023-06-02 PROCEDURE — 6360000004 HC RX CONTRAST MEDICATION

## 2023-06-02 PROCEDURE — 94150 VITAL CAPACITY TEST: CPT

## 2023-06-02 PROCEDURE — 74177 CT ABD & PELVIS W/CONTRAST: CPT

## 2023-06-02 PROCEDURE — 80048 BASIC METABOLIC PNL TOTAL CA: CPT

## 2023-06-02 PROCEDURE — 96374 THER/PROPH/DIAG INJ IV PUSH: CPT

## 2023-06-02 PROCEDURE — 2500000003 HC RX 250 WO HCPCS

## 2023-06-02 PROCEDURE — 94761 N-INVAS EAR/PLS OXIMETRY MLT: CPT

## 2023-06-02 PROCEDURE — 99285 EMERGENCY DEPT VISIT HI MDM: CPT

## 2023-06-02 PROCEDURE — 6370000000 HC RX 637 (ALT 250 FOR IP)

## 2023-06-02 PROCEDURE — 2700000000 HC OXYGEN THERAPY PER DAY

## 2023-06-02 PROCEDURE — 83735 ASSAY OF MAGNESIUM: CPT

## 2023-06-02 RX ORDER — HYDROMORPHONE HYDROCHLORIDE 1 MG/ML
1 INJECTION, SOLUTION INTRAMUSCULAR; INTRAVENOUS; SUBCUTANEOUS
Status: COMPLETED | OUTPATIENT
Start: 2023-06-02 | End: 2023-06-02

## 2023-06-02 RX ORDER — KETOROLAC TROMETHAMINE 30 MG/ML
15 INJECTION, SOLUTION INTRAMUSCULAR; INTRAVENOUS ONCE
Status: DISCONTINUED | OUTPATIENT
Start: 2023-06-02 | End: 2023-06-02

## 2023-06-02 RX ORDER — LIDOCAINE 4 G/G
2 PATCH TOPICAL DAILY
Status: DISCONTINUED | OUTPATIENT
Start: 2023-06-02 | End: 2023-06-02 | Stop reason: HOSPADM

## 2023-06-02 RX ADMIN — HYDROMORPHONE HYDROCHLORIDE 1 MG: 1 INJECTION, SOLUTION INTRAMUSCULAR; INTRAVENOUS; SUBCUTANEOUS at 17:10

## 2023-06-02 RX ADMIN — IOPAMIDOL 75 ML: 755 INJECTION, SOLUTION INTRAVENOUS at 13:11

## 2023-06-02 RX ADMIN — HYDROMORPHONE HYDROCHLORIDE 1 MG: 1 INJECTION, SOLUTION INTRAMUSCULAR; INTRAVENOUS; SUBCUTANEOUS at 12:02

## 2023-06-02 RX ADMIN — HYDROMORPHONE HYDROCHLORIDE 1 MG: 1 INJECTION, SOLUTION INTRAMUSCULAR; INTRAVENOUS; SUBCUTANEOUS at 13:44

## 2023-06-02 ASSESSMENT — ENCOUNTER SYMPTOMS
VOMITING: 0
SHORTNESS OF BREATH: 0
SORE THROAT: 0
DIARRHEA: 0
WHEEZING: 0
ABDOMINAL PAIN: 0
NAUSEA: 0

## 2023-06-02 ASSESSMENT — PAIN DESCRIPTION - FREQUENCY: FREQUENCY: CONTINUOUS

## 2023-06-02 ASSESSMENT — PAIN SCALES - GENERAL
PAINLEVEL_OUTOF10: 5
PAINLEVEL_OUTOF10: 10
PAINLEVEL_OUTOF10: 10
PAINLEVEL_OUTOF10: 6
PAINLEVEL_OUTOF10: 10

## 2023-06-02 ASSESSMENT — PAIN DESCRIPTION - ORIENTATION: ORIENTATION: RIGHT

## 2023-06-02 ASSESSMENT — PAIN DESCRIPTION - LOCATION
LOCATION: RIB CAGE
LOCATION: CHEST
LOCATION: RIB CAGE;SHOULDER
LOCATION: CHEST

## 2023-06-02 ASSESSMENT — PAIN - FUNCTIONAL ASSESSMENT
PAIN_FUNCTIONAL_ASSESSMENT: 0-10
PAIN_FUNCTIONAL_ASSESSMENT: 0-10
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
## 2023-06-02 NOTE — ED NOTES
Report called to [facility], report given to [facility] rn.       Saima Montague RN  06/02/23 4478

## 2023-08-31 ENCOUNTER — HOSPITAL ENCOUNTER (EMERGENCY)
Age: 78
Discharge: HOME | End: 2023-08-31
Attending: EMERGENCY MEDICINE
Payer: MEDICARE

## 2023-08-31 VITALS
TEMPERATURE: 98.1 F | BODY MASS INDEX: 31.5 KG/M2 | HEIGHT: 70 IN | OXYGEN SATURATION: 95 % | RESPIRATION RATE: 18 BRPM | DIASTOLIC BLOOD PRESSURE: 71 MMHG | WEIGHT: 220 LBS | SYSTOLIC BLOOD PRESSURE: 150 MMHG | HEART RATE: 79 BPM

## 2023-08-31 DIAGNOSIS — R35.0 URINARY FREQUENCY: Primary | ICD-10-CM

## 2023-08-31 LAB
BACTERIA URNS QL MICRO: ABNORMAL /HPF
BILIRUB UR QL STRIP.AUTO: NEGATIVE
CHP ED QC CHECK: YES
CLARITY UR: CLEAR
COLOR UR: YELLOW
GLUCOSE BLD-MCNC: 293 MG/DL (ref 70–99)
GLUCOSE UR STRIP.AUTO-MCNC: >=1000 MG/DL
HGB UR QL STRIP.AUTO: ABNORMAL
KETONES UR STRIP.AUTO-MCNC: NEGATIVE MG/DL
LEUKOCYTE ESTERASE UR QL STRIP.AUTO: ABNORMAL
NITRITE UR QL STRIP.AUTO: NEGATIVE
PERFORMED ON: ABNORMAL
PH UR STRIP.AUTO: 6.5 [PH] (ref 5–8)
PROT UR STRIP.AUTO-MCNC: 100 MG/DL
RBC #/AREA URNS HPF: ABNORMAL /HPF (ref 0–4)
RENAL EPI CELLS #/AREA UR COMP ASSIST: ABNORMAL /HPF (ref 0–1)
SP GR UR STRIP.AUTO: 1.02 (ref 1–1.03)
UA COMPLETE W REFLEX CULTURE PNL UR: YES
UA DIPSTICK W REFLEX MICRO PNL UR: YES
URN SPEC COLLECT METH UR: ABNORMAL
UROBILINOGEN UR STRIP-ACNC: 0.2 E.U./DL
WBC #/AREA URNS HPF: ABNORMAL /HPF (ref 0–5)

## 2023-08-31 PROCEDURE — 87186 SC STD MICRODIL/AGAR DIL: CPT

## 2023-08-31 PROCEDURE — 81001 URINALYSIS AUTO W/SCOPE: CPT

## 2023-08-31 PROCEDURE — 99283 EMERGENCY DEPT VISIT LOW MDM: CPT

## 2023-08-31 PROCEDURE — 87086 URINE CULTURE/COLONY COUNT: CPT

## 2023-08-31 PROCEDURE — 6370000000 HC RX 637 (ALT 250 FOR IP)

## 2023-08-31 PROCEDURE — 87077 CULTURE AEROBIC IDENTIFY: CPT

## 2023-08-31 RX ORDER — LEVOFLOXACIN 750 MG/1
750 TABLET ORAL ONCE
Status: COMPLETED | OUTPATIENT
Start: 2023-08-31 | End: 2023-08-31

## 2023-08-31 RX ORDER — LEVOFLOXACIN 750 MG/1
750 TABLET ORAL DAILY
Qty: 4 TABLET | Refills: 0 | Status: SHIPPED | OUTPATIENT
Start: 2023-08-31 | End: 2023-09-04

## 2023-08-31 RX ADMIN — LEVOFLOXACIN 750 MG: 750 TABLET, FILM COATED ORAL at 23:32

## 2023-08-31 ASSESSMENT — ENCOUNTER SYMPTOMS
EYE PAIN: 0
WHEEZING: 0
VOMITING: 0
CONSTIPATION: 0
ABDOMINAL DISTENTION: 0
NAUSEA: 0
DIARRHEA: 0
COUGH: 0
SHORTNESS OF BREATH: 0
ALLERGIC/IMMUNOLOGIC NEGATIVE: 1
CHEST TIGHTNESS: 0
ABDOMINAL PAIN: 0
PHOTOPHOBIA: 0
BACK PAIN: 0

## 2023-09-01 NOTE — ED PROVIDER NOTES
ED Attending Attestation Note     Date of evaluation: 8/31/2023    This patient was seen by the advance practice provider. I have seen and examined the patient, agree with the workup, evaluation, management and diagnosis. The care plan has been discussed. My assessment reveals nontender abdomen. Medical decision making: Patient's presentation consistent with urinary tract infection. Had a UTI about 9 months ago, culture grew out Staph epidermidis. Urinalysis suspicious for urinary tract infection as well, urine culture pending. Wanted to start him on Keflex although he has a cephalosporin allergy. Cipro and Bactrim would not cover Staph epidermidis and so opted for levofloxacin, first dose given in the ED. Will discharge with 4 additional days for total 5-day course.   No signs of systemic illness on exam.     Jersey Lang MD  08/31/23 9471

## 2023-09-01 NOTE — ED PROVIDER NOTES
infection and will start him on levofloxacin 750 mg for 5 days the first dose given tonight while in the emergency department. I initially wanted to start the patient on Keflex however he is allergic to cephalosporins. Levofloxacin will provide best coverage against the patient's prior  UTI of Staphylococcus epidermis as found in his urine culture 8 months ago. For further review POC blood sugar was obtained given the large amount of glucose found in patient's urine. POC blood glucose of 283. Patient notified of elevated blood glucose and discussed dietary habits. Patient states that is highly abnormal for him as he normally runs within normal range. Patient states he ate 6-10 jellybeans prior to come to the emergency department as he was not sure how long he would be here and did not want to have any hypoglycemic events. At this time, patient is safe to be discharged home. Patient given paper prescription for levofloxacin and was advised to fill it tomorrow and start prescription then further manner 4 days. Patient vies follow-up with his urologist as needed. Patient was advised come back to the emergency department for any increased fevers, body aches, chills or any other concerns he may have. Is this patient to be included in the SEP-1 core measure? No Exclusion criteria - the patient is NOT to be included for SEP-1 Core Measure due to: Infection is not suspected    Medical Decision Making  Amount and/or Complexity of Data Reviewed  External Data Reviewed: labs and notes. Labs: ordered. Decision-making details documented in ED Course. Radiology: ordered. Decision-making details documented in ED Course. ECG/medicine tests: ordered. Decision-making details documented in ED Course. Risk  Prescription drug management. This patient was also evaluated by the attending physician. All care plans werediscussed and agreed upon. Clinical Impression     1.  Urinary frequency diabetes mellitus ([address]), Pseudomonas infection, Rupture of appendix, and TIA (transient ischemic attack). 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 His family history includes Diabetes type 2  in his mother; Heart Failure in his mother; Lymphoma in his father; Sleep Apnea in his father. He reports that he has never smoked. He has never used smokeless tobacco. He reports that he does not drink alcohol and does not use drugs. Medications     Discharge Medication List as of 8/31/2023 11:34 PM        CONTINUE these medications which have NOT CHANGED    Details   acetaminophen (TYLENOL) 500 MG tablet Take 1,000 mg by mouth daily as neededHistorical Med      apixaban (ELIQUIS) 5 MG TABS tablet Take 1 tablet by mouth 2 times daily, Disp-180 tablet, R-3Normal      magnesium oxide (MAG-OX) 400 MG tablet Take 1 tablet by mouth 3 times daily, Disp-270 tablet, R-3Normal      ciclopirox (LOPROX) 0.77 % cream Apply topically daily, Topical, DAILY, Historical Med      finasteride (PROSCAR) 5 MG tablet Take 5 mg by mouth dailyHistorical Med      gabapentin (NEURONTIN) 300 MG capsule Take 300 mg by mouth 2 times daily. Historical Med      amLODIPine (NORVASC) 10 MG tablet Take 1 tablet by mouth every morningHistorical Med      atorvastatin (LIPITOR) 40 MG tablet Take 40 mg by mouth nightlyHistorical Med      cloNIDine (CATAPRES) 0.2 MG tablet Take 0.2 mg by mouth 2 times dailyHistorical Med      enalapril (VASOTEC) 10 MG tablet Take 10 mg by mouth 2 times dailyHistorical Med      fenofibrate (TRIGLIDE) 160 MG tablet Take 160 mg by mouth every evening Take one tablet by mouth daily at 6 PM.Historical Med      glimepiride (AMARYL) 2 MG tablet Take 2 mg by mouth 2 times dailyHistorical Med      losartan-hydroCHLOROthiazide (HYZAAR)

## 2023-09-03 LAB
BACTERIA UR CULT: ABNORMAL
ORGANISM: ABNORMAL

## 2023-09-25 NOTE — PROGRESS NOTES
cardiovascular risk factors. - The patient is counseled to lose weigt to control cardiovascular risk factors. -The patient is counseled about the health hazards of smoking including cardiovascular side effects and its impact on morbidity and mortality. Thank you for allowing me to participate in the care of Lanie Rachel. All questions and concerns were addressed to the patient/family. Alternatives to my treatment were discussed. Lo Patel RN, am scribing for and in the presence of Dr. uSsanne Buckner. 10/02/23 2:23 PM  Meenu Guajardo RN    I, Cheryl Soriano MD, personally performed the services prescribed in this documentation as scribed by Ms. Meenu Guajardo RN in my presence and it is both accurate and complete.      Cheryl Soriano MD  Cardiac Electrophysiology  [address] [place] [address]

## 2023-10-02 ENCOUNTER — OFFICE VISIT (OUTPATIENT)
Dept: CARDIOLOGY CLINIC | Age: 78
End: 2023-10-02
Payer: MEDICARE

## 2023-10-02 VITALS
BODY MASS INDEX: 30.71 KG/M2 | SYSTOLIC BLOOD PRESSURE: 110 MMHG | WEIGHT: 214 LBS | HEART RATE: 59 BPM | DIASTOLIC BLOOD PRESSURE: 72 MMHG

## 2023-10-02 DIAGNOSIS — I48.19 PERSISTENT ATRIAL FIBRILLATION (HCC): Primary | ICD-10-CM

## 2023-10-02 DIAGNOSIS — I49.3 PVC'S (PREMATURE VENTRICULAR CONTRACTIONS): ICD-10-CM

## 2023-10-02 DIAGNOSIS — I10 BENIGN ESSENTIAL HTN: ICD-10-CM

## 2023-10-02 DIAGNOSIS — G47.33 OSA ON CPAP: ICD-10-CM

## 2023-10-02 PROCEDURE — 1123F ACP DISCUSS/DSCN MKR DOCD: CPT | Performed by: INTERNAL MEDICINE

## 2023-10-02 PROCEDURE — 3074F SYST BP LT 130 MM HG: CPT | Performed by: INTERNAL MEDICINE

## 2023-10-02 PROCEDURE — 3078F DIAST BP <80 MM HG: CPT | Performed by: INTERNAL MEDICINE

## 2023-10-02 PROCEDURE — 99214 OFFICE O/P EST MOD 30 MIN: CPT | Performed by: INTERNAL MEDICINE

## 2023-10-02 PROCEDURE — 3017F COLORECTAL CA SCREEN DOC REV: CPT | Performed by: INTERNAL MEDICINE

## 2023-10-02 PROCEDURE — 93000 ELECTROCARDIOGRAM COMPLETE: CPT | Performed by: INTERNAL MEDICINE

## 2023-10-02 PROCEDURE — G8484 FLU IMMUNIZE NO ADMIN: HCPCS | Performed by: INTERNAL MEDICINE

## 2023-10-02 PROCEDURE — G8427 DOCREV CUR MEDS BY ELIG CLIN: HCPCS | Performed by: INTERNAL MEDICINE

## 2023-10-02 PROCEDURE — G8417 CALC BMI ABV UP PARAM F/U: HCPCS | Performed by: INTERNAL MEDICINE

## 2023-10-02 PROCEDURE — 1036F TOBACCO NON-USER: CPT | Performed by: INTERNAL MEDICINE

## 2024-07-24 ENCOUNTER — OFFICE VISIT (OUTPATIENT)
Dept: PULMONOLOGY | Age: 79
End: 2024-07-24
Payer: MEDICARE

## 2024-07-24 VITALS
DIASTOLIC BLOOD PRESSURE: 70 MMHG | HEIGHT: 70 IN | WEIGHT: 207 LBS | BODY MASS INDEX: 29.63 KG/M2 | OXYGEN SATURATION: 98 % | HEART RATE: 68 BPM | SYSTOLIC BLOOD PRESSURE: 108 MMHG

## 2024-07-24 DIAGNOSIS — G47.33 OSA (OBSTRUCTIVE SLEEP APNEA): Primary | ICD-10-CM

## 2024-07-24 DIAGNOSIS — I48.20 CHRONIC ATRIAL FIBRILLATION (HCC): ICD-10-CM

## 2024-07-24 DIAGNOSIS — E11.69 TYPE 2 DIABETES MELLITUS WITH OTHER SPECIFIED COMPLICATION, UNSPECIFIED WHETHER LONG TERM INSULIN USE (HCC): ICD-10-CM

## 2024-07-24 DIAGNOSIS — I10 ESSENTIAL HYPERTENSION: Chronic | ICD-10-CM

## 2024-07-24 PROCEDURE — G8417 CALC BMI ABV UP PARAM F/U: HCPCS | Performed by: NURSE PRACTITIONER

## 2024-07-24 PROCEDURE — 99214 OFFICE O/P EST MOD 30 MIN: CPT | Performed by: NURSE PRACTITIONER

## 2024-07-24 PROCEDURE — 3078F DIAST BP <80 MM HG: CPT | Performed by: NURSE PRACTITIONER

## 2024-07-24 PROCEDURE — G2211 COMPLEX E/M VISIT ADD ON: HCPCS | Performed by: NURSE PRACTITIONER

## 2024-07-24 PROCEDURE — G8427 DOCREV CUR MEDS BY ELIG CLIN: HCPCS | Performed by: NURSE PRACTITIONER

## 2024-07-24 PROCEDURE — 3074F SYST BP LT 130 MM HG: CPT | Performed by: NURSE PRACTITIONER

## 2024-07-24 PROCEDURE — 1123F ACP DISCUSS/DSCN MKR DOCD: CPT | Performed by: NURSE PRACTITIONER

## 2024-07-24 PROCEDURE — 1036F TOBACCO NON-USER: CPT | Performed by: NURSE PRACTITIONER

## 2024-07-24 RX ORDER — TAMSULOSIN HYDROCHLORIDE 0.4 MG/1
0.4 CAPSULE ORAL DAILY
COMMUNITY

## 2024-07-24 NOTE — PROGRESS NOTES
Diagnosis: [x] SURAJ (G47.33) [] CSA (G47.31) [] Apnea (G47.30)   Length of Need: [x] 15 Months [] 99 Months [] Other:   Machine (RENUKA!): [] Respironics Dream Station      Auto [] ResMed AirSense     Auto [] Other:     []  CPAP () [] Bilevel ()   Mode: [] Auto [] Spontaneous    Mode: [] Auto [] Spontaneous             Comfort Settings:      Humidifier: [] Heated ()        [x] Water chamber replacement ()/ 1 per 6 months        Mask:   [] Nasal () /1 per 3 months [x] Full Face () /1 per 3 months   [] Patient choice -Size and fit mask [x] Patient Choice - Size and fit mask   [] Dispense: [] Dispense:   [] Headgear () / 1 per 3 months [x] Headgear () / 1 per 3 months   [] Replacement Nasal Cushion ()/2 per month [x] Interface Replacement ()/1 per month   [] Replacement Nasal Pillows ()/2 per month         Tubing: [x] Heated ()/1 per 3 months    [] Standard ()/1 per 3 months [] Other:           Filters: [x] Non-disposable ()/1 per 6 months     [x] Ultra-Fine, Disposable ()/2 per month        Miscellaneous: [] Chin Strap ()/ 1 per 6 months [] O2 bleed-in:        LPM   [] Oxymetry on CPAP/Bilevel []  Other:         Start Order Date: 07/24/24    MEDICAL JUSTIFICATION:  I, the undersigned, certify that the above prescribed supplies are medically necessary for this patient’s wellbeing.  In my opinion, the supplies are both reasonable and necessary in reference to accepted standards of medicalpractice in treatment of this patient’s condition.    RYAN RANDHAWA NP    NPI: [id number]       Order Signed Date: 07/24/24  [facility]  Pulmonary, Sleep, and Critical Care    Pulmonary, Sleep, and Critical Care  [facility] [address]                          [address] [place] [address] [address]  [place] [address]                                    [place] [address]  Phone: [phone]    Fax:

## 2024-07-24 NOTE — PROGRESS NOTES
Diagnosis: [x] SURAJ (G47.33) [] CSA (G47.31) [] Apnea (G47.30)   Length of Need: [x] 15 Months [] 99 Months [] Other:   Machine (RENUKA!): [] Respironics Dream Station      Auto [] ResMed AirSense     Auto [] Other:     []  CPAP () [] Bilevel ()   Mode: [] Auto [] Spontaneous    Mode: [] Auto [] Spontaneous            Comfort Settings:      Humidifier: [] Heated ()        [x] Water chamber replacement ()/ 1 per 6 months        Mask:   [x] Nasal () /1 per 3 months [] Full Face () /1 per 3 months   [x] Patient choice -Size and fit mask [] Patient Choice - Size and fit mask   [] Dispense: [] Dispense:   [x] Headgear () / 1 per 3 months [] Headgear () / 1 per 3 months   [x] Replacement Nasal Cushion ()/2 per month [] Interface Replacement ()/1 per month   [x] Replacement Nasal Pillows ()/2 per month         Tubing: [x] Heated ()/1 per 3 months    [] Standard ()/1 per 3 months [] Other:           Filters: [x] Non-disposable ()/1 per 6 months     [x] Ultra-Fine, Disposable ()/2 per month        Miscellaneous: [x] Chin Strap ()/ 1 per 6 months [] O2 bleed-in:        LPM   [] Oxymetry on CPAP/Bilevel []  Other:         Start Order Date: 07/24/24    MEDICAL JUSTIFICATION:  I, the undersigned, certify that the above prescribed supplies are medically necessary for this patient’s wellbeing.  In my opinion, the supplies are both reasonable and necessary in reference to accepted standards of medicalpractice in treatment of this patient’s condition.    RYAN RANDHAWA NP    NPI: [id number]       Order Signed Date: 07/24/24  [facility]  Pulmonary, Sleep, and Critical Care    Pulmonary, Sleep, and Critical Care  [facility] [address]                          [address] [place] [address], [address]  [place] [address]                                    [place] [address]  Phone: [phone]    Fax:

## 2024-07-24 NOTE — PROGRESS NOTES
[facility] [place] [id number]  [address]  [place] [address]  P- [phone]  F- [phone]     [facility] PHYSICIANS [place] SPECIAL[facility] SLEEP MEDICINE  [address]  [address]  [facility] [address]  Dept: [phone]  Dept Fax: [phone]  Loc: [phone]      Assessment/Plan:      1. SURAJ (obstructive sleep apnea)  Assessment & Plan:   Chronic-Stable: Reviewed and analyzed results of physiologic download from patient's machine and reviewed with patient.  Supplies and parts as needed for his machine.  These are medically necessary.  Limit caffeine use after 3pm. Based on the analyzed data will continue with current settings. Stable on his machine at current settings, getting benefit from the use, and having minimal side effects.  Will see him back in 1 year.  Encouraged him to contact the office with any questions or concerns.    2. Chronic atrial fibrillation (HCC)  Assessment & Plan:   Chronic- Stable.  Discussed the importance of treating obstructive sleep apnea as part of the management of this disorder.  Cont any meds per PCP and other physicians.    3. Essential hypertension  Assessment & Plan:   Chronic- Stable.  Discussed the importance of treating obstructive sleep apnea as part of the management of this disorder.  Cont any meds per PCP and other physicians.    4. Type 2 diabetes mellitus with other specified complication, unspecified whether long term insulin use (HCC)  Assessment & Plan:  Chronic- Stable.  Discussed the importance of treating obstructive sleep apnea as part of the management of this disorder.  Cont any meds per PCP and other physicians.        Reviewed, analyzed, and documented physiologic data from patient's PAP machine.    This information was analyzed to assess complexity and medical decision making in regards to further testing and management.    The primary encounter diagnosis was SURAJ

## 2024-07-24 NOTE — ASSESSMENT & PLAN NOTE
Chronic-Stable: Reviewed and analyzed results of physiologic download from patient's machine and reviewed with patient.  Supplies and parts as needed for his machine.  These are medically necessary.  Limit caffeine use after 3pm. Based on the analyzed data will continue with current settings. Stable on his machine at current settings, getting benefit from the use, and having minimal side effects.  Will see him back in 1 year.  Encouraged him to contact the office with any questions or concerns.

## 2024-10-03 RX ORDER — APIXABAN 5 MG/1
5 TABLET, FILM COATED ORAL 2 TIMES DAILY
Qty: 5 TABLET | Refills: 0 | Status: SHIPPED | OUTPATIENT
Start: 2024-10-03

## 2025-03-10 ENCOUNTER — APPOINTMENT (OUTPATIENT)
Dept: GENERAL RADIOLOGY | Age: 80
DRG: 194 | End: 2025-03-10
Payer: MEDICARE

## 2025-03-10 ENCOUNTER — HOSPITAL ENCOUNTER (INPATIENT)
Age: 80
LOS: 3 days | Discharge: HOME | DRG: 194 | End: 2025-03-13
Attending: EMERGENCY MEDICINE | Admitting: INTERNAL MEDICINE
Payer: MEDICARE

## 2025-03-10 ENCOUNTER — APPOINTMENT (OUTPATIENT)
Dept: CT IMAGING | Age: 80
DRG: 194 | End: 2025-03-10
Payer: MEDICARE

## 2025-03-10 DIAGNOSIS — J10.1 INFLUENZA A: Primary | ICD-10-CM

## 2025-03-10 LAB
ANION GAP SERPL CALCULATED.3IONS-SCNC: 15 MMOL/L (ref 3–16)
APTT BLD: 30.9 SEC (ref 22.1–36.4)
BACTERIA URNS QL MICRO: ABNORMAL /HPF
BASOPHILS # BLD: 0 K/UL (ref 0–0.2)
BASOPHILS NFR BLD: 0 %
BILIRUB UR QL STRIP.AUTO: NEGATIVE
BUN SERPL-MCNC: 13 MG/DL (ref 7–20)
CALCIUM SERPL-MCNC: 8.8 MG/DL (ref 8.3–10.6)
CHLORIDE SERPL-SCNC: 94 MMOL/L (ref 99–110)
CLARITY UR: ABNORMAL
CO2 SERPL-SCNC: 25 MMOL/L (ref 21–32)
COLOR UR: YELLOW
CREAT SERPL-MCNC: 1.2 MG/DL (ref 0.8–1.3)
DEPRECATED RDW RBC AUTO: 15.7 % (ref 12.4–15.4)
EOSINOPHIL # BLD: 0 K/UL (ref 0–0.6)
EOSINOPHIL NFR BLD: 0 %
FLUAV RNA RESP QL NAA+PROBE: DETECTED
FLUBV RNA RESP QL NAA+PROBE: NOT DETECTED
GFR SERPLBLD CREATININE-BSD FMLA CKD-EPI: 62 ML/MIN/{1.73_M2}
GLUCOSE BLD-MCNC: 241 MG/DL (ref 70–99)
GLUCOSE SERPL-MCNC: 193 MG/DL (ref 70–99)
GLUCOSE UR STRIP.AUTO-MCNC: >=1000 MG/DL
HCT VFR BLD AUTO: 35.8 % (ref 40.5–52.5)
HGB BLD-MCNC: 12.2 G/DL (ref 13.5–17.5)
HGB UR QL STRIP.AUTO: ABNORMAL
INR PPP: 1.15 (ref 0.85–1.15)
KETONES UR STRIP.AUTO-MCNC: NEGATIVE MG/DL
LEUKOCYTE ESTERASE UR QL STRIP.AUTO: NEGATIVE
LYMPHOCYTES # BLD: 0.6 K/UL (ref 1–5.1)
LYMPHOCYTES NFR BLD: 5.9 %
MAGNESIUM SERPL-MCNC: 1.49 MG/DL (ref 1.8–2.4)
MCH RBC QN AUTO: 27.8 PG (ref 26–34)
MCHC RBC AUTO-ENTMCNC: 34 G/DL (ref 31–36)
MCV RBC AUTO: 81.7 FL (ref 80–100)
MONOCYTES # BLD: 0.7 K/UL (ref 0–1.3)
MONOCYTES NFR BLD: 7 %
MUCOUS THREADS #/AREA URNS LPF: ABNORMAL /LPF
NEUTROPHILS # BLD: 9.1 K/UL (ref 1.7–7.7)
NEUTROPHILS NFR BLD: 87.1 %
NITRITE UR QL STRIP.AUTO: NEGATIVE
NT-PROBNP SERPL-MCNC: 1311 PG/ML (ref 0–449)
PERFORMED ON: ABNORMAL
PH UR STRIP.AUTO: 6 [PH] (ref 5–8)
PLATELET # BLD AUTO: 193 K/UL (ref 135–450)
PMV BLD AUTO: 8.2 FL (ref 5–10.5)
POTASSIUM SERPL-SCNC: 3 MMOL/L (ref 3.5–5.1)
PROCALCITONIN SERPL IA-MCNC: 1.29 NG/ML (ref 0–0.15)
PROT UR STRIP.AUTO-MCNC: >=300 MG/DL
PROTHROMBIN TIME: 14.9 SEC (ref 11.9–14.9)
RBC # BLD AUTO: 4.38 M/UL (ref 4.2–5.9)
RBC #/AREA URNS HPF: >100 /HPF (ref 0–4)
SARS-COV-2 RNA RESP QL NAA+PROBE: NOT DETECTED
SODIUM SERPL-SCNC: 134 MMOL/L (ref 136–145)
SP GR UR STRIP.AUTO: 1.02 (ref 1–1.03)
TROPONIN, HIGH SENSITIVITY: 33 NG/L (ref 0–22)
TROPONIN, HIGH SENSITIVITY: 33 NG/L (ref 0–22)
UA COMPLETE W REFLEX CULTURE PNL UR: ABNORMAL
UA DIPSTICK W REFLEX MICRO PNL UR: YES
URN SPEC COLLECT METH UR: ABNORMAL
UROBILINOGEN UR STRIP-ACNC: 0.2 E.U./DL
WBC # BLD AUTO: 10.4 K/UL (ref 4–11)
WBC #/AREA URNS HPF: ABNORMAL /HPF (ref 0–5)

## 2025-03-10 PROCEDURE — 99285 EMERGENCY DEPT VISIT HI MDM: CPT

## 2025-03-10 PROCEDURE — 85025 COMPLETE CBC W/AUTO DIFF WBC: CPT

## 2025-03-10 PROCEDURE — 6360000002 HC RX W HCPCS

## 2025-03-10 PROCEDURE — 83036 HEMOGLOBIN GLYCOSYLATED A1C: CPT

## 2025-03-10 PROCEDURE — 85610 PROTHROMBIN TIME: CPT

## 2025-03-10 PROCEDURE — 93005 ELECTROCARDIOGRAM TRACING: CPT

## 2025-03-10 PROCEDURE — 83880 ASSAY OF NATRIURETIC PEPTIDE: CPT

## 2025-03-10 PROCEDURE — 6360000002 HC RX W HCPCS: Performed by: INTERNAL MEDICINE

## 2025-03-10 PROCEDURE — 6370000000 HC RX 637 (ALT 250 FOR IP): Performed by: INTERNAL MEDICINE

## 2025-03-10 PROCEDURE — 84145 PROCALCITONIN (PCT): CPT

## 2025-03-10 PROCEDURE — 83735 ASSAY OF MAGNESIUM: CPT

## 2025-03-10 PROCEDURE — 87636 SARSCOV2 & INF A&B AMP PRB: CPT

## 2025-03-10 PROCEDURE — 72125 CT NECK SPINE W/O DYE: CPT

## 2025-03-10 PROCEDURE — 2500000003 HC RX 250 WO HCPCS: Performed by: INTERNAL MEDICINE

## 2025-03-10 PROCEDURE — 73080 X-RAY EXAM OF ELBOW: CPT

## 2025-03-10 PROCEDURE — 70450 CT HEAD/BRAIN W/O DYE: CPT

## 2025-03-10 PROCEDURE — 6370000000 HC RX 637 (ALT 250 FOR IP)

## 2025-03-10 PROCEDURE — 71046 X-RAY EXAM CHEST 2 VIEWS: CPT

## 2025-03-10 PROCEDURE — 84484 ASSAY OF TROPONIN QUANT: CPT

## 2025-03-10 PROCEDURE — 81001 URINALYSIS AUTO W/SCOPE: CPT

## 2025-03-10 PROCEDURE — 96374 THER/PROPH/DIAG INJ IV PUSH: CPT

## 2025-03-10 PROCEDURE — 2580000003 HC RX 258

## 2025-03-10 PROCEDURE — 85730 THROMBOPLASTIN TIME PARTIAL: CPT

## 2025-03-10 PROCEDURE — 80048 BASIC METABOLIC PNL TOTAL CA: CPT

## 2025-03-10 PROCEDURE — 1200000000 HC SEMI PRIVATE

## 2025-03-10 RX ORDER — INSULIN LISPRO 100 [IU]/ML
0-8 INJECTION, SOLUTION INTRAVENOUS; SUBCUTANEOUS
Status: DISCONTINUED | OUTPATIENT
Start: 2025-03-10 | End: 2025-03-11

## 2025-03-10 RX ORDER — LANOLIN ALCOHOL/MO/W.PET/CERES
400 CREAM (GRAM) TOPICAL 3 TIMES DAILY
Status: DISCONTINUED | OUTPATIENT
Start: 2025-03-10 | End: 2025-03-13 | Stop reason: HOSPADM

## 2025-03-10 RX ORDER — OSELTAMIVIR PHOSPHATE 75 MG/1
75 CAPSULE ORAL ONCE
Status: COMPLETED | OUTPATIENT
Start: 2025-03-10 | End: 2025-03-10

## 2025-03-10 RX ORDER — MAGNESIUM SULFATE IN WATER 40 MG/ML
2000 INJECTION, SOLUTION INTRAVENOUS PRN
Status: DISCONTINUED | OUTPATIENT
Start: 2025-03-10 | End: 2025-03-13 | Stop reason: HOSPADM

## 2025-03-10 RX ORDER — SODIUM CHLORIDE 0.9 % (FLUSH) 0.9 %
5-40 SYRINGE (ML) INJECTION PRN
Status: DISCONTINUED | OUTPATIENT
Start: 2025-03-10 | End: 2025-03-13 | Stop reason: HOSPADM

## 2025-03-10 RX ORDER — TAMSULOSIN HYDROCHLORIDE 0.4 MG/1
0.4 CAPSULE ORAL DAILY
Status: DISCONTINUED | OUTPATIENT
Start: 2025-03-10 | End: 2025-03-13 | Stop reason: HOSPADM

## 2025-03-10 RX ORDER — PAROXETINE 20 MG/1
20 TABLET, FILM COATED ORAL EVERY EVENING
Status: DISCONTINUED | OUTPATIENT
Start: 2025-03-10 | End: 2025-03-13 | Stop reason: HOSPADM

## 2025-03-10 RX ORDER — CLONIDINE HYDROCHLORIDE 0.2 MG/1
0.2 TABLET ORAL 2 TIMES DAILY
Status: DISCONTINUED | OUTPATIENT
Start: 2025-03-10 | End: 2025-03-13 | Stop reason: HOSPADM

## 2025-03-10 RX ORDER — METOPROLOL TARTRATE 50 MG
50 TABLET ORAL 2 TIMES DAILY
Status: DISCONTINUED | OUTPATIENT
Start: 2025-03-10 | End: 2025-03-13 | Stop reason: HOSPADM

## 2025-03-10 RX ORDER — OSELTAMIVIR PHOSPHATE 75 MG/1
75 CAPSULE ORAL 2 TIMES DAILY
Status: DISCONTINUED | OUTPATIENT
Start: 2025-03-10 | End: 2025-03-10 | Stop reason: SDUPTHER

## 2025-03-10 RX ORDER — POTASSIUM CHLORIDE 7.45 MG/ML
10 INJECTION INTRAVENOUS PRN
Status: DISCONTINUED | OUTPATIENT
Start: 2025-03-10 | End: 2025-03-13 | Stop reason: HOSPADM

## 2025-03-10 RX ORDER — FINASTERIDE 5 MG/1
5 TABLET, FILM COATED ORAL DAILY
Status: DISCONTINUED | OUTPATIENT
Start: 2025-03-10 | End: 2025-03-13 | Stop reason: HOSPADM

## 2025-03-10 RX ORDER — ACETAMINOPHEN 650 MG/1
650 SUPPOSITORY RECTAL EVERY 6 HOURS PRN
Status: DISCONTINUED | OUTPATIENT
Start: 2025-03-10 | End: 2025-03-13 | Stop reason: HOSPADM

## 2025-03-10 RX ORDER — LEVOFLOXACIN 5 MG/ML
750 INJECTION, SOLUTION INTRAVENOUS ONCE
Status: COMPLETED | OUTPATIENT
Start: 2025-03-10 | End: 2025-03-10

## 2025-03-10 RX ORDER — GABAPENTIN 300 MG/1
300 CAPSULE ORAL 2 TIMES DAILY
Status: DISCONTINUED | OUTPATIENT
Start: 2025-03-10 | End: 2025-03-13 | Stop reason: HOSPADM

## 2025-03-10 RX ORDER — GLUCAGON 1 MG/ML
1 KIT INJECTION PRN
Status: DISCONTINUED | OUTPATIENT
Start: 2025-03-10 | End: 2025-03-11 | Stop reason: SDUPTHER

## 2025-03-10 RX ORDER — LOSARTAN POTASSIUM 50 MG/1
100 TABLET ORAL DAILY
Status: DISCONTINUED | OUTPATIENT
Start: 2025-03-10 | End: 2025-03-13 | Stop reason: HOSPADM

## 2025-03-10 RX ORDER — SODIUM CHLORIDE 0.9 % (FLUSH) 0.9 %
5-40 SYRINGE (ML) INJECTION EVERY 12 HOURS SCHEDULED
Status: DISCONTINUED | OUTPATIENT
Start: 2025-03-10 | End: 2025-03-13 | Stop reason: HOSPADM

## 2025-03-10 RX ORDER — POLYETHYLENE GLYCOL 3350 17 G/17G
17 POWDER, FOR SOLUTION ORAL DAILY PRN
Status: DISCONTINUED | OUTPATIENT
Start: 2025-03-10 | End: 2025-03-13 | Stop reason: HOSPADM

## 2025-03-10 RX ORDER — OSELTAMIVIR PHOSPHATE 30 MG/1
30 CAPSULE ORAL EVERY 12 HOURS
Status: DISCONTINUED | OUTPATIENT
Start: 2025-03-11 | End: 2025-03-11

## 2025-03-10 RX ORDER — LOSARTAN POTASSIUM AND HYDROCHLOROTHIAZIDE 25; 100 MG/1; MG/1
1 TABLET ORAL EVERY EVENING
Status: DISCONTINUED | OUTPATIENT
Start: 2025-03-10 | End: 2025-03-10

## 2025-03-10 RX ORDER — BENZONATATE 100 MG/1
100 CAPSULE ORAL 3 TIMES DAILY PRN
Status: DISCONTINUED | OUTPATIENT
Start: 2025-03-10 | End: 2025-03-13 | Stop reason: HOSPADM

## 2025-03-10 RX ORDER — LORAZEPAM 1 MG/1
1 TABLET ORAL 2 TIMES DAILY
COMMUNITY

## 2025-03-10 RX ORDER — LEVOFLOXACIN 750 MG/1
750 TABLET, FILM COATED ORAL EVERY 24 HOURS
Status: DISCONTINUED | OUTPATIENT
Start: 2025-03-11 | End: 2025-03-13 | Stop reason: HOSPADM

## 2025-03-10 RX ORDER — HYDROCHLOROTHIAZIDE 25 MG/1
25 TABLET ORAL DAILY
Status: DISCONTINUED | OUTPATIENT
Start: 2025-03-10 | End: 2025-03-13 | Stop reason: HOSPADM

## 2025-03-10 RX ORDER — ONDANSETRON 2 MG/ML
4 INJECTION INTRAMUSCULAR; INTRAVENOUS EVERY 6 HOURS PRN
Status: DISCONTINUED | OUTPATIENT
Start: 2025-03-10 | End: 2025-03-13 | Stop reason: HOSPADM

## 2025-03-10 RX ORDER — ACETAMINOPHEN 325 MG/1
650 TABLET ORAL EVERY 6 HOURS PRN
Status: DISCONTINUED | OUTPATIENT
Start: 2025-03-10 | End: 2025-03-13 | Stop reason: HOSPADM

## 2025-03-10 RX ORDER — ATORVASTATIN CALCIUM 40 MG/1
40 TABLET, FILM COATED ORAL NIGHTLY
Status: DISCONTINUED | OUTPATIENT
Start: 2025-03-10 | End: 2025-03-13 | Stop reason: HOSPADM

## 2025-03-10 RX ORDER — ONDANSETRON 4 MG/1
4 TABLET, ORALLY DISINTEGRATING ORAL EVERY 8 HOURS PRN
Status: DISCONTINUED | OUTPATIENT
Start: 2025-03-10 | End: 2025-03-13 | Stop reason: HOSPADM

## 2025-03-10 RX ORDER — AMLODIPINE BESYLATE 10 MG/1
10 TABLET ORAL EVERY MORNING
Status: DISCONTINUED | OUTPATIENT
Start: 2025-03-11 | End: 2025-03-13 | Stop reason: HOSPADM

## 2025-03-10 RX ORDER — MAGNESIUM SULFATE IN WATER 40 MG/ML
2000 INJECTION, SOLUTION INTRAVENOUS ONCE
Status: COMPLETED | OUTPATIENT
Start: 2025-03-10 | End: 2025-03-10

## 2025-03-10 RX ORDER — ALBUTEROL SULFATE 0.83 MG/ML
2.5 SOLUTION RESPIRATORY (INHALATION) EVERY 6 HOURS PRN
Status: DISCONTINUED | OUTPATIENT
Start: 2025-03-10 | End: 2025-03-13 | Stop reason: HOSPADM

## 2025-03-10 RX ORDER — GUAIFENESIN 600 MG/1
600 TABLET, EXTENDED RELEASE ORAL 2 TIMES DAILY
Status: DISCONTINUED | OUTPATIENT
Start: 2025-03-10 | End: 2025-03-13 | Stop reason: HOSPADM

## 2025-03-10 RX ORDER — DEXTROSE MONOHYDRATE 100 MG/ML
INJECTION, SOLUTION INTRAVENOUS CONTINUOUS PRN
Status: DISCONTINUED | OUTPATIENT
Start: 2025-03-10 | End: 2025-03-11 | Stop reason: SDUPTHER

## 2025-03-10 RX ORDER — SODIUM CHLORIDE 9 MG/ML
INJECTION, SOLUTION INTRAVENOUS PRN
Status: DISCONTINUED | OUTPATIENT
Start: 2025-03-10 | End: 2025-03-13 | Stop reason: HOSPADM

## 2025-03-10 RX ORDER — SODIUM CHLORIDE, SODIUM LACTATE, POTASSIUM CHLORIDE, AND CALCIUM CHLORIDE .6; .31; .03; .02 G/100ML; G/100ML; G/100ML; G/100ML
1000 INJECTION, SOLUTION INTRAVENOUS ONCE
Status: COMPLETED | OUTPATIENT
Start: 2025-03-10 | End: 2025-03-10

## 2025-03-10 RX ORDER — POTASSIUM CHLORIDE 1500 MG/1
40 TABLET, EXTENDED RELEASE ORAL PRN
Status: DISCONTINUED | OUTPATIENT
Start: 2025-03-10 | End: 2025-03-13 | Stop reason: HOSPADM

## 2025-03-10 RX ORDER — FENOFIBRATE 160 MG/1
160 TABLET ORAL DAILY
Status: DISCONTINUED | OUTPATIENT
Start: 2025-03-10 | End: 2025-03-13 | Stop reason: HOSPADM

## 2025-03-10 RX ADMIN — POTASSIUM CHLORIDE 10 MEQ: 7.45 INJECTION INTRAVENOUS at 18:16

## 2025-03-10 RX ADMIN — Medication 400 MG: at 21:00

## 2025-03-10 RX ADMIN — LEVOFLOXACIN 750 MG: 5 INJECTION, SOLUTION INTRAVENOUS at 14:07

## 2025-03-10 RX ADMIN — PAROXETINE HYDROCHLORIDE 20 MG: 20 TABLET, FILM COATED ORAL at 18:13

## 2025-03-10 RX ADMIN — SODIUM CHLORIDE, SODIUM LACTATE, POTASSIUM CHLORIDE, AND CALCIUM CHLORIDE 1000 ML: .6; .31; .03; .02 INJECTION, SOLUTION INTRAVENOUS at 12:03

## 2025-03-10 RX ADMIN — FINASTERIDE 5 MG: 5 TABLET, FILM COATED ORAL at 18:13

## 2025-03-10 RX ADMIN — OSELTAMIVIR 75 MG: 75 CAPSULE ORAL at 14:11

## 2025-03-10 RX ADMIN — BENZONATATE 100 MG: 100 CAPSULE ORAL at 21:54

## 2025-03-10 RX ADMIN — INSULIN LISPRO 2 UNITS: 100 INJECTION, SOLUTION INTRAVENOUS; SUBCUTANEOUS at 21:55

## 2025-03-10 RX ADMIN — APIXABAN 5 MG: 5 TABLET, FILM COATED ORAL at 21:00

## 2025-03-10 RX ADMIN — MAGNESIUM SULFATE HEPTAHYDRATE 2000 MG: 40 INJECTION, SOLUTION INTRAVENOUS at 15:38

## 2025-03-10 RX ADMIN — POTASSIUM CHLORIDE 10 MEQ: 7.45 INJECTION INTRAVENOUS at 19:21

## 2025-03-10 RX ADMIN — HYDROCHLOROTHIAZIDE 25 MG: 25 TABLET ORAL at 18:32

## 2025-03-10 RX ADMIN — ACETAMINOPHEN 650 MG: 325 TABLET, FILM COATED ORAL at 21:54

## 2025-03-10 RX ADMIN — TAMSULOSIN HYDROCHLORIDE 0.4 MG: 0.4 CAPSULE ORAL at 18:13

## 2025-03-10 RX ADMIN — METOPROLOL TARTRATE 50 MG: 50 TABLET, FILM COATED ORAL at 21:00

## 2025-03-10 RX ADMIN — LOSARTAN POTASSIUM 100 MG: 50 TABLET, FILM COATED ORAL at 18:32

## 2025-03-10 RX ADMIN — POTASSIUM BICARBONATE 20 MEQ: 782 TABLET, EFFERVESCENT ORAL at 15:38

## 2025-03-10 RX ADMIN — GABAPENTIN 300 MG: 300 CAPSULE ORAL at 18:12

## 2025-03-10 RX ADMIN — GUAIFENESIN 600 MG: 600 TABLET ORAL at 21:01

## 2025-03-10 RX ADMIN — FENOFIBRATE 160 MG: 160 TABLET ORAL at 18:13

## 2025-03-10 RX ADMIN — SODIUM CHLORIDE, PRESERVATIVE FREE 10 ML: 5 INJECTION INTRAVENOUS at 21:03

## 2025-03-10 RX ADMIN — ATORVASTATIN CALCIUM 40 MG: 40 TABLET, FILM COATED ORAL at 21:00

## 2025-03-10 RX ADMIN — CLONIDINE HYDROCHLORIDE 0.2 MG: 0.2 TABLET ORAL at 21:00

## 2025-03-10 ASSESSMENT — PAIN DESCRIPTION - ONSET: ONSET: GRADUAL

## 2025-03-10 ASSESSMENT — PAIN SCALES - GENERAL: PAINLEVEL_OUTOF10: 3

## 2025-03-10 ASSESSMENT — PAIN - FUNCTIONAL ASSESSMENT: PAIN_FUNCTIONAL_ASSESSMENT: PREVENTS OR INTERFERES SOME ACTIVE ACTIVITIES AND ADLS

## 2025-03-10 ASSESSMENT — PAIN DESCRIPTION - PAIN TYPE: TYPE: ACUTE PAIN

## 2025-03-10 ASSESSMENT — PAIN DESCRIPTION - FREQUENCY: FREQUENCY: INTERMITTENT

## 2025-03-10 ASSESSMENT — PAIN DESCRIPTION - LOCATION: LOCATION: GENERALIZED

## 2025-03-10 ASSESSMENT — PAIN DESCRIPTION - DESCRIPTORS: DESCRIPTORS: DISCOMFORT

## 2025-03-10 NOTE — ED NOTES
Patient Name: Leobardo Mccormick  : 1945 79 y.o.  MRN: [id number]  ED Room #: B18/B18-18     Chief complaint:   Chief Complaint   Patient presents with    Cough     Coming from home for cough since Thursday. Today got dizzy while going to the bathroom and fell. States that he hit his head on the floor and on eliquis. Denies LOC.      Hospital Problem/Diagnosis:   Hospital Problems           Last Modified POA    * (Principal) Influenza A 3/10/2025 Yes         O2 Flow Rate:O2 Device: None (Room air)   (if applicable)  Cardiac Rhythm:   (if applicable)  Active LDA's:   Peripheral IV 03/10/25 Right Antecubital (Active)            How does patient ambulate? Stand by assist    2. How does patient take pills? Whole with Water    3. Is patient alert? Alert    4. Is patient oriented? To Person, To Place, To Time, and To Situation    5.   Patient arrived from:  home  Facility Name: ___________________________________________    6. If patient is disoriented or from a Skill Nursing Facility has family been notified of admission?       7. Patient belongings? Belongings: Cell Phone and Clothing    Disposition of belongings? Kept with Patient     8. Any specific patient or family belongings/needs/dynamics?   a.     9. Miscellaneous comments/pending orders?  a. Pt coming from home for dizziness that caused a fall today while he was in the bathroom and dry cough since Thursday. A&Ox4. Flu +. On RA at baseline but using 1L while sleeping for comfort. Ambulates independently at home but standby assist here.       If there are any additional questions please reach out to the Emergency Department.      Argelia Duncan, VIKRAM  03/10/25 9713

## 2025-03-10 NOTE — ED PROVIDER NOTES
THE [facility]  EMERGENCY DEPARTMENT ENCOUNTER          EM RESIDENT NOTE   Date of evaluation: 3/10/2025  Chief Complaint   Cough (Coming from home for cough since Thursday. Today got dizzy while going to the bathroom and fell. States that he hit his head on the floor and on eliquis. Denies LOC. )    History of Present Illness   Leobardo Mccormick is a 79 y.o. male with PMH atrial fibrillation on Eliquis, T2DM c/b neuropathy, HTN, HLD, SURAJ who presents with hacking cough, dizziness, and a fall.  The patient states that since Thursday he has developed a cough productive of sputum without fever/chills.  He states because he has been coughing so much, he has felt increasingly lightheaded to the point that he fell 3 times today, striking his head once and also injuring his left elbow.  Says he did not lose consciousness during these times.  He endorses adherence to his Eliquis.  He was immediately ambulatory after the fall.  He did not have any significant chest pain or shortness of breath immediately prior to the fall.  He describes his dizziness as presyncopal, instead any room spinning dizziness when he is still.  He notes that the dizziness is prompted by changes in position especially with rising.    MEDICAL DECISION MAKING / ASSESSMENT / PLAN   This patient was also evaluated by the attending physician. All care plans were discussed and agreed upon.    Patient vital signs are reassuring against sepsis.  High concern that the patient is combating a pulmonary infectious process that unfortunately has caused some decompensation resulting in him becoming lightheaded (possibly orthostatic) resulting in a fall while anticoagulated.  On exam, he does have a very small abrasion at his left forehead and abrasions to his left elbow.  Will obtain CT head and C-spine imaging given evidence of head trauma, anticoagulation use, and age.  Will obtain x-ray of the left elbow.  Will obtain chest x-ray to evaluate for pneumonia.

## 2025-03-10 NOTE — ED PROVIDER NOTES
ED Attending Attestation Note     Date of evaluation: 3/10/2025    This patient was seen by the resident.  I have seen and examined the patient, agree with the workup, evaluation, management and diagnosis. The care plan has been discussed.  I have reviewed the ECG and concur with the resident's interpretation.      Briefly, Leobardo Mccormick is a 79 y.o. male with a PMH inclusive of A Fib on eliquis who presents for evaluation of cough that is making him lightheaded and leading to fall x3 times. Has lightheadedess with walking     Notable exam findings include no respiratory distress.    Assessment/ Medical Decision Making:     Patient with influenza, infiltrate.  Getting antibiotics, Tamiflu.  Will admit.         Luis Eduardo Batres MD  03/13/25 0505

## 2025-03-10 NOTE — H&P
V2.0  History and Physical      Name:  Leobardo Mccormick /Age/Sex: 1945  (79 y.o. male)   MRN & CSN:  [id number] & [id number] Encounter Date/Time: 3/10/2025 3:16 PM EDT   Location:  [place] [address] PCP: Rene Beasley MD       Hospital Day: 1    Assessment and Plan:   Leobardo Mccormick is a 79 y.o. male with a pmh of Hypertension, diabetes mellitus type 2, SURAJ on CPAP, persistent A-fib status post ablation  on Eliquis who presents with Influenza A    Hospital Problems           Last Modified POA    * (Principal) Influenza A 3/10/2025 Yes     Assessment and plan  #Influenza A  #Suspected superimposed bacterial infection  Tamiflu 75 mg IV for 10 doses.  Levaquin 750 mg daily for 5 days.  He is allergic to cephalosporin.  Continue bronchodilators, Mucinex,  Patient reports he was flu vaccine this year.  Encourage patient to take vaccine every year.  Check procalcitonin level, respiratory panel PCR.    #Elevated proBNP likely in setting of pneumonia  Continue to monitor.  There is no obvious sign of volume overload.    #A-fib status ablation  Continue Eliquis 5 mg, rate controlled with metoprolol    #Hypertension  Continue clonidine 0.2 mg twice daily, losartan hydrochlorothiazide.    #BPH  Continue tamsulosin, Proscar    #Hyperlipidemia  Continue atorvastatin.    #Diabetes mellitus type 2  He is on metformin, saxagliptin at home.  Will put on insulin sliding scale while in the hospital.    #Hypomagnesemia and hypokalemia  Replaced will monitor    Disposition:   Current Living situation: Home  Expected Disposition: Home  Estimated D/C: 3/12    Diet No diet orders on file cardiac diet   DVT Prophylaxis [] Lovenox, []  Heparin, [] SCDs, [] Ambulation,  [x] Eliquis, [] Xarelto, [] Coumadin   Code Status Prior full code   Surrogate Decision Maker/ POA None     Personally reviewed Lab Studies and Imaging           History from:     patient    History of Present Illness:     Chief Complaint:   Chief Complaint   Patient

## 2025-03-10 NOTE — PLAN OF CARE
Patient admitted with pneumonia and the flu. Placed on heart monitor. Oriented to room. Magnesium infusing. Call light in reach.

## 2025-03-11 LAB
ANION GAP SERPL CALCULATED.3IONS-SCNC: 10 MMOL/L (ref 3–16)
BASOPHILS # BLD: 0.1 K/UL (ref 0–0.2)
BASOPHILS NFR BLD: 0.9 %
BUN SERPL-MCNC: 14 MG/DL (ref 7–20)
CALCIUM SERPL-MCNC: 8.8 MG/DL (ref 8.3–10.6)
CHLORIDE SERPL-SCNC: 98 MMOL/L (ref 99–110)
CO2 SERPL-SCNC: 29 MMOL/L (ref 21–32)
CREAT SERPL-MCNC: 1 MG/DL (ref 0.8–1.3)
DEPRECATED RDW RBC AUTO: 15.5 % (ref 12.4–15.4)
EKG ATRIAL RATE: 72 BPM
EKG DIAGNOSIS: NORMAL
EKG P AXIS: 6 DEGREES
EKG P-R INTERVAL: 168 MS
EKG Q-T INTERVAL: 382 MS
EKG QRS DURATION: 84 MS
EKG QTC CALCULATION (BAZETT): 418 MS
EKG R AXIS: -34 DEGREES
EKG T AXIS: -48 DEGREES
EKG VENTRICULAR RATE: 72 BPM
EOSINOPHIL # BLD: 0 K/UL (ref 0–0.6)
EOSINOPHIL NFR BLD: 0 %
EST. AVERAGE GLUCOSE BLD GHB EST-MCNC: 200.1 MG/DL
GFR SERPLBLD CREATININE-BSD FMLA CKD-EPI: 77 ML/MIN/{1.73_M2}
GLUCOSE BLD-MCNC: 187 MG/DL (ref 70–99)
GLUCOSE BLD-MCNC: 347 MG/DL (ref 70–99)
GLUCOSE BLD-MCNC: 359 MG/DL (ref 70–99)
GLUCOSE BLD-MCNC: 376 MG/DL (ref 70–99)
GLUCOSE SERPL-MCNC: 185 MG/DL (ref 70–99)
HBA1C MFR BLD: 8.6 %
HCT VFR BLD AUTO: 35.6 % (ref 40.5–52.5)
HGB BLD-MCNC: 12 G/DL (ref 13.5–17.5)
LYMPHOCYTES # BLD: 0.8 K/UL (ref 1–5.1)
LYMPHOCYTES NFR BLD: 11.2 %
MAGNESIUM SERPL-MCNC: 1.92 MG/DL (ref 1.8–2.4)
MCH RBC QN AUTO: 27.8 PG (ref 26–34)
MCHC RBC AUTO-ENTMCNC: 33.6 G/DL (ref 31–36)
MCV RBC AUTO: 82.9 FL (ref 80–100)
MONOCYTES # BLD: 0.4 K/UL (ref 0–1.3)
MONOCYTES NFR BLD: 6.3 %
NEUTROPHILS # BLD: 5.8 K/UL (ref 1.7–7.7)
NEUTROPHILS NFR BLD: 81.6 %
PERFORMED ON: ABNORMAL
PLATELET # BLD AUTO: 162 K/UL (ref 135–450)
PMV BLD AUTO: 8.4 FL (ref 5–10.5)
POTASSIUM SERPL-SCNC: 3.1 MMOL/L (ref 3.5–5.1)
RBC # BLD AUTO: 4.3 M/UL (ref 4.2–5.9)
SODIUM SERPL-SCNC: 137 MMOL/L (ref 136–145)
WBC # BLD AUTO: 7.1 K/UL (ref 4–11)

## 2025-03-11 PROCEDURE — 97530 THERAPEUTIC ACTIVITIES: CPT

## 2025-03-11 PROCEDURE — 97116 GAIT TRAINING THERAPY: CPT

## 2025-03-11 PROCEDURE — 87641 MR-STAPH DNA AMP PROBE: CPT

## 2025-03-11 PROCEDURE — 6370000000 HC RX 637 (ALT 250 FOR IP)

## 2025-03-11 PROCEDURE — 97535 SELF CARE MNGMENT TRAINING: CPT

## 2025-03-11 PROCEDURE — 80048 BASIC METABOLIC PNL TOTAL CA: CPT

## 2025-03-11 PROCEDURE — 0202U NFCT DS 22 TRGT SARS-COV-2: CPT

## 2025-03-11 PROCEDURE — 6370000000 HC RX 637 (ALT 250 FOR IP): Performed by: HOSPITALIST

## 2025-03-11 PROCEDURE — 36415 COLL VENOUS BLD VENIPUNCTURE: CPT

## 2025-03-11 PROCEDURE — 93010 ELECTROCARDIOGRAM REPORT: CPT | Performed by: INTERNAL MEDICINE

## 2025-03-11 PROCEDURE — 2580000003 HC RX 258: Performed by: INTERNAL MEDICINE

## 2025-03-11 PROCEDURE — 2500000003 HC RX 250 WO HCPCS: Performed by: INTERNAL MEDICINE

## 2025-03-11 PROCEDURE — 97165 OT EVAL LOW COMPLEX 30 MIN: CPT

## 2025-03-11 PROCEDURE — 83735 ASSAY OF MAGNESIUM: CPT

## 2025-03-11 PROCEDURE — 97162 PT EVAL MOD COMPLEX 30 MIN: CPT

## 2025-03-11 PROCEDURE — 1200000000 HC SEMI PRIVATE

## 2025-03-11 PROCEDURE — 6360000002 HC RX W HCPCS: Performed by: INTERNAL MEDICINE

## 2025-03-11 PROCEDURE — 6370000000 HC RX 637 (ALT 250 FOR IP): Performed by: INTERNAL MEDICINE

## 2025-03-11 PROCEDURE — 85025 COMPLETE CBC W/AUTO DIFF WBC: CPT

## 2025-03-11 RX ORDER — DEXTROSE MONOHYDRATE 100 MG/ML
INJECTION, SOLUTION INTRAVENOUS CONTINUOUS PRN
Status: DISCONTINUED | OUTPATIENT
Start: 2025-03-11 | End: 2025-03-13 | Stop reason: HOSPADM

## 2025-03-11 RX ORDER — OSELTAMIVIR PHOSPHATE 75 MG/1
75 CAPSULE ORAL 2 TIMES DAILY
Status: DISCONTINUED | OUTPATIENT
Start: 2025-03-11 | End: 2025-03-13 | Stop reason: HOSPADM

## 2025-03-11 RX ORDER — GLUCAGON 1 MG/ML
1 KIT INJECTION PRN
Status: DISCONTINUED | OUTPATIENT
Start: 2025-03-11 | End: 2025-03-13 | Stop reason: HOSPADM

## 2025-03-11 RX ORDER — ACETAMINOPHEN 325 MG/1
650 TABLET ORAL 4 TIMES DAILY
Status: DISCONTINUED | OUTPATIENT
Start: 2025-03-11 | End: 2025-03-13 | Stop reason: HOSPADM

## 2025-03-11 RX ORDER — INSULIN GLARGINE 100 [IU]/ML
5 INJECTION, SOLUTION SUBCUTANEOUS NIGHTLY
Status: DISCONTINUED | OUTPATIENT
Start: 2025-03-11 | End: 2025-03-12

## 2025-03-11 RX ORDER — INSULIN LISPRO 100 [IU]/ML
0-16 INJECTION, SOLUTION INTRAVENOUS; SUBCUTANEOUS
Status: DISCONTINUED | OUTPATIENT
Start: 2025-03-11 | End: 2025-03-13 | Stop reason: HOSPADM

## 2025-03-11 RX ADMIN — SODIUM CHLORIDE: 0.9 INJECTION, SOLUTION INTRAVENOUS at 04:22

## 2025-03-11 RX ADMIN — GABAPENTIN 300 MG: 300 CAPSULE ORAL at 17:51

## 2025-03-11 RX ADMIN — ACETAMINOPHEN 650 MG: 325 TABLET, FILM COATED ORAL at 17:51

## 2025-03-11 RX ADMIN — SODIUM CHLORIDE, PRESERVATIVE FREE 10 ML: 5 INJECTION INTRAVENOUS at 21:43

## 2025-03-11 RX ADMIN — CLONIDINE HYDROCHLORIDE 0.2 MG: 0.2 TABLET ORAL at 09:29

## 2025-03-11 RX ADMIN — ATORVASTATIN CALCIUM 40 MG: 40 TABLET, FILM COATED ORAL at 21:41

## 2025-03-11 RX ADMIN — METOPROLOL TARTRATE 50 MG: 50 TABLET, FILM COATED ORAL at 09:29

## 2025-03-11 RX ADMIN — FINASTERIDE 5 MG: 5 TABLET, FILM COATED ORAL at 09:29

## 2025-03-11 RX ADMIN — Medication 400 MG: at 14:50

## 2025-03-11 RX ADMIN — INSULIN GLARGINE 5 UNITS: 100 INJECTION, SOLUTION SUBCUTANEOUS at 21:40

## 2025-03-11 RX ADMIN — Medication 400 MG: at 21:41

## 2025-03-11 RX ADMIN — POTASSIUM CHLORIDE 10 MEQ: 7.45 INJECTION INTRAVENOUS at 07:02

## 2025-03-11 RX ADMIN — CLONIDINE HYDROCHLORIDE 0.2 MG: 0.2 TABLET ORAL at 21:41

## 2025-03-11 RX ADMIN — AMLODIPINE BESYLATE 10 MG: 10 TABLET ORAL at 09:29

## 2025-03-11 RX ADMIN — POTASSIUM CHLORIDE 40 MEQ: 1500 TABLET, EXTENDED RELEASE ORAL at 09:29

## 2025-03-11 RX ADMIN — HYDROCHLOROTHIAZIDE 25 MG: 25 TABLET ORAL at 09:29

## 2025-03-11 RX ADMIN — ACETAMINOPHEN 650 MG: 325 TABLET, FILM COATED ORAL at 05:35

## 2025-03-11 RX ADMIN — APIXABAN 5 MG: 5 TABLET, FILM COATED ORAL at 09:29

## 2025-03-11 RX ADMIN — PAROXETINE HYDROCHLORIDE 20 MG: 20 TABLET, FILM COATED ORAL at 17:52

## 2025-03-11 RX ADMIN — INSULIN LISPRO 8 UNITS: 100 INJECTION, SOLUTION INTRAVENOUS; SUBCUTANEOUS at 12:41

## 2025-03-11 RX ADMIN — INSULIN LISPRO 2 UNITS: 100 INJECTION, SOLUTION INTRAVENOUS; SUBCUTANEOUS at 09:34

## 2025-03-11 RX ADMIN — TAMSULOSIN HYDROCHLORIDE 0.4 MG: 0.4 CAPSULE ORAL at 09:29

## 2025-03-11 RX ADMIN — SODIUM CHLORIDE, PRESERVATIVE FREE 10 ML: 5 INJECTION INTRAVENOUS at 09:30

## 2025-03-11 RX ADMIN — BENZONATATE 100 MG: 100 CAPSULE ORAL at 05:35

## 2025-03-11 RX ADMIN — BENZONATATE 100 MG: 100 CAPSULE ORAL at 21:41

## 2025-03-11 RX ADMIN — POTASSIUM CHLORIDE 10 MEQ: 7.45 INJECTION INTRAVENOUS at 05:34

## 2025-03-11 RX ADMIN — FENOFIBRATE 160 MG: 160 TABLET ORAL at 09:29

## 2025-03-11 RX ADMIN — GUAIFENESIN 600 MG: 600 TABLET ORAL at 09:29

## 2025-03-11 RX ADMIN — OSELTAMIVIR PHOSPHATE 75 MG: 75 CAPSULE ORAL at 21:42

## 2025-03-11 RX ADMIN — GUAIFENESIN 600 MG: 600 TABLET ORAL at 21:40

## 2025-03-11 RX ADMIN — INSULIN LISPRO 16 UNITS: 100 INJECTION, SOLUTION INTRAVENOUS; SUBCUTANEOUS at 17:52

## 2025-03-11 RX ADMIN — ACETAMINOPHEN 650 MG: 325 TABLET, FILM COATED ORAL at 12:41

## 2025-03-11 RX ADMIN — LOSARTAN POTASSIUM 100 MG: 50 TABLET, FILM COATED ORAL at 09:29

## 2025-03-11 RX ADMIN — METOPROLOL TARTRATE 50 MG: 50 TABLET, FILM COATED ORAL at 21:40

## 2025-03-11 RX ADMIN — ACETAMINOPHEN 650 MG: 325 TABLET, FILM COATED ORAL at 21:41

## 2025-03-11 RX ADMIN — OSELTAMIVIR PHOSPHATE 30 MG: 30 CAPSULE ORAL at 07:00

## 2025-03-11 RX ADMIN — LEVOFLOXACIN 750 MG: 750 TABLET, FILM COATED ORAL at 14:50

## 2025-03-11 RX ADMIN — GABAPENTIN 300 MG: 300 CAPSULE ORAL at 09:29

## 2025-03-11 RX ADMIN — APIXABAN 5 MG: 5 TABLET, FILM COATED ORAL at 21:42

## 2025-03-11 RX ADMIN — Medication 400 MG: at 09:29

## 2025-03-11 RX ADMIN — POTASSIUM CHLORIDE 10 MEQ: 7.45 INJECTION INTRAVENOUS at 04:22

## 2025-03-11 ASSESSMENT — PAIN DESCRIPTION - FREQUENCY
FREQUENCY: INTERMITTENT
FREQUENCY: INTERMITTENT

## 2025-03-11 ASSESSMENT — PAIN DESCRIPTION - LOCATION
LOCATION: GENERALIZED

## 2025-03-11 ASSESSMENT — PAIN DESCRIPTION - DESCRIPTORS
DESCRIPTORS: DISCOMFORT
DESCRIPTORS: DISCOMFORT

## 2025-03-11 ASSESSMENT — PAIN DESCRIPTION - PAIN TYPE
TYPE: ACUTE PAIN
TYPE: ACUTE PAIN

## 2025-03-11 ASSESSMENT — PAIN SCALES - GENERAL
PAINLEVEL_OUTOF10: 3

## 2025-03-11 ASSESSMENT — PAIN - FUNCTIONAL ASSESSMENT
PAIN_FUNCTIONAL_ASSESSMENT: ACTIVITIES ARE NOT PREVENTED
PAIN_FUNCTIONAL_ASSESSMENT: PREVENTS OR INTERFERES SOME ACTIVE ACTIVITIES AND ADLS

## 2025-03-11 ASSESSMENT — PAIN DESCRIPTION - ONSET
ONSET: GRADUAL
ONSET: GRADUAL

## 2025-03-11 NOTE — PLAN OF CARE
Problem: Safety - Adult  Goal: Free from fall injury  Outcome: Progressing  Note:  Remains free from falls, bed in low position, call light in reach, bed alarm monitoring for safety.     Problem: Pain  Goal: Verbalizes/displays adequate comfort level or baseline comfort level  Outcome: Progressing  Note:  PRN Tylenol 650 mg PO at 2154 for generalized discomfort helpful.     Problem: Respiratory - Adult  Goal: Achieves optimal ventilation and oxygenation  Outcome: Progressing  Note:  O2 93 to 95% on Room Air.     Problem: Cardiovascular - Adult  Goal: Absence of cardiac dysrhythmias or at baseline  Outcome: Progressing  Note:  Normal Sinus Rhythm with IVCD rate 67 at 2158.

## 2025-03-11 NOTE — PLAN OF CARE
Problem: Safety - Adult  Goal: Free from fall injury  3/11/2025 1551 by Noa Haile RN  Note: Patient up in room with stand by assist. Gait steady. He denies dizziness, or lightheadedness. Bed alarm on, and call light in reach. Chair alarm on while up in chair.      Problem: Pain  Goal: Verbalizes/displays adequate comfort level or baseline comfort level  3/11/2025 1551 by Noa Haile RN  Note: No complaints of pain.      Problem: Respiratory - Adult  Goal: Achieves optimal ventilation and oxygenation  3/11/2025 1551 by Noa Haile, RN  Note: He remains on room air. Breath sound diminished. He has a non productive cough. He is receiving tamiflu, and antibiotics for pneumonia.      Problem: Cardiovascular - Adult  Goal: Absence of cardiac dysrhythmias or at baseline  3/11/2025 1551 by Noa Haile, RN  Note: SR on telemetry. No chest pain.      Problem: Chronic Conditions and Co-morbidities  Goal: Patient's chronic conditions and co-morbidity symptoms are monitored and maintained or improved  Note: Patient given 40 meq of potassium for a potassium of 3.1.     Problem: Discharge Planning  Goal: Discharge to home or other facility with appropriate resources  Note: He plans to return home at discharge.

## 2025-03-12 LAB
ALBUMIN SERPL-MCNC: 3.2 G/DL (ref 3.4–5)
ANION GAP SERPL CALCULATED.3IONS-SCNC: 10 MMOL/L (ref 3–16)
BASOPHILS # BLD: 0 K/UL (ref 0–0.2)
BASOPHILS NFR BLD: 0.7 %
BUN SERPL-MCNC: 21 MG/DL (ref 7–20)
CALCIUM SERPL-MCNC: 8.6 MG/DL (ref 8.3–10.6)
CHLORIDE SERPL-SCNC: 92 MMOL/L (ref 99–110)
CO2 SERPL-SCNC: 28 MMOL/L (ref 21–32)
CREAT SERPL-MCNC: 1.3 MG/DL (ref 0.8–1.3)
DEPRECATED RDW RBC AUTO: 15.7 % (ref 12.4–15.4)
EOSINOPHIL # BLD: 0.1 K/UL (ref 0–0.6)
EOSINOPHIL NFR BLD: 2.1 %
GFR SERPLBLD CREATININE-BSD FMLA CKD-EPI: 56 ML/MIN/{1.73_M2}
GLUCOSE BLD-MCNC: 191 MG/DL (ref 70–99)
GLUCOSE BLD-MCNC: 236 MG/DL (ref 70–99)
GLUCOSE BLD-MCNC: 255 MG/DL (ref 70–99)
GLUCOSE BLD-MCNC: 257 MG/DL (ref 70–99)
GLUCOSE BLD-MCNC: 359 MG/DL (ref 70–99)
GLUCOSE SERPL-MCNC: 320 MG/DL (ref 70–99)
HCT VFR BLD AUTO: 31.8 % (ref 40.5–52.5)
HGB BLD-MCNC: 10.6 G/DL (ref 13.5–17.5)
LYMPHOCYTES # BLD: 0.8 K/UL (ref 1–5.1)
LYMPHOCYTES NFR BLD: 19.9 %
MCH RBC QN AUTO: 27.5 PG (ref 26–34)
MCHC RBC AUTO-ENTMCNC: 33.3 G/DL (ref 31–36)
MCV RBC AUTO: 82.5 FL (ref 80–100)
MONOCYTES # BLD: 0.3 K/UL (ref 0–1.3)
MONOCYTES NFR BLD: 8.2 %
MRSA DNA SPEC QL NAA+PROBE: NORMAL
NEUTROPHILS # BLD: 2.8 K/UL (ref 1.7–7.7)
NEUTROPHILS NFR BLD: 69.1 %
ORGANISM: ABNORMAL
PERFORMED ON: ABNORMAL
PHOSPHATE SERPL-MCNC: 2.9 MG/DL (ref 2.5–4.9)
PLATELET # BLD AUTO: 153 K/UL (ref 135–450)
PMV BLD AUTO: 8.7 FL (ref 5–10.5)
POTASSIUM SERPL-SCNC: 3.3 MMOL/L (ref 3.5–5.1)
RBC # BLD AUTO: 3.86 M/UL (ref 4.2–5.9)
REPORT: NORMAL
RESP PATH DNA+RNA PNL NPH NAA+NON-PROBE: ABNORMAL
SODIUM SERPL-SCNC: 130 MMOL/L (ref 136–145)
WBC # BLD AUTO: 4 K/UL (ref 4–11)

## 2025-03-12 PROCEDURE — 36415 COLL VENOUS BLD VENIPUNCTURE: CPT

## 2025-03-12 PROCEDURE — 80069 RENAL FUNCTION PANEL: CPT

## 2025-03-12 PROCEDURE — 2500000003 HC RX 250 WO HCPCS: Performed by: INTERNAL MEDICINE

## 2025-03-12 PROCEDURE — 97116 GAIT TRAINING THERAPY: CPT

## 2025-03-12 PROCEDURE — 97530 THERAPEUTIC ACTIVITIES: CPT

## 2025-03-12 PROCEDURE — 6370000000 HC RX 637 (ALT 250 FOR IP): Performed by: INTERNAL MEDICINE

## 2025-03-12 PROCEDURE — 85025 COMPLETE CBC W/AUTO DIFF WBC: CPT

## 2025-03-12 PROCEDURE — 2580000003 HC RX 258: Performed by: HOSPITALIST

## 2025-03-12 PROCEDURE — 1200000000 HC SEMI PRIVATE

## 2025-03-12 PROCEDURE — 6370000000 HC RX 637 (ALT 250 FOR IP): Performed by: HOSPITALIST

## 2025-03-12 RX ORDER — INSULIN LISPRO 100 [IU]/ML
7 INJECTION, SOLUTION INTRAVENOUS; SUBCUTANEOUS
Status: DISCONTINUED | OUTPATIENT
Start: 2025-03-12 | End: 2025-03-12

## 2025-03-12 RX ORDER — INSULIN LISPRO 100 [IU]/ML
12 INJECTION, SOLUTION INTRAVENOUS; SUBCUTANEOUS
Status: DISCONTINUED | OUTPATIENT
Start: 2025-03-12 | End: 2025-03-13 | Stop reason: HOSPADM

## 2025-03-12 RX ORDER — SODIUM CHLORIDE 9 MG/ML
INJECTION, SOLUTION INTRAVENOUS CONTINUOUS
Status: DISCONTINUED | OUTPATIENT
Start: 2025-03-12 | End: 2025-03-13 | Stop reason: HOSPADM

## 2025-03-12 RX ORDER — INSULIN GLARGINE 100 [IU]/ML
10 INJECTION, SOLUTION SUBCUTANEOUS NIGHTLY
Status: DISCONTINUED | OUTPATIENT
Start: 2025-03-12 | End: 2025-03-13 | Stop reason: HOSPADM

## 2025-03-12 RX ADMIN — Medication 400 MG: at 10:07

## 2025-03-12 RX ADMIN — OSELTAMIVIR PHOSPHATE 75 MG: 75 CAPSULE ORAL at 23:07

## 2025-03-12 RX ADMIN — GABAPENTIN 300 MG: 300 CAPSULE ORAL at 17:18

## 2025-03-12 RX ADMIN — CLONIDINE HYDROCHLORIDE 0.2 MG: 0.2 TABLET ORAL at 23:05

## 2025-03-12 RX ADMIN — INSULIN LISPRO 4 UNITS: 100 INJECTION, SOLUTION INTRAVENOUS; SUBCUTANEOUS at 09:45

## 2025-03-12 RX ADMIN — FINASTERIDE 5 MG: 5 TABLET, FILM COATED ORAL at 10:06

## 2025-03-12 RX ADMIN — GUAIFENESIN 600 MG: 600 TABLET ORAL at 23:05

## 2025-03-12 RX ADMIN — APIXABAN 5 MG: 5 TABLET, FILM COATED ORAL at 10:07

## 2025-03-12 RX ADMIN — INSULIN LISPRO 7 UNITS: 100 INJECTION, SOLUTION INTRAVENOUS; SUBCUTANEOUS at 12:50

## 2025-03-12 RX ADMIN — LOSARTAN POTASSIUM 100 MG: 50 TABLET, FILM COATED ORAL at 10:06

## 2025-03-12 RX ADMIN — HYDROCHLOROTHIAZIDE 25 MG: 25 TABLET ORAL at 10:06

## 2025-03-12 RX ADMIN — INSULIN LISPRO 4 UNITS: 100 INJECTION, SOLUTION INTRAVENOUS; SUBCUTANEOUS at 23:05

## 2025-03-12 RX ADMIN — GABAPENTIN 300 MG: 300 CAPSULE ORAL at 10:07

## 2025-03-12 RX ADMIN — PAROXETINE HYDROCHLORIDE 20 MG: 20 TABLET, FILM COATED ORAL at 17:19

## 2025-03-12 RX ADMIN — Medication 400 MG: at 23:05

## 2025-03-12 RX ADMIN — INSULIN LISPRO 8 UNITS: 100 INJECTION, SOLUTION INTRAVENOUS; SUBCUTANEOUS at 18:00

## 2025-03-12 RX ADMIN — SODIUM CHLORIDE, PRESERVATIVE FREE 10 ML: 5 INJECTION INTRAVENOUS at 10:11

## 2025-03-12 RX ADMIN — FENOFIBRATE 160 MG: 160 TABLET ORAL at 10:06

## 2025-03-12 RX ADMIN — INSULIN LISPRO 12 UNITS: 100 INJECTION, SOLUTION INTRAVENOUS; SUBCUTANEOUS at 18:52

## 2025-03-12 RX ADMIN — ATORVASTATIN CALCIUM 40 MG: 40 TABLET, FILM COATED ORAL at 23:05

## 2025-03-12 RX ADMIN — OSELTAMIVIR PHOSPHATE 75 MG: 75 CAPSULE ORAL at 10:07

## 2025-03-12 RX ADMIN — INSULIN GLARGINE 10 UNITS: 100 INJECTION, SOLUTION SUBCUTANEOUS at 23:05

## 2025-03-12 RX ADMIN — METOPROLOL TARTRATE 50 MG: 50 TABLET, FILM COATED ORAL at 23:04

## 2025-03-12 RX ADMIN — TAMSULOSIN HYDROCHLORIDE 0.4 MG: 0.4 CAPSULE ORAL at 10:07

## 2025-03-12 RX ADMIN — POTASSIUM BICARBONATE 40 MEQ: 782 TABLET, EFFERVESCENT ORAL at 17:19

## 2025-03-12 RX ADMIN — AMLODIPINE BESYLATE 10 MG: 10 TABLET ORAL at 10:06

## 2025-03-12 RX ADMIN — GUAIFENESIN 600 MG: 600 TABLET ORAL at 10:11

## 2025-03-12 RX ADMIN — Medication 400 MG: at 14:27

## 2025-03-12 RX ADMIN — ACETAMINOPHEN 650 MG: 325 TABLET, FILM COATED ORAL at 17:18

## 2025-03-12 RX ADMIN — APIXABAN 5 MG: 5 TABLET, FILM COATED ORAL at 23:04

## 2025-03-12 RX ADMIN — ACETAMINOPHEN 650 MG: 325 TABLET, FILM COATED ORAL at 23:04

## 2025-03-12 RX ADMIN — INSULIN LISPRO 8 UNITS: 100 INJECTION, SOLUTION INTRAVENOUS; SUBCUTANEOUS at 00:22

## 2025-03-12 RX ADMIN — CLONIDINE HYDROCHLORIDE 0.2 MG: 0.2 TABLET ORAL at 10:06

## 2025-03-12 RX ADMIN — ACETAMINOPHEN 650 MG: 325 TABLET, FILM COATED ORAL at 14:27

## 2025-03-12 RX ADMIN — METOPROLOL TARTRATE 50 MG: 50 TABLET, FILM COATED ORAL at 10:06

## 2025-03-12 RX ADMIN — LEVOFLOXACIN 750 MG: 750 TABLET, FILM COATED ORAL at 14:27

## 2025-03-12 RX ADMIN — ACETAMINOPHEN 650 MG: 325 TABLET, FILM COATED ORAL at 10:06

## 2025-03-12 RX ADMIN — SODIUM CHLORIDE: 0.9 INJECTION, SOLUTION INTRAVENOUS at 17:14

## 2025-03-12 RX ADMIN — INSULIN LISPRO 16 UNITS: 100 INJECTION, SOLUTION INTRAVENOUS; SUBCUTANEOUS at 12:50

## 2025-03-12 ASSESSMENT — PAIN - FUNCTIONAL ASSESSMENT: PAIN_FUNCTIONAL_ASSESSMENT: PREVENTS OR INTERFERES SOME ACTIVE ACTIVITIES AND ADLS

## 2025-03-12 ASSESSMENT — PAIN DESCRIPTION - LOCATION: LOCATION: RIB CAGE

## 2025-03-12 ASSESSMENT — PAIN SCALES - GENERAL: PAINLEVEL_OUTOF10: 3

## 2025-03-12 ASSESSMENT — PAIN DESCRIPTION - DESCRIPTORS: DESCRIPTORS: DISCOMFORT

## 2025-03-12 NOTE — PLAN OF CARE
Problem: Safety - Adult  Goal: Free from fall injury  Outcome: Progressing  Note:  Remains free from falls, bed in low position, call light in reach, bed alarm monitoring for safety.     Problem: Pain  Goal: Verbalizes/displays adequate comfort level or baseline comfort level  Outcome: Progressing  Note:  PRN Tylenol 650 mg PO at 2141 for generalized discomfort helpful.     Problem: Respiratory - Adult  Goal: Achieves optimal ventilation and oxygenation  Outcome: Progressing  Note:  O2 94 to 98% on Room Air.

## 2025-03-13 VITALS
SYSTOLIC BLOOD PRESSURE: 128 MMHG | BODY MASS INDEX: 30.44 KG/M2 | HEART RATE: 56 BPM | RESPIRATION RATE: 18 BRPM | TEMPERATURE: 97.6 F | WEIGHT: 200.84 LBS | DIASTOLIC BLOOD PRESSURE: 80 MMHG | OXYGEN SATURATION: 94 % | HEIGHT: 68 IN

## 2025-03-13 LAB
ALBUMIN SERPL-MCNC: 3 G/DL (ref 3.4–5)
ANION GAP SERPL CALCULATED.3IONS-SCNC: 10 MMOL/L (ref 3–16)
BUN SERPL-MCNC: 17 MG/DL (ref 7–20)
CALCIUM SERPL-MCNC: 8.6 MG/DL (ref 8.3–10.6)
CHLORIDE SERPL-SCNC: 98 MMOL/L (ref 99–110)
CO2 SERPL-SCNC: 28 MMOL/L (ref 21–32)
CREAT SERPL-MCNC: 1.1 MG/DL (ref 0.8–1.3)
GFR SERPLBLD CREATININE-BSD FMLA CKD-EPI: 69 ML/MIN/{1.73_M2}
GLUCOSE BLD-MCNC: 199 MG/DL (ref 70–99)
GLUCOSE BLD-MCNC: 301 MG/DL (ref 70–99)
GLUCOSE SERPL-MCNC: 176 MG/DL (ref 70–99)
PERFORMED ON: ABNORMAL
PERFORMED ON: ABNORMAL
PHOSPHATE SERPL-MCNC: 3.8 MG/DL (ref 2.5–4.9)
POTASSIUM SERPL-SCNC: 3.2 MMOL/L (ref 3.5–5.1)
SODIUM SERPL-SCNC: 136 MMOL/L (ref 136–145)

## 2025-03-13 PROCEDURE — 6370000000 HC RX 637 (ALT 250 FOR IP): Performed by: INTERNAL MEDICINE

## 2025-03-13 PROCEDURE — 2500000003 HC RX 250 WO HCPCS: Performed by: INTERNAL MEDICINE

## 2025-03-13 PROCEDURE — 36415 COLL VENOUS BLD VENIPUNCTURE: CPT

## 2025-03-13 PROCEDURE — 2580000003 HC RX 258: Performed by: HOSPITALIST

## 2025-03-13 PROCEDURE — 80069 RENAL FUNCTION PANEL: CPT

## 2025-03-13 PROCEDURE — 6370000000 HC RX 637 (ALT 250 FOR IP): Performed by: HOSPITALIST

## 2025-03-13 RX ORDER — OSELTAMIVIR PHOSPHATE 75 MG/1
75 CAPSULE ORAL 2 TIMES DAILY
Qty: 4 CAPSULE | Refills: 0 | Status: SHIPPED | OUTPATIENT
Start: 2025-03-13 | End: 2025-03-15

## 2025-03-13 RX ORDER — LEVOFLOXACIN 750 MG/1
750 TABLET, FILM COATED ORAL EVERY 24 HOURS
Qty: 2 TABLET | Refills: 0 | Status: SHIPPED | OUTPATIENT
Start: 2025-03-14 | End: 2025-03-16

## 2025-03-13 RX ORDER — LOSARTAN POTASSIUM AND HYDROCHLOROTHIAZIDE 25; 100 MG/1; MG/1
1 TABLET ORAL EVERY EVENING
Qty: 30 TABLET | Refills: 3 | Status: SHIPPED
Start: 2025-03-15

## 2025-03-13 RX ADMIN — INSULIN LISPRO 12 UNITS: 100 INJECTION, SOLUTION INTRAVENOUS; SUBCUTANEOUS at 08:54

## 2025-03-13 RX ADMIN — LEVOFLOXACIN 750 MG: 750 TABLET, FILM COATED ORAL at 16:33

## 2025-03-13 RX ADMIN — METOPROLOL TARTRATE 50 MG: 50 TABLET, FILM COATED ORAL at 08:54

## 2025-03-13 RX ADMIN — Medication 400 MG: at 08:53

## 2025-03-13 RX ADMIN — APIXABAN 5 MG: 5 TABLET, FILM COATED ORAL at 08:53

## 2025-03-13 RX ADMIN — INSULIN LISPRO 12 UNITS: 100 INJECTION, SOLUTION INTRAVENOUS; SUBCUTANEOUS at 12:34

## 2025-03-13 RX ADMIN — OSELTAMIVIR PHOSPHATE 75 MG: 75 CAPSULE ORAL at 09:35

## 2025-03-13 RX ADMIN — INSULIN LISPRO 12 UNITS: 100 INJECTION, SOLUTION INTRAVENOUS; SUBCUTANEOUS at 12:33

## 2025-03-13 RX ADMIN — AMLODIPINE BESYLATE 10 MG: 10 TABLET ORAL at 08:53

## 2025-03-13 RX ADMIN — POTASSIUM CHLORIDE 40 MEQ: 1500 TABLET, EXTENDED RELEASE ORAL at 07:30

## 2025-03-13 RX ADMIN — GABAPENTIN 300 MG: 300 CAPSULE ORAL at 16:33

## 2025-03-13 RX ADMIN — GABAPENTIN 300 MG: 300 CAPSULE ORAL at 08:53

## 2025-03-13 RX ADMIN — FENOFIBRATE 160 MG: 160 TABLET ORAL at 08:53

## 2025-03-13 RX ADMIN — ACETAMINOPHEN 650 MG: 325 TABLET, FILM COATED ORAL at 12:34

## 2025-03-13 RX ADMIN — INSULIN LISPRO 4 UNITS: 100 INJECTION, SOLUTION INTRAVENOUS; SUBCUTANEOUS at 09:06

## 2025-03-13 RX ADMIN — SODIUM CHLORIDE: 0.9 INJECTION, SOLUTION INTRAVENOUS at 06:42

## 2025-03-13 RX ADMIN — GUAIFENESIN 600 MG: 600 TABLET ORAL at 08:53

## 2025-03-13 RX ADMIN — ACETAMINOPHEN 650 MG: 325 TABLET, FILM COATED ORAL at 08:53

## 2025-03-13 RX ADMIN — TAMSULOSIN HYDROCHLORIDE 0.4 MG: 0.4 CAPSULE ORAL at 08:53

## 2025-03-13 RX ADMIN — CLONIDINE HYDROCHLORIDE 0.2 MG: 0.2 TABLET ORAL at 08:53

## 2025-03-13 RX ADMIN — FINASTERIDE 5 MG: 5 TABLET, FILM COATED ORAL at 08:54

## 2025-03-13 RX ADMIN — Medication 400 MG: at 16:33

## 2025-03-13 RX ADMIN — ACETAMINOPHEN 650 MG: 325 TABLET, FILM COATED ORAL at 16:33

## 2025-03-13 ASSESSMENT — PAIN DESCRIPTION - LOCATION: LOCATION: RIB CAGE

## 2025-03-13 ASSESSMENT — PAIN SCALES - GENERAL
PAINLEVEL_OUTOF10: 2

## 2025-03-13 ASSESSMENT — PAIN DESCRIPTION - DESCRIPTORS: DESCRIPTORS: ACHING;SORE

## 2025-03-13 ASSESSMENT — PAIN DESCRIPTION - ORIENTATION: ORIENTATION: LEFT;RIGHT

## 2025-03-13 NOTE — PLAN OF CARE
Problem: Chronic Conditions and Co-morbidities  Goal: Patient's chronic conditions and co-morbidity symptoms are monitored and maintained or improved  Outcome: Progressing  Flowsheets (Taken 3/13/2025 0120)  Care Plan - Patient's Chronic Conditions and Co-Morbidity Symptoms are Monitored and Maintained or Improved:   Monitor and assess patient's chronic conditions and comorbid symptoms for stability, deterioration, or improvement   Collaborate with multidisciplinary team to address chronic and comorbid conditions and prevent exacerbation or deterioration   Update acute care plan with appropriate goals if chronic or comorbid symptoms are exacerbated and prevent overall improvement and discharge     Problem: Discharge Planning  Goal: Discharge to home or other facility with appropriate resources  Outcome: Progressing  Flowsheets (Taken 3/13/2025 0120)  Discharge to home or other facility with appropriate resources:   Identify barriers to discharge with patient and caregiver   Arrange for needed discharge resources and transportation as appropriate   Identify discharge learning needs (meds, wound care, etc)   Refer to discharge planning if patient needs post-hospital services based on physician order or complex needs related to functional status, cognitive ability or social support system   Arrange for interpreters to assist at discharge as needed     Problem: Safety - Adult  Goal: Free from fall injury  Outcome: Progressing  Flowsheets (Taken 3/13/2025 0120)  Free From Fall Injury:   Instruct family/caregiver on patient safety   Based on caregiver fall risk screen, instruct family/caregiver to ask for assistance with transferring infant if caregiver noted to have fall risk factors  Note: Bed locked and in lowest position. Bed alarm on and call light within reach.     Problem: ABCDS Injury Assessment  Goal: Absence of physical injury  Outcome: Progressing     Problem: Pain  Goal: Verbalizes/displays adequate comfort

## 2025-03-13 NOTE — PROGRESS NOTES
V2.0    [facility] – [place], [place] Progress Note      Name:  Leobardo Mccormick /Age/Sex: 1945  (79 y.o. male)   MRN & CSN:  [id number] & [id number] Encounter Date/Time: 3/12/2025 1:58 PM EDT   Location:  6322/6322-01 PCP: Rene Beasley MD     Attending:Ricky Coats MD       Hospital Day: 3    Assessment and Recommendations   Leobardo Mccormick is a 79 y.o. male with pmh of Hypertension, diabetes mellitus type 2, SURAJ on CPAP, persistent A-fib status post ablation  on Eliquis came to ER due to complaint of persistent cough associated with chills, fever and lightheadedness that lead to a fall     He tested positive for influenza A.. CXR showed possible pneumonia     Influenza A infection   superimposed bacterial pneumonia  -Slightly better   -Cont Tamiflu   -Cont Levaquin (allergic to cephalosporin).     A-fib - hx of ablation.Continue Eliquis 5 mg, rate controlled with metoprolol     Hypertension -Continue clonidine 0.2 mg twice daily, losartan hydrochlorothiazide.     BPH - Continue tamsulosin, Proscar     Hyperlipidemia - Continue atorvastatin.     Diabetes mellitus type 2   -A1c 8.6  -He is on metformin, saxagliptin and glimepiride at home.  -Still with significant hyperglycemia   -Cont high SSI. Add lispro 7 units TID. Cont lantus 10 units  -adjust insulin as needed.Insulin is a high risk medications that require monitoring glucose checks to avoid risk for hypoglycemia .    Hypomagnesemia and hypokalemia - replace as needed    DVT Ppx:eliquis    Code: full      Medical Decision Making:  The following items were considered in medical decision making:  Discussion of patient care with other providers. Consultant Notes reviewed  Reviewed clinical lab tests Independently if any  Microbiology cultures and other micro tests if any  Reviewed radiology tests Independently if any  Reviewed other diagnostic tests/interventions  Discussed the discharge plan in detail with case management        Subjective:      Feeling slightly 
    V2.0    [facility] – [place]      Name:  Leobardo Mccormick /Age/Sex: 1945  (79 y.o. male)   MRN & CSN:  [id number] & [id number] Encounter Date/Time: 3/11/2025 1:58 PM EDT   Location:  6322/6322-01 PCP: Rene Beasley MD     Attending:Dave Paula MD       Hospital Day: 2    Assessment and Recommendations   Leobardo Mccormick is a 79 y.o. male with pmh of Hypertension, diabetes mellitus type 2, SURAJ on CPAP, persistent A-fib status post ablation  on Eliquis came to ER due to complaint of persistent cough associated with chills, fever and lightheadedness that lead to a fall     He tested positive for influenza A.. CXR showed possible pneumonia     Influenza A infection   superimposed bacterial pneumonia  -Cont Tamiflu   -Cont Levaquin (allergic to cephalosporin). Check MRSA screen     A-fib status ablationContinue Eliquis 5 mg, rate controlled with metoprolol     Hypertension -Continue clonidine 0.2 mg twice daily, losartan hydrochlorothiazide.     BPH - Continue tamsulosin, Proscar     Hyperlipidemia - Continue atorvastatin.     Diabetes mellitus type 2 - He is on metformin, saxagliptin at home.  Will put on insulin sliding scale while in the hospital.adjust insulin as needed.Insulin is a high risk medications that require monitoring glucose checks to avoid risk for hypoglycemia .    Hypomagnesemia and hypokalemia - replace as needed  Replaced will monitor    DVT Ppx:eliquis    Code: full      Medical Decision Making:  The following items were considered in medical decision making:  Discussion of patient care with other providers. Consultant Notes reviewed  Reviewed clinical lab tests Independently if any  Microbiology cultures and other micro tests if any  Reviewed radiology tests Independently if any  Reviewed other diagnostic tests/interventions  Discussed the discharge plan in detail with case management        Subjective:      Feeling better      Review of Systems:      Pertinent positives and 
4 Eyes Skin Assessment     NAME:  Leoabrdo Mccormick  YOB: 1945  MEDICAL RECORD NUMBER:  [id number]    The patient is being assessed for  Admission    I agree that at least one RN has performed a thorough Head to Toe Skin Assessment on the patient. ALL assessment sites listed below have been assessed.      Areas assessed by both nurses:    Head, Face, Ears, Shoulders, Back, Chest, Arms, Elbows, Hands, Sacrum. Buttock, Coccyx, Ischium, Legs. Feet and Heels, and Under Medical Devices         Does the Patient have a Wound? No noted wound(s)  Alonzo Prevention initiated by RN: No  Wound Care Orders initiated by RN: No    Pressure Injury (Stage 3,4, Unstageable, DTI, NWPT, and Complex wounds) if present, place Wound referral order by RN under : No    New Ostomies, if present place, Ostomy referral order under : No     Nurse 1 eSignature: Electronically signed by Noa Haile RN on 3/10/25 at 5:13 PM EDT    **SHARE this note so that the co-signing nurse can place an eSignature**    Nurse 2 eSignature: Electronically signed by Mary Farrell RN on 3/10/25 at 5:55 PM EDT    
D:  Patient has a persistent non-productive cough.  There are no PRN orders for this.  May we have an order for Tessalon?  He just received Mucinex.  Please advise.  Thank you.  A:  Notified provider, Beth Cool.  
Discharge instructions discussed with pt. All questions and concerns addressed. Pt advised their daughter will be picking them up later this evening.   
Physical Therapy  Facility/Department: [address]  Physical Therapy Daily Treatment    Name: Leobardo Mccormick  : 1945  MRN: [id number]  Date of Service: 3/12/2025    Discharge Recommendations:  24 hour supervision or assist   PT Equipment Recommendations  Equipment Needed: No      Patient Diagnosis(es): There were no encounter diagnoses.  Past Medical History:  has a past medical history of Allergic reaction to sulfonamide, Atrial fibrillation (HCC), Atrial fibrillation, controlled (HCC), Bacterial infection due to Morganella morganii, Bee sting reaction, BPH associated with nocturia, Bulging of cervical intervertebral disc, Cephalexin adverse reaction, DM (diabetes mellitus), type 2 (HCC), Dyslipidemia, Fracture of left elbow, Group B streptococcal infection, Hematuria, History of cardioversion, History of right inguinal hernia repair, HTN (hypertension), Hidalgo's neuroma of right foot, Open fracture of left elbow, Open fracture of left humerus, Polyneuropathy due to type 2 diabetes mellitus (HCC), Pseudomonas infection, Rupture of appendix, and TIA (transient ischemic attack).  Past Surgical History:  has a past surgical history that includes Tonsillectomy (); Toe amputation (Right, 2021); Foot surgery (Right, 2022); Appendectomy; Inguinal hernia repair (Right); ORIF humerus fracture; and Foot surgery (Left, 3/20/2023).    Assessment  Assessment: pt progressing well, able to ambulate longer distances within room with SBA with spO2 97% on room air. Pt appears mildly unsteady initially but improves with distance. Pt plans to DC home with assist from his daughter. Rec initial 24hr A at DC and progressive ambulation with staff during admission.  Activity Tolerance  Activity Tolerance: Patient tolerated treatment well    Plan  Physical Therapy Plan  General Plan: Other (See Comment) (2-5)  Current Treatment Recommendations: Strengthening, Balance training, Functional mobility training, 
Pt belongings gathered and last set of meds given per MAR. Pt meds delivered at bedside. Pt had no following concerns of discharging. IV removed, bleeding at the site initially but stopped. Gauze and tegaderm applied firmly. Pt taken downstairs in wheelchair and left with daughter driving.   
[facility] - Clinical Pharmacy Note - Renal Dosing    Tamiflu ordered for treatment of Influenza. Per [facility] Renal Dose Adjustment Policy, 70 mg q12h will be changed to 30 mg q12h. Patient already received 1x dose of 70mg.    Estimated Creatinine Clearance: Estimated Creatinine Clearance: 57 mL/min (based on SCr of 1.2 mg/dL).  Dialysis Status, EDER, CKD: none  BMI: Body mass index is 30.87 kg/m².    Rationale for Adjustment: Agent is renally eliminated.    Pharmacy will continue to monitor renal function and adjust dose as necessary.      Please call with any questions.    John Mauro, PharmD  PGY1 Pharmacy Resident   Wireless: [phone]  03/10/25       
impairment  Therapy Prognosis: Good  Decision Making: Medium Complexity  Requires PT Follow-Up: Yes  Activity Tolerance  Activity Tolerance: Patient tolerated evaluation without incident;Patient tolerated treatment well  Activity Tolerance Comments: spO2 94-96% on room air throughout session    Plan  Physical Therapy Plan  General Plan: Other (See Comment)  Current Treatment Recommendations: Strengthening, Balance training, Functional mobility training, Transfer training, Endurance training, Gait training, Stair training, Safety education & training, Home exercise program, Patient/Caregiver education & training, Therapeutic activities  Safety Devices  Type of Devices: Chair alarm in place, Left in chair, Nurse notified, Call light within reach    Restrictions  Restrictions/Precautions  Activity Level: Up as Tolerated     Subjective  General  Chart Reviewed: Yes  Patient assessed for rehabilitation services?: Yes  Additional Pertinent Hx: Leobardo Mccormick is a 79 y.o. male with a pmh of Hypertension, diabetes mellitus type 2, SURAJ on CPAP, persistent A-fib status post ablation 2021 on Eliquis who presents with Influenza A. He states because he has been coughing so much, he has felt increasingly lightheaded to the point that he fell 3 times today, striking his head once and also injuring his left elbow. CT head: No acute intracranial abnormality. XR L elbow: No acute osseous abnormality.  Family/Caregiver Present: No  Referring Practitioner: Dave Paula MD  Referral Date : 03/10/25  Diagnosis: influenza  Follows Commands: Within Functional Limits  Subjective  Subjective: pt semisupine in bed on arrival, agreeable to PT. No c/o pain.         Social/Functional History  Social/Functional History  Lives With: Daughter  Type of Home: Condo  Home Layout: Bed/Bath upstairs, Two level  Home Access: Stairs to enter without rails  Entrance Stairs - Number of Steps: 2  Bathroom Shower/Tub: Tub/Shower unit  Bathroom Toilet: 
Frame for Short Term Goals: by dc  Short Term Goal 1: Pt will complete LE dressing w/ spvn and use of LRAD prn  Short Term Goal 2: Pt will complete toileting w/ spvn  Short Term Goal 3: Pt will complete functional transfers w/ spvn  Short Term Goal 4: Pt will complete functional mobility to/from bathroom using AD prn w/ spvn      Therapy Time   Individual Concurrent Group Co-treatment   Time In 1100         Time Out 1140         Minutes 40         Timed Code Treatment Minutes: 25 Minutes     Total Treatment Minutes: 40  Selin bravo, OT

## 2025-03-13 NOTE — DISCHARGE INSTRUCTIONS
Finish courses of Levofloxacin and Tamiflu as instructed  Restart your losartan- hydrochlorothiazide on 3/15  Restart your metformin on 3/15  See your primary care physician next week and repeat labs   Possible side effect of the Levofloxacin is tendinitis especially in the Achilles tendon as we discussed. If you start to notice pain there then stop it please and call your primary physician

## 2025-03-13 NOTE — DISCHARGE INSTR - COC
Active Problem List   Diagnosis Code    Anxiety state F41.1    Type 2 diabetes mellitus with other specified complication (HCC) E11.69    Essential hypertension I10    Major depression, chronic F32.9    Migraine G43.909    Mixed hyperlipidemia E78.2    Chronic atrial fibrillation (HCC) I48.20    Reflux esophagitis K21.00    Obesity E66.9    Persistent atrial fibrillation (HCC) I48.19    SURAJ (obstructive sleep apnea) G47.33    Diabetic foot infection (HCC) E11.628, L08.9    Osteomyelitis of right foot (HCC) M86.9    Elevated C-reactive protein (CRP) R79.82    Diabetic polyneuropathy associated with type 2 diabetes mellitus (HCC) E11.42    Elevated sed rate R70.0    Hyponatremia E87.1    Numbness and tingling of right arm R20.0, R20.2    Microscopic hematuria R31.29    Normocytic anemia D64.9    BPH associated with nocturia N40.1, R35.1    TIA (transient ischemic attack) G45.9    Influenza A J10.1       Isolation/Infection:   Isolation            Droplet  Contact          Patient Infection Status        Infection Onset Added Last Indicated Last Indicated By Review Planned Expiration    Influenza 03/10/25 03/10/25 03/11/25 Respiratory Panel, Molecular, with COVID-19 (Restricted: peds pts or suitable admitted adults) 03/19/25 03/21/25 history    MRSA 02/27/22 02/28/22 02/27/22 Culture, Anaerobic and Aerobic                           Nurse Assessment:  Last Vital Signs: BP (!) 173/79   Pulse 60   Temp 97.7 °F (36.5 °C) (Oral)   Resp 18   Ht 1.727 m (5' 8\")   Wt 91.1 kg (200 lb 13.4 oz)   SpO2 94%   BMI 30.54 kg/m²     Last documented pain score (0-10 scale): Pain Level: 2  Last Weight:   Wt Readings from Last 1 Encounters:   03/13/25 91.1 kg (200 lb 13.4 oz)     Mental Status:  {IP PT MENTAL STATUS:20030}    IV Access:  {[facility] IV ACCESS:425617721}    Nursing Mobility/ADLs:  Walking   {CHP DME ADLs:390758080}  Transfer  {CHP DME ADLs:650700956}  Bathing  {CHP DME ADLs:171754925}  Dressing  {CHP DME

## 2025-03-13 NOTE — DISCHARGE SUMMARY
Ventricles are normal in size and position. Basal cisterns are preserved. Visualized paranasal sinuses and mastoid air cells are clear. No acute or suspicious bony abnormality.     No acute intracranial abnormality. Electronically signed by Dale Marsh      CBC:   Recent Labs     03/11/25  0514 03/12/25  1450   WBC 7.1 4.0   HGB 12.0* 10.6*    153     BMP:    Recent Labs     03/11/25  0514 03/12/25  1450 03/13/25  0442    130* 136   K 3.1* 3.3* 3.2*   CL 98* 92* 98*   CO2 29 28 28   BUN 14 21* 17   CREATININE 1.0 1.3 1.1   GLUCOSE 185* 320* 176*     Hepatic: No results for input(s): \"AST\", \"ALT\", \"BILITOT\", \"ALKPHOS\" in the last 72 hours.    Invalid input(s): \"ALB\"  Lipids:   Lab Results   Component Value Date/Time    CHOL 145 02/21/2023 03:04 AM    HDL 28 02/21/2023 03:04 AM    TRIG 348 02/21/2023 03:04 AM     Hemoglobin A1C:   Lab Results   Component Value Date/Time    LABA1C 8.6 03/10/2025 12:58 PM     TSH:   Lab Results   Component Value Date/Time    TSH 1.85 01/06/2021 08:04 PM     Troponin: No results found for: \"TROPONINT\"  Lactic Acid: No results for input(s): \"LACTA\" in the last 72 hours.  BNP: No results for input(s): \"PROBNP\" in the last 72 hours.  UA:  Lab Results   Component Value Date/Time    NITRU Negative 03/10/2025 12:58 PM    COLORU Yellow 03/10/2025 12:58 PM    PHUR 6.0 03/10/2025 12:58 PM    PHUR 6.5 08/31/2023 09:48 PM    WBCUA 0-2 03/10/2025 12:58 PM    RBCUA >100 03/10/2025 12:58 PM    MUCUS Rare 03/10/2025 12:58 PM    BACTERIA 2+ 03/10/2025 12:58 PM    CLARITYU CLOUDY 03/10/2025 12:58 PM    LEUKOCYTESUR Negative 03/10/2025 12:58 PM    UROBILINOGEN 0.2 03/10/2025 12:58 PM    BILIRUBINUR Negative 03/10/2025 12:58 PM    BLOODU LARGE 03/10/2025 12:58 PM    GLUCOSEU >=1000 03/10/2025 12:58 PM    KETUA Negative 03/10/2025 12:58 PM     Urine Cultures:   Lab Results   Component Value Date/Time    LABURIN 75,000 CFU/ml 08/31/2023 09:48 PM     Blood Cultures:   Lab Results   Component

## 2025-04-01 ENCOUNTER — APPOINTMENT (OUTPATIENT)
Dept: GENERAL RADIOLOGY | Age: 80
End: 2025-04-01
Payer: MEDICARE

## 2025-04-01 ENCOUNTER — HOSPITAL ENCOUNTER (EMERGENCY)
Age: 80
Discharge: HOME | End: 2025-04-01
Attending: EMERGENCY MEDICINE
Payer: MEDICARE

## 2025-04-01 VITALS
DIASTOLIC BLOOD PRESSURE: 96 MMHG | OXYGEN SATURATION: 100 % | WEIGHT: 211.5 LBS | TEMPERATURE: 98.8 F | SYSTOLIC BLOOD PRESSURE: 143 MMHG | HEIGHT: 68 IN | HEART RATE: 70 BPM | BODY MASS INDEX: 32.05 KG/M2 | RESPIRATION RATE: 18 BRPM

## 2025-04-01 DIAGNOSIS — R05.9 COUGH, UNSPECIFIED TYPE: Primary | ICD-10-CM

## 2025-04-01 LAB
ANION GAP SERPL CALCULATED.3IONS-SCNC: 12 MMOL/L (ref 3–16)
BASOPHILS # BLD: 0.1 K/UL (ref 0–0.2)
BASOPHILS NFR BLD: 0.8 %
BUN SERPL-MCNC: 12 MG/DL (ref 7–20)
CALCIUM SERPL-MCNC: 9.2 MG/DL (ref 8.3–10.6)
CHLORIDE SERPL-SCNC: 95 MMOL/L (ref 99–110)
CO2 SERPL-SCNC: 26 MMOL/L (ref 21–32)
CREAT SERPL-MCNC: 0.9 MG/DL (ref 0.8–1.3)
DEPRECATED RDW RBC AUTO: 16.2 % (ref 12.4–15.4)
EKG ATRIAL RATE: 66 BPM
EKG DIAGNOSIS: NORMAL
EKG P AXIS: -7 DEGREES
EKG P-R INTERVAL: 206 MS
EKG Q-T INTERVAL: 404 MS
EKG QRS DURATION: 78 MS
EKG QTC CALCULATION (BAZETT): 423 MS
EKG R AXIS: -26 DEGREES
EKG T AXIS: -24 DEGREES
EKG VENTRICULAR RATE: 66 BPM
EOSINOPHIL # BLD: 0.1 K/UL (ref 0–0.6)
EOSINOPHIL NFR BLD: 1 %
GFR SERPLBLD CREATININE-BSD FMLA CKD-EPI: 88 ML/MIN/{1.73_M2}
GLUCOSE SERPL-MCNC: 247 MG/DL (ref 70–99)
HCT VFR BLD AUTO: 33.8 % (ref 40.5–52.5)
HGB BLD-MCNC: 11.5 G/DL (ref 13.5–17.5)
LYMPHOCYTES # BLD: 1.1 K/UL (ref 1–5.1)
LYMPHOCYTES NFR BLD: 13.2 %
MAGNESIUM SERPL-MCNC: 1.55 MG/DL (ref 1.8–2.4)
MCH RBC QN AUTO: 28 PG (ref 26–34)
MCHC RBC AUTO-ENTMCNC: 34.2 G/DL (ref 31–36)
MCV RBC AUTO: 82 FL (ref 80–100)
MONOCYTES # BLD: 0.7 K/UL (ref 0–1.3)
MONOCYTES NFR BLD: 8 %
NEUTROPHILS # BLD: 6.6 K/UL (ref 1.7–7.7)
NEUTROPHILS NFR BLD: 77 %
NT-PROBNP SERPL-MCNC: 271 PG/ML (ref 0–449)
PLATELET # BLD AUTO: 229 K/UL (ref 135–450)
PMV BLD AUTO: 8 FL (ref 5–10.5)
POTASSIUM SERPL-SCNC: 3.5 MMOL/L (ref 3.5–5.1)
RBC # BLD AUTO: 4.12 M/UL (ref 4.2–5.9)
SODIUM SERPL-SCNC: 133 MMOL/L (ref 136–145)
TROPONIN, HIGH SENSITIVITY: 27 NG/L (ref 0–22)
TROPONIN, HIGH SENSITIVITY: 30 NG/L (ref 0–22)
WBC # BLD AUTO: 8.5 K/UL (ref 4–11)

## 2025-04-01 PROCEDURE — 83735 ASSAY OF MAGNESIUM: CPT

## 2025-04-01 PROCEDURE — 99285 EMERGENCY DEPT VISIT HI MDM: CPT

## 2025-04-01 PROCEDURE — 80048 BASIC METABOLIC PNL TOTAL CA: CPT

## 2025-04-01 PROCEDURE — 93005 ELECTROCARDIOGRAM TRACING: CPT | Performed by: EMERGENCY MEDICINE

## 2025-04-01 PROCEDURE — 71046 X-RAY EXAM CHEST 2 VIEWS: CPT

## 2025-04-01 PROCEDURE — 84484 ASSAY OF TROPONIN QUANT: CPT

## 2025-04-01 PROCEDURE — 83880 ASSAY OF NATRIURETIC PEPTIDE: CPT

## 2025-04-01 PROCEDURE — 85025 COMPLETE CBC W/AUTO DIFF WBC: CPT

## 2025-04-01 ASSESSMENT — LIFESTYLE VARIABLES
HOW MANY STANDARD DRINKS CONTAINING ALCOHOL DO YOU HAVE ON A TYPICAL DAY: PATIENT DOES NOT DRINK
HOW OFTEN DO YOU HAVE A DRINK CONTAINING ALCOHOL: NEVER

## 2025-04-01 NOTE — DISCHARGE INSTRUCTIONS
Please continue to take the cough medicine prescribed by your primary care physician, as well as your other medications.    Consider over-the-counter cough syrup for further symptom control.    Please call your primary care provider for follow-up.    Come back to the emergency department for any new or worsening symptoms, failure to improve as you think you should, any other urgent concerns.

## 2025-04-01 NOTE — ED PROVIDER NOTES
THE [facility]  EMERGENCY DEPARTMENT ENCOUNTER          ATTENDING PHYSICIAN NOTE       Date of evaluation: 4/1/2025    Chief Complaint     Cough (Pt presents to the ED due to a persistent cough. Pt recently was admitted for pnemonia.)      History of Present Illness     Leobardo Mccormick is a 79 y.o. male who presents to the department for evaluation of a persistent cough.  He was diagnosed with influenza and pneumonia several weeks ago, and has trended to the better, but continues to be nag by a cough.  He describes occasional paroxysms of coughing that are difficult to control, and without definitive provoking or palliative features.  He visited his primary care physician, was prescribed Tessalon Perles, but these are not having significant improvement.    Cough is nonproductive.  No fevers chills or myalgias.  No lower extremity pain or swelling.  No orthopnea or paroxysmal nocturnal dyspnea.  No GI symptoms.  He occasionally feels some soreness in his ribs after coughing, but does not have chest pain.  Does not feel short of breath at rest.    Coughing appears spontaneously, with no change whether at rest or exerting himself, or with position    Review of Systems     Pertinent positives and negatives as described in the history of present illness.    Past Medical, Surgical, Family, and Social History     He has a past medical history of Allergic reaction to sulfonamide, Atrial fibrillation (HCC), Atrial fibrillation, controlled (HCC), Bacterial infection due to Morganella morganii, Bee sting reaction, BPH associated with nocturia, Bulging of cervical intervertebral disc, Cephalexin adverse reaction, DM (diabetes mellitus), type 2 (HCC), Dyslipidemia, Fracture of left elbow, Group B streptococcal infection, Hematuria, History of cardioversion, History of right inguinal hernia repair, HTN (hypertension), Hidalgo's neuroma of right foot, Open fracture of left elbow, Open fracture of left humerus, Polyneuropathy

## 2025-04-09 PROBLEM — J10.1 INFLUENZA A: Status: RESOLVED | Noted: 2025-03-10 | Resolved: 2025-04-09

## 2025-08-21 ENCOUNTER — OFFICE VISIT (OUTPATIENT)
Age: 80
End: 2025-08-21
Payer: MEDICARE

## 2025-08-21 VITALS
BODY MASS INDEX: 32.28 KG/M2 | HEIGHT: 68 IN | SYSTOLIC BLOOD PRESSURE: 130 MMHG | DIASTOLIC BLOOD PRESSURE: 80 MMHG | OXYGEN SATURATION: 95 % | WEIGHT: 212.96 LBS | HEART RATE: 65 BPM

## 2025-08-21 DIAGNOSIS — E11.69 TYPE 2 DIABETES MELLITUS WITH OTHER SPECIFIED COMPLICATION, UNSPECIFIED WHETHER LONG TERM INSULIN USE (HCC): ICD-10-CM

## 2025-08-21 DIAGNOSIS — I10 ESSENTIAL HYPERTENSION: Chronic | ICD-10-CM

## 2025-08-21 DIAGNOSIS — E66.811 CLASS 1 OBESITY WITH SERIOUS COMORBIDITY AND BODY MASS INDEX (BMI) OF 32.0 TO 32.9 IN ADULT, UNSPECIFIED OBESITY TYPE: ICD-10-CM

## 2025-08-21 DIAGNOSIS — I48.19 PERSISTENT ATRIAL FIBRILLATION (HCC): ICD-10-CM

## 2025-08-21 DIAGNOSIS — G47.33 OSA (OBSTRUCTIVE SLEEP APNEA): Primary | ICD-10-CM

## 2025-08-21 PROCEDURE — 1036F TOBACCO NON-USER: CPT | Performed by: NURSE PRACTITIONER

## 2025-08-21 PROCEDURE — 99214 OFFICE O/P EST MOD 30 MIN: CPT | Performed by: NURSE PRACTITIONER

## 2025-08-21 PROCEDURE — G8427 DOCREV CUR MEDS BY ELIG CLIN: HCPCS | Performed by: NURSE PRACTITIONER

## 2025-08-21 PROCEDURE — 1159F MED LIST DOCD IN RCRD: CPT | Performed by: NURSE PRACTITIONER

## 2025-08-21 PROCEDURE — 1123F ACP DISCUSS/DSCN MKR DOCD: CPT | Performed by: NURSE PRACTITIONER

## 2025-08-21 PROCEDURE — G8419 CALC BMI OUT NRM PARAM NOF/U: HCPCS | Performed by: NURSE PRACTITIONER

## 2025-08-21 PROCEDURE — 3052F HG A1C>EQUAL 8.0%<EQUAL 9.0%: CPT | Performed by: NURSE PRACTITIONER

## 2025-08-21 PROCEDURE — G2211 COMPLEX E/M VISIT ADD ON: HCPCS | Performed by: NURSE PRACTITIONER

## 2025-08-21 PROCEDURE — 3080F DIAST BP >= 90 MM HG: CPT | Performed by: NURSE PRACTITIONER

## 2025-08-21 PROCEDURE — 1160F RVW MEDS BY RX/DR IN RCRD: CPT | Performed by: NURSE PRACTITIONER

## 2025-08-21 PROCEDURE — 3077F SYST BP >= 140 MM HG: CPT | Performed by: NURSE PRACTITIONER

## 2025-08-21 ASSESSMENT — SLEEP AND FATIGUE QUESTIONNAIRES
HOW LIKELY ARE YOU TO NOD OFF OR FALL ASLEEP WHILE WATCHING TV: SLIGHT CHANCE OF DOZING
HOW LIKELY ARE YOU TO NOD OFF OR FALL ASLEEP WHILE SITTING AND TALKING TO SOMEONE: WOULD NEVER DOZE
HOW LIKELY ARE YOU TO NOD OFF OR FALL ASLEEP WHILE SITTING AND READING: SLIGHT CHANCE OF DOZING
HOW LIKELY ARE YOU TO NOD OFF OR FALL ASLEEP IN A CAR, WHILE STOPPED FOR A FEW MINUTES IN TRAFFIC: WOULD NEVER DOZE
HOW LIKELY ARE YOU TO NOD OFF OR FALL ASLEEP WHILE SITTING INACTIVE IN A PUBLIC PLACE: WOULD NEVER DOZE
HOW LIKELY ARE YOU TO NOD OFF OR FALL ASLEEP WHILE SITTING QUIETLY AFTER LUNCH WITHOUT ALCOHOL: SLIGHT CHANCE OF DOZING
HOW LIKELY ARE YOU TO NOD OFF OR FALL ASLEEP WHEN YOU ARE A PASSENGER IN A CAR FOR AN HOUR WITHOUT A BREAK: MODERATE CHANCE OF DOZING
ESS TOTAL SCORE: 7
HOW LIKELY ARE YOU TO NOD OFF OR FALL ASLEEP WHILE LYING DOWN TO REST IN THE AFTERNOON WHEN CIRCUMSTANCES PERMIT: MODERATE CHANCE OF DOZING

## (undated) DEVICE — GLOVE ORANGE PI 8   MSG9080

## (undated) DEVICE — MICRO SAGITTAL BLADE (9.5 X 0.4 X 25.5MM)

## (undated) DEVICE — SUTURE VCRL SZ 5-0 L18IN ABSRB UD P-3 L13MM 3/8 CIR PRIM J493H

## (undated) DEVICE — PROTECTOR ULN NRV PUR FOAM HK LOOP STRP ANATOMICALLY

## (undated) DEVICE — APPLICATOR PREP 26ML 0.7% IOD POVACRYLEX 74% ISO ALC ST

## (undated) DEVICE — JEWISH HOSPITAL TURNOVER KIT: Brand: MEDLINE INDUSTRIES, INC.

## (undated) DEVICE — HYPODERMIC SAFETY NEEDLE: Brand: MAGELLAN

## (undated) DEVICE — SUTURE VCRL + SZ 4-0 L18IN ABSRB UD L19MM PS-2 3/8 CIR PRIM VCP496H

## (undated) DEVICE — STOCKINETTE,IMPERVIOUS,12X48,STERILE: Brand: MEDLINE

## (undated) DEVICE — BANDAGE COMPR W6INXL12FT SMOOTH FOR LIMB EXSANG ESMARCH

## (undated) DEVICE — E-Z CLEAN, NON-STICK, PTFE COATED, ELECTROSURGICAL BLADE ELECTRODE, 2.5 INCH (6.35 CM): Brand: EZ CLEAN

## (undated) DEVICE — PLATE ES AD W 9FT CRD 2

## (undated) DEVICE — MEDICINE CUP, GRADUATED, STER: Brand: MEDLINE

## (undated) DEVICE — ZIMMER® STERILE DISPOSABLE TOURNIQUET CUFF WITH PLC, DUAL PORT, DUAL BLADDER, 18 IN. (46 CM)

## (undated) DEVICE — PACK PROCEDURE SURG EXTREMITY MFFOP CUST

## (undated) DEVICE — SOLUTION IRRIG 1000ML 0.9% SOD CHL USP POUR PLAS BTL

## (undated) DEVICE — DBD-BIOPSY PUNCH,STERILE,DISP,4MM,50EACH: Brand: MEDLINE

## (undated) DEVICE — SUTURE ETHLN SZ 3-0 L18IN NONABSORBABLE BLK PS-2 L19MM 3/8 1669H

## (undated) DEVICE — STERILE PVP: Brand: MEDLINE INDUSTRIES, INC.

## (undated) DEVICE — BANDAGE COMPR W4INXL12FT E DISP ESMARCH EVEN

## (undated) DEVICE — PADDING CAST W4INXL4YD ST COT RAYON MICROPLEATED HIGHLY

## (undated) DEVICE — TRAY PREP DRY W/ PREM GLV 2 APPL 6 SPNG 2 UNDPD 1 OVERWRAP

## (undated) DEVICE — GAUZE,SPONGE,4"X4",8PLY,STRL,LF,10/TRAY: Brand: MEDLINE

## (undated) DEVICE — BANDAGE GZ W2XL75IN ST RAYON POLY CNFRM STRTCH LTWT

## (undated) DEVICE — SYRINGE, LUER LOCK, 10ML: Brand: MEDLINE

## (undated) DEVICE — COVER,TABLE,HEAVY DUTY,77"X90",STRL: Brand: MEDLINE

## (undated) DEVICE — SPONGE,LAP,18"X18",DLX,XR,ST,5/PK,40/PK: Brand: MEDLINE

## (undated) DEVICE — HANDPIECE SUCTION TUBING INTERPULSE 10FT

## (undated) DEVICE — SUTURE MCRYL SZ 5-0 L18IN ABSRB UD L13MM P-3 3/8 CIR PRIM Y493G

## (undated) DEVICE — INTENDED FOR TISSUE SEPARATION, AND OTHER PROCEDURES THAT REQUIRE A SHARP SURGICAL BLADE TO PUNCTURE OR CUT.: Brand: BARD-PARKER ® STAINLESS STEEL BLADES

## (undated) DEVICE — MICRO SAGITTAL BLADE (9.4 X 0.4 X 26.2MM)

## (undated) DEVICE — ESMARK: Brand: DEROYAL

## (undated) DEVICE — COVER,MAYO STAND,XL,STERILE: Brand: MEDLINE

## (undated) DEVICE — PADDING CAST W4INXL4YD HIGHLY ABSRB THAN COT EZ APPL

## (undated) DEVICE — COAXIAL HIGH FLOW TIP WITH SOFT SHIELD

## (undated) DEVICE — TOWEL,OR,DSP,ST,BLUE,STD,6/PK,12PK/CS: Brand: MEDLINE

## (undated) DEVICE — MERCY FAIRFIELD TURNOVER KIT: Brand: MEDLINE INDUSTRIES, INC.

## (undated) DEVICE — BNDG,ELSTC,MATRIX,STRL,4"X5YD,LF,HOOK&LP: Brand: MEDLINE

## (undated) DEVICE — COVER LT HNDL BLU PLAS

## (undated) DEVICE — C-ARM: Brand: UNBRANDED

## (undated) DEVICE — DRAPE,U/ SHT,SPLIT,PLAS,STERIL: Brand: MEDLINE

## (undated) DEVICE — INTENDED FOR TISSUE SEPARATION, AND OTHER PROCEDURES THAT REQUIRE A SHARP SURGICAL BLADE TO PUNCTURE OR CUT.: Brand: BARD-PARKER ® CARBON RIB-BACK BLADES

## (undated) DEVICE — STRIP,CLOSURE,WOUND,MEDI-STRIP,1/2X4: Brand: MEDLINE

## (undated) DEVICE — MASTISOL ADHESIVE LIQ 2/3ML

## (undated) DEVICE — Z INACTIVE NO SUPPLIER SOLUTIONIRRIG 3000ML 0.9% SOD CHL FLX CONT [79720808] [HOSPIRA WORLDWIDE INC]

## (undated) DEVICE — SWAB CULTURET AMIES DBL

## (undated) DEVICE — LOOP,VESSEL,MAXI,BLUE,2/PK,STERILE: Brand: MEDLINE

## (undated) DEVICE — T-DRAPE,EXTREMITY,STERILE: Brand: MEDLINE

## (undated) DEVICE — SOLUTION IV 1000ML 0.9% SOD CHL

## (undated) DEVICE — SUTURE VCRL SZ 3-0 L27IN ABSRB UD L19MM PS-2 3/8 CIR PRIM J427H

## (undated) DEVICE — Z INACTIVE USE 2863696 DRESSING PETRO W3XL8IN N ADH OIL EMUL GZ CURAD

## (undated) DEVICE — PODIATRY PK

## (undated) DEVICE — LIGHT HANDLE: Brand: DEVON

## (undated) DEVICE — GAUZE,SPONGE,4"X4",16PLY,XRAY,STRL,LF: Brand: MEDLINE

## (undated) DEVICE — SUTURE VCRL + SZ 3-0 L27IN ABSRB UD CT-2 L26MM 1/2 CIR TAPR VCP232H

## (undated) DEVICE — PRECISION THIN (9.0 X 0.38 X 31.0MM)